# Patient Record
Sex: FEMALE | Race: WHITE | NOT HISPANIC OR LATINO | Employment: FULL TIME | URBAN - METROPOLITAN AREA
[De-identification: names, ages, dates, MRNs, and addresses within clinical notes are randomized per-mention and may not be internally consistent; named-entity substitution may affect disease eponyms.]

---

## 2017-02-09 ENCOUNTER — OFFICE VISIT (OUTPATIENT)
Dept: ORTHOPEDICS | Facility: CLINIC | Age: 52
End: 2017-02-09
Payer: COMMERCIAL

## 2017-02-09 VITALS — HEART RATE: 69 BPM | DIASTOLIC BLOOD PRESSURE: 97 MMHG | SYSTOLIC BLOOD PRESSURE: 149 MMHG | OXYGEN SATURATION: 98 %

## 2017-02-09 DIAGNOSIS — M71.9 DISORDER OF BURSAE AND TENDONS IN SHOULDER REGION: Primary | ICD-10-CM

## 2017-02-09 DIAGNOSIS — M67.919 DISORDER OF BURSAE AND TENDONS IN SHOULDER REGION: Primary | ICD-10-CM

## 2017-02-09 PROCEDURE — 99213 OFFICE O/P EST LOW 20 MIN: CPT | Mod: 25 | Performed by: ORTHOPAEDIC SURGERY

## 2017-02-09 PROCEDURE — 20610 DRAIN/INJ JOINT/BURSA W/O US: CPT | Mod: LT | Performed by: ORTHOPAEDIC SURGERY

## 2017-02-09 ASSESSMENT — PAIN SCALES - GENERAL: PAINLEVEL: MODERATE PAIN (4)

## 2017-02-09 NOTE — MR AVS SNAPSHOT
After Visit Summary   2017    Mary Sims    MRN: [id number]           Patient Information     Date Of Birth          1965        Visit Information        Provider Department      2017 1:00 PM Dorina Rodriguez MD [facility]        Today's Diagnoses     Disorder of bursae and tendons in shoulder region    -  1      Care Instructions    Thanks for coming today.  Or[facility] Clinic  [address] [place] [address]    To schedule future appointments in Ortho Clinic, you may call [phone].    To schedule ordered imaging by your provider:   Call Central Imaging [phone]    To schedule an injection ordered by your provider:  Call Central Imaging Injection scheduling line: [phone]  [email or web address] available online at:  [email or web address]    Please call if any further questions or concerns ([phone]).  Clinic hours 8 am to 5 pm.    Return to clinic (call) if symptoms worsen or fail to improve.          Follow-ups after your visit        Your next 10 appointments already scheduled     2017  9:30 AM CST   Return Visit with Dorina Rodriguez MD   [facility] ([facility])    [address] [place] [id number] [phone]              Who to contact     If you have questions or need follow up information about today's clinic visit or your schedule please contact [facility] directly at [phone].  Normal or non-critical lab and imaging results will be communicated to you by MyChart, letter or phone within 4 business days after the clinic has received the results. If you do not hear from us within 7 days, please contact the clinic through [employer] or phone. If you have a critical or abnormal lab result, we will notify you by phone as soon as possible.  Submit refill requests through [employer] or call your pharmacy and they will forward the refill  request to us. Please allow 3 business days for your refill to be completed.          Additional Information About Your Visit        [employer] Information     [employer] gives you secure access to your electronic health record. If you see a primary care provider, you can also send messages to your care team and make appointments. If you have questions, please call your primary care clinic.  If you do not have a primary care provider, please call [phone] and they will assist you.      [employer] is an electronic gateway that provides easy, online access to your medical records. With [employer], you can request a clinic appointment, read your test results, renew a prescription or communicate with your care team.     To access your existing account, please contact your [facility] or call [phone] for assistance.        Care EveryWhere ID     This is your Care EveryWhere ID. This could be used by other organizations to access your [facility] medical records  [id number]        Your Vitals Were     Pulse Last Period Pulse Oximetry             69 08/04/2015 (Approximate) 98%          Blood Pressure from Last 3 Encounters:   02/09/17 (!) 149/97   12/30/16 140/80   10/20/16 143/85    Weight from Last 3 Encounters:   12/30/16 88.9 kg (196 lb)   10/20/16 88 kg (194 lb)   10/18/16 85.7 kg (189 lb)              We Performed the Following     DRAIN/INJECT LARGE JOINT/BURSA     METHYLPREDNISOLONE 40 MG INJ        Primary Care Provider Office Phone # Fax #    Jazmin Baylee Ramírez PA-C [phone]       [facility] PHYSIC[facility] [address] [facility] [address]  [facility] [address]        Thank you!     Thank you for choosing [facility]  for your care. Our goal is always to provide you with excellent care. Hearing back from our patients is one way we can continue to improve our services. Please take a few minutes to complete the written survey that you may receive in the mail  after your visit with us. Thank you!             Your Updated Medication List - Protect others around you: Learn how to safely use, store and throw away your medicines at www.disposemymeds.org.          This list is accurate as of: 2/9/17 11:59 PM.  Always use your most recent med list.                   Brand Name Dispense Instructions for use    ferrous sulfate 325 (65 FE) MG tablet    IRON    180 tablet    Take 1 tablet (325 mg) by mouth 2 times daily       levothyroxine 88 MCG tablet    SYNTHROID/LEVOTHROID    90 tablet    TAKE 1 TABLET (88 MCG) BY MOUTH DAILY       metoprolol 25 MG 24 hr tablet    TOPROL-XL    90 tablet    Take 1 tablet (25 mg) by mouth daily

## 2017-02-09 NOTE — NURSING NOTE
"Mary Sims's goals for this visit include: Follow up with left shoulder pain.   She requests these members of her care team be copied on today's visit information: yes    PCP: Jazmin Ramírez    Referring Provider:  ESTABLISHED PATIENT  No address on file    Chief Complaint   Patient presents with     RECHECK     Shoulder left     Patient recieved a Subacromial Bursa Cortisone injection on 10/20/2016. Had a relief from the pain for three months. Pain is now becoming worse.        Initial /97 mmHg  Pulse 69  SpO2 98%  LMP 08/04/2015 (Approximate) Estimated body mass index is 33.38 kg/(m^2) as calculated from the following:    Height as of 10/20/16: 1.632 m (5' 4.25\").    Weight as of 12/30/16: 88.905 kg (196 lb).  Medication Reconciliation: complete    "

## 2017-02-09 NOTE — PATIENT INSTRUCTIONS
Thanks for coming today.  Ortho[facility] Medicine Clinic  [address] [place] [address]    To schedule future appointments in Ortho Clinic, you may call [phone].    To schedule ordered imaging by your provider:   Call Central Imaging [phone]    To schedule an injection ordered by your provider:  Call Central Imaging Injection scheduling line: [phone]  [email or web address] available online at:  [email or web address]    Please call if any further questions or concerns ([phone]).  Clinic hours 8 am to 5 pm.    Return to clinic (call) if symptoms worsen or fail to improve.

## 2017-02-24 NOTE — PROGRESS NOTES
CHIEF CONCERN:  Left shoulder pain.      HISTORY OF PRESENT ILLNESS:  Ms. Sims is a 51-year-old female who returns today for her left shoulder pain.  She notes that the injection she received on 10/20/2016 provided her significant relief for 3 months.  She states that when it was working for 3 months she had zero pain.  Now her discomfort is returning.  She has trouble with reaching away from her and notes pain over the anterior aspect of the shoulder.      PHYSICAL EXAMINATION:  On examination today, the patient is a pleasant adult female in no acute distress.  She articulates and communicates appropriately with normal affect.   Respirations are even and unlabored.   FOCUSED UPPER EXTREMITY EXAM:  Inspection of the shoulder reveals no warmth, atrophy or erythema.  She has intact sensation and motor exam without deficits.  Her left shoulder active range of motion is forward elevation to 165, external rotation at the side to 90 and internal rotation to L2 with pain.  She has some tenderness to palpation over the biceps groove.  She has a grossly positive Speed's today.  She has no tenderness to palpation over the AC joint.      IMAGING:  None  new.      ASSESSMENT:   1.  Left partial thickness supraspinatus tear.   2.  Left long head biceps disease.      RECOMMENDATIONS:  I reviewed with Ms. Sims that we could repeat her injection today.  I think a pure subacromial injection would be reasonable given her exam today.  We discussed the risks and benefits of that injection.  She would like to proceed with that.  She will then follow up on an as-needed basis.      PROCEDURE:  The left shoulder was sterilely prepped after confirming correct patient and site with date of birth and imaging.  I then injected 4 cc of 1% plain lidocaine and 40 mg of Depo-Medrol into the subacromial space.  She tolerated the injection well.

## 2017-02-27 ENCOUNTER — OFFICE VISIT (OUTPATIENT)
Dept: ORTHOPEDICS | Facility: CLINIC | Age: 52
End: 2017-02-27
Payer: COMMERCIAL

## 2017-02-27 ENCOUNTER — TELEPHONE (OUTPATIENT)
Dept: ORTHOPEDICS | Facility: CLINIC | Age: 52
End: 2017-02-27

## 2017-02-27 VITALS — HEART RATE: 74 BPM | OXYGEN SATURATION: 100 % | SYSTOLIC BLOOD PRESSURE: 129 MMHG | DIASTOLIC BLOOD PRESSURE: 89 MMHG

## 2017-02-27 DIAGNOSIS — M75.112 INCOMPLETE TEAR OF LEFT ROTATOR CUFF: Primary | ICD-10-CM

## 2017-02-27 PROCEDURE — 99213 OFFICE O/P EST LOW 20 MIN: CPT | Performed by: ORTHOPAEDIC SURGERY

## 2017-02-27 ASSESSMENT — PAIN SCALES - GENERAL: PAINLEVEL: EXTREME PAIN (8)

## 2017-02-27 NOTE — NURSING NOTE
"Mary Sims's goals for this visit include: Discuss different surgery options   She requests these members of her care team be copied on today's visit information: yes    PCP: Jazmin Ramírez    Referring Provider:  ESTABLISHED PATIENT  No address on file    Chief Complaint   Patient presents with     RECHECK     Follow up with left shoulder pain. Patient states that the Cortisone injection didnt help at all. Mary would like to discuss different surgery routes.        Initial /89 (BP Location: Left arm, Patient Position: Chair, Cuff Size: Adult Large)  Pulse 74  LMP 08/04/2015 (Approximate)  SpO2 100% Estimated body mass index is 33.38 kg/(m^2) as calculated from the following:    Height as of 10/20/16: 1.632 m (5' 4.25\").    Weight as of 12/30/16: 88.9 kg (196 lb).  Medication Reconciliation: complete    "

## 2017-02-27 NOTE — TELEPHONE ENCOUNTER
Procedure: Left  arthroscopic rotator cuff repair, subacromial decompression, open biceps tenodesis  Facility: [facility]  Length: 90 minute(s)  Surgeon: Michael ORTIZ  Anesthesia: Choice  BMI: There is no height or weight on file to calculate BMI. (If over 43 for general or 45 for MAC cannot be scheduled at  [facility])   Pre-op Appointments needed: H&P within 30 days of surgery  Post-op appointments needed:2 weeks   provider only 6 weeks   provider only   needed:  No  Surgery packet/instructions given to patient?  Yes   Special Considerations:     Raul Sanchez RN

## 2017-02-27 NOTE — PATIENT INSTRUCTIONS
[facility]  [address] [place] [id number]  Phone [phone]  Fax [phone]    SURGICAL INFORMATION & INSTRUCTIONS  Dr. Dorina Rodriguez  Name of Surgery: Left arthroscopic rotator cuff repair, subacromial decompression, open biceps tenodesis  Date of Surgery:   A surgery scheduler will contact you within 1 week of your office visit with the surgeon.  If you don't receive a phone call, please call [phone].  Arrival Time:   Time of Surgery:    Please arrive early so that we can prepare you for surgery, if you arrive later than your scheduled arrival time your surgery may be cancelled.  Please note that scheduled times may change.    Location of Surgery:   ?  [facility]  5th Floor   [address] [place] [address]  Phone [phone]  Fax [phone]  [email or web address]  Prior to surgery  ?   Call your insurance company   Ask if you need pre-approval for your surgery.  If pre-approval is needed, please call our surgery scheduler for assistance with the pre-approval process.   If you do not have insurance, please let us know.     ?   Schedule an appointment with a Primary Care Provider for a Pre-Op Physical.  This should be done within 30 days of surgery  If you do not have a primary care provider, you may call [facility] at [phone], for an appointment.  Please have your office note and any labs or tests faxed to the facility where you are having surgery. Please be sure to request a copy of your pro-op physical and bring it with you on the day of surgery.      Tell your provider if you have any of the following:   - IF you have a pacemaker or an ICD (implanted cardiac defibrillator). If you do, please bring the ID card with you on the day of surgery  - IF you're a smoker. People who smoke have a higher risk of infection after surgery. Ask your provider how you can quit smoking.  - If you have  diabetes, work with your provider to control your blood sugar. If its not well-controlled, we may need to delay surgery (or you may have problems healing afterward).  - If your surgeon asks you to see your dentist: You'll need to complete any dental work before surgery. Your dentist must send a letter to your surgery center saying it's ok to do the surgery.  ?   Pre-Op Phone Call  You will receive a pre-op phone call 1-3 days before surgery to review your eating and drinking restrictions, review medications, and confirm surgery times.      ?   7-10 days BEFORE surgery  ? Stop taking aspirin, Plavix or aspirin products 10 days before surgery or as directed by your doctor.    ? Stop taking non-steroidal anti-inflammatory medications (Motrin, Naprosyn, Ibuprofen, Aleve, Advil) 1 week before surgery or as directed by your surgeon.  This will reduce the risk of bleeding during surgery.    ? Stop taking fish oil (Omega-3-fatty acid) 1 week before surgery.    ? It is OK to take acetaminophen (Tylenol) up until 2 hours prior to surgery.    ? Take prescription medications as directed by your doctor.  Discuss which medications to take or hold prior to your surgery, with your primary care doctor.      ? If you have diabetes, ask your primary care doctor or endocrinologist how you should take your medication(s).  ?   24 hours BEFORE surgery    ? Stop drinking alcohol (beer, wine, liquor) at least 24-hours before and after your surgery.     ?   Evening BEFORE surgery      You may eat a normal meal the night before surgery, but eat nothing after midnight.       Take a shower - to help wash away bacteria (germs) from your skin.  It s normal to have bacteria on your skin and skin protects us from these germs.  When you have surgery, we cut the skin.  Sometimes germs get into the cuts and cause infection (illness caused by germs).  By following the showering instructions and using the special soap, you will lower the number of germs  on your skin.  This decreases your chance of an infection.    o Buy or get 8 ounces of antiseptic surgical soap called 4% CHG.  Common brands of this soap are Hibiclens and Exidine.      o You can find it this soap at your local pharmacy, clinic or retail store.  If you have trouble finding it, ask your pharmacist to help you find the right substitute.      o Do not shave within 12 inches of your incision (surgical cut) area for at least 3 days before surgery.  Shaving can make small cuts in the skin.  This puts you at a higher risk of infection.  Items you will need for each shower:     Newly washed towel    4 ounces of one of the recommended soaps    Follow these instructions, the evening before surgery       Wash your hair and body with your regular shampoo and soap. Make sure you rinse the shampoo and soap from your hair and body.      Using clean hands, apply about 2 ounces of soap gently on your skin from the neck to your toes. Use on your groin area last. Do not use this soap on your face or head. If you get any soap in your eyes, ears or mouth, rinse right away.       Repeat step 2. It is very important to let the soap stay on your skin for at least 1 minute.       Rinse well and dry off using a clean towel.  If you feel any tingling, itching or other irritation, rinse right away. It is normal to feel some coolness on the skin after using the antiseptic soap. Your skin may feel a bit dry after the shower, but do not use any lotions, creams or moisturizers. Do not use hair spray or other products in your hair.      Dress in freshly washed clothes or pajamas. Use fresh pillowcases and sheets on your bed.    ?   Day of Surgery    You may drink clear liquids up to 2 hours before surgery or as directed by your surgeon.  Clear liquids include: Water, Pedialyte, Gatorade, apple juice and liquids you can read through. Please avoid liquids that are red or orange in color.     Do NOT drink: milk, coffee, liquids that  have pulp, orange juice, and lemonade or tomato juice.     Do NOT chew gum, chew tobacco or suck on hard candy.      Take another shower            Follow these instructions:        Wash your hair and body with your regular shampoo and soap. Make sure you rinse the shampoo and soap from your hair and body.      Using clean hands, apply about 2 ounces of soap gently on your skin from the neck to your toes. Use on your groin area last. Do not use this soap on your face or head. If you get any soap in your eyes, ears or mouth, rinse right away.       Repeat step 2. It is very important to let the soap stay on your skin for at least 1 minute.       Rinse well and dry off using a clean towel.  If you feel any tingling, itching or other irritation, rinse right away. It is normal to feel some coolness on the skin after using the antiseptic soap. Your skin may feel a bit dry after the shower, but do not use any lotions, creams or moisturizers. Do not use hair spray or other products in your hair.      Dress in freshly washed clothes.    Do not wear deodorant, cologne, lotion, makeup, nail polish or jewelry to surgery.    ?   If there is any change in your health PRIOR to your surgery, please contact your surgeon's office.  Such as a fever, cold, cough, rash, etc     ? Arrange for someone to drive you home after surgery.    will need to be a responsible adult (18 years or older) that will provide transportation to and from surgery and stay in the waiting room during your surgery. You may not drive yourself or take public transportation to and from surgery.    ?   Arrange for someone to stay with you for 24 hours after you go home.   This person must be a responsible adult (18 years or older).    ?   Bring these items to the hospital/surgery center:   ? Insurance card(s)  ? Money for parking and co-pays, if needed  ? A list of all the medications you take, including vitamins, minerals, herbs and over the counter  medications.    ? A copy of your Advance Health Care Directive, if you have one.  This tells us what treatment(s) you would or would not want, and who would make health care decisions, if you could no longer speak for yourself.    ? A case for glasses, contact lenses, hearing aids or dentures.   ? Your inhaler or CPAP machine, if you use these at home    ?   Leave extra cash, jewelry and other valuables at home.       ?   Other information:     Sleep Apnea: Let your nurse know if you have a history of sleep apnea, only if you are having surgery at the [place].    When you arrive for surgery  When you get to the surgery center/hospital, you will:    Check in. If you are under age 18, you must be with a parent or legal guardian.      Sign consent forms, if you haven t already. These forms state that you know the risks and benefits of surgery. When you sign the forms, you give us permission to do the surgery. Do not sign them unless you understand what will happen during and after your surgery. If you have any questions about your surgery, ask to speak with your doctor before you sign the forms. If you don t understand the answers, ask again.      Receive a copy of the Patient s Bill of Rights. If you do not receive a copy, please ask for one.      Change into hospital clothes. Your belongings will be placed in a bag. We will return them to you after surgery.      Meet with the anesthesia provider. He or she will tell you what kind of anesthesia (medicine) will be used to keep you comfortable during surgery.      Remember: it s okay to remind doctors and nurses to wash their hands before touching you.      In most cases, your surgeon will use a marker to write his or her initials on the surgery site. This ensures that the exact site is operated on.      For safety reasons, we will ask you the same questions many times. For example, we may ask your name and birth date over and over again.      Friends  and family can stay with you until it s time for surgery. While you re in surgery, they will be in the waiting area. Please note that cell phones are not allowed in some patient care areas.      If you have questions about what will happen in the operating room, talk to your care team.      You will meet with an anesthesiologist, before your surgery.  He or she may reference types of anesthesia commonly used for surgeries:     General:  This involves the use of an IV for injection of medication and anesthesia. You are put into a sleep and have a breathing tube to assist you with breathing.    Sedation:  You are asleep, but not so deply that you need a breathing tube.     Local or Regional: a nerve is injected to numb the surgical area.    Spinal: you are numbed from the waist down with medicine injected into your back.    Femoral Nerve Block:  Anesthesia injected into the groin of leg which you are having surgery on.      After surgery  We will move you to a recovery room, where we will watch you closely. If you have any pain or discomfort, tell your nurse. He or she will try to make you comfortable.      If you are staying overnight, we will move you to your hospital room after you are awake.      If you are going home, we will move you to another room. Friends and family may be able to join you. The length of time you spend in recovery depend on the type of medicine you received, your medical condition, the type of surgery you had, or your response to the anesthesia given during your procedure.      When you are discharged from the recovery room, the nurses will review instructions with you and your caregiver.      Please wash your hands every time you touch the wound or change bandages or dressings.      Do not submerge the wound in water.  You may not use a bathtub or hot tub until the wound is closed. The wait time frame is generally 2-3 weeks, but any open area can be a source of incoming bacteria, so it is  better to be on the safe side and avoid water submersion until your wound is fully healed.  Keep all dressings clean and dry.       If you had surgery on your arm or leg, elevate it as much as possible to help reduce swelling.      You may put ice on the surgical area for comfort, keeping ice on area for up to 20 minutes then off for 40 minutes.  You may do this the first few days after surgery to help reduce pain and swelling.        Drink at least 8-10 glasses of liquid each day to help your body heal.      Keep your lungs clear by coughing and taking deep breaths every couple hours.  This is especially important the first 48 hours after surgery.      Notify your doctor if you have any of the following:   o Fever of 101 F or higher  o Numbness and/or tingling  o Increased pain, redness or swelling  o Drainage from wound  o Prolonged or uncontrolled bleeding  o Difficulty with movement  ?  Physical Therapy  Think about where you would like to have physical therapy (PT) after your surgery. You will need to continue PT soon after being discharged from the hospital unless specifically told by your care team to wait until follow up in clinic. If you have questions regarding this, please contact the orthopedic clinic.     Follow-up Appointments, in Clinic  If you don't already have an appointment scheduled, please call to set up an appointment at [phone].    ?   Post-Op appointment with provider. (2 and 6 weeks)    Dealing with pain  A nurse will check your comfort level often during your stay. He or she will work with you to manage your pain.  It s expected that you will have pain after surgery.  Our goal is to reduce or minimize pain by:     Remember pain is real. There are many ways to control pain. We can help you decide what works best for you.    Ask for pain medicine when you need it.  Don t try to  tough it out --this can make you feel worse. Always take your medicine as ordered.    Medicine doesn t work  the same for everyone. If your medicine isn t working, tell your nurse. There may be other medicines or treatments we can try.    Medication Refills.  If you need refills on your pain medication, please call the clinic as soon as possible.  It may take 72-business hours to obtain a refill.  Refills must be picked up at check-in 2, [facility] or mailed to a pharmacy of your choice.      It is expected that you will wean off the pain medications in a timely manner.     Constipation is a common side effect of pain medication, decreased activity and anesthesia from surgery.  Take a stool softener as prescribed by your doctor at the time of discharge.  You may also use over the counter medications as needed.  Be sure to increase your fiber (fruits and vegetables) and your water intake.      Please call the clinic at [phone], if you experience any problems or have questions.  If you are having an emergency, always call [phone] or seek immediate evaluation at the Emergency Room.    Thank you for selecting the [facility] for your care!  ---------------------------------------------

## 2017-02-27 NOTE — MR AVS SNAPSHOT
After Visit Summary   2/27/2017    Mary Sims    MRN: [id number]           Patient Information     Date Of Birth          1965        Visit Information        Provider Department      2/27/2017 9:30 AM Dorina Rodriguez MD [facility]        Today's Diagnoses     Incomplete tear of left rotator cuff    -  1      Care Instructions      [facility]  [address] [place] [address]  Phone [phone]  Fax [phone]    SURGICAL INFORMATION & INSTRUCTIONS  Dr. Dorina Rodriguez  Name of Surgery: Left arthroscopic rotator cuff repair, subacromial decompression, open biceps tenodesis  Date of Surgery:   A surgery scheduler will contact you within 1 week of your office visit with the surgeon.  If you don't receive a phone call, please call [phone].  Arrival Time:   Time of Surgery:    Please arrive early so that we can prepare you for surgery, if you arrive later than your scheduled arrival time your surgery may be cancelled.  Please note that scheduled times may change.    Location of Surgery:   ?  [facility]  5th Floor   [address] [place] [address]  Phone [phone]  Fax [phone]  [email or web address]  Prior to surgery  ?   Call your insurance company   Ask if you need pre-approval for your surgery.  If pre-approval is needed, please call our surgery scheduler for assistance with the pre-approval process.   If you do not have insurance, please let us know.     ?   Schedule an appointment with a Primary Care Provider for a Pre-Op Physical.  This should be done within 30 days of surgery  If you do not have a primary care provider, you may call [facility] at [phone], for an appointment.  Please have your office note and any labs or tests faxed to the facility where you are having surgery. Please be sure to request a copy of your pro-op physical and bring it  with you on the day of surgery.      Tell your provider if you have any of the following:   - IF you have a pacemaker or an ICD (implanted cardiac defibrillator). If you do, please bring the ID card with you on the day of surgery  - IF you're a smoker. People who smoke have a higher risk of infection after surgery. Ask your provider how you can quit smoking.  - If you have diabetes, work with your provider to control your blood sugar. If its not well-controlled, we may need to delay surgery (or you may have problems healing afterward).  - If your surgeon asks you to see your dentist: You'll need to complete any dental work before surgery. Your dentist must send a letter to your surgery center saying it's ok to do the surgery.  ?   Pre-Op Phone Call  You will receive a pre-op phone call 1-3 days before surgery to review your eating and drinking restrictions, review medications, and confirm surgery times.      ?   7-10 days BEFORE surgery  ? Stop taking aspirin, Plavix or aspirin products 10 days before surgery or as directed by your doctor.    ? Stop taking non-steroidal anti-inflammatory medications (Motrin, Naprosyn, Ibuprofen, Aleve, Advil) 1 week before surgery or as directed by your surgeon.  This will reduce the risk of bleeding during surgery.    ? Stop taking fish oil (Omega-3-fatty acid) 1 week before surgery.    ? It is OK to take acetaminophen (Tylenol) up until 2 hours prior to surgery.    ? Take prescription medications as directed by your doctor.  Discuss which medications to take or hold prior to your surgery, with your primary care doctor.      ? If you have diabetes, ask your primary care doctor or endocrinologist how you should take your medication(s).  ?   24 hours BEFORE surgery    ? Stop drinking alcohol (beer, wine, liquor) at least 24-hours before and after your surgery.     ?   Evening BEFORE surgery      You may eat a normal meal the night before surgery, but eat nothing after midnight.        Take a shower - to help wash away bacteria (germs) from your skin.  It s normal to have bacteria on your skin and skin protects us from these germs.  When you have surgery, we cut the skin.  Sometimes germs get into the cuts and cause infection (illness caused by germs).  By following the showering instructions and using the special soap, you will lower the number of germs on your skin.  This decreases your chance of an infection.    o Buy or get 8 ounces of antiseptic surgical soap called 4% CHG.  Common brands of this soap are Hibiclens and Exidine.      o You can find it this soap at your local pharmacy, clinic or retail store.  If you have trouble finding it, ask your pharmacist to help you find the right substitute.      o Do not shave within 12 inches of your incision (surgical cut) area for at least 3 days before surgery.  Shaving can make small cuts in the skin.  This puts you at a higher risk of infection.  Items you will need for each shower:     Newly washed towel    4 ounces of one of the recommended soaps    Follow these instructions, the evening before surgery       Wash your hair and body with your regular shampoo and soap. Make sure you rinse the shampoo and soap from your hair and body.      Using clean hands, apply about 2 ounces of soap gently on your skin from the neck to your toes. Use on your groin area last. Do not use this soap on your face or head. If you get any soap in your eyes, ears or mouth, rinse right away.       Repeat step 2. It is very important to let the soap stay on your skin for at least 1 minute.       Rinse well and dry off using a clean towel.  If you feel any tingling, itching or other irritation, rinse right away. It is normal to feel some coolness on the skin after using the antiseptic soap. Your skin may feel a bit dry after the shower, but do not use any lotions, creams or moisturizers. Do not use hair spray or other products in your hair.      Dress in freshly  washed clothes or pajamas. Use fresh pillowcases and sheets on your bed.    ?   Day of Surgery    You may drink clear liquids up to 2 hours before surgery or as directed by your surgeon.  Clear liquids include: Water, Pedialyte, Gatorade, apple juice and liquids you can read through. Please avoid liquids that are red or orange in color.     Do NOT drink: milk, coffee, liquids that have pulp, orange juice, and lemonade or tomato juice.     Do NOT chew gum, chew tobacco or suck on hard candy.      Take another shower            Follow these instructions:        Wash your hair and body with your regular shampoo and soap. Make sure you rinse the shampoo and soap from your hair and body.      Using clean hands, apply about 2 ounces of soap gently on your skin from the neck to your toes. Use on your groin area last. Do not use this soap on your face or head. If you get any soap in your eyes, ears or mouth, rinse right away.       Repeat step 2. It is very important to let the soap stay on your skin for at least 1 minute.       Rinse well and dry off using a clean towel.  If you feel any tingling, itching or other irritation, rinse right away. It is normal to feel some coolness on the skin after using the antiseptic soap. Your skin may feel a bit dry after the shower, but do not use any lotions, creams or moisturizers. Do not use hair spray or other products in your hair.      Dress in freshly washed clothes.    Do not wear deodorant, cologne, lotion, makeup, nail polish or jewelry to surgery.    ?   If there is any change in your health PRIOR to your surgery, please contact your surgeon's office.  Such as a fever, cold, cough, rash, etc     ? Arrange for someone to drive you home after surgery.    will need to be a responsible adult (18 years or older) that will provide transportation to and from surgery and stay in the waiting room during your surgery. You may not drive yourself or take public transportation to and  from surgery.    ?   Arrange for someone to stay with you for 24 hours after you go home.   This person must be a responsible adult (18 years or older).    ?   Bring these items to the hospital/surgery center:   ? Insurance card(s)  ? Money for parking and co-pays, if needed  ? A list of all the medications you take, including vitamins, minerals, herbs and over the counter medications.    ? A copy of your Advance Health Care Directive, if you have one.  This tells us what treatment(s) you would or would not want, and who would make health care decisions, if you could no longer speak for yourself.    ? A case for glasses, contact lenses, hearing aids or dentures.   ? Your inhaler or CPAP machine, if you use these at home    ?   Leave extra cash, jewelry and other valuables at home.       ?   Other information:     Sleep Apnea: Let your nurse know if you have a history of sleep apnea, only if you are having surgery at the [facility].    When you arrive for surgery  When you get to the surgery center/hospital, you will:    Check in. If you are under age 18, you must be with a parent or legal guardian.      Sign consent forms, if you haven t already. These forms state that you know the risks and benefits of surgery. When you sign the forms, you give us permission to do the surgery. Do not sign them unless you understand what will happen during and after your surgery. If you have any questions about your surgery, ask to speak with your doctor before you sign the forms. If you don t understand the answers, ask again.      Receive a copy of the Patient s Bill of Rights. If you do not receive a copy, please ask for one.      Change into hospital clothes. Your belongings will be placed in a bag. We will return them to you after surgery.      Meet with the anesthesia provider. He or she will tell you what kind of anesthesia (medicine) will be used to keep you comfortable during surgery.      Remember: it s  okay to remind doctors and nurses to wash their hands before touching you.      In most cases, your surgeon will use a marker to write his or her initials on the surgery site. This ensures that the exact site is operated on.      For safety reasons, we will ask you the same questions many times. For example, we may ask your name and birth date over and over again.      Friends and family can stay with you until it s time for surgery. While you re in surgery, they will be in the waiting area. Please note that cell phones are not allowed in some patient care areas.      If you have questions about what will happen in the operating room, talk to your care team.      You will meet with an anesthesiologist, before your surgery.  He or she may reference types of anesthesia commonly used for surgeries:     General:  This involves the use of an IV for injection of medication and anesthesia. You are put into a sleep and have a breathing tube to assist you with breathing.    Sedation:  You are asleep, but not so deply that you need a breathing tube.     Local or Regional: a nerve is injected to numb the surgical area.    Spinal: you are numbed from the waist down with medicine injected into your back.    Femoral Nerve Block:  Anesthesia injected into the groin of leg which you are having surgery on.      After surgery  We will move you to a recovery room, where we will watch you closely. If you have any pain or discomfort, tell your nurse. He or she will try to make you comfortable.      If you are staying overnight, we will move you to your hospital room after you are awake.      If you are going home, we will move you to another room. Friends and family may be able to join you. The length of time you spend in recovery depend on the type of medicine you received, your medical condition, the type of surgery you had, or your response to the anesthesia given during your procedure.      When you are discharged from the recovery  room, the nurses will review instructions with you and your caregiver.      Please wash your hands every time you touch the wound or change bandages or dressings.      Do not submerge the wound in water.  You may not use a bathtub or hot tub until the wound is closed. The wait time frame is generally 2-3 weeks, but any open area can be a source of incoming bacteria, so it is better to be on the safe side and avoid water submersion until your wound is fully healed.  Keep all dressings clean and dry.       If you had surgery on your arm or leg, elevate it as much as possible to help reduce swelling.      You may put ice on the surgical area for comfort, keeping ice on area for up to 20 minutes then off for 40 minutes.  You may do this the first few days after surgery to help reduce pain and swelling.        Drink at least 8-10 glasses of liquid each day to help your body heal.      Keep your lungs clear by coughing and taking deep breaths every couple hours.  This is especially important the first 48 hours after surgery.      Notify your doctor if you have any of the following:   o Fever of 101 F or higher  o Numbness and/or tingling  o Increased pain, redness or swelling  o Drainage from wound  o Prolonged or uncontrolled bleeding  o Difficulty with movement  ?  Physical Therapy  Think about where you would like to have physical therapy (PT) after your surgery. You will need to continue PT soon after being discharged from the hospital unless specifically told by your care team to wait until follow up in clinic. If you have questions regarding this, please contact the orthopedic clinic.     Follow-up Appointments, in Clinic  If you don't already have an appointment scheduled, please call to set up an appointment at [phone].    ?   Post-Op appointment with provider. (2 and 6 weeks)    Dealing with pain  A nurse will check your comfort level often during your stay. He or she will work with you to manage your  pain.  It s expected that you will have pain after surgery.  Our goal is to reduce or minimize pain by:     Remember pain is real. There are many ways to control pain. We can help you decide what works best for you.    Ask for pain medicine when you need it.  Don t try to  tough it out --this can make you feel worse. Always take your medicine as ordered.    Medicine doesn t work the same for everyone. If your medicine isn t working, tell your nurse. There may be other medicines or treatments we can try.    Medication Refills.  If you need refills on your pain medication, please call the clinic as soon as possible.  It may take 72-business hours to obtain a refill.  Refills must be picked up at check-in 2, [facility] or mailed to a pharmacy of your choice.      It is expected that you will wean off the pain medications in a timely manner.     Constipation is a common side effect of pain medication, decreased activity and anesthesia from surgery.  Take a stool softener as prescribed by your doctor at the time of discharge.  You may also use over the counter medications as needed.  Be sure to increase your fiber (fruits and vegetables) and your water intake.      Please call the clinic at [phone], if you experience any problems or have questions.  If you are having an emergency, always call [phone] or seek immediate evaluation at the Emergency Room.    Thank you for selecting the [facility] for your care!  ---------------------------------------------        Follow-ups after your visit        Who to contact     If you have questions or need follow up information about today's clinic visit or your schedule please contact [facility] directly at [phone].  Normal or non-critical lab and imaging results will be communicated to you by MyChart, letter or phone within 4 business days after the clinic has received the results. If you do not hear from us within 7  days, please contact the clinic through [employer] or phone. If you have a critical or abnormal lab result, we will notify you by phone as soon as possible.  Submit refill requests through [employer] or call your pharmacy and they will forward the refill request to us. Please allow 3 business days for your refill to be completed.          Additional Information About Your Visit        [employer] Information     [employer] gives you secure access to your electronic health record. If you see a primary care provider, you can also send messages to your care team and make appointments. If you have questions, please call your primary care clinic.  If you do not have a primary care provider, please call [phone] and they will assist you.      [employer] is an electronic gateway that provides easy, online access to your medical records. With [employer], you can request a clinic appointment, read your test results, renew a prescription or communicate with your care team.     To access your existing account, please contact your [facility] or call [phone] for assistance.        Care EveryWhere ID     This is your Care EveryWhere ID. This could be used by other organizations to access your [facility] medical records  [id number]        Your Vitals Were     Pulse Last Period Pulse Oximetry             74 08/04/2015 (Approximate) 100%          Blood Pressure from Last 3 Encounters:   02/27/17 129/89   02/09/17 (!) 149/97   12/30/16 140/80    Weight from Last 3 Encounters:   12/30/16 88.9 kg (196 lb)   10/20/16 88 kg (194 lb)   10/18/16 85.7 kg (189 lb)              We Performed the Following     Julianne-Operative Worksheet        Primary Care Provider Office Phone # Fax #    Jazmin Ramírez PA-C [phone]       [facility] [address]  [facility] [address]        Thank you!     Thank you for choosing [facility]  for your care. Our goal is always  to provide you with excellent care. Hearing back from our patients is one way we can continue to improve our services. Please take a few minutes to complete the written survey that you may receive in the mail after your visit with us. Thank you!             Your Updated Medication List - Protect others around you: Learn how to safely use, store and throw away your medicines at www.disposemymeds.org.          This list is accurate as of: 2/27/17 10:08 AM.  Always use your most recent med list.                   Brand Name Dispense Instructions for use    ferrous sulfate 325 (65 FE) MG tablet    IRON    180 tablet    Take 1 tablet (325 mg) by mouth 2 times daily       levothyroxine 88 MCG tablet    SYNTHROID/LEVOTHROID    90 tablet    TAKE 1 TABLET (88 MCG) BY MOUTH DAILY       metoprolol 25 MG 24 hr tablet    TOPROL-XL    90 tablet    Take 1 tablet (25 mg) by mouth daily

## 2017-02-27 NOTE — PROGRESS NOTES
CHIEF CONCERN:    HISTORY OF PRESENT ILLNESS:    PHYSICAL EXAM:    IMAGING:    ASSESSMENT:    PLAN:CHIEF CONCERN:  Left shoulder pain.      HISTORY OF PRESENT ILLNESS:  Mary returns to clinic to follow up on her left shoulder pain.  Mary received a cortisone shot on 2/9/2017.  She says that did not help nearly as much as her U/S guided biceps groove injection.  She relates that her shoulder is hurting all the time.  It ranges between 4 and 8/10 pain.  She states that it aches all the time.  It is worse by moving it.  Reaching in particular makes it worse.  She takes 2 Aleve and Tylenol at a time.  States this is helping her.  She will awake frequently with pain.  She has tried physical therapy, which did not help.  She got an injection in the fall in her biceps under ultrasound guidance, which helped her.  She relates that she is having trouble exercising because of her shoulder pain.  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  She has pain predominantly on the lateral aspect of her proximal humerus and secondarily over her biceps groove.       PHYSICAL EXAMINATION:  The patient is pleasant, in no acute distress.   Her breathing is nonlabored on room air.   Focused exam of the left upper extremity reveals sensation intact to median, ulnar, radial and axillary nerve distributions.  She can fire her FPL, EPL and intrinsics.  Her radial pulse is +2.  She has forward flexion to 160 degrees.  She has abduction to 90 degrees and got up 160 with my assistance.  She had external rotation to 90 degrees and internal rotation to T7.  She has 4/5 strength in forward flexion and 5/5 strength in external rotation and 5/5 strength for bear hug.  She is tender over her biceps tendon and had a positive Speed's test.      IMAGING:  We reviewed her Left shoulder MRI from 02/17/2016 which showed an intrasubstance/thickness tear of her supraspinatus, tendinosis of her articular biceps  tendon and degenerative changes of her labrum.      ASSESSMENT:   1.  Left shoulder partial thickness tear of the supraspinatus tendon.   2.  Left shoulder long head biceps tendinosis.      PLAN:  We discussed the options with Ms. Sims for her shoulder pain.  These include continued conservative management, injection of PRP and surgery.  We discussed an arthroscopic rotator cuff repair. We also discussed management of the long head of the biceps with either tenotomy or tenodesis.  Due to her job, we recommended tenodesis as she does a lot of work at her job that involves supinating and pronating her forearm.  We discussed the risks and benefits of surgery as well as the likely recovery period which involves wearing a sling for 6 weeks and starting physical therapy between 2 and 4 weeks. She would like to schedule surgery.  She will require a preop physical and preoperative labs.     Dictated by Kat Thapa MD  Orthopaedic Surgery Resident    I have personally examined this patient and have reviewed the clinical presentation and progress note with the resident.  I agree with the treatment plan as outlined.  The plan was formulated with the resident on the day of the resident's note.

## 2017-02-27 NOTE — TELEPHONE ENCOUNTER
Date Scheduled: 4-11-17  Facility: [facility]  Surgeon: Dr. Rodriguez   Post-op appointment scheduled:   Next 5 appointments (look out 90 days)     Apr 27, 2017  8:00 AM CDT   Return Visit with Dorina Rodriguez MD   [facility])    [address] [place] [address] [id number] [phone]            May 22, 2017  8:00 AM CDT   Return Visit with Dorina Rodriguez MD   [facility])    [address] [place] [id number] [phone]                   scheduled?: No  Surgery packet/instructions confirmed received?  Yes  Special Considerations:   Jessica Lujan, Surgery Scheduling Coordinator

## 2017-03-03 DIAGNOSIS — E03.9 HYPOTHYROIDISM: ICD-10-CM

## 2017-03-03 NOTE — TELEPHONE ENCOUNTER
levothyroxine      Last Written Prescription Date: 12/30/16  Last Quantity: 90, # refills: 1  Last Office Visit with [facility], [facility] or [facility] prescribing provider: 9/6/16   Next 5 appointments (look out 90 days)     Mar 13, 2017 10:00 AM CDT   Pre-Op physical with P[facility] ([facility])    [address] [facility] [id number]   [phone]            Apr 27, 2017  8:00 AM CDT   [facility] Visit with Dorina Rodriguez MD   [facility]    [address] [place] [id number] [phone]            May 22, 2017  8:00 AM CDT   Return Visit with Dorina Rodriguez MD   [facility]    [address] [place] [id number] [phone]                   TSH   Date Value Ref Range Status   04/18/2016 0.53 0.40 - 4.00 mU/L Final

## 2017-03-07 RX ORDER — LEVOTHYROXINE SODIUM 88 UG/1
TABLET ORAL
Qty: 90 TABLET | Refills: 0 | Status: SHIPPED | OUTPATIENT
Start: 2017-03-07 | End: 2017-05-15

## 2017-03-07 NOTE — TELEPHONE ENCOUNTER
levothyroxine (SYNTHROID, LEVOTHROID) 88 MCG tablet 90 tablet 1 9/6/2016  No   Sig: TAKE 1 TABLET (88 MCG) BY MOUTH DAILY     Last office visit: 12/30/16    Prescription approved per [facility] Refill Protocol.  Natacha Cheng RN

## 2017-03-13 ENCOUNTER — OFFICE VISIT (OUTPATIENT)
Dept: FAMILY MEDICINE | Facility: CLINIC | Age: 52
End: 2017-03-13
Payer: COMMERCIAL

## 2017-03-13 VITALS
DIASTOLIC BLOOD PRESSURE: 76 MMHG | BODY MASS INDEX: 33.73 KG/M2 | SYSTOLIC BLOOD PRESSURE: 130 MMHG | WEIGHT: 197.6 LBS | HEIGHT: 64 IN | HEART RATE: 71 BPM | TEMPERATURE: 97.4 F | OXYGEN SATURATION: 99 %

## 2017-03-13 DIAGNOSIS — M75.102 NONTRAUMATIC TEAR OF SUPRASPINATUS TENDON, LEFT: ICD-10-CM

## 2017-03-13 DIAGNOSIS — Z01.818 PREOP GENERAL PHYSICAL EXAM: Primary | ICD-10-CM

## 2017-03-13 DIAGNOSIS — E03.9 HYPOTHYROIDISM, UNSPECIFIED TYPE: ICD-10-CM

## 2017-03-13 DIAGNOSIS — D50.9 IRON DEFICIENCY ANEMIA, UNSPECIFIED IRON DEFICIENCY ANEMIA TYPE: ICD-10-CM

## 2017-03-13 DIAGNOSIS — I10 HYPERTENSION GOAL BP (BLOOD PRESSURE) < 140/90: ICD-10-CM

## 2017-03-13 LAB
ANION GAP SERPL CALCULATED.3IONS-SCNC: 7 MMOL/L (ref 3–14)
BUN SERPL-MCNC: 14 MG/DL (ref 7–30)
CALCIUM SERPL-MCNC: 9 MG/DL (ref 8.5–10.1)
CHLORIDE SERPL-SCNC: 105 MMOL/L (ref 94–109)
CO2 SERPL-SCNC: 29 MMOL/L (ref 20–32)
CREAT SERPL-MCNC: 0.73 MG/DL (ref 0.52–1.04)
GFR SERPL CREATININE-BSD FRML MDRD: 83 ML/MIN/1.7M2
GLUCOSE SERPL-MCNC: 80 MG/DL (ref 70–99)
HGB BLD-MCNC: 13.5 G/DL (ref 11.7–15.7)
POTASSIUM SERPL-SCNC: 4.3 MMOL/L (ref 3.4–5.3)
SODIUM SERPL-SCNC: 141 MMOL/L (ref 133–144)
TSH SERPL DL<=0.005 MIU/L-ACNC: 0.46 MU/L (ref 0.4–4)

## 2017-03-13 PROCEDURE — 93000 ELECTROCARDIOGRAM COMPLETE: CPT | Performed by: NURSE PRACTITIONER

## 2017-03-13 PROCEDURE — 36415 COLL VENOUS BLD VENIPUNCTURE: CPT | Performed by: NURSE PRACTITIONER

## 2017-03-13 PROCEDURE — 99214 OFFICE O/P EST MOD 30 MIN: CPT | Performed by: NURSE PRACTITIONER

## 2017-03-13 PROCEDURE — 80048 BASIC METABOLIC PNL TOTAL CA: CPT | Performed by: NURSE PRACTITIONER

## 2017-03-13 PROCEDURE — 85018 HEMOGLOBIN: CPT | Performed by: NURSE PRACTITIONER

## 2017-03-13 PROCEDURE — 84443 ASSAY THYROID STIM HORMONE: CPT | Performed by: NURSE PRACTITIONER

## 2017-03-13 NOTE — PATIENT INSTRUCTIONS
Based on your medical history and these are the current health maintenance or preventive care services that you are due for (some may have been done at this visit)  Health Maintenance Due   Topic Date Due     HEPATITIS C SCREENING  06/04/1981     PAP Q3 YR  03/11/2016         At [facility], we strive to deliver an exceptional experience to you, every time we see you.    If you receive a survey in the mail, please send us back your thoughts. We really do value your feedback.    Your care team's suggested websites for health information:  Www.[employer].org : Up to date and easily searchable information on multiple topics.  Www.medlineplus.gov : medication info, interactive tutorials, watch real surgeries online  Www.familydoctor.org : good info from the Academy of Family Physicians  Www.cdc.gov : public health info, travel advisories, epidemics (H1N1)  Www.aap.org : children's health info, normal development, vaccinations  Www.health.[facility].[place].us : [place] dept of health, public health issues in [place], N1N1    How to contact your care team:   Team [place] [phone]         Pharmacy [phone]    Dr. Rich, Teri Bingham PA-C, Dr. Aems, Gretel SHAH CNP, Tierney Mosqueda PA-C, Dr. Álvarez, and PABLO Deng CNP    Team RNs: Rosy & Danitza      Clinic hours  M-Th 7 am-7 pm   Fri 7 am-5 pm.   Urgent care M-F 11 am-9 pm,   Sat/Sun 9 am-5 pm.  Pharmacy M-Th 8 am-8 pm Fri 8 am-6 pm  Sat/Sun 9 am-5 pm.     All password changes, disabled accounts, or ID changes in [employer]/MyHealth will be done by our Access Services Department.    If you need help with your account or password, call: [phone]. Clinic staff no longer has the ability to change passwords.     Before Your Surgery      Call your surgeon if there is any change in your health. This includes signs of a cold or flu (such as a sore throat, runny nose, cough, rash or fever).    Do not smoke, drink alcohol or take  over the counter medicine (unless your surgeon or primary care doctor tells you to) for the 24 hours before and after surgery.    If you take prescribed drugs: Follow your doctor s orders about which medicines to take and which to stop until after surgery.    Eating and drinking prior to surgery: follow the instructions from your surgeon    Take a shower or bath the night before surgery. Use the soap your surgeon gave you to gently clean your skin. If you do not have soap from your surgeon, use your regular soap. Do not shave or scrub the surgery site.  Wear clean pajamas and have clean sheets on your bed.

## 2017-03-13 NOTE — MR AVS SNAPSHOT
After Visit Summary   3/13/2017    Mary Sims    MRN: [id number]           Patient Information     Date Of Birth          1965        Visit Information        Provider Department      3/13/2017 10:00 AM Janie Riley APRN CNP [facility]        Today's Diagnoses     Preop general physical exam    -  1    Nontraumatic tear of supraspinatus tendon, left        Hypertension goal BP (blood pressure) < 140/90        Hypothyroidism, unspecified type        Iron deficiency anemia, unspecified iron deficiency anemia type          Care Instructions    Based on your medical history and these are the current health maintenance or preventive care services that you are due for (some may have been done at this visit)  Health Maintenance Due   Topic Date Due     HEPATITIS C SCREENING  06/04/1981     PAP Q3 YR  03/11/2016         At [facility], we strive to deliver an exceptional experience to you, every time we see you.    If you receive a survey in the mail, please send us back your thoughts. We really do value your feedback.    Your care team's suggested websites for health information:  Www.[employer].org : Up to date and easily searchable information on multiple topics.  Www.medlineplus.gov : medication info, interactive tutorials, watch real surgeries online  Www.familydoctor.org : good info from the Academy of Family Physicians  Www.cdc.gov : public health info, travel advisories, epidemics (H1N1)  Www.aap.org : children's health info, normal development, vaccinations  Www.health.[facility].[place].us : [place] dept of health, public health issues in [place], N1N1    How to contact your care team:   Team [facility] [phone]         Pharmacy [phone]    Dr. Rich, Teri Bingham PA-C, Dr. Ames, Gretel SHAH CNP, Tierney Mosqueda PA-C, Dr. Álvarez, and PABLO Deng CNP    Team RNs: Rosy Bosch      Clinic hours  M-Th 7 am-7 pm   Fri 7 am-5  pm.   Urgent care M-F 11 am-9 pm,   Sat/Sun 9 am-5 pm.  Pharmacy M-Th 8 am-8 pm Fri 8 am-6 pm  Sat/Sun 9 am-5 pm.     All password changes, disabled accounts, or ID changes in MyChart/MyHealth will be done by our Access Services Department.    If you need help with your account or password, call: [phone]. Clinic staff no longer has the ability to change passwords.     Before Your Surgery      Call your surgeon if there is any change in your health. This includes signs of a cold or flu (such as a sore throat, runny nose, cough, rash or fever).    Do not smoke, drink alcohol or take over the counter medicine (unless your surgeon or primary care doctor tells you to) for the 24 hours before and after surgery.    If you take prescribed drugs: Follow your doctor s orders about which medicines to take and which to stop until after surgery.    Eating and drinking prior to surgery: follow the instructions from your surgeon    Take a shower or bath the night before surgery. Use the soap your surgeon gave you to gently clean your skin. If you do not have soap from your surgeon, use your regular soap. Do not shave or scrub the surgery site.  Wear clean pajamas and have clean sheets on your bed.         Follow-ups after your visit        Your next 10 appointments already scheduled     Apr 11, 2017   Procedure with Dorina Rodriguez MD   [facility] ([facility])    [address] [place] [address]  [address] [place] [id number] [phone]           Located in the Clinics and Surgery Center at [address] [place] [address].   parking is very convenient and highly recommended.  is a $6 flat rate fee.  Both  and self parkers should enter the main arrival plaza from [facility]; parking attendants will direct you based on your parking preference.            Apr 27, 2017  8:00 AM CDT   Return Visit with Dorina Rodriguez MD   [facility]  "[facility] ([facility])    [address] [place] [address] [phone]            May 22, 2017  8:00 AM CDT   Return Visit with Dorina Rodriguez MD   [facility] ([facility])    [address] [address] [address] [phone]              Who to contact     If you have questions or need follow up information about today's clinic visit or your schedule please contact [facility] directly at [phone].  Normal or non-critical lab and imaging results will be communicated to you by [employer], letter or phone within 4 business days after the clinic has received the results. If you do not hear from us within 7 days, please contact the clinic through [employer] or phone. If you have a critical or abnormal lab result, we will notify you by phone as soon as possible.  Submit refill requests through [employer] or call your pharmacy and they will forward the refill request to us. Please allow 3 business days for your refill to be completed.          Additional Information About Your Visit        [employer] Information     [employer] gives you secure access to your electronic health record. If you see a primary care provider, you can also send messages to your care team and make appointments. If you have questions, please call your primary care clinic.  If you do not have a primary care provider, please call [phone] and they will assist you.        Care EveryWhere ID     This is your Care EveryWhere ID. This could be used by other organizations to access your [place] medical records  [id number]        Your Vitals Were     Pulse Temperature Height Last Period Pulse Oximetry BMI (Body Mass Index)    71 97.4  F (36.3  C) (Oral) 5' 3.86\" (1.622 m) 08/04/2015 (Approximate) 99% 34.07 kg/m2       Blood Pressure from Last 3 Encounters:   03/13/17 130/76   02/27/17 129/89   02/09/17 (!) 149/97    Weight from Last 3 Encounters: "   03/13/17 197 lb 9.6 oz (89.6 kg)   12/30/16 196 lb (88.9 kg)   10/20/16 194 lb (88 kg)              We Performed the Following     Basic metabolic panel  (Ca, Cl, CO2, Creat, Gluc, K, Na, BUN)     EKG 12-lead complete w/read - Clinics     Hemoglobin     TSH with free T4 reflex        Primary Care Provider Office Phone # Fax #    Jazmin Baylee Ramírez PA-C [phone]       [facility] [address] [facility] [address]  [facility] [address]        Thank you!     Thank you for choosing [facility]  for your care. Our goal is always to provide you with excellent care. Hearing back from our patients is one way we can continue to improve our services. Please take a few minutes to complete the written survey that you may receive in the mail after your visit with us. Thank you!             Your Updated Medication List - Protect others around you: Learn how to safely use, store and throw away your medicines at www.disposemymeds.org.          This list is accurate as of: 3/13/17 11:08 AM.  Always use your most recent med list.                   Brand Name Dispense Instructions for use    ferrous sulfate 325 (65 FE) MG tablet    IRON    180 tablet    Take 1 tablet (325 mg) by mouth 2 times daily       levothyroxine 88 MCG tablet    SYNTHROID/LEVOTHROID    90 tablet    TAKE 1 TABLET BY MOUTH DAILY       metoprolol 25 MG 24 hr tablet    TOPROL-XL    90 tablet    Take 1 tablet (25 mg) by mouth daily

## 2017-03-13 NOTE — PROGRESS NOTES
[address] [address]  [phone]  Dept: [phone]    PRE-OP EVALUATION:  Today's date: 3/13/2017    Mary Sims (: 1965) presents for pre-operative evaluation assessment as requested by Dorina May.  She requires evaluation and anesthesia risk assessment prior to undergoing surgery/procedure for treatment of Left Arthroscopic Rotator Cuff Repair, Subacromial Decompression, Open Bioceps Tenodesis  .  Proposed procedure: ARTHROSCOPY SHOULDER ROTATOR CUFF REPAIR, SUBACROMIAL DECOMPRESSION, DISTAL CLAVICLE RESECTION, OPEN BICEP TENODESIS REPAIR    Date of Surgery/ Procedure: 2017  Time of Surgery/ Procedure: 10AM  Hospital/Surgical Facility:  OR  Fax number for surgical facility:   Primary Physician: Jazmin Ramírez  Type of Anesthesia Anticipated: General    Patient has a Health Care Directive or Living Will:  NO    1. NO - Do you have a history of heart attack, stroke, stent, bypass or surgery on an artery in the head, neck, heart or legs?  2. NO - Do you ever have any pain or discomfort in your chest?  3. NO - Do you have a history of  Heart Failure?  4. NO - Are you troubled by shortness of breath when: walking on the level, up a slight hill or at night?  5. NO - Do you currently have a cold, bronchitis or other respiratory infection?  6. NO - Do you have a cough, shortness of breath or wheezing?  7. NO - Do you sometimes get pains in the calves of your legs when you walk?  8. NO - Do you or anyone in your family have previous history of blood clots?  9. NO - Do you or does anyone in your family have a serious bleeding problem such as prolonged bleeding following surgeries or cuts?  10. YES - Have you ever had problems with anemia or been told to take iron pills?  After pregnancies  11. NO - Have you had any abnormal blood loss such as black, tarry or bloody stools, or abnormal vaginal bleeding?  12. NO -  Have you ever had a blood transfusion?  13. NO - Have you or any of your relatives ever had problems with anesthesia?  14. NO - Do you have sleep apnea, excessive snoring or daytime drowsiness?  15. NO - Do you have any prosthetic heart valves?  16. NO - Do you have prosthetic joints?  17. NO - Is there any chance that you may be pregnant?      HPI:                                                      Brief HPI related to upcoming procedure: Patient has long history of  Left shoulder pain, has done physical therapy, had shoulder injection 10/2016 that worked well for 3 months but her pain returned, had another injection 2/9/17 but received no pain relief. MRI 2/2016 showed left shoulder partial thickness tear of the supraspinatus tendon, left long head biceps tendinosis. She is scheduled for ARTHROSCOPY SHOULDER ROTATOR CUFF REPAIR, SUBACROMIAL DECOMPRESSION, DISTAL CLAVICLE RESECTION, OPEN BICEP TENODESIS REPAIR on 4/11/17 but she is on a waiting list to see if she can have the procedure done sooner.       See problem list for active medical problems.  Problems all longstanding and stable, except as noted/documented.  See ROS for pertinent symptoms related to these conditions.                                                                                                  .  HYPERTENSION - Patient has longstanding history of mod-severe HTN , currently denies any symptoms referable to elevated blood pressure. Specifically denies chest pain, palpitations, dyspnea, orthopnea, PND or peripheral edema. Blood pressure readings have been in normal range. Current medication regimen is as listed below. Patient denies any side effects of medication.                                                                                                                                                                                          .  ANEMIA - Patient has a recent history of moderate severity anemia, which has not been  symptomatic. Work up to date has revealed iron deficiency anemia. Treatment has been dietary.                                                                                                                   .  HYPOTHYROIDISM - Patient has a longstanding history of chronic Hypothyroidism. Patient has been doing well, noting no tremor, insomnia, hair loss or changes in skin texture. Last TSH value of 0.53 16. Continues to take medications as directed, without adverse reactions or side effects.                                                                                                                                                                                                                        .    MEDICAL HISTORY:                                                      Patient Active Problem List    Diagnosis Date Noted     Nontraumatic tear of supraspinatus tendon, left 2016     Priority: Medium     Obesity 2016     Priority: Medium     WONG (iron deficiency anemia) 2014     Priority: Medium     Hypothyroidism 2011     Priority: Medium     Hypertension goal BP (blood pressure) < 140/90 2011     Priority: Medium     CARDIOVASCULAR SCREENING; LDL GOAL LESS THAN 160 10/31/2010     Priority: Medium      Past Medical History   Diagnosis Date     Graves disease      Hypertension      Hypothyroidism      MEDICAL HISTORY OF -      S/P RADIOACTIVE IODINE     Palpitation      Past Surgical History   Procedure Laterality Date     C  delivery only       Surgical history of -        RT ANKLE Fx AND REPAIR (PIN,PLATE,SCREWS)     Colonoscopy  2013     Procedure: COLONOSCOPY;  colonoscopy, screening;  Surgeon: Dalton Lala MD;  Location: [facility] OR     Current Outpatient Prescriptions   Medication Sig Dispense Refill     levothyroxine (SYNTHROID/LEVOTHROID) 88 MCG tablet TAKE 1 TABLET BY MOUTH DAILY 90 tablet 0     metoprolol (TOPROL-XL) 25 MG 24 hr tablet Take 1  "tablet (25 mg) by mouth daily 90 tablet 3     ferrous sulfate 325 (65 FE) MG tablet Take 1 tablet (325 mg) by mouth 2 times daily (Patient not taking: Reported on 3/13/2017) 180 tablet 1     OTC products: None, except as noted above    Allergies   Allergen Reactions     No Known Drug Allergy       Latex Allergy: NO    Social History   Substance Use Topics     Smoking status: Never Smoker     Smokeless tobacco: Never Used     Alcohol use No     History   Drug Use No       REVIEW OF SYSTEMS:                                                    Constitutional, HEENT, cardiovascular, pulmonary, gi and gu systems are negative, except as otherwise noted.    EXAM:                                                    /76 (BP Location: Left arm, Patient Position: Chair, Cuff Size: Adult Regular)  Pulse 71  Temp 97.4  F (36.3  C) (Oral)  Ht 5' 3.86\" (1.622 m)  Wt 197 lb 9.6 oz (89.6 kg)  LMP 08/04/2015 (Approximate)  SpO2 99%  BMI 34.07 kg/m2    GENERAL APPEARANCE: healthy, alert and no distress     EYES: EOMI, PERRL     HENT: ear canals and TM's normal and nose and mouth without ulcers or lesions     NECK: no adenopathy, no asymmetry, masses, or scars and thyroid normal to palpation     RESP: lungs clear to auscultation - no rales, rhonchi or wheezes     CV: regular rates and rhythm, normal S1 S2, no S3 or S4 and no murmur, click or rub     ABDOMEN:  soft, nontender, no HSM or masses and bowel sounds normal     SKIN: no suspicious lesions or rashes     NEURO: Normal strength and tone, sensory exam grossly normal, mentation intact and speech normal     PSYCH: mentation appears normal. and affect normal/bright     LYMPHATICS: No axillary, cervical, or supraclavicular nodes    DIAGNOSTICS:                                                    EKG: appears normal, NSR, normal axis, normal intervals, no acute ST/T changes c/w ischemia, no LVH by voltage criteria, there are no prior tracings available    Recent Labs   Lab Test  " 04/18/16   1245  01/16/15   1358  05/15/14   1356   HGB   --   13.2  9.9*   PLT   --   236  376   NA  141   --   139   POTASSIUM  3.8   --   4.3   CR  0.67   --   0.92      Component      Latest Ref Rng & Units 3/13/2017   Sodium      133 - 144 mmol/L 141   Potassium      3.4 - 5.3 mmol/L 4.3   Chloride      94 - 109 mmol/L 105   Carbon Dioxide      20 - 32 mmol/L 29   Anion Gap      3 - 14 mmol/L 7   Glucose      70 - 99 mg/dL 80   Urea Nitrogen      7 - 30 mg/dL 14   Creatinine      0.52 - 1.04 mg/dL 0.73   GFR Estimate      >60 mL/min/1.7m2 83   GFR Estimate If Black      >60 mL/min/1.7m2 >90 . . .   Calcium      8.5 - 10.1 mg/dL 9.0   Hemoglobin      11.7 - 15.7 g/dL 13.5   TSH      0.40 - 4.00 mU/L 0.46       IMPRESSION:                                                    Reason for surgery/procedure: left shoulder partial thickness tear of the supraspinatus tendon, left long head biceps tendinosis/ ARTHROSCOPY SHOULDER ROTATOR CUFF REPAIR, SUBACROMIAL DECOMPRESSION, DISTAL CLAVICLE RESECTION, OPEN BICEP TENODESIS REPAIR  Diagnosis/reason for consult: Preoperative physical    The proposed surgical procedure is considered INTERMEDIATE risk.    REVISED CARDIAC RISK INDEX  The patient has the following serious cardiovascular risks for perioperative complications such as (MI, PE, VFib and 3  AV Block):  No serious cardiac risks  INTERPRETATION: 0 risks: Class I (very low risk - 0.4% complication rate)    The patient has the following additional risks for perioperative complications:  No identified additional risks  The 10-year ASCVD risk score (Edel JHONATHAN Jr., et al., 2013) is: 1.7%    Values used to calculate the score:      Age: 51 years      Sex: Female      Is Non- : No      Diabetic: No      Tobacco smoker: No      Systolic Blood Pressure: 130 mmHg      Is Prescribed Antihypertensives: Yes      HDL Cholesterol: 68 mg/dL      Total Cholesterol: 188 mg/dL      ICD-10-CM    1. Preop general  physical exam Z01.818 EKG 12-lead complete w/read - Clinics   2. Nontraumatic tear of supraspinatus tendon, left M75.102    3. Hypertension goal BP (blood pressure) < 140/90 I10 TSH with free T4 reflex     Basic metabolic panel  (Ca, Cl, CO2, Creat, Gluc, K, Na, BUN)   4. Hypothyroidism, unspecified type E03.9 TSH with free T4 reflex   5. Iron deficiency anemia, unspecified iron deficiency anemia type D50.9 Hemoglobin       RECOMMENDATIONS:                                                        --Patient is to take all scheduled medications on the day of surgery EXCEPT for modifications listed below.    APPROVAL GIVEN to proceed with proposed procedure, without further diagnostic evaluation       Signed Electronically by: PABLO Glez CNP    Copy of this evaluation report is provided to requesting physician.    [facility] Preop Guidelines

## 2017-03-13 NOTE — NURSING NOTE
"Chief Complaint   Patient presents with     Pre-Op Exam       Initial /76 (BP Location: Left arm, Patient Position: Chair, Cuff Size: Adult Regular)  Pulse 71  Temp 97.4  F (36.3  C) (Oral)  Ht 5' 3.86\" (1.622 m)  Wt 197 lb 9.6 oz (89.6 kg)  LMP 08/04/2015 (Approximate)  SpO2 99%  BMI 34.07 kg/m2 Estimated body mass index is 34.07 kg/(m^2) as calculated from the following:    Height as of this encounter: 5' 3.86\" (1.622 m).    Weight as of this encounter: 197 lb 9.6 oz (89.6 kg).  Medication Reconciliation: complete   Britney Cruz MA      "

## 2017-03-15 NOTE — PROGRESS NOTES
Faxed Pre-op notes, lab and EKG to [facility],  [phone] and [phone], right fax at both confirmed  At 8:53 am today.  Trang Chirinos MA/  For Teams [facility] and [facility]

## 2017-04-07 ENCOUNTER — DOCUMENTATION ONLY (OUTPATIENT)
Dept: ORTHOPEDICS | Facility: CLINIC | Age: 52
End: 2017-04-07

## 2017-04-07 NOTE — PROGRESS NOTES
Received forms from [facility] for Short Term Disability. Forms filled out and will be faxed (to [phone]) after provider signs them.  Pt will  forms on Monday after provider signs them. Just needs to be called when they are put up front.    Raul Sanchez RN

## 2017-04-10 ENCOUNTER — ANESTHESIA EVENT (OUTPATIENT)
Dept: SURGERY | Facility: AMBULATORY SURGERY CENTER | Age: 52
End: 2017-04-10

## 2017-04-11 ENCOUNTER — SURGERY (OUTPATIENT)
Age: 52
End: 2017-04-11

## 2017-04-11 ENCOUNTER — ANESTHESIA (OUTPATIENT)
Dept: SURGERY | Facility: AMBULATORY SURGERY CENTER | Age: 52
End: 2017-04-11

## 2017-04-11 ENCOUNTER — HOSPITAL ENCOUNTER (OUTPATIENT)
Facility: AMBULATORY SURGERY CENTER | Age: 52
End: 2017-04-11
Attending: ORTHOPAEDIC SURGERY

## 2017-04-11 VITALS
WEIGHT: 190 LBS | TEMPERATURE: 97 F | HEIGHT: 64 IN | HEART RATE: 68 BPM | SYSTOLIC BLOOD PRESSURE: 146 MMHG | OXYGEN SATURATION: 95 % | DIASTOLIC BLOOD PRESSURE: 96 MMHG | RESPIRATION RATE: 16 BRPM | BODY MASS INDEX: 32.44 KG/M2

## 2017-04-11 DIAGNOSIS — Z98.890 S/P ROTATOR CUFF REPAIR: Primary | ICD-10-CM

## 2017-04-11 DEVICE — IMP ANCHOR ARTHREX BIO-SWIVELOCK 5.5MM AR-2323BCC: Type: IMPLANTABLE DEVICE | Site: SHOULDER | Status: FUNCTIONAL

## 2017-04-11 DEVICE — IMP ANCHOR ARTHREX BC SWVLK TENODESIS 6.25X19.1MM AR-1662BC: Type: IMPLANTABLE DEVICE | Site: SHOULDER | Status: FUNCTIONAL

## 2017-04-11 DEVICE — IMP ANCHOR ARTHREX BIO-SWIVELOCK 4.75X22MM AR-2324BCC-2: Type: IMPLANTABLE DEVICE | Site: SHOULDER | Status: FUNCTIONAL

## 2017-04-11 RX ORDER — ONDANSETRON 4 MG/1
4 TABLET, ORALLY DISINTEGRATING ORAL EVERY 30 MIN PRN
Status: DISCONTINUED | OUTPATIENT
Start: 2017-04-11 | End: 2017-04-12 | Stop reason: HOSPADM

## 2017-04-11 RX ORDER — SODIUM CHLORIDE, SODIUM LACTATE, POTASSIUM CHLORIDE, CALCIUM CHLORIDE 600; 310; 30; 20 MG/100ML; MG/100ML; MG/100ML; MG/100ML
INJECTION, SOLUTION INTRAVENOUS CONTINUOUS
Status: DISCONTINUED | OUTPATIENT
Start: 2017-04-11 | End: 2017-04-12 | Stop reason: HOSPADM

## 2017-04-11 RX ORDER — BUPIVACAINE HYDROCHLORIDE 2.5 MG/ML
INJECTION, SOLUTION EPIDURAL; INFILTRATION; INTRACAUDAL PRN
Status: DISCONTINUED | OUTPATIENT
Start: 2017-04-11 | End: 2017-04-11 | Stop reason: HOSPADM

## 2017-04-11 RX ORDER — OXYCODONE HYDROCHLORIDE 5 MG/1
5-10 TABLET ORAL
Qty: 80 TABLET | Refills: 0 | Status: SHIPPED | OUTPATIENT
Start: 2017-04-11 | End: 2017-04-27

## 2017-04-11 RX ORDER — BUPIVACAINE HYDROCHLORIDE 5 MG/ML
INJECTION, SOLUTION EPIDURAL; INTRACAUDAL PRN
Status: DISCONTINUED | OUTPATIENT
Start: 2017-04-11 | End: 2017-04-11

## 2017-04-11 RX ORDER — FLUMAZENIL 0.1 MG/ML
0.2 INJECTION, SOLUTION INTRAVENOUS
Status: DISCONTINUED | OUTPATIENT
Start: 2017-04-11 | End: 2017-04-11 | Stop reason: HOSPADM

## 2017-04-11 RX ORDER — LIDOCAINE 40 MG/G
CREAM TOPICAL
Status: DISCONTINUED | OUTPATIENT
Start: 2017-04-11 | End: 2017-04-11 | Stop reason: HOSPADM

## 2017-04-11 RX ORDER — ACETAMINOPHEN 325 MG/1
975 TABLET ORAL ONCE
Status: COMPLETED | OUTPATIENT
Start: 2017-04-11 | End: 2017-04-11

## 2017-04-11 RX ORDER — MEPERIDINE HYDROCHLORIDE 25 MG/ML
12.5 INJECTION INTRAMUSCULAR; INTRAVENOUS; SUBCUTANEOUS
Status: DISCONTINUED | OUTPATIENT
Start: 2017-04-11 | End: 2017-04-12 | Stop reason: HOSPADM

## 2017-04-11 RX ORDER — SODIUM CHLORIDE, SODIUM LACTATE, POTASSIUM CHLORIDE, CALCIUM CHLORIDE 600; 310; 30; 20 MG/100ML; MG/100ML; MG/100ML; MG/100ML
INJECTION, SOLUTION INTRAVENOUS CONTINUOUS PRN
Status: DISCONTINUED | OUTPATIENT
Start: 2017-04-11 | End: 2017-04-11

## 2017-04-11 RX ORDER — AMOXICILLIN 250 MG
1-2 CAPSULE ORAL 2 TIMES DAILY
Qty: 30 TABLET | Refills: 0 | Status: SHIPPED | OUTPATIENT
Start: 2017-04-11 | End: 2017-04-27

## 2017-04-11 RX ORDER — GABAPENTIN 300 MG/1
300 CAPSULE ORAL ONCE
Status: COMPLETED | OUTPATIENT
Start: 2017-04-11 | End: 2017-04-11

## 2017-04-11 RX ORDER — NALOXONE HYDROCHLORIDE 0.4 MG/ML
.1-.4 INJECTION, SOLUTION INTRAMUSCULAR; INTRAVENOUS; SUBCUTANEOUS
Status: DISCONTINUED | OUTPATIENT
Start: 2017-04-11 | End: 2017-04-11 | Stop reason: HOSPADM

## 2017-04-11 RX ORDER — ONDANSETRON 2 MG/ML
INJECTION INTRAMUSCULAR; INTRAVENOUS PRN
Status: DISCONTINUED | OUTPATIENT
Start: 2017-04-11 | End: 2017-04-11

## 2017-04-11 RX ORDER — NALOXONE HYDROCHLORIDE 0.4 MG/ML
.1-.4 INJECTION, SOLUTION INTRAMUSCULAR; INTRAVENOUS; SUBCUTANEOUS
Status: DISCONTINUED | OUTPATIENT
Start: 2017-04-11 | End: 2017-04-12 | Stop reason: HOSPADM

## 2017-04-11 RX ORDER — DEXAMETHASONE SODIUM PHOSPHATE 4 MG/ML
INJECTION, SOLUTION INTRA-ARTICULAR; INTRALESIONAL; INTRAMUSCULAR; INTRAVENOUS; SOFT TISSUE PRN
Status: DISCONTINUED | OUTPATIENT
Start: 2017-04-11 | End: 2017-04-11

## 2017-04-11 RX ORDER — SODIUM CHLORIDE, SODIUM LACTATE, POTASSIUM CHLORIDE, CALCIUM CHLORIDE 600; 310; 30; 20 MG/100ML; MG/100ML; MG/100ML; MG/100ML
INJECTION, SOLUTION INTRAVENOUS CONTINUOUS
Status: DISCONTINUED | OUTPATIENT
Start: 2017-04-11 | End: 2017-04-11 | Stop reason: HOSPADM

## 2017-04-11 RX ORDER — PROPOFOL 10 MG/ML
INJECTION, EMULSION INTRAVENOUS CONTINUOUS PRN
Status: DISCONTINUED | OUTPATIENT
Start: 2017-04-11 | End: 2017-04-11

## 2017-04-11 RX ORDER — ONDANSETRON 2 MG/ML
4 INJECTION INTRAMUSCULAR; INTRAVENOUS EVERY 30 MIN PRN
Status: DISCONTINUED | OUTPATIENT
Start: 2017-04-11 | End: 2017-04-12 | Stop reason: HOSPADM

## 2017-04-11 RX ORDER — KETOROLAC TROMETHAMINE 30 MG/ML
30 INJECTION, SOLUTION INTRAMUSCULAR; INTRAVENOUS EVERY 6 HOURS PRN
Status: DISCONTINUED | OUTPATIENT
Start: 2017-04-11 | End: 2017-04-12 | Stop reason: HOSPADM

## 2017-04-11 RX ORDER — HYDROXYZINE HYDROCHLORIDE 25 MG/1
25 TABLET, FILM COATED ORAL EVERY 6 HOURS PRN
Qty: 50 TABLET | Refills: 0 | Status: SHIPPED | OUTPATIENT
Start: 2017-04-11 | End: 2018-04-25

## 2017-04-11 RX ORDER — FENTANYL CITRATE 50 UG/ML
25-50 INJECTION, SOLUTION INTRAMUSCULAR; INTRAVENOUS
Status: DISCONTINUED | OUTPATIENT
Start: 2017-04-11 | End: 2017-04-11 | Stop reason: HOSPADM

## 2017-04-11 RX ORDER — PROPOFOL 10 MG/ML
INJECTION, EMULSION INTRAVENOUS PRN
Status: DISCONTINUED | OUTPATIENT
Start: 2017-04-11 | End: 2017-04-11

## 2017-04-11 RX ORDER — LIDOCAINE HYDROCHLORIDE 20 MG/ML
INJECTION, SOLUTION INFILTRATION; PERINEURAL PRN
Status: DISCONTINUED | OUTPATIENT
Start: 2017-04-11 | End: 2017-04-11

## 2017-04-11 RX ORDER — CEFAZOLIN SODIUM 1 G/3ML
1 INJECTION, POWDER, FOR SOLUTION INTRAMUSCULAR; INTRAVENOUS SEE ADMIN INSTRUCTIONS
Status: DISCONTINUED | OUTPATIENT
Start: 2017-04-11 | End: 2017-04-11 | Stop reason: HOSPADM

## 2017-04-11 RX ADMIN — SODIUM CHLORIDE, SODIUM LACTATE, POTASSIUM CHLORIDE, CALCIUM CHLORIDE: 600; 310; 30; 20 INJECTION, SOLUTION INTRAVENOUS at 09:59

## 2017-04-11 RX ADMIN — PROPOFOL: 10 INJECTION, EMULSION INTRAVENOUS at 10:43

## 2017-04-11 RX ADMIN — ONDANSETRON 4 MG: 2 INJECTION INTRAMUSCULAR; INTRAVENOUS at 10:04

## 2017-04-11 RX ADMIN — BUPIVACAINE HYDROCHLORIDE 5 ML: 2.5 INJECTION, SOLUTION EPIDURAL; INFILTRATION; INTRACAUDAL at 11:40

## 2017-04-11 RX ADMIN — GABAPENTIN 300 MG: 300 CAPSULE ORAL at 09:02

## 2017-04-11 RX ADMIN — ACETAMINOPHEN 975 MG: 325 TABLET ORAL at 09:02

## 2017-04-11 RX ADMIN — PROPOFOL: 10 INJECTION, EMULSION INTRAVENOUS at 11:24

## 2017-04-11 RX ADMIN — DEXAMETHASONE SODIUM PHOSPHATE 10 MG: 4 INJECTION, SOLUTION INTRA-ARTICULAR; INTRALESIONAL; INTRAMUSCULAR; INTRAVENOUS; SOFT TISSUE at 10:04

## 2017-04-11 RX ADMIN — SODIUM CHLORIDE, SODIUM LACTATE, POTASSIUM CHLORIDE, CALCIUM CHLORIDE: 600; 310; 30; 20 INJECTION, SOLUTION INTRAVENOUS at 09:09

## 2017-04-11 RX ADMIN — BUPIVACAINE HYDROCHLORIDE 10 ML: 5 INJECTION, SOLUTION EPIDURAL; INTRACAUDAL at 09:17

## 2017-04-11 RX ADMIN — FENTANYL CITRATE 25 MCG: 50 INJECTION, SOLUTION INTRAMUSCULAR; INTRAVENOUS at 09:21

## 2017-04-11 RX ADMIN — LIDOCAINE HYDROCHLORIDE 80 MG: 20 INJECTION, SOLUTION INFILTRATION; PERINEURAL at 10:02

## 2017-04-11 RX ADMIN — PROPOFOL 200 MCG/KG/MIN: 10 INJECTION, EMULSION INTRAVENOUS at 10:04

## 2017-04-11 RX ADMIN — PROPOFOL 240 MG: 10 INJECTION, EMULSION INTRAVENOUS at 10:02

## 2017-04-11 NOTE — DISCHARGE INSTRUCTIONS
[facility]  Home Care Following Anesthesia  For 24 hours after surgery:  1. Get plenty of rest.  A responsible adult must stay with you for at least 24 hours after you leave the surgery center.  2. Do not drive or use heavy equipment.  If you have weakness or tingling, don't drive or use heavy equipment until this feeling goes away.   3. Do not drink alcohol.   4. Avoid strenuous or risky activities.  Ask for help when climbing stairs.  5. You may feel lightheaded.  IF so, sit for a few minutes before standing.  Have someone help you get up.   6. If you have nausea (feel sick to your stomach): Drink only clear liquids such as apple juice, ginger ale, broth or 7-Up.  Rest may also help.  Be sure to drink enough fluids.  Move to a regular diet as you feel able.   7. You may have a slight fever.  Call the doctor if your fever is over 100 F (37.7 C) (taken under the tongue) or lasts longer than 24 hours.  8. You may have a dry mouth, a sore throat, muscle aches or trouble sleeping. These should go away after 24 hours.  9. Do not make important or legal decisions.         Tips for taking pain medications  To get the best pain relief possible, remember these points:    Take pain medications as directed, before pain becomes severe.    Pain medication can upset your stomach: taking it with food may help.    Constipation is a common side effect of pain medication. Drink plenty of  fluids.    Eat foods high in fiber. Take a stool softener if recommended by your doctor or pharmacist.    Do not drink alcohol, drive or operate machinery while taking pain medications.    Ask about other ways to control pain, such as with heat, ice or relaxation.    Call a doctor for any of the followin. Signs of infection (fever, growing tenderness at the surgery site, a large amount of drainage or bleeding, severe pain, foul-smelling drainage, redness, swelling).  2. It has been over 8 to 10 hours since  surgery and you are still not able to urinate (pass water).  3. Headache for over 24 hours.  4. Numbness, tingling or weakness the day after surgery (if you had spinal anesthesia).  Your doctor is:  Dr. Dorina Rodriguez, Orthopaedics: [phone]                    Or dial [phone] and ask for the resident on call for:  Orthopaedics  For emergency care, call the:  [facility] Emergency Department: [phone] (TTY for hearing impaired: [phone])  Tips for taking pain medications    To get the best pain relief possible, remember these points:    Take pain medications as directed, before pain becomes severe.    Pain medication can upset your stomach: taking it with food may help.    Constipation is a common side effect of pain medication. Drink plenty of  fluids.    Eat foods high in fiber. Take a stool softener if recommended by your doctor or pharmacist.    Do not drink alcohol, drive or operate machinery while taking pain medications.    Ask about other ways to control pain, such as with heat, ice or relaxation.    Post Operative Instructions: Regional Anesthetic for Upper Extremity    General Information:   Regional anesthesia is when local anesthetic or  numbing  medication is injected around the nerves to anesthetize or  numb  the area supplied by that set of nerves.      Types of Regional Blocks:  Interscalene: A block injected into the neck on the operative shoulder/arm of a patient having shoulder surgery  Supraclavicular: A block injected near the clavicle on the operative shoulder of a patient having elbow, forearm, or hand surgery    Procedure:  The type of anesthesia your doctor used to numb your shoulder or arm will usually not wear off for 6-18 hours, but may last as long as 24 hours. You should be careful during that period, since it is possible to injure your arm without being aware of the injury. While your arm is numb, you should:    Avoid striking or bumping your arm    Avoid extreme hot or  cold    Diet:  There are no restrictions on your diet. You should drink plenty of fluids.     Discomfort:  You will have a tingling and prickly sensation in your arm as the feeling begins to return. You can also expect some discomfort. The amount of discomfort is unpredictable, but if you have more pain than can be controlled with pain medication you should notify your physician.     Pain Medicine:   Begin taking your oral pain pills (if you have not already done so) before bedtime and during the night to avoid a sudden onset of pain as the block wears off.  Do not engage in drinking, driving, or hazardous occupations while taking pain medication.     Stitches:   You may have stitches or special skin closures. You doctor will inform you when to return to the office to have them removed.     Activity:  On the day of surgery you should try to stay in bed with your hand elevated on pillows. You may resume your normal activity after that, wearing a sling for comfort. Contact your physician if you have any of the problems:     Continued numbness or tingling in the arm or hand after 24 hours    Swelling of the fingers or fingers that are cold to the touch    Excessive bleeding or drainage    Severe pain

## 2017-04-11 NOTE — IP AVS SNAPSHOT
[facility]    [address] [place] [id number]    Phone:  [phone]    Fax:  [phone]                                       After Visit Summary   4/11/2017    Mary Sims    MRN: [id number]           After Visit Summary Signature Page     I have received my discharge instructions, and my questions have been answered. I have discussed any challenges I see with this plan with the nurse or doctor.    ..........................................................................................................................................  Patient/Patient Representative Signature      ..........................................................................................................................................  Patient Representative Print Name and Relationship to Patient    ..................................................               ................................................  Date                                            Time    ..........................................................................................................................................  Reviewed by Signature/Title    ...................................................              ..............................................  Date                                                            Time

## 2017-04-11 NOTE — ANESTHESIA PROCEDURE NOTES
Peripheral Nerve Block Procedure Note    Staff:     Anesthesiologist:  BARRY DE LA CRUZ    Referred By:  HAO YOUNG  Location: Pre-op  Procedure Start/Stop TImes:      4/11/2017 9:11 AM     4/11/2017 9:21 AM    patient identified, IV checked, site marked, risks and benefits discussed, informed consent, monitors and equipment checked, pre-op evaluation, at physician/surgeon's request and post-op pain management      Correct Patient: Yes      Correct Position: Yes      Correct Site: Yes      Correct Procedure: Yes      Correct Laterality:  Yes    Site Marked:  Yes  Procedure details:     Procedure:  Interscalene    ASA:  2    Laterality:  Left    Position:  Supine    Sterile Prep: chloraprep, mask and sterile gloves      Local skin infiltration:  2% lidocaine    Needle:  Insulated and short bevel    Needle gauge:  21    Needle length (inches):  4    Ultrasound: Yes      Ultrasound used to identify targeted nerve, plexus, or vascular structure and placed a needle adjacent to it      Permanent Image entered into patiient's record      Abnormal pain on injection: No      Blood Aspirated: No      Paresthesias:  No    Bleeding at site: No      Bolus via:  Needle    Infusion Method:  Single Shot    Complications:  None  Assessment/Narrative:      Manometer used.  PSI remained below 15 PSI

## 2017-04-11 NOTE — BRIEF OP NOTE
[facility]    Brief Operative Note    Pre-operative diagnosis: Cuff Tear Incomplete and Biceps Disease  Post-operative diagnosis Left shoulder rotator cuff tear, biceps disease  Procedure: Procedure(s):  Left Arthroscopic Rotator Cuff Repair, Subacromial Decompression, Open Bioceps Tenodesis - Wound Class: I-Clean  Surgeon: Surgeon(s) and Role:     * Dorina Rodriguez MD - Primary  Anesthesia: General   Estimated blood loss: Less than 10 ml  Drains: None  Specimens: * No specimens in log *  Findings:   See op note  Complications: None.  Implants: See op note    PLAN:  - Discharge home in stable condition  - Oxycodone, Vistaril for pain  - Shower/dressing change POD#3  - NWB with LUE in sling at all times  - PT: follow RCR protocol  - Follow up within 2 weeks

## 2017-04-11 NOTE — PROGRESS NOTES
S. Patient was seen today, at [facility] preop for measurements/delivery of an Ultrasling IV for left shoulder as ordered by Dr. Rodriguez.    O/goal. To reduce motion and help with post-op support    A. At this time, I have provided patient with a size medium Don Tiffany Ultrasling IV. Straps were trimmed and adjusted to achieve a custom fit for the patient.    P. Patient/staffing have been instructed to contact our facility with any future questions and/or concerns.    Edgar Ultrasling IV Instructions

## 2017-04-11 NOTE — ANESTHESIA CARE TRANSFER NOTE
Patient: Mary Sims    Procedure(s):  Left Arthroscopic Rotator Cuff Repair, Subacromial Decompression, Open Bioceps Tenodesis - Wound Class: I-Clean    Diagnosis: Cuff Tear Incomplete and Biceps Disease  Diagnosis Additional Information: No value filed.    Anesthesia Type:   General, LMA, Periph. Nerve Block for postop pain     Note:  Airway :Nasal Cannula  Patient transferred to:PACU  Comments: Patient to PACU on 4L O2 via NC and opens eyes to voice. Report to RN and transfer of care. BP: 137/82 HR: 83 SpO2: 98% on 4L O2 via NC. Temp: 36.0 RR: 12      Vitals: (Last set prior to Anesthesia Care Transfer)    CRNA VITALS  4/11/2017 1127 - 4/11/2017 1201      4/11/2017             Resp Rate (observed): (!)  6                Electronically Signed By: PABLO Cruz CRNA  April 11, 2017  12:01 PM

## 2017-04-11 NOTE — OP NOTE
DATE OF PROCEDURE: 4/11/2017     PREOPERATIVE DIAGNOSES:   1. Left rotator cuff tear  2. Left long head biceps disease.   3. Left subacromial bursitis with anterior acromial spur.     POSTOPERATIVE DIAGNOSES:   1. Left rotator cuff tear  2. Left long head biceps disease.  3. Left subacromial bursitis with large anterior acromial spur     PROCEDURES:   1. Left arthroscopic rotator cuff repair.   2. Left arthroscopic glenohumeral debridement  3. Left subacromial bursectomy with bony acromioplasty.   4. Left open biceps tenodesis    STAFF SURGEON: Dorina Rodriguez MD   ASSISTANT: Miquel Peña PA-C   The assistance of Miquel Peña was needed for assistance with patient position, instrument management, and retraction for open tenodesis exposure in the absence of a suitably qualified available orthopaedic resident to assist (not PGY1)    ANESTHESIA: General endotracheal anesthesia with supplementary interscalene block.   ESTIMATED BLOOD LOSS: 5 mL.     IMPLANTS: Arthrex 4.75 Bio-SwiveLock x 1, 5.5 x 2, 6.25 x 1.   COMPLICATIONS: None.     BRIEF PATIENT HISTORY: Mary Sims is a 51 year old female I have seen in clinic. Please refer to that documentation. She has an MRI which demonstrated a near full thickness tear involving the supraspinatus and she has had marked clinical symptoms of long head biceps disease. The patient has had nonoperative management including physical therapy and cortisone injection. She has not had adequate lasting relief of pain and is at this time having lifestyle limiting symptoms. She wishes at this time to proceed with surgical intervention. We had discussed the risks and benefits of both non-operative and surgical management. We discussed the expected postoperative restrictions. We discussed the risks of surgery including failure of the cuff to heal or risk of retear, risk of being no better or even worse if she  became stiff, infected, had an injury to the nerves or arteries which power  the arm or hand or had a reaction to anesthesia. We discussed the postoperative rehabilitation course. The patient wished to proceed with surgery and informed consent was completed.    DESCRIPTION OF PROCEDURE: The patient was identified in the preoperative area and the correct left shoulder was marked for surgery. The patient was provided an interscalene block by our Anesthesia colleagues and was taken to the operating room where she was surrendered to general endotracheal anesthesia. The patient was moved to the operating table in the beachchair position with all bony prominences well padded. The head was placed in a head guadalupe with the neck in neutral position. The left upper extremity was prepped and draped in the usual sterile fashion. A timeout was held in accordance with hospital policy, confirming correct patient, side, site, procedure and administration of IV antibiotics prior to incision.   I initiated shoulder arthroscopy through a posterior viewing portal. I created an anterior working portal under direct visualization. I performed a diagnostic arthroscopy. There was an intact subscapularis, infraspinatus, and teres. The supraspinatus had an intact articular surface but appeared thin and atrophic. The long head of the biceps was slightly thickened and injected in the biceps groove. The chondral surfaces were well preserved.   I performed a tenotomy of the long head of the biceps and allowed it to retract out of the shoulder for later tenodesis. I debrided the stump back to a stable edge. I then moved to the subacromial space and performed a thorough subacromial bursectomy. There was marked thickened bursitis and severe fraying of the supraspinatus over a course of at least 1 cm from the supraspinatus footprint. Only perhaps 10% of the tendon was attached (at the articular surface). I debrided this to complete the tear. The supraspinatus quality was poor given the severe splitting and fraying of the  tendon extending medial to the original insertion. I exposed the undersurface of the acromion and noted a very large anterior hook or spur (Neer type 3). I used a raya to remove the spur and smooth the acromion to a type 1. Next I debrided the greater tuberosity at the tear site. I placed a medial 4.75 Bio-SwiveLock anchors which was double loaded and with FiberTape in the eyelet. I passed the sutures in mattress fashion. The FiberWire sutures were tied down. All sutures were then brought laterally to 2 5.5 Bio-SwiveLock anchors. This reapproximated the tendon to the greater tuberosity.     Next, I made a longitudinal incision near the axilla centered over the inferior edge of the pectoralis. I dissected down to the pec and came underneath the muscle where I identified the long head of the biceps. This tendon was delivered into the wound and starting at the musculotendinous junction I placed a stitch with FiberLoop extending 2 cm down the tendon. I removed the remaining proximal tendon. I then exposed the site for tenodesis beneath the pectoralis below the biceps groove. I placed a asad retractor and long thin rich retractor to safely expose the site. I drilled a 6.5 tunnel and used the 6.25 SwivelLock to deliver the tendon into the tunnel. I anchored the SwivelLock with good purchase and appropriate tension on the tendon. The sutures were cut flush.  All instruments were removed from the shoulder and then incision were closed in layered fashion with Monocryl. Steri-Strips and a soft sterile dressing were applied. The arm was placed into an abduction sling and the patient was extubated and transported to the recovery room in stable condition. There were no apparent intraoperative complications.     POSTOPERATIVE PLAN:   1. The patient will be discharged to home when she meets Same Day discharge criteria.   2. The patient will have no range of motion of the shoulder for 4 weeks but can do active hand, wrist and  elbow range of motion.   3. At 4 weeks, the patient will start passive and active assisted range of motion and progress to active range of motion over the following 6 weeks with strengthening at 3 months.     HAO YOUNG MD

## 2017-04-11 NOTE — IP AVS SNAPSHOT
MRN:[id number]                      After Visit Summary   4/11/2017    Mary Sims    MRN: [id number]           Thank you!     Thank you for choosing [facility] for your care. Our goal is always to provide you with excellent care. Hearing back from our patients is one way we can continue to improve our services. Please take a few minutes to complete the written survey that you may receive in the mail after you visit with us. Thank you!        Patient Information     Date Of Birth          1965        About your hospital stay     You were admitted on:  April 11, 2017 [facility]    You were discharged on:  April 11, 2017       Who to Call     For medical emergencies, please call 911.  For non-urgent questions about your medical care, please call your primary care provider or clinic, [phone]  For questions related to your surgery, please call your surgery clinic        Attending Provider     Provider Specialty    Dorina Rodriguez MD Orthopedics       Primary Care Provider Office Phone # Fax #    Jazmin Baylee Ramírez PA-C [phone]       [facility] PHYSICIAN [facility] [address] [id number]        After Care Instructions      Diet as Tolerated       Return to diet before surgery, unless instructed otherwise.            Discharge Instructions       Review outpatient procedure discharge instructions with patient as directed by Provider            Discharge Instructions - Lifting Limit (specify)       Lifting limit zero pounds until seen at Post-op follow up appointment.            Dressing Change       Change dressing on third day after surgery.            Ice to affected area       Ice pack to surgical site every 15 minutes per hour for 24 hours            No driving or operating machinery        No driving or alcohol while taking narcotics            No weight bearing       Operative extremity  in sling at all times except hygiene and PT            Return to clinic       Return to clinic in 2 weeks            Shower        POD#3, Cover dressing if dressing is not going to be changed today            Wound care       Do not immerse wound in water until sutures removed                  Your next 10 appointments already scheduled     Apr 27, 2017  8:00 AM CDT   [facility]   [facility]    [address] [facility] [address]   [phone]            May 22, 2017  8:00 AM CDT   Return Visit with Dorina Rodriguez MD   [facility]    [address], [place] [address] [phone]              Further instructions from your care team       [facility]  Home Care Following Anesthesia  For 24 hours after surgery:  1. Get plenty of rest.  A responsible adult must stay with you for at least 24 hours after you leave the surgery center.  2. Do not drive or use heavy equipment.  If you have weakness or tingling, don't drive or use heavy equipment until this feeling goes away.   3. Do not drink alcohol.   4. Avoid strenuous or risky activities.  Ask for help when climbing stairs.  5. You may feel lightheaded.  IF so, sit for a few minutes before standing.  Have someone help you get up.   6. If you have nausea (feel sick to your stomach): Drink only clear liquids such as apple juice, ginger ale, broth or 7-Up.  Rest may also help.  Be sure to drink enough fluids.  Move to a regular diet as you feel able.   7. You may have a slight fever.  Call the doctor if your fever is over 100 F (37.7 C) (taken under the tongue) or lasts longer than 24 hours.  8. You may have a dry mouth, a sore throat, muscle aches or trouble sleeping. These should go away after 24 hours.  9. Do not make important or legal decisions.         Tips for taking  pain medications  To get the best pain relief possible, remember these points:    Take pain medications as directed, before pain becomes severe.    Pain medication can upset your stomach: taking it with food may help.    Constipation is a common side effect of pain medication. Drink plenty of  fluids.    Eat foods high in fiber. Take a stool softener if recommended by your doctor or pharmacist.    Do not drink alcohol, drive or operate machinery while taking pain medications.    Ask about other ways to control pain, such as with heat, ice or relaxation.    Call a doctor for any of the followin. Signs of infection (fever, growing tenderness at the surgery site, a large amount of drainage or bleeding, severe pain, foul-smelling drainage, redness, swelling).  2. It has been over 8 to 10 hours since surgery and you are still not able to urinate (pass water).  3. Headache for over 24 hours.  4. Numbness, tingling or weakness the day after surgery (if you had spinal anesthesia).  Your doctor is:  Dr. Dorina Rodriguez, Orthopaedics: [phone]                    Or dial [phone] and ask for the resident on call for:  Orthopaedics  For emergency care, call the:  [facility]: [phone] (TTY for hearing impaired: [phone])  Tips for taking pain medications    To get the best pain relief possible, remember these points:    Take pain medications as directed, before pain becomes severe.    Pain medication can upset your stomach: taking it with food may help.    Constipation is a common side effect of pain medication. Drink plenty of  fluids.    Eat foods high in fiber. Take a stool softener if recommended by your doctor or pharmacist.    Do not drink alcohol, drive or operate machinery while taking pain medications.    Ask about other ways to control pain, such as with heat, ice or relaxation.    Post Operative Instructions: Regional Anesthetic for Upper Extremity    General Information:    Regional anesthesia is when local anesthetic or  numbing  medication is injected around the nerves to anesthetize or  numb  the area supplied by that set of nerves.      Types of Regional Blocks:  Interscalene: A block injected into the neck on the operative shoulder/arm of a patient having shoulder surgery  Supraclavicular: A block injected near the clavicle on the operative shoulder of a patient having elbow, forearm, or hand surgery    Procedure:  The type of anesthesia your doctor used to numb your shoulder or arm will usually not wear off for 6-18 hours, but may last as long as 24 hours. You should be careful during that period, since it is possible to injure your arm without being aware of the injury. While your arm is numb, you should:    Avoid striking or bumping your arm    Avoid extreme hot or cold    Diet:  There are no restrictions on your diet. You should drink plenty of fluids.     Discomfort:  You will have a tingling and prickly sensation in your arm as the feeling begins to return. You can also expect some discomfort. The amount of discomfort is unpredictable, but if you have more pain than can be controlled with pain medication you should notify your physician.     Pain Medicine:   Begin taking your oral pain pills (if you have not already done so) before bedtime and during the night to avoid a sudden onset of pain as the block wears off.  Do not engage in drinking, driving, or hazardous occupations while taking pain medication.     Stitches:   You may have stitches or special skin closures. You doctor will inform you when to return to the office to have them removed.     Activity:  On the day of surgery you should try to stay in bed with your hand elevated on pillows. You may resume your normal activity after that, wearing a sling for comfort. Contact your physician if you have any of the problems:     Continued numbness or tingling in the arm or hand after 24 hours    Swelling of the fingers or  "fingers that are cold to the touch    Excessive bleeding or drainage    Severe pain                                Pending Results     No orders found from 4/9/2017 to 4/12/2017.            Admission Information     Date & Time Provider Department Dept. Phone    4/11/2017 Dorina Rodriguez MD [facility] [phone]      Your Vitals Were     Blood Pressure Pulse Temperature Respirations Height Weight    127/84 68 98.4  F (36.9  C) (Oral) 14 1.622 m (5' 3.86\") 86.2 kg (190 lb)    Last Period Pulse Oximetry BMI (Body Mass Index)             08/04/2015 (Approximate) 99% 32.76 kg/m2         Opposing Views Information     [employer] gives you secure access to your electronic health record. If you see a primary care provider, you can also send messages to your care team and make appointments. If you have questions, please call your primary care clinic.  If you do not have a primary care provider, please call [phone] and they will assist you.      [employer] is an electronic gateway that provides easy, online access to your medical records. With [employer], you can request a clinic appointment, read your test results, renew a prescription or communicate with your care team.     To access your existing account, please contact your [facility] or call [phone] for assistance.        Care EveryWhere ID     This is your Care EveryWhere ID. This could be used by other organizations to access your [facility] medical records  [id number]           Review of your medicines      START taking        Dose / Directions    hydrOXYzine 25 MG tablet   Commonly known as:  ATARAX   Used for:  S/P rotator cuff repair        Dose:  25 mg   Take 1 tablet (25 mg) by mouth every 6 hours as needed for itching (and nausea)   Quantity:  50 tablet   Refills:  0       oxyCODONE 5 MG IR tablet   Commonly known as:  ROXICODONE   Used for:  S/P rotator cuff repair        Dose:  5-10 mg   Take 1-2 " tablets (5-10 mg) by mouth every 3 hours as needed for pain or other (Moderate to Severe)   Quantity:  80 tablet   Refills:  0       senna-docusate 8.6-50 MG per tablet   Commonly known as:  SENOKOT-S;PERICOLACE   Used for:  S/P rotator cuff repair        Dose:  1-2 tablet   Take 1-2 tablets by mouth 2 times daily Take while on oral narcotics to prevent or treat constipation.   Quantity:  30 tablet   Refills:  0         CONTINUE these medicines which have NOT CHANGED        Dose / Directions    levothyroxine 88 MCG tablet   Commonly known as:  SYNTHROID/LEVOTHROID   Used for:  Hypothyroidism        TAKE 1 TABLET BY MOUTH DAILY   Quantity:  90 tablet   Refills:  0       metoprolol 25 MG 24 hr tablet   Commonly known as:  TOPROL-XL   Used for:  Essential hypertension with goal blood pressure less than 140/90        Dose:  25 mg   Take 1 tablet (25 mg) by mouth daily   Quantity:  90 tablet   Refills:  3            Where to get your medicines      Some of these will need a paper prescription and others can be bought over the counter. Ask your nurse if you have questions.     Bring a paper prescription for each of these medications     hydrOXYzine 25 MG tablet    oxyCODONE 5 MG IR tablet    senna-docusate 8.6-50 MG per tablet                Protect others around you: Learn how to safely use, store and throw away your medicines at www.disposemymeds.org.             Medication List: This is a list of all your medications and when to take them. Check marks below indicate your daily home schedule. Keep this list as a reference.      Medications           Morning Afternoon Evening Bedtime As Needed    hydrOXYzine 25 MG tablet   Commonly known as:  ATARAX   Take 1 tablet (25 mg) by mouth every 6 hours as needed for itching (and nausea)                                levothyroxine 88 MCG tablet   Commonly known as:  SYNTHROID/LEVOTHROID   TAKE 1 TABLET BY MOUTH DAILY                                metoprolol 25 MG 24 hr tablet    Commonly known as:  TOPROL-XL   Take 1 tablet (25 mg) by mouth daily                                oxyCODONE 5 MG IR tablet   Commonly known as:  ROXICODONE   Take 1-2 tablets (5-10 mg) by mouth every 3 hours as needed for pain or other (Moderate to Severe)                                senna-docusate 8.6-50 MG per tablet   Commonly known as:  SENOKOT-S;PERICOLACE   Take 1-2 tablets by mouth 2 times daily Take while on oral narcotics to prevent or treat constipation.

## 2017-04-11 NOTE — ANESTHESIA POSTPROCEDURE EVALUATION
Patient: Mary Sims    Procedure(s):  Left Arthroscopic Rotator Cuff Repair, Subacromial Decompression, Open Bioceps Tenodesis - Wound Class: I-Clean    Diagnosis:Cuff Tear Incomplete and Biceps Disease  Diagnosis Additional Information: No value filed.    Anesthesia Type:  General, LMA, Periph. Nerve Block for postop pain    Note:  Anesthesia Post Evaluation    Patient location during evaluation: bedside  Patient participation: Able to participate in evaluation but full recovery from regional anesthesia has not yet occurred and is not anticipated to occur within 48 hours  Level of consciousness: awake  Pain management: adequate  Airway patency: patent  Cardiovascular status: acceptable  Respiratory status: acceptable  Hydration status: acceptable  PONV: controlled     Anesthetic complications: None          Last vitals:  Vitals:    04/11/17 1229 04/11/17 1230 04/11/17 1235   BP: (!) 121/91  132/80   Pulse:      Resp: 12 16 16   Temp: 36  C (96.8  F)  37.1  C (98.8  F)   SpO2: 98%  99%         Electronically Signed By: Vasquez Guillen MD, MD  April 11, 2017  12:38 PM

## 2017-04-11 NOTE — ANESTHESIA PREPROCEDURE EVALUATION
Anesthesia Evaluation     . Pt has had prior anesthetic. Type: General    No history of anesthetic complications          ROS/MED HX    ENT/Pulmonary:  - neg pulmonary ROS     Neurologic:  - neg neurologic ROS     Cardiovascular:     (+) hypertension----. : . . . :. .       METS/Exercise Tolerance:  >4 METS   Hematologic:  - neg hematologic  ROS       Musculoskeletal:  - neg musculoskeletal ROS       GI/Hepatic:  - neg GI/hepatic ROS       Renal/Genitourinary:  - ROS Renal section negative       Endo:     (+) thyroid problem hypothyroidism, .      Psychiatric:  - neg psychiatric ROS       Infectious Disease:  - neg infectious disease ROS       Malignancy:      - no malignancy   Other:                     Physical Exam  Normal systems: dental    Airway   Mallampati: I  TM distance: >3 FB  Neck ROM: full    Dental     Cardiovascular   Rhythm and rate: regular and normal      Pulmonary    breath sounds clear to auscultation                    Anesthesia Plan      History & Physical Review  History and physical reviewed and following examination; no interval change.    ASA Status:  2 .    NPO Status:  > 6 hours    Plan for General, LMA and Periph. Nerve Block for postop pain with Intravenous induction. Maintenance will be TIVA.    PONV prophylaxis:  Ondansetron (or other 5HT-3) and Dexamethasone or Solumedrol  Discussed with Patient Off-Label use of Liposomal Bupivacaine (Exparel) for Nerve Block.    Relevant risks & benefits were discussed with patient.    All questions were answered and there was agreement to proceed.    Patient signed Off-Label Use of Exparel Consent Form.          Postoperative Care  Postoperative pain management:  Oral pain medications, Peripheral nerve block (Single Shot) and Multi-modal analgesia.      Consents  Anesthetic plan, risks, benefits and alternatives discussed with:  Patient..                          .

## 2017-04-19 ENCOUNTER — ALLIED HEALTH/NURSE VISIT (OUTPATIENT)
Dept: NURSING | Facility: CLINIC | Age: 52
End: 2017-04-19
Payer: COMMERCIAL

## 2017-04-19 DIAGNOSIS — Z98.890 HISTORY OF ARTHROSCOPY OF LEFT SHOULDER: Primary | ICD-10-CM

## 2017-04-19 DIAGNOSIS — Z48.89 ENCOUNTER FOR POST SURGICAL WOUND CHECK: ICD-10-CM

## 2017-04-19 PROCEDURE — 99024 POSTOP FOLLOW-UP VISIT: CPT

## 2017-04-19 NOTE — PATIENT INSTRUCTIONS
Thanks for coming today.  Ortho/Sports Medicine Clinic  [address] [place] [address]    To schedule future appointments in Ortho Clinic, you may call [phone].    To schedule ordered imaging by your provider:   Call Central Imaging [phone]    To schedule an injection ordered by your provider:  Call Central Imaging Injection scheduling line: [phone]  [employer] Powerhart available online at:  [email or web address]    Please call if any further questions or concerns ([phone]).  Clinic hours 8 am to 5 pm.    Return to clinic (call) if symptoms worsen or fail to improve.

## 2017-04-19 NOTE — PROGRESS NOTES
Mary Sims comes into clinic today for a skin integrity check as well as to assess fitting of US IV sling.    The patient is status post left Arthroscopic Rotator Cuff Repair, Subacromial Decompression, Open Bioceps Tenodesis on 4/11/17.   There has been no history of infection or drainage.    There appears to be a old blister seen on the inner aspect of the left upper arm, about where the sling starts. It is just smaller than a dime. 2x2 gauze removed. No redness or drainage noted. Patient notes only discomfort when sling rubs against.  I placed a band-aid over the old blister and advised that she just keep it padded as needed.   I also removed her original post op dressing from her left shoulder and advised that she can shower and get it wet, cover as needed, pat dry, let steri-strips fall off on their own.   She is scheduled to come back in in about 1 week for her scheduled 2 week post op visit with Dr. Rodriguez.  Pt is comfortable with plan and let us know if she has any questions or concerns.    This service provided today was under the direct supervision of Dr. Massey, who was available if needed.    Raul Sanchez RN

## 2017-04-19 NOTE — MR AVS SNAPSHOT
After Visit Summary   2017    Mary Sims    MRN: [id number]           Patient Information     Date Of Birth          1965        Visit Information        Provider Department      2017 11:00 AM MG NURSE ONLY ORTHO [facility]        Today's Diagnoses     History of arthroscopy of left shoulder    -  1    Encounter for post surgical wound check          Care Instructions    Thanks for coming today.  Ortho/Sports Medicine Clinic  [address] [place] [address]    To schedule future appointments in Ortho Clinic, you may call [phone].    To schedule ordered imaging by your provider:   Call Central Imaging [phone]    To schedule an injection ordered by your provider:  Call Central Imaging Injection scheduling line: [phone]  MyChart available online at:  [email or web address]    Please call if any further questions or concerns ([phone]).  Clinic hours 8 am to 5 pm.    Return to clinic (call) if symptoms worsen or fail to improve.          Follow-ups after your visit        Your next 10 appointments already scheduled     2017  8:00 AM CDT   Return Visit with Dorina Rodriguez MD   [facility]    [address] [place] [address] [phone]            May 22, 2017  8:00 AM CDT   Return Visit with Dorina Rodriguez MD   [facility]    [address] [place] [address] [phone]              Who to contact     If you have questions or need follow up information about today's clinic visit or your schedule please contact [facility] directly at [phone].  Normal or non-critical lab and imaging results will be communicated to you by MyChart, letter or phone within 4 business days after the clinic has received the results. If you do not hear from us within 7 days,  please contact the clinic through [employer] or phone. If you have a critical or abnormal lab result, we will notify you by phone as soon as possible.  Submit refill requests through Vuze or call your pharmacy and they will forward the refill request to us. Please allow 3 business days for your refill to be completed.          Additional Information About Your Visit        [employer] Information     [employer]ze gives you secure access to your electronic health record. If you see a primary care provider, you can also send messages to your care team and make appointments. If you have questions, please call your primary care clinic.  If you do not have a primary care provider, please call [phone] and they will assist you.      [employer]ze is an electronic gateway that provides easy, online access to your medical records. With [employer], you can request a clinic appointment, read your test results, renew a prescription or communicate with your care team.     To access your existing account, please contact your [facility] or call [phone] for assistance.        Care EveryWhere ID     This is your Care EveryWhere ID. This could be used by other organizations to access your [facility] medical records  [id number]        Your Vitals Were     Last Period                   08/04/2015 (Approximate)            Blood Pressure from Last 3 Encounters:   04/11/17 (!) 146/96   03/13/17 130/76   02/27/17 129/89    Weight from Last 3 Encounters:   04/11/17 86.2 kg (190 lb)   03/13/17 89.6 kg (197 lb 9.6 oz)   12/30/16 88.9 kg (196 lb)              Today, you had the following     No orders found for display       Primary Care Provider Office Phone # Fax #    Jazmin Ramírez PA-C [phone]       [facility] PHYSICIAN [facility] [address]  [facility] [address]        Thank you!     Thank you for choosing [facility]  for your care. Our goal is always to provide you  with excellent care. Hearing back from our patients is one way we can continue to improve our services. Please take a few minutes to complete the written survey that you may receive in the mail after your visit with us. Thank you!             Your Updated Medication List - Protect others around you: Learn how to safely use, store and throw away your medicines at www.disposemymeds.org.          This list is accurate as of: 4/19/17 11:35 AM.  Always use your most recent med list.                   Brand Name Dispense Instructions for use    hydrOXYzine 25 MG tablet    ATARAX    50 tablet    Take 1 tablet (25 mg) by mouth every 6 hours as needed for itching (and nausea)       levothyroxine 88 MCG tablet    SYNTHROID/LEVOTHROID    90 tablet    TAKE 1 TABLET BY MOUTH DAILY       metoprolol 25 MG 24 hr tablet    TOPROL-XL    90 tablet    Take 1 tablet (25 mg) by mouth daily       oxyCODONE 5 MG IR tablet    ROXICODONE    80 tablet    Take 1-2 tablets (5-10 mg) by mouth every 3 hours as needed for pain or other (Moderate to Severe)       senna-docusate 8.6-50 MG per tablet    SENOKOT-S;PERICOLACE    30 tablet    Take 1-2 tablets by mouth 2 times daily Take while on oral narcotics to prevent or treat constipation.

## 2017-04-27 ENCOUNTER — OFFICE VISIT (OUTPATIENT)
Dept: ORTHOPEDICS | Facility: CLINIC | Age: 52
End: 2017-04-27
Payer: COMMERCIAL

## 2017-04-27 VITALS — SYSTOLIC BLOOD PRESSURE: 135 MMHG | DIASTOLIC BLOOD PRESSURE: 94 MMHG | OXYGEN SATURATION: 95 % | HEART RATE: 80 BPM

## 2017-04-27 DIAGNOSIS — M75.102 TEAR OF LEFT ROTATOR CUFF, UNSPECIFIED TEAR EXTENT: Primary | ICD-10-CM

## 2017-04-27 PROCEDURE — 99024 POSTOP FOLLOW-UP VISIT: CPT | Performed by: ORTHOPAEDIC SURGERY

## 2017-04-27 ASSESSMENT — PAIN SCALES - GENERAL: PAINLEVEL: NO PAIN (0)

## 2017-04-27 NOTE — PATIENT INSTRUCTIONS
Thanks for coming today.  Ortho/Sports Medicine Clinic  [address] [place] [address]    To schedule future appointments in Ortho Clinic, you may call [phone].    To schedule ordered imaging by your provider:   Call Central Imaging [phone]    To schedule an injection ordered by your provider:  Call Central Imaging Injection scheduling line: [phone]  Safe Bulkershart available online at:  [email or web address]    Please call if any further questions or concerns ([phone]).  Clinic hours 8 am to 5 pm.    Return to clinic (call) if symptoms worsen or fail to improve.

## 2017-04-27 NOTE — PROGRESS NOTES
CHIEF CONCERN: Status post left arthroscopic rotator cuff repair, subacromial decompression, open biceps tenodesis  DATE OF SURGERY: 4/11/17    HISTORY OF PRESENT ILLNESS: Ms. Sims is a 51 year old female who is 2 weeks status post the above procedure. She reports she is very pleased with how she has done - her pain is already much better than prior to surgery. She took only 6 pills (oxycodone) total.    PHYSICAL EXAM:  Adult female in no distress  Respirations even and unlabored  Left shoulder: incisions well healing. No erythema. No drainage. CMS intact. Normal EPL, FPL, and Intrinsics.    IMAGING: None new - reviewed intraop pics    ASSESSMENT:  1. Two weeks status post the above procedure    PLAN:  -Continue sling  -Begin PT at 4 weeks postop (in two weeks)  -RTC in 4 weeks

## 2017-04-27 NOTE — NURSING NOTE
"Mary Sims's goals for this visit include: Status post Left Arthroscopic Rotator Cuff Repair, Subacromial Decompression, Open Bioceps Tenodesis DOS 4/11/17  She requests these members of her care team be copied on today's visit information: yes    PCP: Jazmin Ramírez    Referring Provider:  No referring provider defined for this encounter.    Chief Complaint   Patient presents with     RECHECK     Status post Left Arthroscopic Rotator Cuff Repair, Subacromial Decompression, Open Bioceps Tenodesis DOS 4/11/17       Initial BP (!) 135/94  Pulse 80  LMP 08/04/2015 (Approximate)  SpO2 95% Estimated body mass index is 32.76 kg/(m^2) as calculated from the following:    Height as of 4/11/17: 1.622 m (5' 3.86\").    Weight as of 4/11/17: 86.2 kg (190 lb).  Medication Reconciliation: complete  "

## 2017-04-27 NOTE — MR AVS SNAPSHOT
After Visit Summary   2017    Mary Sims    MRN: [id number]           Patient Information     Date Of Birth          1965        Visit Information        Provider Department      2017 8:00 AM Dorina Rodriguez MD [facility]        Today's Diagnoses     Tear of left rotator cuff, unspecified tear extent    -  1      Care Instructions    Thanks for coming today.  [facility] Clinic  [address] [place] [address]    To schedule future appointments in Ortho Clinic, you may call [phone].    To schedule ordered imaging by your provider:   Call Central Imaging [phone]    To schedule an injection ordered by your provider:  Call Central Imaging Injection scheduling line: [phone]  Uzabase available online at:  [email or web address]    Please call if any further questions or concerns ([phone]).  Clinic hours 8 am to 5 pm.    Return to clinic (call) if symptoms worsen or fail to improve.        Follow-ups after your visit        Additional Services     PHYSICAL THERAPY REFERRAL (Internal)       Physical Therapy Referral                  Your next 10 appointments already scheduled     May 25, 2017  8:30 AM CDT   MELIDA Extremity with Shadi Doss PT   [facility] ([facility])    [address] [place] [address]   [phone]            May 30, 2017  8:30 AM CDT   MELIDA Extremity with Shadi Doss PT   [facility] ([facility])    [address] [place] [address]   [phone]            2017  8:30 AM CDT   MELIDA Extremity with Shadi Doss PT   [facility] ([facility])    [address] [place] [address]   [phone]            2017   4:45 PM CDT   Return Visit with Dorina Rodriguez MD   [facility]    [address] [place] [address] [phone]              Who to contact     If you have questions or need follow up information about today's clinic visit or your schedule please contact [facility] directly at [phone].  Normal or non-critical lab and imaging results will be communicated to you by Lijit Networkshart, letter or phone within 4 business days after the clinic has received the results. If you do not hear from us within 7 days, please contact the clinic through [email or web address] or phone. If you have a critical or abnormal lab result, we will notify you by phone as soon as possible.  Submit refill requests through Sykio or call your pharmacy and they will forward the refill request to us. Please allow 3 business days for your refill to be completed.          Additional Information About Your Visit        Sykio Information     Sykio gives you secure access to your electronic health record. If you see a primary care provider, you can also send messages to your care team and make appointments. If you have questions, please call your primary care clinic.  If you do not have a primary care provider, please call [phone] and they will assist you.      Sykio is an electronic gateway that provides easy, online access to your medical records. With Sykio, you can request a clinic appointment, read your test results, renew a prescription or communicate with your care team.     To access your existing account, please contact your [facility] or call [phone] for assistance.        Care EveryWhere ID     This is your Care EveryWhere ID. This could be used by other organizations to access your [place] medical records  [id number]        Your Vitals Were     Pulse Last Period Pulse Oximetry             80 08/04/2015 (Approximate)  95%          Blood Pressure from Last 3 Encounters:   05/22/17 (!) 148/97   05/15/17 136/86   04/27/17 (!) 135/94    Weight from Last 3 Encounters:   05/15/17 92.8 kg (204 lb 9.6 oz)   04/11/17 86.2 kg (190 lb)   03/13/17 89.6 kg (197 lb 9.6 oz)              We Performed the Following     PHYSICAL THERAPY REFERRAL (Internal)        Primary Care Provider Office Phone # Fax #    Jazmin Ramírez PA-C [phone]       [facility] PHYSICIAN [facility] [address]  [facility] [address]        Thank you!     Thank you for choosing [facility]  for your care. Our goal is always to provide you with excellent care. Hearing back from our patients is one way we can continue to improve our services. Please take a few minutes to complete the written survey that you may receive in the mail after your visit with us. Thank you!             Your Updated Medication List - Protect others around you: Learn how to safely use, store and throw away your medicines at www.disposemymeds.org.          This list is accurate as of: 4/27/17 11:59 PM.  Always use your most recent med list.                   Brand Name Dispense Instructions for use    hydrOXYzine 25 MG tablet    ATARAX    50 tablet    Take 1 tablet (25 mg) by mouth every 6 hours as needed for itching (and nausea)

## 2017-05-10 ENCOUNTER — THERAPY VISIT (OUTPATIENT)
Dept: PHYSICAL THERAPY | Facility: CLINIC | Age: 52
End: 2017-05-10
Payer: COMMERCIAL

## 2017-05-10 DIAGNOSIS — M75.122 COMPLETE TEAR OF LEFT ROTATOR CUFF: Primary | ICD-10-CM

## 2017-05-10 DIAGNOSIS — Z47.89 AFTERCARE FOLLOWING SURGERY OF THE MUSCULOSKELETAL SYSTEM: ICD-10-CM

## 2017-05-10 PROCEDURE — 97110 THERAPEUTIC EXERCISES: CPT | Mod: GP | Performed by: PHYSICAL THERAPIST

## 2017-05-10 PROCEDURE — 97161 PT EVAL LOW COMPLEX 20 MIN: CPT | Mod: GP | Performed by: PHYSICAL THERAPIST

## 2017-05-10 NOTE — LETTER
[facility] PHYSICAL THERAPY  [address]  [facility] [address]  [phone]    May 11, 2017    Re: Mary Sims   :   1965  MRN:  [id number]   REFERRING PHYSICIAN:   Dorina Rodriguez    [facility] PHYSICAL [facility]    Date of Initial Evaluation:  05/10/2017  Visits:  Rxs Used: 1  Reason for Referral:     Complete tear of left rotator cuff  Aftercare following surgery of the musculoskeletal system    EVALUATION SUMMARY    [facility] Initial Evaluation    Subjective:  Patient is a 51 year old female presenting with rehab left shoulder hpi.   Mary Sims is a 51 year old female with a left shoulder condition.  Condition occurred with:  Repetition/overuse.  Condition occurred: other.  This is a new condition  Patient is s/p L rotator cuff repair, SAD, with open biceps tenodesis on 17.   She had an MRI showing degenerative tearing of the supraspinatus tendon as well as degenerative changes in the subacromial space.  She had pain since about  after shoveling that persisted over time with repetitive reaching/lifting and overuse.  .    Site of Pain: lateral over deltoid and in biceps area. Pain is described as aching and is intermittent and reported as 1/10.  Associated symptoms:  Loss of motion/stiffness and loss of strength. Pain is worse in the P.M..  Exacerbated by: in post operative sling for 2 more weeks, so is avoiding use of the left arm currently. and relieved by ice (tylenol).  Since onset symptoms are gradually improving.  Special testing: MRI pre operative.  Previous treatment includes surgery.  Improvement with previous treatment: good initial response.  General health as reported by patient is good.  Pertinent medical history includes:  Rheumatoid arthritis, thyroid problems, high blood pressure, anemia and migraines.  Medical allergies: no.  Other surgeries  include:  Orthopedic surgery (ankle and shoulder).  Current medications:  High blood pressure medication and thyroid medication.  Current occupation  at [employer].  Patient is currently not working due to present treatment problem.  Primary job tasks include:  Repetitive tasks and lifting (push/pull, assembly, repetitive tasks).  Barriers include:  None as reported by the patient.  Red flags:  None as reported by the patient.                  Objective:  SHOULDER:    Re: Mary Sims   :   1965    Shoulder:   PROM L PROM R AROM L AROM R MMT L MMT R   Flex 80  NT  NT    Abd 60        Full Can         Empty Can         IR To stomach        ER 0        Ext/IR           Palpation: incisions are well healing, biceps open incision with small scab yet, scope incisions all closed and doing well.     Assessment/Plan:    Patient is a 51 year old female with left side shoulder complaints.    Patient has the following significant findings with corresponding treatment plan.                Diagnosis 1:  S/p L rotator cuff repair, subacromial decompression, open biceps tenodesis on 17    Pain -  hot/cold therapy  Decreased ROM/flexibility - manual therapy and therapeutic exercise  Decreased strength - therapeutic exercise and therapeutic activities  Decreased proprioception - neuro re-education and therapeutic activities  Decreased function - therapeutic activities  Impaired posture - neuro re-education    Therapy Evaluation Codes:   1) History comprised of:   Personal factors that impact the plan of care:      None.    Comorbidity factors that impact the plan of care are:      Rheumatoid arthritis, high blood pressure, thyroid problems, anemia, migraines.     Medications impacting care: high blood pressure and thyroid problems.  2) Examination of Body Systems comprised of:   Body structures and functions that impact the plan of care:      Shoulder.   Activity limitations that impact the plan of care are:       Bathing, Cooking, Driving, Dressing, Lifting, Reading/Computer work, Working, Sleeping, Laying down and Reaching.  3) Clinical presentation characteristics are:   Stable/Uncomplicated.  4) Decision-Making    Low complexity using standardized patient assessment instrument and/or measureable assessment of functional outcome.  Cumulative Therapy Evaluation is: Low complexity.    Re: Mary Sims   :   1965    Previous and current functional limitations:  (See Goal Flow Sheet for this information)    Short term and Long term goals: (See Goal Flow Sheet for this information)     Communication ability:  Patient appears to be able to clearly communicate and understand verbal and written communication and follow directions correctly.  Treatment Explanation - The following has been discussed with the patient:   RX ordered/plan of care  Anticipated outcomes  Possible risks and side effects  This patient would benefit from PT intervention to resume normal activities.   Rehab potential is good.    Frequency:  2 X week, once daily  Duration:  for 2 weeks, tapering to 1x/week for 12 weeks  Discharge Plan:  Achieve all LTG.  Independent in home treatment program.  Reach maximal therapeutic benefit.    Thank you for your referral.    INQUIRIES  Therapist: Mike Doss DPT  [facility]  [address] [address]  Phone: [phone]  Fax: [phone]

## 2017-05-10 NOTE — MR AVS SNAPSHOT
After Visit Summary   5/10/2017    Mary Sims    MRN: [id number]           Patient Information     Date Of Birth          1965        Visit Information        Provider Department      5/10/2017 9:00 AM Shadi Doss PT [facility]        Today's Diagnoses     Complete tear of left rotator cuff    -  1    Aftercare following surgery of the musculoskeletal system           Follow-ups after your visit        Your next 10 appointments already scheduled     May 11, 2017 11:00 AM CDT   MyChart Physical Adult with PABLO Dean CNP   [facility]    [address] [facility] [id number] [phone]            May 22, 2017  8:00 AM CDT   Return Visit with Dorina Rodriguez MD   [facility] ([facility])    [address] [place] [address] [phone]              Who to contact     If you have questions or need follow up information about today's clinic visit or your schedule please contact [facility] directly at [phone].  Normal or non-critical lab and imaging results will be communicated to you by MyChart, letter or phone within 4 business days after the clinic has received the results. If you do not hear from us within 7 days, please contact the clinic through Biotzhart or phone. If you have a critical or abnormal lab result, we will notify you by phone as soon as possible.  Submit refill requests through PayScale or call your pharmacy and they will forward the refill request to us. Please allow 3 business days for your refill to be completed.          Additional Information About Your Visit        MyChart Information     PayScale gives you secure access to your electronic health record. If you see a primary care provider, you can also send messages to your care  team and make appointments. If you have questions, please call your primary care clinic.  If you do not have a primary care provider, please call [phone] and they will assist you.        Care EveryWhere ID     This is your Care EveryWhere ID. This could be used by other organizations to access your [place] medical records  [id number]        Your Vitals Were     Last Period                   08/04/2015 (Approximate)            Blood Pressure from Last 3 Encounters:   04/27/17 (!) 135/94   04/11/17 (!) 146/96   03/13/17 130/76    Weight from Last 3 Encounters:   04/11/17 86.2 kg (190 lb)   03/13/17 89.6 kg (197 lb 9.6 oz)   12/30/16 88.9 kg (196 lb)              We Performed the Following     HC PT EVAL, LOW COMPLEXITY     MELIDA INITIAL EVAL REPORT     THERAPEUTIC EXERCISES        Primary Care Provider Office Phone # Fax #    Jazmin Baylee Ramírez PA-C [phone]       [facility] PHYSICIAN [facility] [address] [facility] [address]  [facility] [address]        Thank you!     Thank you for choosing [facility]ERAP[facility]  for your care. Our goal is always to provide you with excellent care. Hearing back from our patients is one way we can continue to improve our services. Please take a few minutes to complete the written survey that you may receive in the mail after your visit with us. Thank you!             Your Updated Medication List - Protect others around you: Learn how to safely use, store and throw away your medicines at www.disposemymeds.org.          This list is accurate as of: 5/10/17 12:45 PM.  Always use your most recent med list.                   Brand Name Dispense Instructions for use    hydrOXYzine 25 MG tablet    ATARAX    50 tablet    Take 1 tablet (25 mg) by mouth every 6 hours as needed for itching (and nausea)       levothyroxine 88 MCG tablet    SYNTHROID/LEVOTHROID    90 tablet    TAKE 1 TABLET BY MOUTH DAILY       metoprolol 25 MG 24 hr tablet     TOPROL-XL    90 tablet    Take 1 tablet (25 mg) by mouth daily

## 2017-05-10 NOTE — PROGRESS NOTES
[facility] Initial Evaluation    Subjective:    Patient is a 51 year old female presenting with rehab left shoulder hpi.   Mary Sims is a 51 year old female with a left shoulder condition.  Condition occurred with:  Repetition/overuse.  Condition occurred: other.  This is a new condition  Patient is s/p L rotator cuff repair, SAD, with open biceps tenodesis on 4/11/17.   She had an MRI showing degenerative tearing of the supraspinatus tendon as well as degenerative changes in the subacromial space.  She had pain since about 2015 after shoveling that persisted over time with repetitive reaching/lifting and overuse.  .    Site of Pain: lateral over deltoid and in biceps area.    Pain is described as aching and is intermittent and reported as 1/10.  Associated symptoms:  Loss of motion/stiffness and loss of strength. Pain is worse in the P.M..  Exacerbated by: in post operative sling for 2 more weeks, so is avoiding use of the left arm currently. and relieved by ice (tylenol).  Since onset symptoms are gradually improving.  Special testing: MRI pre operative.  Previous treatment includes surgery.  Improvement with previous treatment: good initial response.  General health as reported by patient is good.  Pertinent medical history includes:  Rheumatoid arthritis, thyroid problems, high blood pressure, anemia and migraines.  Medical allergies: no.  Other surgeries include:  Orthopedic surgery (ankle and shoulder).  Current medications:  High blood pressure medication and thyroid medication.  Current occupation  at [employer].  Patient is currently not working due to present treatment problem.  Primary job tasks include:  Repetitive tasks and lifting (push/pull, assembly, repetitive tasks).    Barriers include:  None as reported by the patient.    Red flags:  None as reported by the patient.                        Objective:  SHOULDER:    Shoulder:   PROM L PROM R AROM L AROM R MMT L MMT  R   Flex 80  NT  NT    Abd 60        Full Can         Empty Can         IR To stomach        ER 0        Ext/IR           Palpation: incisions are well healing, biceps open incision with small scab yet, scope incisions all closed and doing well.         System    Physical Exam    General     ROS    Assessment/Plan:      Patient is a 51 year old female with left side shoulder complaints.    Patient has the following significant findings with corresponding treatment plan.                Diagnosis 1:  S/p L rotator cuff repair, subacromial decompression, open biceps tenodesis on 4/11/17    Pain -  hot/cold therapy  Decreased ROM/flexibility - manual therapy and therapeutic exercise  Decreased strength - therapeutic exercise and therapeutic activities  Decreased proprioception - neuro re-education and therapeutic activities  Decreased function - therapeutic activities  Impaired posture - neuro re-education    Therapy Evaluation Codes:   1) History comprised of:   Personal factors that impact the plan of care:      None.    Comorbidity factors that impact the plan of care are:      Rheumatoid arthritis, high blood pressure, thyroid problems, anemia, migraines.     Medications impacting care: high blood pressure and thyroid problems.  2) Examination of Body Systems comprised of:   Body structures and functions that impact the plan of care:      Shoulder.   Activity limitations that impact the plan of care are:      Bathing, Cooking, Driving, Dressing, Lifting, Reading/Computer work, Working, Sleeping, Laying down and Reaching.  3) Clinical presentation characteristics are:   Stable/Uncomplicated.  4) Decision-Making    Low complexity using standardized patient assessment instrument and/or measureable assessment of functional outcome.  Cumulative Therapy Evaluation is: Low complexity.    Previous and current functional limitations:  (See Goal Flow Sheet for this information)    Short term and Long term goals: (See Goal  Flow Sheet for this information)     Communication ability:  Patient appears to be able to clearly communicate and understand verbal and written communication and follow directions correctly.  Treatment Explanation - The following has been discussed with the patient:   RX ordered/plan of care  Anticipated outcomes  Possible risks and side effects  This patient would benefit from PT intervention to resume normal activities.   Rehab potential is good.    Frequency:  2 X week, once daily  Duration:  for 2 weeks, tapering to 1x/week for 12 weeks  Discharge Plan:  Achieve all LTG.  Independent in home treatment program.  Reach maximal therapeutic benefit.    Please refer to the daily flowsheet for treatment today, total treatment time and time spent performing 1:1 timed codes.

## 2017-05-15 ENCOUNTER — OFFICE VISIT (OUTPATIENT)
Dept: FAMILY MEDICINE | Facility: CLINIC | Age: 52
End: 2017-05-15
Payer: COMMERCIAL

## 2017-05-15 VITALS
WEIGHT: 204.6 LBS | TEMPERATURE: 97.5 F | DIASTOLIC BLOOD PRESSURE: 86 MMHG | SYSTOLIC BLOOD PRESSURE: 136 MMHG | HEART RATE: 60 BPM | HEIGHT: 64 IN | BODY MASS INDEX: 34.93 KG/M2 | OXYGEN SATURATION: 100 %

## 2017-05-15 DIAGNOSIS — E66.09 NON MORBID OBESITY DUE TO EXCESS CALORIES: ICD-10-CM

## 2017-05-15 DIAGNOSIS — Z11.59 NEED FOR HEPATITIS C SCREENING TEST: ICD-10-CM

## 2017-05-15 DIAGNOSIS — B96.89 BV (BACTERIAL VAGINOSIS): ICD-10-CM

## 2017-05-15 DIAGNOSIS — E03.9 HYPOTHYROIDISM, UNSPECIFIED TYPE: ICD-10-CM

## 2017-05-15 DIAGNOSIS — N76.0 BV (BACTERIAL VAGINOSIS): ICD-10-CM

## 2017-05-15 DIAGNOSIS — Z11.3 SCREEN FOR STD (SEXUALLY TRANSMITTED DISEASE): ICD-10-CM

## 2017-05-15 DIAGNOSIS — A59.01 TRICHOMONAS VAGINALIS (TV) INFECTION: ICD-10-CM

## 2017-05-15 DIAGNOSIS — I10 ESSENTIAL HYPERTENSION WITH GOAL BLOOD PRESSURE LESS THAN 140/90: ICD-10-CM

## 2017-05-15 DIAGNOSIS — B37.31 CANDIDIASIS OF VAGINA: ICD-10-CM

## 2017-05-15 DIAGNOSIS — Z00.00 NORMAL PHYSICAL EXAM: Primary | ICD-10-CM

## 2017-05-15 DIAGNOSIS — Z12.4 SCREENING FOR MALIGNANT NEOPLASM OF CERVIX: ICD-10-CM

## 2017-05-15 LAB
CREAT UR-MCNC: 84 MG/DL
MICRO REPORT STATUS: ABNORMAL
MICROALBUMIN UR-MCNC: 7 MG/L
MICROALBUMIN/CREAT UR: 7.87 MG/G CR (ref 0–25)
SPECIMEN SOURCE: ABNORMAL
WET PREP SPEC: ABNORMAL

## 2017-05-15 PROCEDURE — 87624 HPV HI-RISK TYP POOLED RSLT: CPT | Performed by: NURSE PRACTITIONER

## 2017-05-15 PROCEDURE — 99396 PREV VISIT EST AGE 40-64: CPT | Performed by: NURSE PRACTITIONER

## 2017-05-15 PROCEDURE — 87210 SMEAR WET MOUNT SALINE/INK: CPT | Performed by: NURSE PRACTITIONER

## 2017-05-15 PROCEDURE — 82043 UR ALBUMIN QUANTITATIVE: CPT | Performed by: NURSE PRACTITIONER

## 2017-05-15 PROCEDURE — G0145 SCR C/V CYTO,THINLAYER,RESCR: HCPCS | Performed by: NURSE PRACTITIONER

## 2017-05-15 RX ORDER — FLUCONAZOLE 150 MG/1
150 TABLET ORAL ONCE
Qty: 1 TABLET | Refills: 0 | Status: SHIPPED | OUTPATIENT
Start: 2017-05-15 | End: 2017-05-15

## 2017-05-15 RX ORDER — METOPROLOL SUCCINATE 25 MG/1
25 TABLET, EXTENDED RELEASE ORAL DAILY
Qty: 90 TABLET | Refills: 3 | Status: SHIPPED | OUTPATIENT
Start: 2017-05-15 | End: 2017-12-14

## 2017-05-15 RX ORDER — METRONIDAZOLE 500 MG/1
500 TABLET ORAL 2 TIMES DAILY
Qty: 14 TABLET | Refills: 0 | Status: SHIPPED | OUTPATIENT
Start: 2017-05-15 | End: 2018-03-08

## 2017-05-15 RX ORDER — LEVOTHYROXINE SODIUM 88 UG/1
88 TABLET ORAL DAILY
Qty: 90 TABLET | Refills: 3 | Status: SHIPPED | OUTPATIENT
Start: 2017-05-15 | End: 2018-04-25

## 2017-05-15 NOTE — NURSING NOTE
"Chief Complaint   Patient presents with     Physical     Fasting       Initial /90 (BP Location: Right arm, Patient Position: Chair, Cuff Size: Adult Large)  Pulse 60  Temp 97.5  F (36.4  C) (Oral)  Ht 5' 3.86\" (1.622 m)  Wt 204 lb 9.6 oz (92.8 kg)  LMP 08/04/2015 (Approximate)  SpO2 100%  BMI 35.28 kg/m2 Estimated body mass index is 35.28 kg/(m^2) as calculated from the following:    Height as of this encounter: 5' 3.86\" (1.622 m).    Weight as of this encounter: 204 lb 9.6 oz (92.8 kg).  Medication Reconciliation: complete   Britney Cruz MA      "

## 2017-05-15 NOTE — MR AVS SNAPSHOT
After Visit Summary   5/15/2017    Mary Sims    MRN: [id number]           Patient Information     Date Of Birth          1965        Visit Information        Provider Department      5/15/2017 9:00 AM Janie Riley APRN CNP [facility]        Today's Diagnoses     Normal physical exam    -  1    Essential hypertension with goal blood pressure less than 140/90        Screening for malignant neoplasm of cervix        Hypothyroidism, unspecified type        Need for hepatitis C screening test        Non morbid obesity due to excess calories          Care Instructions    Based on your medical history and these are the current health maintenance or preventive care services that you are due for (some may have been done at this visit)  Health Maintenance Due   Topic Date Due     HEPATITIS C SCREENING  06/04/1981     PAP Q3 YR  03/11/2016     MICROALBUMIN Q1 YEAR( NO INBASKET)  04/18/2017     EYE EXAM Q1 YEAR( NO INBASKET)  04/27/2017         At [facility], we strive to deliver an exceptional experience to you, every time we see you.    If you receive a survey in the mail, please send us back your thoughts. We really do value your feedback.    Your care team's suggested websites for health information:  Www.[employer].org : Up to date and easily searchable information on multiple topics.  Www.medlineplus.gov : medication info, interactive tutorials, watch real surgeries online  Www.familydoctor.org : good info from the Academy of Family Physicians  Www.cdc.gov : public health info, travel advisories, epidemics (H1N1)  Www.aap.org : children's health info, normal development, vaccinations  Www.health.[facility].us : [place] dept of health, public health issues in [place], N1N1    How to contact your care team:   Team [facility] [phone]         Pharmacy [phone]    Dr. Rich, Teri Bingham PA-C, Gretel Dash CNP, Tierney Mosqueda,  ANTELMO, Dr. Álvarez, and PABLO Deng CNP    Team RNs: Rosy & Danitza      Clinic hours  M-Th 7 am-7 pm   Fri 7 am-5 pm.   Urgent care M-F 11 am-9 pm,   Sat/Sun 9 am-5 pm.  Pharmacy M-Th 8 am-8 pm Fri 8 am-6 pm  Sat/Sun 9 am-5 pm.     All password changes, disabled accounts, or ID changes in [employer]/MyHealth will be done by our Access Services Department.    If you need help with your account or password, call: [phone]. Clinic staff no longer has the ability to change passwords.     Preventive Health Recommendations  Female Ages 50 - 64    Yearly exam: See your health care provider every year in order to  o Review health changes.   o Discuss preventive care.    o Review your medicines if your doctor has prescribed any.      Get a Pap test every three years (unless you have an abnormal result and your provider advises testing more often).    If you get Pap tests with HPV test, you only need to test every 5 years, unless you have an abnormal result.     You do not need a Pap test if your uterus was removed (hysterectomy) and you have not had cancer.    You should be tested each year for STDs (sexually transmitted diseases) if you're at risk.     Have a mammogram every 1 to 2 years.    Have a colonoscopy at age 50, or have a yearly FIT test (stool test). These exams screen for colon cancer.      Have a cholesterol test every 5 years, or more often if advised.    Have a diabetes test (fasting glucose) every three years. If you are at risk for diabetes, you should have this test more often.     If you are at risk for osteoporosis (brittle bone disease), think about having a bone density scan (DEXA).    Shots: Get a flu shot each year. Get a tetanus shot every 10 years.    Nutrition:     Eat at least 5 servings of fruits and vegetables each day.    Eat whole-grain bread, whole-wheat pasta and brown rice instead of white grains and rice.    Talk to your provider about Calcium and Vitamin D.      Lifestyle    Exercise at least 150 minutes a week (30 minutes a day, 5 days a week). This will help you control your weight and prevent disease.    Limit alcohol to one drink per day.    No smoking.     Wear sunscreen to prevent skin cancer.     See your dentist every six months for an exam and cleaning.    See your eye doctor every 1 to 2 years.    Controlling High Blood Pressure  High blood pressure (hypertension) is called the silent killer. This is because many people who have it don t know it. High blood pressure is 140/90 or higher. Know your blood pressure and remember to check it regularly. Doing so can save your life. Here are some things you can do to help control your blood pressure.    Choose heart-healthy foods    Select low-salt, low-fat foods.    Limit canned, dried, cured, packaged, and fast foods. These can contain a lot of salt.    Eat 8 to 10 servings of  fruits and vegetables every day.    Choose lean meats, fish, or chicken.    Eat whole-grain pasta, brown rice, and beans.    Eat 2 to 3 servings of low-fat or fat-free dairy products    Ask your doctor about the DASH eating plan. This plan helps reduce blood pressure.  Maintain a healthy weight    Ask your health care provider how many calories to eat a day. Then stick to that number.    Ask your health care provider what weight range is healthiest for you. If you are overweight, a weight loss of only 3% to 5% of your body weight can help lower blood pressure.    Limit snacks and sweets.    Get regular exercise.  Get up and get active    Choose activities you enjoy. Find ones you can do with friends or family.    Park farther away from building entrances.    Use stairs instead of the elevator.    When you can, walk or bike instead of driving.    [place] leaves, garden, or do household repairs.    Be active at a moderate to vigorous level of physical activity for at least 40 minutes for a minimum of 3 to 4 days a week.   Manage stress    Make  time to relax and enjoy life. Find time to laugh.    Visit with family and friends, and keep up with hobbies.  Limit alcohol and quit smoking    Men should have no more than 2 drinks per day.    Women should have no more than 1 drink per day.    Talk with your health care provider about quitting smoking. Smoking increases your risk for heart disease and stroke. Ask about local or community programs that can help.  Medications  If lifestyle changes aren t enough, your health care provider may prescribe high blood pressure medicine. Take all medications as prescribed.     0364-4634 [employer]. [address] [place] [address]. All rights reserved. This information is not intended as a substitute for professional medical care. Always follow your healthcare professional's instructions.              Follow-ups after your visit        Your next 10 appointments already scheduled     May 17, 2017  2:10 PM CDT   MELIDA Extremity with Nicol Gibbs PTA   [facility] ([facility])    [address] [place] [id number]   [phone]            May 19, 2017  7:45 AM CDT   MELIDA Extremity with Nicol Gibbs PTA   [facility] ([facility])    [address] [place] [phone]   [phone]            May 22, 2017  8:00 AM CDT   [facility]    [address] [place] [phone]   [phone]            May 23, 2017  9:10 AM CDT   MELIDA Extremity with Shadi Doss PT   [facility] ([facility])    [address] [place] [phone]   [phone]            May 25, 2017  8:30 AM CDT   MELIDA Extremity with Shadi Doss PT   [facility] [facility]    [address]  [facility] [id number] [phone]              Future tests that were ordered for you today     Open Future Orders        Priority Expected Expires Ordered    Hepatitis C Screen Reflex to HCV RNA Quant and Genotype Routine  5/15/2018 5/15/2017            Who to contact     If you have questions or need follow up information about today's clinic visit or your schedule please contact [facility] directly at [phone].  Normal or non-critical lab and imaging results will be communicated to you by [employer], letter or phone within 4 business days after the clinic has received the results. If you do not hear from us within 7 days, please contact the clinic through [employer] or phone. If you have a critical or abnormal lab result, we will notify you by phone as soon as possible.  Submit refill requests through [employer] or call your pharmacy and they will forward the refill request to us. Please allow 3 business days for your refill to be completed.          Additional Information About Your Visit        [employer] Information     [employer] gives you secure access to your electronic health record. If you see a primary care provider, you can also send messages to your care team and make appointments. If you have questions, please call your primary care clinic.  If you do not have a primary care provider, please call [phone] and they will assist you.        Care EveryWhere ID     This is your Care EveryWhere ID. This could be used by other organizations to access your [place] medical records  [id number]        Your Vitals Were     Pulse Temperature Height Last Period Pulse Oximetry BMI (Body Mass Index)    60 97.5  F (36.4  C) (Oral) 5' 3.86\" (1.622 m) 08/04/2015 (Approximate) 100% 35.28 kg/m2       Blood Pressure from Last 3 Encounters:   05/15/17 136/86   04/27/17 (!) 135/94   04/11/17 " (!) 146/96    Weight from Last 3 Encounters:   05/15/17 204 lb 9.6 oz (92.8 kg)   04/11/17 190 lb (86.2 kg)   03/13/17 197 lb 9.6 oz (89.6 kg)              We Performed the Following     Albumin Random Urine Quantitative     Pap imaged thin layer screen with HPV - recommended age 30 - 65 years (select HPV order below)     Wet prep          Today's Medication Changes          These changes are accurate as of: 5/15/17  9:37 AM.  If you have any questions, ask your nurse or doctor.               These medicines have changed or have updated prescriptions.        Dose/Directions    levothyroxine 88 MCG tablet   Commonly known as:  SYNTHROID/LEVOTHROID   This may have changed:  See the new instructions.   Used for:  Hypothyroidism, unspecified type   Changed by:  Janie Riley APRN CNP        Dose:  88 mcg   Take 1 tablet (88 mcg) by mouth daily   Quantity:  90 tablet   Refills:  3            Where to get your medicines      These medications were sent to [facility] [address]  [address] [facility] [address]     Phone:  [phone]     levothyroxine 88 MCG tablet    metoprolol 25 MG 24 hr tablet                Primary Care Provider Office Phone # Fax #    Jazmin Ramírez PA-C [phone]       [facility] [address]  [facility] [address]        Thank you!     Thank you for choosing [facility]  for your care. Our goal is always to provide you with excellent care. Hearing back from our patients is one way we can continue to improve our services. Please take a few minutes to complete the written survey that you may receive in the mail after your visit with us. Thank you!             Your Updated Medication List - Protect others around you: Learn how to safely use, store and throw away your medicines at www.disposemymeds.org.          This list is accurate as of: 5/15/17  9:37 AM.  Always use your  most recent med list.                   Brand Name Dispense Instructions for use    hydrOXYzine 25 MG tablet    ATARAX    50 tablet    Take 1 tablet (25 mg) by mouth every 6 hours as needed for itching (and nausea)       levothyroxine 88 MCG tablet    SYNTHROID/LEVOTHROID    90 tablet    Take 1 tablet (88 mcg) by mouth daily       metoprolol 25 MG 24 hr tablet    TOPROL-XL    90 tablet    Take 1 tablet (25 mg) by mouth daily

## 2017-05-15 NOTE — PATIENT INSTRUCTIONS
Based on your medical history and these are the current health maintenance or preventive care services that you are due for (some may have been done at this visit)  Health Maintenance Due   Topic Date Due     HEPATITIS C SCREENING  06/04/1981     PAP Q3 YR  03/11/2016     MICROALBUMIN Q1 YEAR( NO INBASKET)  04/18/2017     EYE EXAM Q1 YEAR( NO INBASKET)  04/27/2017         At [facility], we strive to deliver an exceptional experience to you, every time we see you.    If you receive a survey in the mail, please send us back your thoughts. We really do value your feedback.    Your care team's suggested websites for health information:  Www.[employer].org : Up to date and easily searchable information on multiple topics.  Www.medlineplus.gov : medication info, interactive tutorials, watch real surgeries online  Www.familydoctor.org : good info from the Academy of Family Physicians  Www.cdc.gov : public health info, travel advisories, epidemics (H1N1)  Www.aap.org : children's health info, normal development, vaccinations  Www.health.[facility].[place].us : [place] dept of health, public health issues in [place], N1N1    How to contact your care team:   Team [facility] [phone]         Pharmacy [phone]    Dr. Rich, Teri Bingham PA-C, Dr. Ames, Gretel SHAH CNP, Tierney Mosqueda PA-C, Dr. Álvarez, and PABLO Deng CNP    Team RNs: Rosy Bosch      Clinic hours  M-Th 7 am-7 pm   Fri 7 am-5 pm.   Urgent care M-F 11 am-9 pm,   Sat/Sun 9 am-5 pm.  Pharmacy M-Th 8 am-8 pm Fri 8 am-6 pm  Sat/Sun 9 am-5 pm.     All password changes, disabled accounts, or ID changes in [employer]/MyHealth will be done by our Access Services Department.    If you need help with your account or password, call: [phone]. Clinic staff no longer has the ability to change passwords.     Preventive Health Recommendations  Female Ages 50 - 64    Yearly exam: See your health care provider every year in order  to  o Review health changes.   o Discuss preventive care.    o Review your medicines if your doctor has prescribed any.      Get a Pap test every three years (unless you have an abnormal result and your provider advises testing more often).    If you get Pap tests with HPV test, you only need to test every 5 years, unless you have an abnormal result.     You do not need a Pap test if your uterus was removed (hysterectomy) and you have not had cancer.    You should be tested each year for STDs (sexually transmitted diseases) if you're at risk.     Have a mammogram every 1 to 2 years.    Have a colonoscopy at age 50, or have a yearly FIT test (stool test). These exams screen for colon cancer.      Have a cholesterol test every 5 years, or more often if advised.    Have a diabetes test (fasting glucose) every three years. If you are at risk for diabetes, you should have this test more often.     If you are at risk for osteoporosis (brittle bone disease), think about having a bone density scan (DEXA).    Shots: Get a flu shot each year. Get a tetanus shot every 10 years.    Nutrition:     Eat at least 5 servings of fruits and vegetables each day.    Eat whole-grain bread, whole-wheat pasta and brown rice instead of white grains and rice.    Talk to your provider about Calcium and Vitamin D.     Lifestyle    Exercise at least 150 minutes a week (30 minutes a day, 5 days a week). This will help you control your weight and prevent disease.    Limit alcohol to one drink per day.    No smoking.     Wear sunscreen to prevent skin cancer.     See your dentist every six months for an exam and cleaning.    See your eye doctor every 1 to 2 years.    Controlling High Blood Pressure  High blood pressure (hypertension) is called the silent killer. This is because many people who have it don t know it. High blood pressure is 140/90 or higher. Know your blood pressure and remember to check it regularly. Doing so can save your life.  Here are some things you can do to help control your blood pressure.    Choose heart-healthy foods    Select low-salt, low-fat foods.    Limit canned, dried, cured, packaged, and fast foods. These can contain a lot of salt.    Eat 8 to 10 servings of  fruits and vegetables every day.    Choose lean meats, fish, or chicken.    Eat whole-grain pasta, brown rice, and beans.    Eat 2 to 3 servings of low-fat or fat-free dairy products    Ask your doctor about the DASH eating plan. This plan helps reduce blood pressure.  Maintain a healthy weight    Ask your health care provider how many calories to eat a day. Then stick to that number.    Ask your health care provider what weight range is healthiest for you. If you are overweight, a weight loss of only 3% to 5% of your body weight can help lower blood pressure.    Limit snacks and sweets.    Get regular exercise.  Get up and get active    Choose activities you enjoy. Find ones you can do with friends or family.    Park farther away from building entrances.    Use stairs instead of the elevator.    When you can, walk or bike instead of driving.    [place] leaves, garden, or do household repairs.    Be active at a moderate to vigorous level of physical activity for at least 40 minutes for a minimum of 3 to 4 days a week.   Manage stress    Make time to relax and enjoy life. Find time to laugh.    Visit with family and friends, and keep up with hobbies.  Limit alcohol and quit smoking    Men should have no more than 2 drinks per day.    Women should have no more than 1 drink per day.    Talk with your health care provider about quitting smoking. Smoking increases your risk for heart disease and stroke. Ask about local or community programs that can help.  Medications  If lifestyle changes aren t enough, your health care provider may prescribe high blood pressure medicine. Take all medications as prescribed.     9253-7545 [employer]. [address] [facility],  [place] [address]. All rights reserved. This information is not intended as a substitute for professional medical care. Always follow your healthcare professional's instructions.

## 2017-05-15 NOTE — PROGRESS NOTES
SUBJECTIVE:     CC: Mary Sims is an 51 year old woman who presents for preventive health visit.     [facility] for HPI/ROS submitted by the patient on 5/12/2017   Annual Exam:  Getting at least 3 servings of Calcium per day:: Yes  Bi-annual eye exam:: Yes  Dental care twice a year:: NO  Sleep apnea or symptoms of sleep apnea:: None  Diet:: Regular (no restrictions)  Taking medications regularly:: Yes  Medication side effects:: None  Additional concerns today:: check left hand (possible injury)  Q1: Little interest or pleasure in doing things: 0=Not at all  Q2: Feeling down, depressed or hopeless: 0=Not at all  PHQ-2 Score: 0        Today's PHQ-2 Score:   PHQ-2 ( 1999 Pf[employer]) 5/12/2017 5/9/2017   Q1: Little interest or pleasure in doing things - -   Q2: Feeling down, depressed or hopeless - -   PHQ-2 Score - -   Little interest or pleasure in doing things Not at all Not at all   Feeling down, depressed or hopeless Not at all Not at all   PHQ-2 Score 0 0       Abuse: Current or Past(Physical, Sexual or Emotional)- No  Do you feel safe in your environment - Yes    Social History   Substance Use Topics     Smoking status: Never Smoker     Smokeless tobacco: Never Used     Alcohol use Yes      Comment: SELDOM      The patient does not drink >3 drinks per day nor >7 drinks per week.    Recent Labs   Lab Test  08/18/15   1019  05/15/14   1356   CHOL  188  226*   HDL  68  65   LDL  109  145*   TRIG  57  80   CHOLHDLRATIO  2.8  3.5       Reviewed orders with patient.  Reviewed health maintenance and updated orders accordingly - Yes    Mammo Decision Support:  Patient over age 50, mutual decision to screen reflected in health maintenance.    Pertinent mammograms are reviewed under the imaging tab.  History of abnormal Pap smear: YES - updated in Problem List and Health Maintenance accordingly    Reviewed and updated as needed this visit by clinical staff  Tobacco  Allergies  Meds  Problems   Med Hx  Surg Hx  Fam Hx  Soc Hx          Reviewed and updated as needed this visit by Provider  Allergies  Meds  Problems        Past Medical History:   Diagnosis Date     Graves disease      Hypertension      Hypothyroidism      MEDICAL HISTORY OF -     S/P RADIOACTIVE IODINE     Palpitation       Past Surgical History:   Procedure Laterality Date     ARTHROSCOPY SHLDR ROTATOR CUFF REPAIR, SUBACROMIAL DECOMP, DIST CLAVICLE RESECTION, BICEP TENODESIS Left 2017    Procedure: ARTHROSCOPY SHOULDER ROTATOR CUFF REPAIR, SUBACROMIAL DECOMPRESSION, DISTAL CLAVICLE RESECTION, OPEN BICEP TENODESIS REPAIR;  Surgeon: Dorina Rodriguez MD;  Location: [facility] OR     C  DELIVERY ONLY       COLONOSCOPY  2013    Procedure: COLONOSCOPY;  colonoscopy, screening;  Surgeon: Dalton Lala MD;  Location: [facility] OR     SURGICAL HISTORY OF -       RT ANKLE Fx AND REPAIR (PIN,PLATE,SCREWS)     THYROIDECTOMY       Patient needs refill of synthroid for Hypothyroidism.  No hair loss, weight gain, fatigue, constipation, dry skin.  She is taking synthroid first thing in the am along with her Toprol for BP (also needs refill of Toprol).  She does not check BP's at home, is  Exercising more regularly now that she is feeling better after her left shoulder  Arthroscopy in April.    ROS:  C: NEGATIVE for fever, chills, change in weight  I: NEGATIVE for worrisome rashes, moles or lesions  E: NEGATIVE for vision changes or irritation  ENT: NEGATIVE for ear, mouth and throat problems  R: NEGATIVE for significant cough or SOB  B: NEGATIVE for masses, tenderness or discharge  CV: NEGATIVE for chest pain, palpitations or peripheral edema  GI: NEGATIVE for nausea, abdominal pain, heartburn, or change in bowel habits  : NEGATIVE for unusual urinary or vaginal symptoms. No vaginal bleeding.  M: NEGATIVE for significant arthralgias or myalgia  N: NEGATIVE for weakness, dizziness or paresthesias  P: NEGATIVE for changes in  "mood or affect     Problem list, Medication list, Allergies, and Medical/Social/Surgical histories reviewed in [facility] and updated as appropriate.  Labs reviewed in EPIC  BP Readings from Last 3 Encounters:   05/15/17 136/86   04/27/17 (!) 135/94   04/11/17 (!) 146/96    Wt Readings from Last 3 Encounters:   05/15/17 204 lb 9.6 oz (92.8 kg)   04/11/17 190 lb (86.2 kg)   03/13/17 197 lb 9.6 oz (89.6 kg)                  OBJECTIVE:     /86  Pulse 60  Temp 97.5  F (36.4  C) (Oral)  Ht 5' 3.86\" (1.622 m)  Wt 204 lb 9.6 oz (92.8 kg)  LMP 08/04/2015 (Approximate)  SpO2 100%  BMI 35.28 kg/m2  EXAM:  GENERAL APPEARANCE: healthy, alert and no distress  EYES: Eyes grossly normal to inspection, PERRL and conjunctivae and sclerae normal  HENT: ear canals and TM's normal, nose and mouth without ulcers or lesions, oropharynx clear and oral mucous membranes moist  NECK: no adenopathy, no asymmetry, masses, or scars and thyroid normal to palpation  RESP: lungs clear to auscultation - no rales, rhonchi or wheezes  BREAST: normal without masses, tenderness or nipple discharge and no palpable axillary masses or adenopathy  CV: regular rate and rhythm, normal S1 S2, no S3 or S4, no murmur, click or rub, no peripheral edema and peripheral pulses strong  ABDOMEN: soft, nontender, no hepatosplenomegaly, no masses and bowel sounds normal   (female): normal female external genitalia, normal urethral meatus, vaginal mucosal atrophy noted, normal cervix, adnexae, and uterus without masses, creamy white discharge present, pap/wet prep obtained  MS: no musculoskeletal defects are noted and gait is age appropriate without ataxia  SKIN: no suspicious lesions or rashes  NEURO: Normal strength and tone, sensory exam grossly normal, mentation intact and speech normal  PSYCH: mentation appears normal and affect normal/bright    ASSESSMENT/PLAN:     1. Normal physical exam      2. Essential hypertension with goal blood pressure less than " "140/90  BP at goal, continue to work on weight loss, regular exercise, low salt/DASH/Mediterranean diet  - Albumin Random Urine Quantitative  - metoprolol (TOPROL-XL) 25 MG 24 hr tablet; Take 1 tablet (25 mg) by mouth daily  Dispense: 90 tablet; Refill: 3    3. Screening for malignant neoplasm of cervix    - Pap imaged thin layer screen with HPV - recommended age 30 - 65 years (select HPV order below)  - Wet prep    4. Hypothyroidism, unspecified type  TSH normal 3/2017, refilled for a year.  - levothyroxine (SYNTHROID/LEVOTHROID) 88 MCG tablet; Take 1 tablet (88 mcg) by mouth daily  Dispense: 90 tablet; Refill: 3    5. Need for hepatitis C screening test    - Hepatitis C Screen Reflex to HCV RNA Quant and Genotype; Future  6.  Non morbid obesity due to excess calories  Benefits of weight loss reviewed in detail, encouraged him to cut back on the carbohydrates in the diet, consume more fruits and vegetables, drink plenty of water, avoid fruit juices, sodas, get 150 min moderate exercise/week.  Recheck weight in 6 months.  7.  Screen for STD(sexually transmitted disease)  -Wet prep  -GC    8.  Trichomonas vaginalis (TV) infection  Treating with Flagyl BID for 7 days    9.  BV (Bacterial vaginosis)  Flagyl ordered    10.  Candidiasis of vagina   Treating with Diflucan    COUNSELING:   Reviewed preventive health counseling, as reflected in patient instructions       Regular exercise       Healthy diet/nutrition       Vision screening       Osteoporosis Prevention/Bone Health       Safe sex practices/STD prevention       (Julianne)menopause management         reports that she has never smoked. She has never used smokeless tobacco.    Estimated body mass index is 35.28 kg/(m^2) as calculated from the following:    Height as of this encounter: 5' 3.86\" (1.622 m).    Weight as of this encounter: 204 lb 9.6 oz (92.8 kg).   Weight management plan: Discussed healthy diet and exercise guidelines and patient will follow up in 12 " months in clinic to re-evaluate.    Counseling Resources:  ATP IV Guidelines  Pooled Cohorts Equation Calculator  Breast Cancer Risk Calculator  FRAX Risk Assessment  ICSI Preventive Guidelines  Dietary Guidelines for Americans, 2010  USDA's MyPlate  ASA Prophylaxis  Lung CA Screening    Janie Riley, APRN CNP

## 2017-05-17 ENCOUNTER — THERAPY VISIT (OUTPATIENT)
Dept: PHYSICAL THERAPY | Facility: CLINIC | Age: 52
End: 2017-05-17
Payer: COMMERCIAL

## 2017-05-17 DIAGNOSIS — Z47.89 AFTERCARE FOLLOWING SURGERY OF THE MUSCULOSKELETAL SYSTEM: ICD-10-CM

## 2017-05-17 DIAGNOSIS — M75.122 COMPLETE TEAR OF LEFT ROTATOR CUFF: ICD-10-CM

## 2017-05-17 LAB
COPATH REPORT: NORMAL
PAP: NORMAL

## 2017-05-17 PROCEDURE — 97110 THERAPEUTIC EXERCISES: CPT | Mod: GP | Performed by: PHYSICAL THERAPY ASSISTANT

## 2017-05-19 ENCOUNTER — THERAPY VISIT (OUTPATIENT)
Dept: PHYSICAL THERAPY | Facility: CLINIC | Age: 52
End: 2017-05-19
Payer: COMMERCIAL

## 2017-05-19 DIAGNOSIS — M75.102 NONTRAUMATIC TEAR OF SUPRASPINATUS TENDON, LEFT: ICD-10-CM

## 2017-05-19 DIAGNOSIS — M75.122 COMPLETE TEAR OF LEFT ROTATOR CUFF: Primary | ICD-10-CM

## 2017-05-19 DIAGNOSIS — Z47.89 AFTERCARE FOLLOWING SURGERY OF THE MUSCULOSKELETAL SYSTEM: ICD-10-CM

## 2017-05-19 LAB
FINAL DIAGNOSIS: NORMAL
HPV HR 12 DNA CVX QL NAA+PROBE: NEGATIVE
HPV16 DNA SPEC QL NAA+PROBE: NEGATIVE
HPV18 DNA SPEC QL NAA+PROBE: NEGATIVE
SPECIMEN DESCRIPTION: NORMAL

## 2017-05-19 PROCEDURE — 97110 THERAPEUTIC EXERCISES: CPT | Mod: GP | Performed by: PHYSICAL THERAPY ASSISTANT

## 2017-05-22 ENCOUNTER — OFFICE VISIT (OUTPATIENT)
Dept: ORTHOPEDICS | Facility: CLINIC | Age: 52
End: 2017-05-22
Payer: COMMERCIAL

## 2017-05-22 VITALS — HEART RATE: 85 BPM | SYSTOLIC BLOOD PRESSURE: 148 MMHG | DIASTOLIC BLOOD PRESSURE: 97 MMHG

## 2017-05-22 DIAGNOSIS — M75.102 TEAR OF LEFT ROTATOR CUFF, UNSPECIFIED TEAR EXTENT: Primary | ICD-10-CM

## 2017-05-22 PROCEDURE — 99024 POSTOP FOLLOW-UP VISIT: CPT | Performed by: ORTHOPAEDIC SURGERY

## 2017-05-22 ASSESSMENT — PAIN SCALES - GENERAL: PAINLEVEL: NO PAIN (0)

## 2017-05-22 NOTE — PATIENT INSTRUCTIONS
Thanks for coming today.  Ortho/Sports Medicine Clinic  [address] [place] [address]    To schedule future appointments in Ortho Clinic, you may call [phone].    To schedule ordered imaging by your provider:   Call Central Imaging [phone]    To schedule an injection ordered by your provider:  Call Central Imaging Injection scheduling line: [phone]  JenaValve Technologyhart available online at:  [email or web address]    Please call if any further questions or concerns ([phone]).  Clinic hours 8 am to 5 pm.    Return to clinic (call) if symptoms worsen or fail to improve.

## 2017-05-22 NOTE — NURSING NOTE
"Mary Sims's goals for this visit include: Status post Left Arthroscopic Rotator Cuff Repair, Subacromial Decompression, Open Bioceps Tenodesis DOS 4/11/17  She requests these members of her care team be copied on today's visit information: yes    PCP: Jazmin Ramírez    Referring Provider:  No referring provider defined for this encounter.    Chief Complaint   Patient presents with     RECHECK     Status post Left Arthroscopic Rotator Cuff Repair, Subacromial Decompression, Open Bioceps Tenodesis DOS 4/11/17       Initial BP (!) 148/97  Pulse 85  LMP 08/04/2015 (Approximate) Estimated body mass index is 35.28 kg/(m^2) as calculated from the following:    Height as of 5/15/17: 1.622 m (5' 3.86\").    Weight as of 5/15/17: 92.8 kg (204 lb 9.6 oz).  Medication Reconciliation: complete  "

## 2017-05-22 NOTE — PROGRESS NOTES
CHIEF CONCERN: Status post left arthroscopic rotator cuff repair, subacromial decompression, open biceps tenodesis  DATE OF SURGERY: 4/11/17    HISTORY OF PRESENT ILLNESS: Ms. Sims is a 51 year old female who returns 6 weeks status post the above procedure. She has been working with PT - still with restriction in PROM but she has markedly less pain than preop and she is quite pleased with this.    PHYSICAL EXAM:  Adult female in no distress  Respirations even and unlabored  Left shoulder: CMS intact. EPL, FPL, and Intrinsics intact. Passive ROM is FE to 90, ER at side to 30.    IMAGING: None new     ASSESSMENT:  1. Six weeks status post the above procedure    PLAN:  -DC sling  -Progress PT - to AROM in next 4 weeks. Gentle mobs OK  -Work paperwork completed today: no away from body, shoulder height or overhead work. Return with no more than 6 hrs/day for 1st 2 weeks, then progress to 9 hrs/day.  -RTC in 6 weeks

## 2017-05-22 NOTE — MR AVS SNAPSHOT
After Visit Summary   2017    Mary Sims    MRN: [id number]           Patient Information     Date Of Birth          1965        Visit Information        Provider Department      2017 8:00 AM Dorina Rodriguez MD [facility]        Today's Diagnoses     Tear of left rotator cuff, unspecified tear extent    -  1      Care Instructions    Thanks for coming today.  Ortho/Sports Medicine Clinic  [address] [place] [address]    To schedule future appointments in Ortho Clinic, you may call [phone].    To schedule ordered imaging by your provider:   Call Central Imaging [phone]    To schedule an injection ordered by your provider:  Call Central Imaging Injection scheduling line: [phone]  SendGrid available online at:  [email or web address]    Please call if any further questions or concerns ([phone]).  Clinic hours 8 am to 5 pm.    Return to clinic (call) if symptoms worsen or fail to improve.        Follow-ups after your visit        Your next 10 appointments already scheduled     May 23, 2017  9:10 AM CDT   MELIDA Extremity with Shadi Doss PT   [place] [facility] ([facility])    [address] [place] [id number]   [phone]            May 25, 2017  8:30 AM CDT   MELIDA Extremity with Shadi Doss PT   [facility] Physical Therapy ([facility])    [address] [place] [address]   [phone]            2017  4:45 PM CDT   Return Visit with Dorina Rodriguez MD   [facility] ([facility])    [address] [place] [id number] [phone]              Who to contact     If you have questions or need follow up information about today's clinic visit or your schedule please contact [facility]  directly at [phone].  Normal or non-critical lab and imaging results will be communicated to you by NineSigmahart, letter or phone within 4 business days after the clinic has received the results. If you do not hear from us within 7 days, please contact the clinic through NineSigmahart or phone. If you have a critical or abnormal lab result, we will notify you by phone as soon as possible.  Submit refill requests through Toonimo or call your pharmacy and they will forward the refill request to us. Please allow 3 business days for your refill to be completed.          Additional Information About Your Visit        NineSigmaharOptiMedica Information     Toonimo gives you secure access to your electronic health record. If you see a primary care provider, you can also send messages to your care team and make appointments. If you have questions, please call your primary care clinic.  If you do not have a primary care provider, please call [phone] and they will assist you.      Toonimo is an electronic gateway that provides easy, online access to your medical records. With Toonimo, you can request a clinic appointment, read your test results, renew a prescription or communicate with your care team.     To access your existing account, please contact your [facility] or call [phone] for assistance.        Care EveryWhere ID     This is your Care EveryWhere ID. This could be used by other organizations to access your [place] medical records  [id number]        Your Vitals Were     Pulse Last Period                85 08/04/2015 (Approximate)           Blood Pressure from Last 3 Encounters:   05/22/17 (!) 148/97   05/15/17 136/86   04/27/17 (!) 135/94    Weight from Last 3 Encounters:   05/15/17 92.8 kg (204 lb 9.6 oz)   04/11/17 86.2 kg (190 lb)   03/13/17 89.6 kg (197 lb 9.6 oz)              Today, you had the following     No orders found for display       Primary Care Provider Office Phone # Fax #     Jazmin Ramírez PA-C [phone]       [facility] PHYSICIAN [facility] [address]  [facility] [address]        Thank you!     Thank you for choosing [facility]  for your care. Our goal is always to provide you with excellent care. Hearing back from our patients is one way we can continue to improve our services. Please take a few minutes to complete the written survey that you may receive in the mail after your visit with us. Thank you!             Your Updated Medication List - Protect others around you: Learn how to safely use, store and throw away your medicines at www.disposemymeds.org.          This list is accurate as of: 5/22/17  8:40 AM.  Always use your most recent med list.                   Brand Name Dispense Instructions for use    hydrOXYzine 25 MG tablet    ATARAX    50 tablet    Take 1 tablet (25 mg) by mouth every 6 hours as needed for itching (and nausea)       levothyroxine 88 MCG tablet    SYNTHROID/LEVOTHROID    90 tablet    Take 1 tablet (88 mcg) by mouth daily       metoprolol 25 MG 24 hr tablet    TOPROL-XL    90 tablet    Take 1 tablet (25 mg) by mouth daily       metroNIDAZOLE 500 MG tablet    FLAGYL    14 tablet    Take 1 tablet (500 mg) by mouth 2 times daily

## 2017-05-23 ENCOUNTER — THERAPY VISIT (OUTPATIENT)
Dept: PHYSICAL THERAPY | Facility: CLINIC | Age: 52
End: 2017-05-23
Payer: COMMERCIAL

## 2017-05-23 DIAGNOSIS — Z47.89 AFTERCARE FOLLOWING SURGERY OF THE MUSCULOSKELETAL SYSTEM: ICD-10-CM

## 2017-05-23 DIAGNOSIS — M75.122 COMPLETE TEAR OF LEFT ROTATOR CUFF: ICD-10-CM

## 2017-05-23 PROCEDURE — 97110 THERAPEUTIC EXERCISES: CPT | Mod: GP | Performed by: PHYSICAL THERAPIST

## 2017-05-25 ENCOUNTER — THERAPY VISIT (OUTPATIENT)
Dept: PHYSICAL THERAPY | Facility: CLINIC | Age: 52
End: 2017-05-25
Payer: COMMERCIAL

## 2017-05-25 DIAGNOSIS — M75.122 COMPLETE TEAR OF LEFT ROTATOR CUFF: ICD-10-CM

## 2017-05-25 DIAGNOSIS — Z47.89 AFTERCARE FOLLOWING SURGERY OF THE MUSCULOSKELETAL SYSTEM: ICD-10-CM

## 2017-05-25 PROCEDURE — 97110 THERAPEUTIC EXERCISES: CPT | Mod: GP | Performed by: PHYSICAL THERAPIST

## 2017-05-25 PROCEDURE — 97140 MANUAL THERAPY 1/> REGIONS: CPT | Mod: GP | Performed by: PHYSICAL THERAPIST

## 2017-05-26 ENCOUNTER — TELEPHONE (OUTPATIENT)
Dept: ORTHOPEDICS | Facility: CLINIC | Age: 52
End: 2017-05-26

## 2017-05-26 NOTE — TELEPHONE ENCOUNTER
[facility] Call Center    Phone Message    Name of Caller: Elvie    Phone Number: Other phone number:  [phone]    Best time to return call: Any    May a detailed message be left on voicemail: yes    Relation to patient: Other Name: Elvie  Relationship: Disability nurse  Is there legal documentation in chart to discuss information with this person: NA    Reason for Call: Elvie called to get an update on Mary's last visit.  She will wait for a call back.  Thank you    Action Taken: Message routed to:  Adult Clinics: Orthopedics p [phone]

## 2017-05-30 ENCOUNTER — THERAPY VISIT (OUTPATIENT)
Dept: PHYSICAL THERAPY | Facility: CLINIC | Age: 52
End: 2017-05-30
Payer: COMMERCIAL

## 2017-05-30 DIAGNOSIS — M75.122 COMPLETE TEAR OF LEFT ROTATOR CUFF: ICD-10-CM

## 2017-05-30 DIAGNOSIS — Z47.89 AFTERCARE FOLLOWING SURGERY OF THE MUSCULOSKELETAL SYSTEM: ICD-10-CM

## 2017-05-30 PROCEDURE — 97110 THERAPEUTIC EXERCISES: CPT | Mod: GP | Performed by: PHYSICAL THERAPIST

## 2017-05-30 PROCEDURE — 97140 MANUAL THERAPY 1/> REGIONS: CPT | Mod: GP | Performed by: PHYSICAL THERAPIST

## 2017-05-30 NOTE — TELEPHONE ENCOUNTER
Needed OV notes to go with paperwork that they already have. 5/22/17 OV note faxed.  Fax: [phone].    Raul Sanchez RN

## 2017-06-06 ENCOUNTER — THERAPY VISIT (OUTPATIENT)
Dept: PHYSICAL THERAPY | Facility: CLINIC | Age: 52
End: 2017-06-06
Payer: COMMERCIAL

## 2017-06-06 DIAGNOSIS — Z47.89 AFTERCARE FOLLOWING SURGERY OF THE MUSCULOSKELETAL SYSTEM: ICD-10-CM

## 2017-06-06 DIAGNOSIS — M75.122 COMPLETE TEAR OF LEFT ROTATOR CUFF: ICD-10-CM

## 2017-06-06 PROCEDURE — 97140 MANUAL THERAPY 1/> REGIONS: CPT | Mod: GP | Performed by: PHYSICAL THERAPIST

## 2017-06-06 PROCEDURE — 97110 THERAPEUTIC EXERCISES: CPT | Mod: GP | Performed by: PHYSICAL THERAPIST

## 2017-06-08 ENCOUNTER — THERAPY VISIT (OUTPATIENT)
Dept: PHYSICAL THERAPY | Facility: CLINIC | Age: 52
End: 2017-06-08
Payer: COMMERCIAL

## 2017-06-08 DIAGNOSIS — M75.122 COMPLETE TEAR OF LEFT ROTATOR CUFF: ICD-10-CM

## 2017-06-08 DIAGNOSIS — Z47.89 AFTERCARE FOLLOWING SURGERY OF THE MUSCULOSKELETAL SYSTEM: ICD-10-CM

## 2017-06-08 PROCEDURE — 97110 THERAPEUTIC EXERCISES: CPT | Mod: GP | Performed by: PHYSICAL THERAPIST

## 2017-06-08 PROCEDURE — 97140 MANUAL THERAPY 1/> REGIONS: CPT | Mod: GP | Performed by: PHYSICAL THERAPIST

## 2017-06-13 ENCOUNTER — THERAPY VISIT (OUTPATIENT)
Dept: PHYSICAL THERAPY | Facility: CLINIC | Age: 52
End: 2017-06-13
Payer: COMMERCIAL

## 2017-06-13 DIAGNOSIS — Z47.89 AFTERCARE FOLLOWING SURGERY OF THE MUSCULOSKELETAL SYSTEM: ICD-10-CM

## 2017-06-13 DIAGNOSIS — M75.122 COMPLETE TEAR OF LEFT ROTATOR CUFF: ICD-10-CM

## 2017-06-13 PROCEDURE — 97140 MANUAL THERAPY 1/> REGIONS: CPT | Mod: GP | Performed by: PHYSICAL THERAPIST

## 2017-06-13 PROCEDURE — 97110 THERAPEUTIC EXERCISES: CPT | Mod: GP | Performed by: PHYSICAL THERAPIST

## 2017-06-15 ENCOUNTER — THERAPY VISIT (OUTPATIENT)
Dept: PHYSICAL THERAPY | Facility: CLINIC | Age: 52
End: 2017-06-15
Payer: COMMERCIAL

## 2017-06-15 DIAGNOSIS — M75.122 COMPLETE TEAR OF LEFT ROTATOR CUFF: ICD-10-CM

## 2017-06-15 DIAGNOSIS — Z47.89 AFTERCARE FOLLOWING SURGERY OF THE MUSCULOSKELETAL SYSTEM: ICD-10-CM

## 2017-06-15 PROCEDURE — 97140 MANUAL THERAPY 1/> REGIONS: CPT | Mod: GP | Performed by: PHYSICAL THERAPIST

## 2017-06-15 PROCEDURE — 97110 THERAPEUTIC EXERCISES: CPT | Mod: GP | Performed by: PHYSICAL THERAPIST

## 2017-06-20 ENCOUNTER — THERAPY VISIT (OUTPATIENT)
Dept: PHYSICAL THERAPY | Facility: CLINIC | Age: 52
End: 2017-06-20
Payer: COMMERCIAL

## 2017-06-20 DIAGNOSIS — Z47.89 AFTERCARE FOLLOWING SURGERY OF THE MUSCULOSKELETAL SYSTEM: ICD-10-CM

## 2017-06-20 DIAGNOSIS — M75.122 COMPLETE TEAR OF LEFT ROTATOR CUFF: ICD-10-CM

## 2017-06-20 PROCEDURE — 97110 THERAPEUTIC EXERCISES: CPT | Mod: GP | Performed by: PHYSICAL THERAPIST

## 2017-06-20 PROCEDURE — 97140 MANUAL THERAPY 1/> REGIONS: CPT | Mod: GP | Performed by: PHYSICAL THERAPIST

## 2017-06-22 ENCOUNTER — THERAPY VISIT (OUTPATIENT)
Dept: PHYSICAL THERAPY | Facility: CLINIC | Age: 52
End: 2017-06-22
Payer: COMMERCIAL

## 2017-06-22 DIAGNOSIS — M75.122 COMPLETE TEAR OF LEFT ROTATOR CUFF: ICD-10-CM

## 2017-06-22 DIAGNOSIS — Z47.89 AFTERCARE FOLLOWING SURGERY OF THE MUSCULOSKELETAL SYSTEM: ICD-10-CM

## 2017-06-22 PROCEDURE — 97140 MANUAL THERAPY 1/> REGIONS: CPT | Mod: GP | Performed by: PHYSICAL THERAPIST

## 2017-06-22 PROCEDURE — 97110 THERAPEUTIC EXERCISES: CPT | Mod: GP | Performed by: PHYSICAL THERAPIST

## 2017-06-27 ENCOUNTER — THERAPY VISIT (OUTPATIENT)
Dept: PHYSICAL THERAPY | Facility: CLINIC | Age: 52
End: 2017-06-27
Payer: COMMERCIAL

## 2017-06-27 DIAGNOSIS — M75.122 COMPLETE TEAR OF LEFT ROTATOR CUFF: ICD-10-CM

## 2017-06-27 DIAGNOSIS — Z47.89 AFTERCARE FOLLOWING SURGERY OF THE MUSCULOSKELETAL SYSTEM: ICD-10-CM

## 2017-06-27 PROCEDURE — 97110 THERAPEUTIC EXERCISES: CPT | Mod: GP | Performed by: PHYSICAL THERAPIST

## 2017-06-27 PROCEDURE — 97140 MANUAL THERAPY 1/> REGIONS: CPT | Mod: GP | Performed by: PHYSICAL THERAPIST

## 2017-06-29 ENCOUNTER — THERAPY VISIT (OUTPATIENT)
Dept: PHYSICAL THERAPY | Facility: CLINIC | Age: 52
End: 2017-06-29
Payer: COMMERCIAL

## 2017-06-29 DIAGNOSIS — M75.122 COMPLETE TEAR OF LEFT ROTATOR CUFF: ICD-10-CM

## 2017-06-29 DIAGNOSIS — Z47.89 AFTERCARE FOLLOWING SURGERY OF THE MUSCULOSKELETAL SYSTEM: ICD-10-CM

## 2017-06-29 PROCEDURE — 97110 THERAPEUTIC EXERCISES: CPT | Mod: GP | Performed by: PHYSICAL THERAPIST

## 2017-06-29 PROCEDURE — 97140 MANUAL THERAPY 1/> REGIONS: CPT | Mod: GP | Performed by: PHYSICAL THERAPIST

## 2017-07-06 ENCOUNTER — THERAPY VISIT (OUTPATIENT)
Dept: PHYSICAL THERAPY | Facility: CLINIC | Age: 52
End: 2017-07-06
Payer: COMMERCIAL

## 2017-07-06 ENCOUNTER — TRANSFERRED RECORDS (OUTPATIENT)
Dept: HEALTH INFORMATION MANAGEMENT | Facility: CLINIC | Age: 52
End: 2017-07-06

## 2017-07-06 ENCOUNTER — OFFICE VISIT (OUTPATIENT)
Dept: ORTHOPEDICS | Facility: CLINIC | Age: 52
End: 2017-07-06
Payer: COMMERCIAL

## 2017-07-06 VITALS — HEART RATE: 65 BPM | SYSTOLIC BLOOD PRESSURE: 130 MMHG | DIASTOLIC BLOOD PRESSURE: 80 MMHG

## 2017-07-06 DIAGNOSIS — M75.112 INCOMPLETE TEAR OF LEFT ROTATOR CUFF: Primary | ICD-10-CM

## 2017-07-06 DIAGNOSIS — Z47.89 AFTERCARE FOLLOWING SURGERY OF THE MUSCULOSKELETAL SYSTEM: ICD-10-CM

## 2017-07-06 DIAGNOSIS — M75.122 COMPLETE TEAR OF LEFT ROTATOR CUFF: ICD-10-CM

## 2017-07-06 LAB
CREAT SERPL-MCNC: 0.8 MG/DL (ref 0.57–1.11)
GFR SERPL CREATININE-BSD FRML MDRD: >60 ML/MIN/1.73M2
GLUCOSE SERPL-MCNC: 96 MG/DL (ref 65–100)
POTASSIUM SERPL-SCNC: 4 MMOL/L (ref 3.5–5)

## 2017-07-06 PROCEDURE — 97110 THERAPEUTIC EXERCISES: CPT | Mod: GP | Performed by: PHYSICAL THERAPIST

## 2017-07-06 PROCEDURE — 97112 NEUROMUSCULAR REEDUCATION: CPT | Mod: GP | Performed by: PHYSICAL THERAPIST

## 2017-07-06 PROCEDURE — 99024 POSTOP FOLLOW-UP VISIT: CPT | Performed by: ORTHOPAEDIC SURGERY

## 2017-07-06 ASSESSMENT — PAIN SCALES - GENERAL: PAINLEVEL: NO PAIN (0)

## 2017-07-06 NOTE — MR AVS SNAPSHOT
After Visit Summary   2017    Mary Sims    MRN: [id number]           Patient Information     Date Of Birth          1965        Visit Information        Provider Department      2017 4:45 PM Dorina Rodriguez MD [facility]        Today's Diagnoses     Incomplete tear of left rotator cuff    -  1      Care Instructions    Thanks for coming today.  Ortho/Sports Medicine Clinic  [address] [place] [address]    To schedule future appointments in Ortho Clinic, you may call [phone].    To schedule ordered imaging by your provider:   Call Central Imaging [phone]    To schedule an injection ordered by your provider:  Call Central Imaging Injection scheduling line: [phone]  Cognitive Networkshart available online at:  [email or web address]    Please call if any further questions or concerns ([phone]).  Clinic hours 8 am to 5 pm.    Return to clinic (call) if symptoms worsen or fail to improve.          Follow-ups after your visit        Your next 10 appointments already scheduled     2017  4:45 PM CDT   Return Visit with Dorina Rodriguez MD   [facility] ([facility]    [address] [place] [id number] [phone]            2017  8:30 AM CDT   MELIDA Extremity with Shadi Doss, PT   [place] [facility] ([facility])    [address] [place] [address]   [phone]            2017  7:45 AM CDT   MELIDA Extremity with Nicol Gibbs PTA   [place] [facility] ([facility])    [address] [place] [address]   [phone]            2017  8:30 AM CDT   MELIDA Extremity with Shadi Doss PT   [place] [facility] ([facility])    [address] [place] [address]  [address] [id number] [phone]              Who to contact     If you have questions or need follow up information about today's clinic visit or your schedule please contact [facility] directly at [phone].  Normal or non-critical lab and imaging results will be communicated to you by MyChart, letter or phone within 4 business days after the clinic has received the results. If you do not hear from us within 7 days, please contact the clinic through EnOceanhart or phone. If you have a critical or abnormal lab result, we will notify you by phone as soon as possible.  Submit refill requests through Modality or call your pharmacy and they will forward the refill request to us. Please allow 3 business days for your refill to be completed.          Additional Information About Your Visit        EnOceanhart Information     Modality gives you secure access to your electronic health record. If you see a primary care provider, you can also send messages to your care team and make appointments. If you have questions, please call your primary care clinic.  If you do not have a primary care provider, please call [phone] and they will assist you.      Modality is an electronic gateway that provides easy, online access to your medical records. With Modality, you can request a clinic appointment, read your test results, renew a prescription or communicate with your care team.     To access your existing account, please contact your [facility] or call [phone] for assistance.        Care EveryWhere ID     This is your Care EveryWhere ID. This could be used by other organizations to access your [place] medical records  [id number]        Your Vitals Were     Pulse Last Period                65 08/04/2015 (Approximate)           Blood Pressure from Last 3 Encounters:   07/06/17 130/80   05/22/17 (!) 148/97   05/15/17 136/86    Weight from Last 3  Encounters:   05/15/17 92.8 kg (204 lb 9.6 oz)   04/11/17 86.2 kg (190 lb)   03/13/17 89.6 kg (197 lb 9.6 oz)              Today, you had the following     No orders found for display       Primary Care Provider Office Phone # Fax #    Jazmin Baylee Ramírez PA-C [phone]       [facility] PHYSICIAN [facility] [address]  [place] [id number]        Equal Access to Services     JEFFY DOE : Hadii aad ku hadasho Soomaali, waaxda luqadaha, qaybta kaalmada adeegyada, waxay idiin hayaan aderenu mccoy . So [facility] [phone].    ATENCIÓN: Si habla español, tiene a diamond disposición servicios gratuitos de asistencia lingüística. [facility] [phone].    We comply with applicable federal civil rights laws and [place] laws. We do not discriminate on the basis of race, color, national origin, age, disability sex, sexual orientation or gender identity.            Thank you!     Thank you for choosing [facility]  for your care. Our goal is always to provide you with excellent care. Hearing back from our patients is one way we can continue to improve our services. Please take a few minutes to complete the written survey that you may receive in the mail after your visit with us. Thank you!             Your Updated Medication List - Protect others around you: Learn how to safely use, store and throw away your medicines at www.disposemymeds.org.          This list is accurate as of: 7/6/17  4:32 PM.  Always use your most recent med list.                   Brand Name Dispense Instructions for use Diagnosis    hydrOXYzine 25 MG tablet    ATARAX    50 tablet    Take 1 tablet (25 mg) by mouth every 6 hours as needed for itching (and nausea)    S/P rotator cuff repair       levothyroxine 88 MCG tablet    SYNTHROID/LEVOTHROID    90 tablet    Take 1 tablet (88 mcg) by mouth daily    Hypothyroidism, unspecified type       metoprolol 25 MG 24 hr tablet    TOPROL-XL    90 tablet    Take 1  tablet (25 mg) by mouth daily    Essential hypertension with goal blood pressure less than 140/90       metroNIDAZOLE 500 MG tablet    FLAGYL    14 tablet    Take 1 tablet (500 mg) by mouth 2 times daily    Trichomonas vaginalis (TV) infection, BV (bacterial vaginosis)

## 2017-07-06 NOTE — NURSING NOTE
"Mary Sims's goals for this visit include: Status post Left Arthroscopic Rotator Cuff Repair, Subacromial Decompression, Open Bioceps Tenodesis DOS 4/11/17  She requests these members of her care team be copied on today's visit information: yes    PCP: Jazmin Ramírez    Referring Provider:  No referring provider defined for this encounter.    Chief Complaint   Patient presents with     RECHECK     Status post Left Arthroscopic Rotator Cuff Repair, Subacromial Decompression, Open Bioceps Tenodesis DOS 4/11/17       Initial /80  Pulse 65  LMP 08/04/2015 (Approximate) Estimated body mass index is 35.28 kg/(m^2) as calculated from the following:    Height as of 5/15/17: 1.622 m (5' 3.86\").    Weight as of 5/15/17: 92.8 kg (204 lb 9.6 oz).  Medication Reconciliation: complete  "

## 2017-07-06 NOTE — PROGRESS NOTES
CHIEF CONCERN: Status post left arthroscopic rotator cuff repair, subacromial decompression, open biceps tenodesis  DATE OF SURGERY: 4/11/17    HISTORY OF PRESENT ILLNESS: Ms. Sims is a 51 year old female who is now 3 months status post the above procedure. She had made excellent progress with Shadi in PT and she is very pleased with that. She is most frustrated by some discomfort with IR stretching up the back. Or ER stretching in abduction. She has been able to do FE reaching at work.    PHYSICAL EXAM:  Adult female in no distress  Respirations even and unlabored  Left shoulder: CMS intact. EPL, FPL, and Intrinsics intact. Passive ROM is FE to 150 (active to 125), ER at side to 70.    IMAGING: None new     ASSESSMENT:  1. Three months status post the above procedure    PLAN:  -Progress PT - to strengthening  -Discussed progression of activities outside of work (exercise bike and exercise videos)  -RTC in 3 months or prn

## 2017-07-06 NOTE — PATIENT INSTRUCTIONS
Thanks for coming today.  [facility] Clinic  [address] [place] [address]    To schedule future appointments in Ortho Clinic, you may call [phone].    To schedule ordered imaging by your provider:   Call Central Imaging [phone]    To schedule an injection ordered by your provider:  Call Central Imaging Injection scheduling line: [phone]  Halalatihart available online at:  [email or web address]    Please call if any further questions or concerns ([phone]).  Clinic hours 8 am to 5 pm.    Return to clinic (call) if symptoms worsen or fail to improve.

## 2017-07-06 NOTE — PROGRESS NOTES
Subjective:    HPI                    Objective:    System    Physical Exam    General     ROS    Assessment/Plan:      PROGRESS  REPORT    Progress reporting period is from 5/10/17 to 7/6/17 (15 visits).       SUBJECTIVE  Subjective changes noted by patient:  Patient notes that she is donig well regarding pain and reports it is much better than prior to surgery.  Rates pain at 0-1/10 most of the time.  Occassionally, if she is very active pain can go up to 4/10, but then does come back down to baseline 0-1/10 pain.  She notes she is sleeping fine, with difficulty if lays on the L, but otherwise fine.  Showering and dressing are going fine.  She has returned to normal work hours.  Main complaint is stiffness in the L shoulder, especially reaching behind her back; however, she is consistent with her HEP and is gradually making progress.     Current pain level is:  (0-1/10 typical, up to 4/10 on occasion).     Initial Pain level: 1/10.   Changes in function:  Yes (See Goal flowsheet attached for changes in current functional level)  Adverse reaction to treatment or activity: None    OBJECTIVE  Changes noted in objective findings:  Yes,   SHOULDER:      Shoulder:   PROM L PROM R AROM L AROM R MMT L MMT R   Flex 150 162 125, just stiff 155 NT NT   Abd   115 just stiff 125     Full Can         Empty Can         IR 30 (abducted 90) 46 (Abducted 90) To stomach To stomach     ER 70 (Abducted 90) 95 (abducted 90) 55 degrees at side 67 degrees, at side     Ext/IR   To belt line, stiff T9       Scapulothoraic Rhythm: mild scapular hike with elevation, able to correct with cuing.     Working on wand and 4 corners stretching/ sleeper stretching.   Was working on supine rotator cuff strength up to 1lb; today transitioned to standing AROM AG isotonics, and will progress with reps and then add light weight for rotator cuff and scapular strength now that is 12 weeks post op.       ASSESSMENT/PLAN  Updated problem list and treatment  plan: Diagnosis 1: s/p L rotator cuff repair, SAD, open biceps tenodesis on 4/11/17   (now just over 12 weeks post op)  Pain -  hot/cold therapy  Decreased ROM/flexibility - manual therapy and therapeutic exercise  Decreased strength - therapeutic exercise and therapeutic activities  Decreased proprioception - neuro re-education and therapeutic activities  Decreased function - therapeutic activities  Impaired posture - neuro re-education  STG/LTGs have been met or progress has been made towards goals:  Yes (See Goal flow sheet completed today.)  Assessment of Progress: The patient's condition is improving.  Self Management Plans:  Patient has been instructed in a home treatment program.  I have re-evaluated this patient and find that the nature, scope, duration and intensity of the therapy is appropriate for the medical condition of the patient.  Mary continues to require the following intervention to meet STG and LTG's:  PT    Recommendations:  This patient would benefit from continued therapy.     Frequency:  1 X week, once daily  Duration:  for 8 weeks        Please refer to the daily flowsheet for treatment today, total treatment time and time spent performing 1:1 timed codes.

## 2017-07-06 NOTE — MR AVS SNAPSHOT
After Visit Summary   7/6/2017    Mary Sims    MRN: [id number]           Patient Information     Date Of Birth          1965        Visit Information        Provider Department      7/6/2017 2:20 PM Shadi Doss, PT [facility] Physical Therapy        Today's Diagnoses     Complete tear of left rotator cuff        Aftercare following surgery of the musculoskeletal system           Follow-ups after your visit        Your next 10 appointments already scheduled     Jul 06, 2017  4:45 PM CDT   [facility] with Dorina Rodriguez MD   [facility]    [address] [place] [address]   [phone]            Jul 11, 2017  8:30 AM CDT   MELIDA Extremity with Shadi Doss PT   [facility] Physical Therapy ([facility])    [address] [place] [address]   [phone]            Jul 21, 2017  7:45 AM CDT   MELIDA Extremity with Nicol Gibbs PTA   [facility] Physical Therapy ([facility])    [address] [place] [address] [phone]            Jul 27, 2017  8:30 AM CDT   MELIDA Extremity with Shadi Doss PT   [facility] Physical Therapy ([facility])    [address] [place] [id number] [phone]              Who to contact     If you have questions or need follow up information about today's clinic visit or your schedule please contact [facility] PHYSICAL THERAPY directly at [phone].  Normal or non-critical lab and imaging results will be communicated to you by MyChart, letter or phone within 4 business days after the clinic has received the results. If you do not hear from us within 7 days, please contact the clinic through My [employer] Workst  or phone. If you have a critical or abnormal lab result, we will notify you by phone as soon as possible.  Submit refill requests through Crestock or call your pharmacy and they will forward the refill request to us. Please allow 3 business days for your refill to be completed.          Additional Information About Your Visit        Secondbrainhart Information     Crestock gives you secure access to your electronic health record. If you see a primary care provider, you can also send messages to your care team and make appointments. If you have questions, please call your primary care clinic.  If you do not have a primary care provider, please call [phone] and they will assist you.        Care EveryWhere ID     This is your Care EveryWhere ID. This could be used by other organizations to access your [facility] medical records  [id number]        Your Vitals Were     Last Period                   08/04/2015 (Approximate)            Blood Pressure from Last 3 Encounters:   05/22/17 (!) 148/97   05/15/17 136/86   04/27/17 (!) 135/94    Weight from Last 3 Encounters:   05/15/17 92.8 kg (204 lb 9.6 oz)   04/11/17 86.2 kg (190 lb)   03/13/17 89.6 kg (197 lb 9.6 oz)              We Performed the Following     MELIDA PROGRESS NOTES REPORT     NEUROMUSCULAR RE-EDUCATION     THERAPEUTIC EXERCISES        Primary Care Provider Office Phone # Fax #    Jazmin Baylee Ramírez PA-C [phone]       [facility] PHYSICIAN [facility] [address]  [facility] [address]        Equal Access to Services     JEFFY DOE : Hadii aad ku hadasho Soomaali, waaxda luqadaha, qaybta kaalmada adeegyada, jaymie jean haynoellen clara mccoy . So [facility] [phone].    ATENCIÓN: Si habla español, tiene a diamond disposición servicios gratuitos de asistencia lingüística. Llame al [phone].    We comply with applicable federal civil rights laws and [place] laws. We do not discriminate on the basis of race, color, national origin, age,  disability sex, sexual orientation or gender identity.            Thank you!     Thank you for choosing [facility] PHYSICAL THERAPY  for your care. Our goal is always to provide you with excellent care. Hearing back from our patients is one way we can continue to improve our services. Please take a few minutes to complete the written survey that you may receive in the mail after your visit with us. Thank you!             Your Updated Medication List - Protect others around you: Learn how to safely use, store and throw away your medicines at www.disposemymeds.org.          This list is accurate as of: 7/6/17  3:25 PM.  Always use your most recent med list.                   Brand Name Dispense Instructions for use Diagnosis    hydrOXYzine 25 MG tablet    ATARAX    50 tablet    Take 1 tablet (25 mg) by mouth every 6 hours as needed for itching (and nausea)    S/P rotator cuff repair       levothyroxine 88 MCG tablet    SYNTHROID/LEVOTHROID    90 tablet    Take 1 tablet (88 mcg) by mouth daily    Hypothyroidism, unspecified type       metoprolol 25 MG 24 hr tablet    TOPROL-XL    90 tablet    Take 1 tablet (25 mg) by mouth daily    Essential hypertension with goal blood pressure less than 140/90       metroNIDAZOLE 500 MG tablet    FLAGYL    14 tablet    Take 1 tablet (500 mg) by mouth 2 times daily    Trichomonas vaginalis (TV) infection, BV (bacterial vaginosis)

## 2017-07-06 NOTE — LETTER
[facility] PHYSICAL THERAPY  [address]  [facility] [address]  [phone]    2017    Re: Mary Sims   :   1965  MRN:  [id number]   REFERRING PHYSICIAN:   Dorina Rodriguez    [facility] PHYSICAL [facility]    Date of Initial Evaluation:  5/10/17  Visits:  Rxs Used: 15  Reason for Referral:     Complete tear of left rotator cuff  Aftercare following surgery of the musculoskeletal system    PROGRESS  REPORT    Progress reporting period is from 5/10/17 to 17 (15 visits).       SUBJECTIVE  Subjective changes noted by patient:  Patient notes that she is donig well regarding pain and reports it is much better than prior to surgery.  Rates pain at 0-1/10 most of the time.  Occassionally, if she is very active pain can go up to 4/10, but then does come back down to baseline 0-1/10 pain.  She notes she is sleeping fine, with difficulty if lays on the L, but otherwise fine.  Showering and dressing are going fine.  She has returned to normal work hours.  Main complaint is stiffness in the L shoulder, especially reaching behind her back; however, she is consistent with her HEP and is gradually making progress.     Current pain level is:  (0-1/10 typical, up to 4/10 on occasion).     Initial Pain level: 1/10.   Changes in function:  Yes (See Goal flowsheet attached for changes in current functional level)  Adverse reaction to treatment or activity: None    OBJECTIVE  Changes noted in objective findings:  Yes,   SHOULDER:      Shoulder:   PROM L PROM R AROM L AROM R MMT L MMT R   Flex 150 162 125, just stiff 155 NT NT   Abd   115 just stiff 125     Full Can         Empty Can         IR 30 (abducted 90) 46 (Abducted 90) To stomach To stomach     ER 70 (Abducted 90) 95 (abducted 90) 55 degrees at side 67 degrees, at side     Ext/IR   To belt line, stiff T9       Scapulothoraic Rhythm: mild scapular hike  with elevation, able to correct with cuing.     Working on wand and 4 corners stretching/ sleeper stretching.   Was working on supine rotator cuff strength up to 1lb; today transitioned to standing AROM AG isotonics, and will progress with reps and then add light weight for rotator cuff and scapular strength now that is 12 weeks post op.       ASSESSMENT/PLAN  Updated problem list and treatment plan: Diagnosis 1: s/p L rotator cuff repair, SAD, open biceps tenodesis on 4/11/17   (now just over 12 weeks post op)  Pain -  hot/cold therapy  Decreased ROM/flexibility - manual therapy and therapeutic exercise  Decreased strength - therapeutic exercise and therapeutic activities  Decreased proprioception - neuro re-education and therapeutic activities  Decreased function - therapeutic activities  Impaired posture - neuro re-education  STG/LTGs have been met or progress has been made towards goals:  Yes (See Goal flow sheet completed today.)  Assessment of Progress: The patient's condition is improving.  Self Management Plans:  Patient has been instructed in a home treatment program.  I have re-evaluated this patient and find that the nature, scope, duration and intensity of the therapy is appropriate for the medical condition of the patient.  Mary continues to require the following intervention to meet STG and LTG's:  PT    Recommendations:  This patient would benefit from continued therapy.     Frequency:  1 X week, once daily  Duration:  for 8 weeks    Thank you for your referral.    INQUIRIES  Therapist: Shadi Doss   [facility]  [address] [address]  Phone: [phone]  Fax: [phone]

## 2017-07-11 ENCOUNTER — THERAPY VISIT (OUTPATIENT)
Dept: PHYSICAL THERAPY | Facility: CLINIC | Age: 52
End: 2017-07-11
Payer: COMMERCIAL

## 2017-07-11 DIAGNOSIS — Z47.89 AFTERCARE FOLLOWING SURGERY OF THE MUSCULOSKELETAL SYSTEM: ICD-10-CM

## 2017-07-11 DIAGNOSIS — M75.122 COMPLETE TEAR OF LEFT ROTATOR CUFF: ICD-10-CM

## 2017-07-11 PROCEDURE — 97110 THERAPEUTIC EXERCISES: CPT | Mod: GP | Performed by: PHYSICAL THERAPIST

## 2017-07-11 PROCEDURE — 97112 NEUROMUSCULAR REEDUCATION: CPT | Mod: GP | Performed by: PHYSICAL THERAPIST

## 2017-07-25 ENCOUNTER — THERAPY VISIT (OUTPATIENT)
Dept: PHYSICAL THERAPY | Facility: CLINIC | Age: 52
End: 2017-07-25
Payer: COMMERCIAL

## 2017-07-25 DIAGNOSIS — Z47.89 AFTERCARE FOLLOWING SURGERY OF THE MUSCULOSKELETAL SYSTEM: ICD-10-CM

## 2017-07-25 DIAGNOSIS — M75.122 COMPLETE TEAR OF LEFT ROTATOR CUFF: ICD-10-CM

## 2017-07-25 PROCEDURE — 97110 THERAPEUTIC EXERCISES: CPT | Mod: GP | Performed by: PHYSICAL THERAPIST

## 2017-07-25 PROCEDURE — 97112 NEUROMUSCULAR REEDUCATION: CPT | Mod: GP | Performed by: PHYSICAL THERAPIST

## 2017-08-01 ENCOUNTER — THERAPY VISIT (OUTPATIENT)
Dept: PHYSICAL THERAPY | Facility: CLINIC | Age: 52
End: 2017-08-01
Payer: COMMERCIAL

## 2017-08-01 DIAGNOSIS — Z47.89 AFTERCARE FOLLOWING SURGERY OF THE MUSCULOSKELETAL SYSTEM: ICD-10-CM

## 2017-08-01 DIAGNOSIS — M75.122 COMPLETE TEAR OF LEFT ROTATOR CUFF: ICD-10-CM

## 2017-08-01 PROCEDURE — 97110 THERAPEUTIC EXERCISES: CPT | Mod: GP | Performed by: PHYSICAL THERAPIST

## 2017-08-01 PROCEDURE — 97112 NEUROMUSCULAR REEDUCATION: CPT | Mod: GP | Performed by: PHYSICAL THERAPIST

## 2017-08-01 NOTE — MR AVS SNAPSHOT
After Visit Summary   8/1/2017    Mary Sims    MRN: [id number]           Patient Information     Date Of Birth          1965        Visit Information        Provider Department      8/1/2017 6:20 PM Shadi Doss PT [facility] Physical Therapy        Today's Diagnoses     Complete tear of left rotator cuff        Aftercare following surgery of the musculoskeletal system           Follow-ups after your visit        Who to contact     If you have questions or need follow up information about today's clinic visit or your schedule please contact [facility] PH[facility]ICAL [facility] directly at [phone].  Normal or non-critical lab and imaging results will be communicated to you by [employer] Nethart, letter or phone within 4 business days after the clinic has received the results. If you do not hear from us within 7 days, please contact the clinic through [employer] Nethart or phone. If you have a critical or abnormal lab result, we will notify you by phone as soon as possible.  Submit refill requests through Tragara or call your pharmacy and they will forward the refill request to us. Please allow 3 business days for your refill to be completed.          Additional Information About Your Visit        MyChart Information     Tragara gives you secure access to your electronic health record. If you see a primary care provider, you can also send messages to your care team and make appointments. If you have questions, please call your primary care clinic.  If you do not have a primary care provider, please call [phone] and they will assist you.        Care EveryWhere ID     This is your Care EveryWhere ID. This could be used by other organizations to access your [facility] medical records  [id number]        Your Vitals Were     Last Period                   08/04/2015 (Approximate)            Blood Pressure from Last 3 Encounters:   07/06/17 130/80    05/22/17 (!) 148/97   05/15/17 136/86    Weight from Last 3 Encounters:   05/15/17 92.8 kg (204 lb 9.6 oz)   04/11/17 86.2 kg (190 lb)   03/13/17 89.6 kg (197 lb 9.6 oz)              We Performed the Following     NEUROMUSCULAR RE-EDUCATION     THERAPEUTIC EXERCISES        Primary Care Provider Office Phone # Fax #    Jazmin Baylee Ramírez PA-C [phone]       [facility] PHYSICIAN [facility] [address]  [facility] [address]        Equal Access to Services     [facility]: Hadii aad ku hadasho Soomaali, waaxda luqadaha, qaybta kaalmada aderenuyaalejandro, jaymie mccoy . So [facility] [phone].    ATENCIÓN: Si habla español, tiene a diamond disposición servicios gratuitos de asistencia lingüística. [facility] [phone].    We comply with applicable federal civil rights laws and [place] laws. We do not discriminate on the basis of race, color, national origin, age, disability sex, sexual orientation or gender identity.            Thank you!     Thank you for choosing [facility] PHYSICAL THERAPY  for your care. Our goal is always to provide you with excellent care. Hearing back from our patients is one way we can continue to improve our services. Please take a few minutes to complete the written survey that you may receive in the mail after your visit with us. Thank you!             Your Updated Medication List - Protect others around you: Learn how to safely use, store and throw away your medicines at www.disposemymeds.org.          This list is accurate as of: 8/1/17  7:12 PM.  Always use your most recent med list.                   Brand Name Dispense Instructions for use Diagnosis    hydrOXYzine 25 MG tablet    ATARAX    50 tablet    Take 1 tablet (25 mg) by mouth every 6 hours as needed for itching (and nausea)    S/P rotator cuff repair       levothyroxine 88 MCG tablet    SYNTHROID/LEVOTHROID    90 tablet    Take 1 tablet (88 mcg) by  mouth daily    Hypothyroidism, unspecified type       metoprolol 25 MG 24 hr tablet    TOPROL-XL    90 tablet    Take 1 tablet (25 mg) by mouth daily    Essential hypertension with goal blood pressure less than 140/90       metroNIDAZOLE 500 MG tablet    FLAGYL    14 tablet    Take 1 tablet (500 mg) by mouth 2 times daily    Trichomonas vaginalis (TV) infection, BV (bacterial vaginosis)

## 2017-09-21 PROBLEM — M75.122 COMPLETE TEAR OF LEFT ROTATOR CUFF: Status: RESOLVED | Noted: 2017-05-10 | Resolved: 2017-09-21

## 2017-09-21 PROBLEM — Z47.89 AFTERCARE FOLLOWING SURGERY OF THE MUSCULOSKELETAL SYSTEM: Status: RESOLVED | Noted: 2017-05-10 | Resolved: 2017-09-21

## 2017-09-21 NOTE — PROGRESS NOTES
Subjective:    HPI                    Objective:    System    Physical Exam    General     ROS    Assessment/Plan:      DISCHARGE REPORT    Progress reporting period is from 5/10/17 to 8/1/17 (18 visits).       SUBJECTIVE  Subjective changes noted by patient:  Patient notes she's not doing overhead lifting, but that she's doing most all of her other job duties at work, lifting boxes, etc.  She's a little sore anterior shoulder, biceps and pec as a result, but not too bad.  Still difficulty reaching behind back, but otherwise progressing.     Current pain level is:  (0-1/10).     Initial Pain level: 1/10.   Changes in function:  Yes (See Goal flowsheet attached for changes in current functional level)  Adverse reaction to treatment or activity: None    OBJECTIVE  Changes noted in objective findings:  Yes,   Objective: abd/ER to 70 on L yet (90 on R), abd/IR to 34 degrees (46 on R).  Able to lift 1lb overhead flexion >5 reps, mild scap hike.     Previous more full testing 7/6/17 as in progress note from that date. Further changes as above.       ASSESSMENT/PLAN  Updated problem list and treatment plan: Diagnosis 1:  s/p L rotator cuff repair, SAD, open biceps tenodesis on 4/11/17    Pain -  home program  Decreased ROM/flexibility - home program  Decreased strength - home program  Decreased proprioception - home program  Decreased function - home program  STG/LTGs have been met or progress has been made towards goals:  Yes (See Goal flow sheet completed today.)  Assessment of Progress: The patient's condition is improving.  Self Management Plans:  Patient has been instructed in a home treatment program.  I have re-evaluated this patient and find that the nature, scope, duration and intensity of the therapy is appropriate for the medical condition of the patient.  Mary continues to require the following intervention to meet STG and LTG's:  PT    Recommendations:  Patient consistent with HEP.  Did call to follow up and  left message, but did not reconnect.  Had been doing very well and progressing.  Improving ROM, Strength nearing full.  Function improving and working with ability to do most job duties.  Will discharge from formal PT at this time.     Please refer to the daily flowsheet for treatment today, total treatment time and time spent performing 1:1 timed codes.

## 2017-10-02 DIAGNOSIS — I10 ESSENTIAL HYPERTENSION WITH GOAL BLOOD PRESSURE LESS THAN 140/90: ICD-10-CM

## 2017-10-02 NOTE — TELEPHONE ENCOUNTER
metoprolol (TOPROL-XL) 25 MG 24 hr tablet sent to [facility] on 05/15/17 #90 with 3 refills    [facility] is now requesting this.

## 2017-10-04 RX ORDER — METOPROLOL SUCCINATE 25 MG/1
TABLET, EXTENDED RELEASE ORAL
Qty: 90 TABLET | Refills: 2 | OUTPATIENT
Start: 2017-10-04

## 2017-10-04 NOTE — TELEPHONE ENCOUNTER
Called requesting pharmacy, [facility], and let them know about RX being at [facility].

## 2017-12-14 ENCOUNTER — OFFICE VISIT (OUTPATIENT)
Dept: FAMILY MEDICINE | Facility: CLINIC | Age: 52
End: 2017-12-14
Payer: COMMERCIAL

## 2017-12-14 ENCOUNTER — RADIANT APPOINTMENT (OUTPATIENT)
Dept: GENERAL RADIOLOGY | Facility: CLINIC | Age: 52
End: 2017-12-14
Attending: NURSE PRACTITIONER
Payer: COMMERCIAL

## 2017-12-14 VITALS
SYSTOLIC BLOOD PRESSURE: 120 MMHG | HEART RATE: 53 BPM | WEIGHT: 187.8 LBS | OXYGEN SATURATION: 100 % | HEIGHT: 64 IN | TEMPERATURE: 97.8 F | DIASTOLIC BLOOD PRESSURE: 90 MMHG | BODY MASS INDEX: 32.06 KG/M2

## 2017-12-14 DIAGNOSIS — M79.671 RIGHT FOOT PAIN: Primary | ICD-10-CM

## 2017-12-14 DIAGNOSIS — I10 HYPERTENSION GOAL BP (BLOOD PRESSURE) < 140/90: ICD-10-CM

## 2017-12-14 DIAGNOSIS — M79.671 RIGHT FOOT PAIN: ICD-10-CM

## 2017-12-14 DIAGNOSIS — Z28.21 INFLUENZA VACCINATION DECLINED BY PATIENT: ICD-10-CM

## 2017-12-14 PROCEDURE — 73630 X-RAY EXAM OF FOOT: CPT | Mod: RT

## 2017-12-14 PROCEDURE — 99213 OFFICE O/P EST LOW 20 MIN: CPT | Performed by: NURSE PRACTITIONER

## 2017-12-14 RX ORDER — NAPROXEN 500 MG/1
500 TABLET ORAL 2 TIMES DAILY PRN
Qty: 90 TABLET | Refills: 1 | Status: SHIPPED | OUTPATIENT
Start: 2017-12-14 | End: 2020-01-30

## 2017-12-14 ASSESSMENT — PAIN SCALES - GENERAL: PAINLEVEL: EXTREME PAIN (8)

## 2017-12-14 NOTE — MR AVS SNAPSHOT
After Visit Summary   12/14/2017    Mary Sims    MRN: [id number]           Patient Information     Date Of Birth          1965        Visit Information        Provider Department      12/14/2017 10:00 AM Janie Riley APRN CNP [facility]        Today's Diagnoses     Right foot pain    -  1    Hypertension goal BP (blood pressure) < 140/90        Influenza vaccination declined by patient          Care Instructions    At [facility], we strive to deliver an exceptional experience to you, every time we see you.  If you receive a survey in the mail, please send us back your thoughts. We really do value your feedback.    Based on your medical history, these are the current health maintenance/preventive care services that you are due for (some may have been done at this visit.)  Health Maintenance Due   Topic Date Due     HEPATITIS C SCREENING  06/04/1981     EYE EXAM Q1 YEAR  04/27/2017     INFLUENZA VACCINE (SYSTEM ASSIGNED)  09/01/2017         Suggested websites for health information:  Www.Telller : Up to date and easily searchable information on multiple topics.  Www.medlineplus.gov : medication info, interactive tutorials, watch real surgeries online  Www.familydoctor.org : good info from the Academy of Family Physicians  Www.cdc.gov : public health info, travel advisories, epidemics (H1N1)  Www.aap.org : children's health info, normal development, vaccinations  Www.health.state[place] : [place] dept of health, public health issues in [place], N1N1    Your care team:                            Family Medicine Internal Medicine   MD Stan Santoro MD Shantel Branch-Fleming, MD Katya Georgiev PA-C Nam Ho, MD Pediatrics   ANTELMO Guerin CNP Amelia Massimini APRN CNP Shaista Malik, MD Bethany Templen, MD Deborah Mielke, MD Kim Thein, APRN CNP      Clinic hours: Monday - Thursday 7 am-7 pm; Fridays 7 am-5 pm.  "  Urgent care: Monday - Friday 11 am-9 pm; Saturday and Sunday 9 am-5 pm.  Pharmacy : Monday -Thursday 8 am-8 pm; Friday 8 am-6 pm; Saturday and Sunday 9 am-5 pm.     Clinic: [phone]   Pharmacy: [phone]              Follow-ups after your visit        Follow-up notes from your care team     Return if symptoms worsen or fail to improve.      Who to contact     If you have questions or need follow up information about today's clinic visit or your schedule please contact [facility] directly at [phone].  Normal or non-critical lab and imaging results will be communicated to you by MyChart, letter or phone within 4 business days after the clinic has received the results. If you do not hear from us within 7 days, please contact the clinic through Electronic Payment and Services (EPS)hart or phone. If you have a critical or abnormal lab result, we will notify you by phone as soon as possible.  Submit refill requests through PolyRemedy or call your pharmacy and they will forward the refill request to us. Please allow 3 business days for your refill to be completed.          Additional Information About Your Visit        MyChart Information     PolyRemedy gives you secure access to your electronic health record. If you see a primary care provider, you can also send messages to your care team and make appointments. If you have questions, please call your primary care clinic.  If you do not have a primary care provider, please call [phone] and they will assist you.        Care EveryWhere ID     This is your Care EveryWhere ID. This could be used by other organizations to access your [place] medical records  [id number]        Your Vitals Were     Pulse Temperature Height Last Period Pulse Oximetry BMI (Body Mass Index)    53 97.8  F (36.6  C) (Oral) 5' 3.86\" (1.622 m) 08/04/2015 (Approximate) 100% 32.38 kg/m2       Blood Pressure from Last 3 Encounters:   12/14/17 120/90   07/06/17 130/80   05/22/17 (!) 148/97    Weight " from Last 3 Encounters:   12/14/17 187 lb 12.8 oz (85.2 kg)   05/15/17 204 lb 9.6 oz (92.8 kg)   04/11/17 190 lb (86.2 kg)                 Today's Medication Changes          These changes are accurate as of: 12/14/17  7:32 PM.  If you have any questions, ask your nurse or doctor.               Start taking these medicines.        Dose/Directions    naproxen 500 MG tablet   Commonly known as:  NAPROSYN   Used for:  Right foot pain   Started by:  Janie Riley APRN CNP        Dose:  500 mg   Take 1 tablet (500 mg) by mouth 2 times daily as needed for moderate pain   Quantity:  90 tablet   Refills:  1            Where to get your medicines      These medications were sent to [facility] [address]  [address] [facility] [address]     Phone:  [phone]     naproxen 500 MG tablet                Primary Care Provider Office Phone # Fax #    PABLO Dean [phone]       [facility] [address]  [facility] [address]        Equal Access to Services     JEFFY DOE AH: Hadii aad ku hadasho Soomaali, waaxda luqadaha, qaybta kaalmada adeegyada, waxay ted hayasim paniagua. So [facility] [phone].    ATENCIÓN: Si habla español, tiene a diamond disposición servicios gratuitos de asistencia lingüística. [facility] [phone].    We comply with applicable federal civil rights laws and [place] laws. We do not discriminate on the basis of race, color, national origin, age, disability, sex, sexual orientation, or gender identity.            Thank you!     Thank you for choosing [facility]  for your care. Our goal is always to provide you with excellent care. Hearing back from our patients is one way we can continue to improve our services. Please take a few minutes to complete the written survey that you may receive in the mail after your visit with us. Thank you!             Your Updated Medication List -  Protect others around you: Learn how to safely use, store and throw away your medicines at www.disposemymeds.org.          This list is accurate as of: 12/14/17  7:32 PM.  Always use your most recent med list.                   Brand Name Dispense Instructions for use Diagnosis    hydrOXYzine 25 MG tablet    ATARAX    50 tablet    Take 1 tablet (25 mg) by mouth every 6 hours as needed for itching (and nausea)    S/P rotator cuff repair       levothyroxine 88 MCG tablet    SYNTHROID/LEVOTHROID    90 tablet    Take 1 tablet (88 mcg) by mouth daily    Hypothyroidism, unspecified type       metoprolol 25 MG 24 hr tablet    TOPROL-XL    90 tablet    TAKE ONE TABLET BY MOUTH ONE TIME DAILY    Essential hypertension with goal blood pressure less than 140/90       metroNIDAZOLE 500 MG tablet    FLAGYL    14 tablet    Take 1 tablet (500 mg) by mouth 2 times daily    Trichomonas vaginalis (TV) infection, BV (bacterial vaginosis)       naproxen 500 MG tablet    NAPROSYN    90 tablet    Take 1 tablet (500 mg) by mouth 2 times daily as needed for moderate pain    Right foot pain

## 2017-12-14 NOTE — PROGRESS NOTES
SUBJECTIVE:   Mary Sims is a 52 year old female who presents to clinic today for the following health issues:      Joint Pain    Onset: Two years worsened two months ago     Description:   Location: Right foot   Character: Sharp and spasm     Intensity: moderate, severe    Progression of Symptoms: worse    Accompanying Signs & Symptoms:  Other symptoms: swelling    History:   Previous similar pain: YES      Precipitating factors:   Trauma or overuse: YES- on feet 9 hours a day     Alleviating factors:  Improved by: rest/inactivity    Therapies Tried and outcome: Tylenol 500-600mg   Patient has tried changing her shoes (tennis vs hard soled shoe) without improvement.       Problem list and histories reviewed & adjusted, as indicated.  Additional history: as documented    Patient Active Problem List   Diagnosis     CARDIOVASCULAR SCREENING; LDL GOAL LESS THAN 160     Hypothyroidism     Hypertension goal BP (blood pressure) < 140/90     WONG (iron deficiency anemia)     Obesity     Nontraumatic tear of supraspinatus tendon, left     Past Surgical History:   Procedure Laterality Date     ARTHROSCOPY SHLDR ROTATOR CUFF REPAIR, SUBACROMIAL DECOMP, DIST CLAVICLE RESECTION, BICEP TENODESIS Left 2017    Procedure: ARTHROSCOPY SHOULDER ROTATOR CUFF REPAIR, SUBACROMIAL DECOMPRESSION, DISTAL CLAVICLE RESECTION, OPEN BICEP TENODESIS REPAIR;  Surgeon: Dorina Rodriguez MD;  Location: [facility] OR     C  DELIVERY ONLY       COLONOSCOPY  2013    Procedure: COLONOSCOPY;  colonoscopy, screening;  Surgeon: Dalton Lala MD;  Location:  OR     SURGICAL HISTORY OF -       RT ANKLE Fx AND REPAIR (PIN,PLATE,SCREWS)     THYROIDECTOMY         Social History   Substance Use Topics     Smoking status: Never Smoker     Smokeless tobacco: Never Used     Alcohol use Yes      Comment: SELDOM      Family History   Problem Relation Age of Onset     Hypertension Mother      DIABETES Mother      Hypertension Father  "     Alcohol/Drug Father      alcohol     Crohn Disease Son      Hypertension Other      CANCER No family hx of      CEREBROVASCULAR DISEASE No family hx of      Thyroid Disease No family hx of      Glaucoma No family hx of      Macular Degeneration No family hx of          BP Readings from Last 3 Encounters:   12/14/17 120/90   07/06/17 130/80   05/22/17 (!) 148/97    Wt Readings from Last 3 Encounters:   12/14/17 187 lb 12.8 oz (85.2 kg)   05/15/17 204 lb 9.6 oz (92.8 kg)   04/11/17 190 lb (86.2 kg)                  Labs reviewed in EPIC        Reviewed and updated as needed this visit by clinical staff       Reviewed and updated as needed this visit by Provider         ROS:  Constitutional, HEENT, cardiovascular, pulmonary, gi and gu systems are negative, except as otherwise noted.      OBJECTIVE:   /90 (BP Location: Left arm, Patient Position: Sitting, Cuff Size: Adult Regular)  Pulse 53  Temp 97.8  F (36.6  C) (Oral)  Ht 5' 3.86\" (1.622 m)  Wt 187 lb 12.8 oz (85.2 kg)  LMP 08/04/2015 (Approximate)  SpO2 100%  BMI 32.38 kg/m2  Body mass index is 32.38 kg/(m^2).  GENERAL: healthy, alert and no distress  EYES: Eyes grossly normal to inspection, PERRL and conjunctivae and sclerae normal  HENT: ear canals and TM's normal, nose and mouth without ulcers or lesions  NECK: no adenopathy, no asymmetry, masses, or scars and thyroid normal to palpation  RESP: lungs clear to auscultation - no rales, rhonchi or wheezes  CV: regular rate and rhythm, normal S1 S2, no S3 or S4, no murmur, click or rub, no peripheral edema and peripheral pulses strong  ABDOMEN: soft, nontender, no hepatosplenomegaly, no masses and bowel sounds normal  MS: Right foot- TTP dorsum of foot which is mildly swollen, pain with foot flexion, extension only, FAROM with pain at extremes of ROM, normal sensation, nomal DT/PT pulses, NVI.  no gross musculoskeletal defects noted, no edema  SKIN: no suspicious lesions or rashes  NEURO: Normal " "strength and tone, mentation intact and speech normal  PSYCH: mentation appears normal, affect normal/bright    Diagnostic Test Results:  Xray -   Order   XR Foot Right G/E 3 Views [[id number]] (Order [id number])   Exam Information   Exam Date Exam Time Accession # Performing Department Results    12/14/17 10:55 AM [id number] [facility]    [employer] Interactive Report and InfoRx   View the interactive report   PACS Images   Show images for XR Foot Right G/E 3 Views   Study Result   FOOT RIGHT THREE OR MORE VIEWS December 14, 2017 10:55 AM      HISTORY: Pain dorsum of foot with prolonged standing, Right foot pain.     COMPARISON: None.         IMPRESSION: There are postoperative changes in the ankle, partially  imaged. There are moderate degenerative changes in the midfoot. There  is a large plantar calcaneal spur. No evidence of acute fracture.     CHUY MCCORMACK MD       ASSESSMENT/PLAN:         BMI:   Estimated body mass index is 32.38 kg/(m^2) as calculated from the following:    Height as of this encounter: 5' 3.86\" (1.622 m).    Weight as of this encounter: 187 lb 12.8 oz (85.2 kg).   Weight management plan: Discussed healthy diet and exercise guidelines and patient will follow up in 12 months in clinic to re-evaluate.    Declined immunizations: Influenza due to Conscientious objector      1. Right foot pain  Degenerative changes to midfoot, referring to Podiatry if pain not improved with naprosyn.  Recommended shoes that do not put pressure on tender area, weight loss.  - XR Foot Right G/E 3 Views; Future  - naproxen (NAPROSYN) 500 MG tablet; Take 1 tablet (500 mg) by mouth 2 times daily as needed for moderate pain  Dispense: 90 tablet; Refill: 1    2. Hypertension goal BP (blood pressure) < 140/90  dBP elevated today.  Patient will check her BP's at home (states they've been OK), recommended low salt diet, regular exercise (elliptical, pool walking, recumbent bike).    3. Influenza " vaccination declined by patient        See Patient Instructions    PABLO Glez CNP  [facility]

## 2017-12-14 NOTE — PATIENT INSTRUCTIONS
At [facility], we strive to deliver an exceptional experience to you, every time we see you.  If you receive a survey in the mail, please send us back your thoughts. We really do value your feedback.    Based on your medical history, these are the current health maintenance/preventive care services that you are due for (some may have been done at this visit.)  Health Maintenance Due   Topic Date Due     HEPATITIS C SCREENING  06/04/1981     EYE EXAM Q1 YEAR  04/27/2017     INFLUENZA VACCINE (SYSTEM ASSIGNED)  09/01/2017         Suggested websites for health information:  Www.Webchutney.MakuCell : Up to date and easily searchable information on multiple topics.  Www.Fullscreen.gov : medication info, interactive tutorials, watch real surgeries online  Www.familydoctor.org : good info from the Academy of Family Physicians  Www.cdc.gov : public health info, travel advisories, epidemics (H1N1)  Www.aap.org : children's health info, normal development, vaccinations  Www.health.[facility].[place].us : [place] dept of health, public health issues in [place], N1N1    Your care team:                            Family Medicine Internal Medicine   MD Stan Santoro MD Shantel Branch-Fleming, MD Katya Georgiev PA-C Nam Ho, MD Pediatrics   ANTELMO Guerin, MD Bethany Shelton CNP, MD Deborah Mielke, MD Kim Thein, APRN CNP      Clinic hours: Monday - Thursday 7 am-7 pm; Fridays 7 am-5 pm.   Urgent care: Monday - Friday 11 am-9 pm; Saturday and Sunday 9 am-5 pm.  Pharmacy : Monday -Thursday 8 am-8 pm; Friday 8 am-6 pm; Saturday and Sunday 9 am-5 pm.     Clinic: [phone]   Pharmacy: [phone]

## 2017-12-14 NOTE — NURSING NOTE
"Chief Complaint   Patient presents with     Foot Pain       Initial /90 (BP Location: Left arm, Patient Position: Sitting, Cuff Size: Adult Regular)  Pulse 53  Temp 97.8  F (36.6  C) (Oral)  Ht 5' 3.86\" (1.622 m)  Wt 187 lb 12.8 oz (85.2 kg)  LMP 08/04/2015 (Approximate)  SpO2 100%  BMI 32.38 kg/m2 Estimated body mass index is 32.38 kg/(m^2) as calculated from the following:    Height as of this encounter: 5' 3.86\" (1.622 m).    Weight as of this encounter: 187 lb 12.8 oz (85.2 kg).  Medication Reconciliation: complete   Britney Cruz MA      "

## 2018-03-08 ENCOUNTER — OFFICE VISIT (OUTPATIENT)
Dept: PODIATRY | Facility: CLINIC | Age: 53
End: 2018-03-08
Payer: COMMERCIAL

## 2018-03-08 ENCOUNTER — TELEPHONE (OUTPATIENT)
Dept: FAMILY MEDICINE | Facility: CLINIC | Age: 53
End: 2018-03-08

## 2018-03-08 VITALS — OXYGEN SATURATION: 100 % | WEIGHT: 195 LBS | HEIGHT: 64 IN | BODY MASS INDEX: 33.29 KG/M2

## 2018-03-08 DIAGNOSIS — M19.071 PRIMARY LOCALIZED OSTEOARTHROSIS OF RIGHT ANKLE AND FOOT: Primary | ICD-10-CM

## 2018-03-08 PROCEDURE — 99203 OFFICE O/P NEW LOW 30 MIN: CPT | Performed by: PODIATRIST

## 2018-03-08 NOTE — PATIENT INSTRUCTIONS
We wish you continued good healing. If you have any questions or concerns, please do not hesitate to contact us at [phone]    Please remember to call and schedule a follow up appointment if one was recommended at your earliest convenience.   PODIATRY CLINIC HOURS  TELEPHONE NUMBER    Dr. Guero Ibarra D.P.M [facility]    Clinics:  [facility]    Miladys Villarreal [facility]   Tuesday 1PM-6PM  [place]  Wednesday 7AM-2PM  [facility]  Thursday 10AM-6PM  [place]  Friday 7AM-3PM  [place]  Specialty schedulers:   [phone] to make an appointment with any Specialty Provider.        Urgent Care locations:    [facility] Monday-Friday 5 pm - 9 pm. Saturday-Sunday 9 am -5pm    Monday-Friday 11 am - 9 pm Saturday 9 am - 5 pm     Monday-Sunday 12 noon-8PM [phone]     If you need a medication refill, please contact us you may need lab work and/or a follow up visit prior to your refill (i.e. Antifungal medications).    Insightlyt (secure e-mail communication and access to your chart) to send a message or to make an appointment.    If MRI needed please call [facility] at [phone]        Weight management plan: Patient was referred to their PCP to discuss a diet and exercise plan.

## 2018-03-08 NOTE — MR AVS SNAPSHOT
After Visit Summary   3/8/2018    Mary Sims    MRN: [id number]           Patient Information     Date Of Birth          1965        Visit Information        Provider Department      3/8/2018 3:30 PM Gu[facility]barra[facility]        Today's Diagnoses     Primary localized osteoarthrosis of right ankle and foot    -  1      Care Instructions    We wish you continued good healing. If you have any questions or concerns, please do not hesitate to contact us at [phone]    Please remember to call and schedule a follow up appointment if one was recommended at your earliest convenience.   PODIATRY CLINIC HOURS  TELEPHONE NUMBER    Dr. Guero MARIAPJOHN FAC FAS    Clinics:  [facility]    Miladys Villarreal [facility]   Tuesday 1PM-6PM  [place]  Wednesday 7AM-2PM  [place]  Thursday 10AM-6PM  [place]  Friday 7AM-3PM  [place]  Specialty schedulers:   [phone] to make an appointment with any Specialty Provider.        Urgent Care locations:    [facility] Monday-Friday 5 pm - 9 pm. Saturday-Sunday 9 am -5pm    Monday-Friday 11 am - 9 pm Saturday 9 am - 5 pm     Monday-Sunday 12 noon-8PM [phone]     If you need a medication refill, please contact us you may need lab work and/or a follow up visit prior to your refill (i.e. Antifungal medications).    codetagt (secure e-mail communication and access to your chart) to send a message or to make an appointment.    If MRI needed please call [facility] at [phone]        Weight management plan: Patient was referred to their PCP to discuss a diet and exercise plan.            Follow-ups after your visit        Who to contact     If you have questions or need follow up information about today's clinic visit or your schedule please contact [facility] directly at  "[phone].  Normal or non-critical lab and imaging results will be communicated to you by MyChart, letter or phone within 4 business days after the clinic has received the results. If you do not hear from us within 7 days, please contact the clinic through Urban Interactionshart or phone. If you have a critical or abnormal lab result, we will notify you by phone as soon as possible.  Submit refill requests through Aegis Mobility or call your pharmacy and they will forward the refill request to us. Please allow 3 business days for your refill to be completed.          Additional Information About Your Visit        Urban Interactionshart Information     Aegis Mobility gives you secure access to your electronic health record. If you see a primary care provider, you can also send messages to your care team and make appointments. If you have questions, please call your primary care clinic.  If you do not have a primary care provider, please call [phone] and they will assist you.        Care EveryWhere ID     This is your Care EveryWhere ID. This could be used by other organizations to access your [place] medical records  [id number]        Your Vitals Were     Height Last Period Pulse Oximetry BMI (Body Mass Index)          5' 4\" (1.626 m) 08/04/2015 (Approximate) 100% 33.47 kg/m2         Blood Pressure from Last 3 Encounters:   12/14/17 120/90   07/06/17 130/80   05/22/17 (!) 148/97    Weight from Last 3 Encounters:   03/08/18 195 lb (88.5 kg)   12/14/17 187 lb 12.8 oz (85.2 kg)   05/15/17 204 lb 9.6 oz (92.8 kg)              Today, you had the following     No orders found for display       Primary Care Provider Office Phone # Fax #    PABLO Dean [phone]       [facility] [address]  [facility] [address]        Equal Access to Services     JEFFY DOE : Lola joneso Sokevin, waaxda luqadaha, qaybta kaalmada adeegyada, jaymie paniagua. So [place] [phone].    ATENCIÓN: Si " humaira monzon, tiene a diamond disposición servicios gratuitos de asistencia lingüística. Tirso lacey [phone].    We comply with applicable federal civil rights laws and [place] laws. We do not discriminate on the basis of race, color, national origin, age, disability, sex, sexual orientation, or gender identity.            Thank you!     Thank you for choosing [facility]  for your care. Our goal is always to provide you with excellent care. Hearing back from our patients is one way we can continue to improve our services. Please take a few minutes to complete the written survey that you may receive in the mail after your visit with us. Thank you!             Your Updated Medication List - Protect others around you: Learn how to safely use, store and throw away your medicines at www.disposemymeds.org.          This list is accurate as of 3/8/18 11:59 PM.  Always use your most recent med list.                   Brand Name Dispense Instructions for use Diagnosis    hydrOXYzine 25 MG tablet    ATARAX    50 tablet    Take 1 tablet (25 mg) by mouth every 6 hours as needed for itching (and nausea)    S/P rotator cuff repair       levothyroxine 88 MCG tablet    SYNTHROID/LEVOTHROID    90 tablet    Take 1 tablet (88 mcg) by mouth daily    Hypothyroidism, unspecified type       metoprolol succinate 25 MG 24 hr tablet    TOPROL-XL    90 tablet    TAKE ONE TABLET BY MOUTH ONE TIME DAILY    Essential hypertension with goal blood pressure less than 140/90       naproxen 500 MG tablet    NAPROSYN    90 tablet    Take 1 tablet (500 mg) by mouth 2 times daily as needed for moderate pain    Right foot pain

## 2018-03-08 NOTE — LETTER
3/8/2018         RE: Mary Sims  [address]  [facility] [place] [address]        Dear Colleague,    Thank you for referring your patient, Mary Sims, to the [facility]. Please see a copy of my visit note below.    S:  Patient seen in consult form Laura Riley and complains of foot pain.  Points to dorsum of right second/third tarsometatarsal joint.  Has had this for 2 years.  Describes it as a burning pain.  Aggrevated by activity and relieved by rest.  Positive history of post static dyskinesia.  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  History of ankle fracture with ORIF on this foot.  At times feels better with no shoes pressing on these joints.        ROS:  A 10-point review of systems was performed and is positive for that noted in the HPI and as seen below.  All other areas are negative.        Allergies   Allergen Reactions     No Known Drug Allergy        Current Outpatient Prescriptions   Medication Sig Dispense Refill     naproxen (NAPROSYN) 500 MG tablet Take 1 tablet (500 mg) by mouth 2 times daily as needed for moderate pain 90 tablet 1     metoprolol (TOPROL-XL) 25 MG 24 hr tablet TAKE ONE TABLET BY MOUTH ONE TIME DAILY  90 tablet 1     levothyroxine (SYNTHROID/LEVOTHROID) 88 MCG tablet Take 1 tablet (88 mcg) by mouth daily 90 tablet 3     hydrOXYzine (ATARAX) 25 MG tablet Take 1 tablet (25 mg) by mouth every 6 hours as needed for itching (and nausea) 50 tablet 0       Patient Active Problem List   Diagnosis     CARDIOVASCULAR SCREENING; LDL GOAL LESS THAN 160     Hypothyroidism     Hypertension goal BP (blood pressure) < 140/90     WONG (iron deficiency anemia)     Obesity     Nontraumatic tear of supraspinatus tendon, left       Past Medical History:   Diagnosis Date     Graves disease      Hypertension      Hypothyroidism      MEDICAL HISTORY OF -     S/P RADIOACTIVE IODINE     Palpitation        Past Surgical History:   Procedure Laterality Date     ARTHROSCOPY SHLDR  "ROTATOR CUFF REPAIR, SUBACROMIAL DECOMP, DIST CLAVICLE RESECTION, BICEP TENODESIS Left 2017    Procedure: ARTHROSCOPY SHOULDER ROTATOR CUFF REPAIR, SUBACROMIAL DECOMPRESSION, DISTAL CLAVICLE RESECTION, OPEN BICEP TENODESIS REPAIR;  Surgeon: Dorina Rodriguez MD;  Location: [facility]     C  DELIVERY ONLY       COLONOSCOPY  2013    Procedure: COLONOSCOPY;  colonoscopy, screening;  Surgeon: Dalton Lala MD;  Location: [facility] OR     SURGICAL HISTORY OF -       RT ANKLE Fx AND REPAIR (PIN,PLATE,SCREWS)     THYROIDECTOMY         Family History   Problem Relation Age of Onset     Hypertension Mother      DIABETES Mother      Hypertension Father      Alcohol/Drug Father      alcohol     Crohn Disease Son      Hypertension Other      CANCER No family hx of      CEREBROVASCULAR DISEASE No family hx of      Thyroid Disease No family hx of      Glaucoma No family hx of      Macular Degeneration No family hx of        Social History   Substance Use Topics     Smoking status: Never Smoker     Smokeless tobacco: Never Used     Alcohol use Yes      Comment: SELDOM          O: Ht 5' 4\" (1.626 m)  Wt 195 lb (88.5 kg)  LMP 2015 (Approximate)  SpO2 100%  BMI 33.47 kg/m2.      Constitutional/ general:  Pt is in no apparent distress, appears well-nourished.  Cooperative with history and physical exam.     Psych:  The patient answered questions appropriately.  Normal affect.  Seems to have reasonable expectations, in terms of treatment.     Eyes:  Visual scanning/ tracking without deficit.     Ears:  Response to auditory stimuli is normal.  negative hearing aid devices.  Auricles in proper alignment.     Lymphatic:  Popliteal lymph nodes not enlarged.     Lungs:  Non labored breathing, non labored speech. No cough.  No audible wheezing. Even, quiet breathing.       Vascular:  positive pedal pulses bilaterally for both the DP and PT arteries.  CFT < 3 sec.  negative ankle edema.  positive pedal hair " growth.    Neuro:  Alert and oriented x 3. Coordinated gait.  Light touch sensation is intact to the L4, L5, S1 distributions. No obvious deficits.  No evidence of neurological-based weakness, spasticity, or contracture in the lower extremities.     Derm: Normal texture and turgor.  No erythema, ecchymosis, or cyanosis.      Musculoskeletal:  Cavus arch with weightbearing.  No forefoot or rear foot deformities noted.  MS 5/5 all compartments.  Normal ROM all fore foot and rearfoot joints.  No equinus.  Pain with palpation and ROM of the right second/third tarsometatarsal joint.  There is bossing here.  No pain with stressing any muscle compartments.  No erythema edema or ecchymosis or masses noted.  Xrays show decreased joint and subchondral sclerosis of the second/third tarsometatarsal joint and medial cuneiform navicular joint.    A:  Right midfoot arthritis.    P:  X-rays personally reviewed.  Explained to patient she has arthritis in these joints.   Discussed importance of wearing a good stiff shoe at all times to decrease symptoms and I made suggestion.  Will relace shoes so no pressure.  Rx for orthotics.  Avoid activities that will bother this and discussed this.  Discussed surgery, fusion vs bone removal.  RETURN TO CLINIC PRN.  Thank you for allowing me participate in the care of this patient.        Guero Ibarra DPM, FACFAS         Again, thank you for allowing me to participate in the care of your patient.        Sincerely,        Guero Ibarra DPM

## 2018-03-08 NOTE — TELEPHONE ENCOUNTER
Reason for Call:  Other X-Ray     Detailed comments: Pt calling for she would like to request hard disks of her X-Rays to be made available for  at the  today for she will be needing those for an upcoming appointment with a speciality provider. She would like a call back to confirm disks are ready for .    Phone Number Patient can be reached at: Cell number on file:    Telephone Information:   Mobile [phone]       Best Time: anytime    Can we leave a detailed message on this number? YES    Call taken on 3/8/2018 at 7:13 AM by Sherwin Romero

## 2018-03-09 NOTE — PROGRESS NOTES
S:  Patient seen in consult form Laura Riley and complains of foot pain.  Points to dorsum of right second/third tarsometatarsal joint.  Has had this for 2 years.  Describes it as a burning pain.  Aggrevated by activity and relieved by rest.  Positive history of post static dyskinesia.  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  History of ankle fracture with ORIF on this foot.  At times feels better with no shoes pressing on these joints.        ROS:  A 10-point review of systems was performed and is positive for that noted in the HPI and as seen below.  All other areas are negative.        Allergies   Allergen Reactions     No Known Drug Allergy        Current Outpatient Prescriptions   Medication Sig Dispense Refill     naproxen (NAPROSYN) 500 MG tablet Take 1 tablet (500 mg) by mouth 2 times daily as needed for moderate pain 90 tablet 1     metoprolol (TOPROL-XL) 25 MG 24 hr tablet TAKE ONE TABLET BY MOUTH ONE TIME DAILY  90 tablet 1     levothyroxine (SYNTHROID/LEVOTHROID) 88 MCG tablet Take 1 tablet (88 mcg) by mouth daily 90 tablet 3     hydrOXYzine (ATARAX) 25 MG tablet Take 1 tablet (25 mg) by mouth every 6 hours as needed for itching (and nausea) 50 tablet 0       Patient Active Problem List   Diagnosis     CARDIOVASCULAR SCREENING; LDL GOAL LESS THAN 160     Hypothyroidism     Hypertension goal BP (blood pressure) < 140/90     WONG (iron deficiency anemia)     Obesity     Nontraumatic tear of supraspinatus tendon, left       Past Medical History:   Diagnosis Date     Graves disease      Hypertension      Hypothyroidism      MEDICAL HISTORY OF -     S/P RADIOACTIVE IODINE     Palpitation        Past Surgical History:   Procedure Laterality Date     ARTHROSCOPY SHLDR ROTATOR CUFF REPAIR, SUBACROMIAL DECOMP, DIST CLAVICLE RESECTION, BICEP TENODESIS Left 4/11/2017    Procedure: ARTHROSCOPY SHOULDER ROTATOR CUFF REPAIR, SUBACROMIAL DECOMPRESSION, DISTAL CLAVICLE RESECTION, OPEN BICEP TENODESIS REPAIR;   "Surgeon: Dorina Rodriguez MD;  Location: [facility] OR     C  DELIVERY ONLY       COLONOSCOPY  2013    Procedure: COLONOSCOPY;  colonoscopy, screening;  Surgeon: Dalton Lala MD;  Location: [facility] OR     SURGICAL HISTORY OF -       RT ANKLE Fx AND REPAIR (PIN,PLATE,SCREWS)     THYROIDECTOMY         Family History   Problem Relation Age of Onset     Hypertension Mother      DIABETES Mother      Hypertension Father      Alcohol/Drug Father      alcohol     Crohn Disease Son      Hypertension Other      CANCER No family hx of      CEREBROVASCULAR DISEASE No family hx of      Thyroid Disease No family hx of      Glaucoma No family hx of      Macular Degeneration No family hx of        Social History   Substance Use Topics     Smoking status: Never Smoker     Smokeless tobacco: Never Used     Alcohol use Yes      Comment: SELDOM          O: Ht 5' 4\" (1.626 m)  Wt 195 lb (88.5 kg)  LMP 2015 (Approximate)  SpO2 100%  BMI 33.47 kg/m2.      Constitutional/ general:  Pt is in no apparent distress, appears well-nourished.  Cooperative with history and physical exam.     Psych:  The patient answered questions appropriately.  Normal affect.  Seems to have reasonable expectations, in terms of treatment.     Eyes:  Visual scanning/ tracking without deficit.     Ears:  Response to auditory stimuli is normal.  negative hearing aid devices.  Auricles in proper alignment.     Lymphatic:  Popliteal lymph nodes not enlarged.     Lungs:  Non labored breathing, non labored speech. No cough.  No audible wheezing. Even, quiet breathing.       Vascular:  positive pedal pulses bilaterally for both the DP and PT arteries.  CFT < 3 sec.  negative ankle edema.  positive pedal hair growth.    Neuro:  Alert and oriented x 3. Coordinated gait.  Light touch sensation is intact to the L4, L5, S1 distributions. No obvious deficits.  No evidence of neurological-based weakness, spasticity, or contracture in the lower " extremities.     Derm: Normal texture and turgor.  No erythema, ecchymosis, or cyanosis.      Musculoskeletal:  Cavus arch with weightbearing.  No forefoot or rear foot deformities noted.  MS 5/5 all compartments.  Normal ROM all fore foot and rearfoot joints.  No equinus.  Pain with palpation and ROM of the right second/third tarsometatarsal joint.  There is bossing here.  No pain with stressing any muscle compartments.  No erythema edema or ecchymosis or masses noted.  Xrays show decreased joint and subchondral sclerosis of the second/third tarsometatarsal joint and medial cuneiform navicular joint.    A:  Right midfoot arthritis.    P:  X-rays personally reviewed.  Explained to patient she has arthritis in these joints.   Discussed importance of wearing a good stiff shoe at all times to decrease symptoms and I made suggestion.  Will relace shoes so no pressure.  Rx for orthotics.  Avoid activities that will bother this and discussed this.  Discussed surgery, fusion vs bone removal.  RETURN TO CLINIC PRN.  Thank you for allowing me participate in the care of this patient.        Guero Ibarra DPM, FACFAS

## 2018-04-18 ENCOUNTER — TRANSFERRED RECORDS (OUTPATIENT)
Dept: HEALTH INFORMATION MANAGEMENT | Facility: CLINIC | Age: 53
End: 2018-04-18

## 2018-04-25 ENCOUNTER — OFFICE VISIT (OUTPATIENT)
Dept: FAMILY MEDICINE | Facility: CLINIC | Age: 53
End: 2018-04-25
Payer: COMMERCIAL

## 2018-04-25 ENCOUNTER — RADIANT APPOINTMENT (OUTPATIENT)
Dept: GENERAL RADIOLOGY | Facility: CLINIC | Age: 53
End: 2018-04-25
Attending: INTERNAL MEDICINE
Payer: COMMERCIAL

## 2018-04-25 VITALS
BODY MASS INDEX: 32.27 KG/M2 | HEART RATE: 70 BPM | WEIGHT: 189 LBS | HEIGHT: 64 IN | OXYGEN SATURATION: 99 % | TEMPERATURE: 98.1 F | SYSTOLIC BLOOD PRESSURE: 135 MMHG | DIASTOLIC BLOOD PRESSURE: 88 MMHG

## 2018-04-25 DIAGNOSIS — E03.9 HYPOTHYROIDISM, UNSPECIFIED TYPE: ICD-10-CM

## 2018-04-25 DIAGNOSIS — Z13.6 CARDIOVASCULAR SCREENING; LDL GOAL LESS THAN 130: ICD-10-CM

## 2018-04-25 DIAGNOSIS — M51.369 DEGENERATION OF LUMBAR INTERVERTEBRAL DISC: ICD-10-CM

## 2018-04-25 DIAGNOSIS — G47.00 INSOMNIA, UNSPECIFIED TYPE: ICD-10-CM

## 2018-04-25 DIAGNOSIS — Z11.59 NEED FOR HEPATITIS C SCREENING TEST: ICD-10-CM

## 2018-04-25 DIAGNOSIS — I10 HYPERTENSION GOAL BP (BLOOD PRESSURE) < 140/90: ICD-10-CM

## 2018-04-25 DIAGNOSIS — Z00.00 ROUTINE GENERAL MEDICAL EXAMINATION AT A HEALTH CARE FACILITY: Primary | ICD-10-CM

## 2018-04-25 PROBLEM — M54.50 RIGHT-SIDED LOW BACK PAIN WITHOUT SCIATICA, UNSPECIFIED CHRONICITY: Status: ACTIVE | Noted: 2018-04-25

## 2018-04-25 PROBLEM — M65.4 DE QUERVAIN'S DISEASE (TENOSYNOVITIS): Status: ACTIVE | Noted: 2018-04-25

## 2018-04-25 LAB
ALBUMIN SERPL-MCNC: 4 G/DL (ref 3.4–5)
ALP SERPL-CCNC: 75 U/L (ref 40–150)
ALT SERPL W P-5'-P-CCNC: 21 U/L (ref 0–50)
ANION GAP SERPL CALCULATED.3IONS-SCNC: 7 MMOL/L (ref 3–14)
AST SERPL W P-5'-P-CCNC: 15 U/L (ref 0–45)
BASOPHILS # BLD AUTO: 0 10E9/L (ref 0–0.2)
BASOPHILS NFR BLD AUTO: 0.4 %
BILIRUB SERPL-MCNC: 1 MG/DL (ref 0.2–1.3)
BUN SERPL-MCNC: 18 MG/DL (ref 7–30)
CALCIUM SERPL-MCNC: 9.1 MG/DL (ref 8.5–10.1)
CHLORIDE SERPL-SCNC: 108 MMOL/L (ref 94–109)
CHOLEST SERPL-MCNC: 190 MG/DL
CO2 SERPL-SCNC: 26 MMOL/L (ref 20–32)
CREAT SERPL-MCNC: 0.62 MG/DL (ref 0.52–1.04)
CREAT UR-MCNC: 154 MG/DL
DIFFERENTIAL METHOD BLD: NORMAL
EOSINOPHIL # BLD AUTO: 0.1 10E9/L (ref 0–0.7)
EOSINOPHIL NFR BLD AUTO: 2.3 %
ERYTHROCYTE [DISTWIDTH] IN BLOOD BY AUTOMATED COUNT: 13.2 % (ref 10–15)
GFR SERPL CREATININE-BSD FRML MDRD: >90 ML/MIN/1.7M2
GLUCOSE SERPL-MCNC: 90 MG/DL (ref 70–99)
HCT VFR BLD AUTO: 42.2 % (ref 35–47)
HCV AB SERPL QL IA: NONREACTIVE
HDLC SERPL-MCNC: 72 MG/DL
HGB BLD-MCNC: 13.9 G/DL (ref 11.7–15.7)
LDLC SERPL CALC-MCNC: 104 MG/DL
LYMPHOCYTES # BLD AUTO: 1.9 10E9/L (ref 0.8–5.3)
LYMPHOCYTES NFR BLD AUTO: 40.3 %
MCH RBC QN AUTO: 29 PG (ref 26.5–33)
MCHC RBC AUTO-ENTMCNC: 32.9 G/DL (ref 31.5–36.5)
MCV RBC AUTO: 88 FL (ref 78–100)
MICROALBUMIN UR-MCNC: 14 MG/L
MICROALBUMIN/CREAT UR: 9.03 MG/G CR (ref 0–25)
MONOCYTES # BLD AUTO: 0.3 10E9/L (ref 0–1.3)
MONOCYTES NFR BLD AUTO: 6.8 %
NEUTROPHILS # BLD AUTO: 2.4 10E9/L (ref 1.6–8.3)
NEUTROPHILS NFR BLD AUTO: 50.2 %
NONHDLC SERPL-MCNC: 118 MG/DL
PLATELET # BLD AUTO: 198 10E9/L (ref 150–450)
POTASSIUM SERPL-SCNC: 4.1 MMOL/L (ref 3.4–5.3)
PROT SERPL-MCNC: 7.9 G/DL (ref 6.8–8.8)
RBC # BLD AUTO: 4.79 10E12/L (ref 3.8–5.2)
SODIUM SERPL-SCNC: 141 MMOL/L (ref 133–144)
T4 FREE SERPL-MCNC: 1.68 NG/DL (ref 0.76–1.46)
TRIGL SERPL-MCNC: 69 MG/DL
TSH SERPL DL<=0.005 MIU/L-ACNC: 0.12 MU/L (ref 0.4–4)
WBC # BLD AUTO: 4.7 10E9/L (ref 4–11)

## 2018-04-25 PROCEDURE — 84443 ASSAY THYROID STIM HORMONE: CPT | Performed by: INTERNAL MEDICINE

## 2018-04-25 PROCEDURE — 85025 COMPLETE CBC W/AUTO DIFF WBC: CPT | Performed by: INTERNAL MEDICINE

## 2018-04-25 PROCEDURE — 80053 COMPREHEN METABOLIC PANEL: CPT | Performed by: INTERNAL MEDICINE

## 2018-04-25 PROCEDURE — 86803 HEPATITIS C AB TEST: CPT | Performed by: INTERNAL MEDICINE

## 2018-04-25 PROCEDURE — 80061 LIPID PANEL: CPT | Performed by: INTERNAL MEDICINE

## 2018-04-25 PROCEDURE — 99396 PREV VISIT EST AGE 40-64: CPT | Performed by: INTERNAL MEDICINE

## 2018-04-25 PROCEDURE — 36415 COLL VENOUS BLD VENIPUNCTURE: CPT | Performed by: INTERNAL MEDICINE

## 2018-04-25 PROCEDURE — 82043 UR ALBUMIN QUANTITATIVE: CPT | Performed by: INTERNAL MEDICINE

## 2018-04-25 PROCEDURE — 72100 X-RAY EXAM L-S SPINE 2/3 VWS: CPT | Mod: FY

## 2018-04-25 PROCEDURE — 84439 ASSAY OF FREE THYROXINE: CPT | Performed by: INTERNAL MEDICINE

## 2018-04-25 RX ORDER — ZOLPIDEM TARTRATE 5 MG/1
5 TABLET ORAL
Qty: 30 TABLET | Refills: 5 | Status: SHIPPED | OUTPATIENT
Start: 2018-04-25 | End: 2018-11-20

## 2018-04-25 RX ORDER — LEVOTHYROXINE SODIUM 88 UG/1
88 TABLET ORAL DAILY
Qty: 90 TABLET | Refills: 3 | Status: SHIPPED | OUTPATIENT
Start: 2018-04-25 | End: 2019-01-10 | Stop reason: DRUGHIGH

## 2018-04-25 RX ORDER — METOPROLOL SUCCINATE 25 MG/1
25 TABLET, EXTENDED RELEASE ORAL DAILY
Qty: 90 TABLET | Refills: 3 | Status: SHIPPED | OUTPATIENT
Start: 2018-04-25 | End: 2019-05-06

## 2018-04-25 ASSESSMENT — PAIN SCALES - GENERAL: PAINLEVEL: NO PAIN (0)

## 2018-04-25 NOTE — PROGRESS NOTES
SUBJECTIVE:   CC: Mary Sims is an 52 year old woman who presents for preventive health visit.     Healthy Habits:    Do you get at least three servings of calcium containing foods daily (dairy, green leafy vegetables, etc.)? no, taking calcium and/or vitamin D supplement: no    Amount of exercise or daily activities, outside of work: 2 day(s) per week    Problems taking medications regularly No    Medication side effects: No    Have you had an eye exam in the past two years? yes    Do you see a dentist twice per year? no    Do you have sleep apnea, excessive snoring or daytime drowsiness?no      -------------------------------------    Today's PHQ-2 Score:   PHQ-2 ( 1999 [employer]) 5/12/2017 5/9/2017   Q1: Little interest or pleasure in doing things - -   Q2: Feeling down, depressed or hopeless - -   PHQ-2 Score - -   Q1: Little interest or pleasure in doing things Not at all Not at all   Q2: Feeling down, depressed or hopeless Not at all Not at all   PHQ-2 Score 0 0       Abuse: Current or Past(Physical, Sexual or Emotional)- No  Do you feel safe in your environment - Yes    Social History   Substance Use Topics     Smoking status: Never Smoker     Smokeless tobacco: Never Used     Alcohol use Yes      Comment: SELDOM      If you drink alcohol do you typically have >3 drinks per day or >7 drinks per week? No                     Reviewed orders with patient.  Reviewed health maintenance and updated orders accordingly - Yes  Labs reviewed in EPIC  BP Readings from Last 3 Encounters:   04/25/18 135/88   12/14/17 120/90   07/06/17 130/80    Wt Readings from Last 3 Encounters:   04/25/18 189 lb (85.7 kg)   03/08/18 195 lb (88.5 kg)   12/14/17 187 lb 12.8 oz (85.2 kg)                  Patient Active Problem List   Diagnosis     CARDIOVASCULAR SCREENING; LDL GOAL LESS THAN 160     Hypothyroidism     Hypertension goal BP (blood pressure) < 140/90     WONG (iron deficiency anemia)     Obesity     Nontraumatic tear of  supraspinatus tendon, left     Insomnia, unspecified type     De Quervain's disease (tenosynovitis)     Right-sided low back pain without sciatica, unspecified chronicity     Past Surgical History:   Procedure Laterality Date     ARTHROSCOPY SHLDR ROTATOR CUFF REPAIR, SUBACROMIAL DECOMP, DIST CLAVICLE RESECTION, BICEP TENODESIS Left 2017    Procedure: ARTHROSCOPY SHOULDER ROTATOR CUFF REPAIR, SUBACROMIAL DECOMPRESSION, DISTAL CLAVICLE RESECTION, OPEN BICEP TENODESIS REPAIR;  Surgeon: Dorina Rodriguez MD;  Location: [facility] OR     C  DELIVERY ONLY       COLONOSCOPY  2013    Procedure: COLONOSCOPY;  colonoscopy, screening;  Surgeon: Dalton Lala MD;  Location: [facility] OR     SURGICAL HISTORY OF -       RT ANKLE Fx AND REPAIR (PIN,PLATE,SCREWS)     THYROIDECTOMY         Social History   Substance Use Topics     Smoking status: Never Smoker     Smokeless tobacco: Never Used     Alcohol use Yes      Comment: SELDOM      Family History   Problem Relation Age of Onset     Hypertension Mother      DIABETES Mother      Hypertension Father      Alcohol/Drug Father      alcohol     Crohn Disease Son      Hypertension Other      CANCER No family hx of      CEREBROVASCULAR DISEASE No family hx of      Thyroid Disease No family hx of      Glaucoma No family hx of      Macular Degeneration No family hx of          Allergies   Allergen Reactions     No Known Drug Allergy      Recent Labs   Lab Test  18   0847 17   1110   08/18/15   1019   05/15/14   1356   11   1518   LDL  104*   --    --    --   109   --   145*   --    --    HDL  72   --    --    --   68   --   65   --    --    TRIG  69   --    --    --   57   --   80   --    --    ALT  21   --    --    --    --    --    --    --   22   CR  0.62  0.80  0.73   < >   --    --   0.92   < >  0.85   GFRESTIMATED  >90  >60  83   < >   --    --   65   < >  71   GFRESTBLACK  >90  >60  >90  African American GFR Calc     < >   --    --    78   < >  86   POTASSIUM  4.1  4.0  4.3   < >   --    --   4.3   < >  5.0   TSH  0.12*   --   0.46   < >  1.04   < >  131.00*   < >  0.47    < > = values in this interval not displayed.        Patient over age 50, mutual decision to screen reflected in health maintenance.    Pertinent mammograms are reviewed under the imaging tab.  History of abnormal Pap smear: NO - age 30-65 PAP every 5 years with negative HPV co-testing recommended      ROS:  CONSTITUTIONAL: NEGATIVE for fever, chills, change in weight  INTEGUMENTARU/SKIN: NEGATIVE for worrisome rashes, moles or lesions  EYES: NEGATIVE for vision changes or irritation  ENT: NEGATIVE for ear, mouth and throat problems  RESP: NEGATIVE for significant cough or SOB  BREAST: NEGATIVE for masses, tenderness or discharge  CV: NEGATIVE for chest pain, palpitations or peripheral edema  GI: NEGATIVE for nausea, abdominal pain, heartburn, or change in bowel habits  : NEGATIVE for unusual urinary or vaginal symptoms. Periods are regular.  MUSCULOSKELETAL: NEGATIVE for significant arthralgias or myalgia  NEURO: NEGATIVE for weakness, dizziness or paresthesias  PSYCHIATRIC: NEGATIVE for changes in mood or affect    OBJECTIVE:   LMP 08/04/2015 (Approximate)  EXAM:  GENERAL: healthy, alert and no distress  EYES: Eyes grossly normal to inspection, PERRL and conjunctivae and sclerae normal  HENT: ear canals and TM's normal, nose and mouth without ulcers or lesions  NECK: no adenopathy, no asymmetry, masses, or scars and thyroid normal to palpation  RESP: lungs clear to auscultation - no rales, rhonchi or wheezes  CV: regular rate and rhythm, normal S1 S2, no S3 or S4, no murmur, click or rub, no peripheral edema and peripheral pulses strong  ABDOMEN: soft, nontender, no hepatosplenomegaly, no masses and bowel sounds normal  MS: no gross musculoskeletal defects noted, no edema  SKIN: no suspicious lesions or rashes  NEURO: Normal strength and tone, mentation intact and speech  normal  PSYCH: mentation appears normal, affect normal/bright    ASSESSMENT/PLAN:   (Z00.00) Routine general medical examination at a health care facility  (primary encounter diagnosis)  Comment:   Plan: CBC with platelets differential, Comprehensive         metabolic panel (BMP + Alb, Alk Phos, ALT, AST,        Total. Bili, TP)            (Z13.6) CARDIOVASCULAR SCREENING; LDL GOAL LESS THAN 130  Comment:   Plan: Lipid Profile (Chol, Trig, HDL, LDL calc)            (Z11.59) Need for hepatitis C screening test  Comment:   Plan: Hepatitis C Screen Reflex to HCV RNA Quant and         Genotype            (G47.00) Insomnia, unspecified type  Comment:   Plan: zolpidem (AMBIEN) 5 MG tablet            (M51.36) Degeneration of lumbar intervertebral disc  Comment:   Plan: XR Lumbar Spine 2/3 Views, diclofenac         (VOLTAREN) 1 % GEL topical gel            (I10) Hypertension goal BP (blood pressure) < 140/90  Comment:   Plan: Albumin Random Urine Quantitative with Creat         Ratio, Lipid Profile (Chol, Trig, HDL, LDL         calc), metoprolol succinate (TOPROL-XL) 25 MG         24 hr tablet            (E03.9) Hypothyroidism, unspecified type  Comment:   Plan: levothyroxine (SYNTHROID/LEVOTHROID) 88 MCG         tablet, TSH, T4, free              COUNSELING:   Special attention given to:        Regular exercise       Healthy diet/nutrition       Consider Hep C screening for patients born between 1945 and 1965       The 10-year ASCVD risk score (Edel RING Jr, et al., 2013) is: 1.9%    Values used to calculate the score:      Age: 52 years      Sex: Female      Is Non- : No      Diabetic: No      Tobacco smoker: No      Systolic Blood Pressure: 135 mmHg      Is BP treated: Yes      HDL Cholesterol: 72 mg/dL      Total Cholesterol: 190 mg/dL    BP Screening:   Last 3 BP Readings:    BP Readings from Last 3 Encounters:   04/25/18 135/88   12/14/17 120/90   07/06/17 130/80       The following was  "recommended to the patient:  Re-screen BP within a year and recommended lifestyle modifications     reports that she has never smoked. She has never used smokeless tobacco.    Estimated body mass index is 33.47 kg/(m^2) as calculated from the following:    Height as of 3/8/18: 5' 4\" (1.626 m).    Weight as of 3/8/18: 195 lb (88.5 kg).   Weight management plan: diet and exercise.    Counseling Resources:  ATP IV Guidelines  Pooled Cohorts Equation Calculator  Breast Cancer Risk Calculator  FRAX Risk Assessment  ICSI Preventive Guidelines  Dietary Guidelines for Americans, 2010  USDA's MyPlate  ASA Prophylaxis  Lung CA Screening    Stan Weber MD  [facility]  "

## 2018-04-25 NOTE — MR AVS SNAPSHOT
After Visit Summary   4/25/2018    Mary Sims    MRN: [id number]           Patient Information     Date Of Birth          1965        Visit Information        Provider Department      4/25/2018 7:40 AM Stan Weber MD [facility]        Today's Diagnoses     Routine general medical examination at a health care facility    -  1    CARDIOVASCULAR SCREENING; LDL GOAL LESS THAN 130        Need for hepatitis C screening test        Insomnia, unspecified type        Right-sided low back pain without sciatica, unspecified chronicity        Hypertension goal BP (blood pressure) < 140/90        Hypothyroidism, unspecified type          Care Instructions      Preventive Health Recommendations  Female Ages 50 - 64    Yearly exam: See your health care provider every year in order to  o Review health changes.   o Discuss preventive care.    o Review your medicines if your doctor has prescribed any.      Get a Pap test every three years (unless you have an abnormal result and your provider advises testing more often).    If you get Pap tests with HPV test, you only need to test every 5 years, unless you have an abnormal result.     You do not need a Pap test if your uterus was removed (hysterectomy) and you have not had cancer.    You should be tested each year for STDs (sexually transmitted diseases) if you're at risk.     Have a mammogram every 1 to 2 years.    Have a colonoscopy at age 50, or have a yearly FIT test (stool test). These exams screen for colon cancer.      Have a cholesterol test every 5 years, or more often if advised.    Have a diabetes test (fasting glucose) every three years. If you are at risk for diabetes, you should have this test more often.     If you are at risk for osteoporosis (brittle bone disease), think about having a bone density scan (DEXA).    Shots: Get a flu shot each year. Get a tetanus shot every 10 years.    Nutrition:     Eat at least 5  servings of fruits and vegetables each day.    Eat whole-grain bread, whole-wheat pasta and brown rice instead of white grains and rice.    Talk to your provider about Calcium and Vitamin D.     Lifestyle    Exercise at least 150 minutes a week (30 minutes a day, 5 days a week). This will help you control your weight and prevent disease.    Limit alcohol to one drink per day.    No smoking.     Wear sunscreen to prevent skin cancer.     See your dentist every six months for an exam and cleaning.    See your eye doctor every 1 to 2 years.  At [facility], we strive to deliver an exceptional experience to you, every time we see you.  If you receive a survey in the mail, please send us back your thoughts. We really do value your feedback.    Based on your medical history, these are the current health maintenance/preventive care services that you are due for (some may have been done at this visit.)  Health Maintenance Due   Topic Date Due     HIV SCREEN (SYSTEM ASSIGNED)  06/04/1981     HEPATITIS C SCREENING  06/04/1981     EYE EXAM Q1 YEAR  04/27/2017     INFLUENZA VACCINE (1) 09/01/2017     BMP Q1 YR  03/13/2018     MICROALBUMIN Q1 YEAR  05/15/2018         Suggested websites for health information:  Www.[facility] / [facility].[employer]qua : Up to date and easily searchable information on multiple topics.  Www.medlineplus.gov : medication info, interactive tutorials, watch real surgeries online  Www.familydoctor.org : good info from the Academy of Family Physicians  Www.cdc.gov : public health info, travel advisories, epidemics (H1N1)  Www.aap.org : children's health info, normal development, vaccinations  Www.health.[facility].[place].us : [place] dept of health, public health issues in [place], N1N1    Your care team:                            Family Medicine Internal Medicine   MD Stan Santoro MD Shantel Branch-Fleming, MD Katya Georgiev PA-C Nam Ho, MD Pediatrics   ANTELMO Guerin CNP Amelia  MD Bethany Esquivel CNP, MD Deborah Mielke, MD Kim Thein, APRN CNP      Clinic hours: Monday - Thursday 7 am-7 pm; Fridays 7 am-5 pm.   Urgent care: Monday - Friday 11 am-9 pm; Saturday and Sunday 9 am-5 pm.  Pharmacy : Monday -Thursday 8 am-8 pm; Friday 8 am-6 pm; Saturday and Sunday 9 am-5 pm.     Clinic: [phone]   Pharmacy: [phone]    Facet Joint Injection  Back or neck pain may be caused by a problem with your facet joints. If so, a facet joint injection may help. With this treatment, medicine is injected into certain facet joints. The injection can help your doctor find problem joints. It may also relieve your pain.    What is a facet joint?  Bones called vertebrae make up your spine. Each vertebra has facets (flat surfaces) that touch where the vertebrae fit together. These form a structure called a facet joint on each side of the vertebrae.  What is a facet joint injection?  One or more facet joints in your back or neck can become inflamed (swollen and irritated). This may cause pain. During a facet joint injection, medicine is injected into the inflamed joints. This treatment may help reduce inflammation and relieve pain. Pain relief may last for weeks to months or longer. If the pain returns, you may need a repeat injection.    Getting ready  To get ready for your treatment, do the following:    At least a week before treatment, tell your healthcare provider what medicines you take. This includes aspirin. Ask whether you should stop taking any of them before treatment.    Tell your provider if you are pregnant or allergic to any medicines.    Follow any directions you are given for not eating or drinking before the treatment.    If asked, bring X-rays, MRIs, or other tests with you on the day of your treatment.  Date Last Reviewed: 9/21/2015 2000-2017 [employer]. [address] [place] [address], [place] [address]. All rights reserved. This  "information is not intended as a substitute for professional medical care. Always follow your healthcare professional's instructions.                Follow-ups after your visit        Who to contact     If you have questions or need follow up information about today's clinic visit or your schedule please contact [facility] directly at [phone].  Normal or non-critical lab and imaging results will be communicated to you by MyChart, letter or phone within 4 business days after the clinic has received the results. If you do not hear from us within 7 days, please contact the clinic through Prom[employer]ior[employer] or phone. If you have a critical or abnormal lab result, we will notify you by phone as soon as possible.  Submit refill requests through [employer] or call your pharmacy and they will forward the refill request to us. Please allow 3 business days for your refill to be completed.          Additional Information About Your Visit        MyChart Information     [employer] gives you secure access to your electronic health record. If you see a primary care provider, you can also send messages to your care team and make appointments. If you have questions, please call your primary care clinic.  If you do not have a primary care provider, please call [phone] and they will assist you.        Care EveryWhere ID     This is your Care EveryWhere ID. This could be used by other organizations to access your [facility] medical records  [id number]        Your Vitals Were     Pulse Temperature Height Last Period Pulse Oximetry BMI (Body Mass Index)    70 98.1  F (36.7  C) (Oral) 5' 4\" (1.626 m) 08/04/2015 (Approximate) 99% 32.44 kg/m2       Blood Pressure from Last 3 Encounters:   04/25/18 135/88   12/14/17 120/90   07/06/17 130/80    Weight from Last 3 Encounters:   04/25/18 189 lb (85.7 kg)   03/08/18 195 lb (88.5 kg)   12/14/17 187 lb 12.8 oz (85.2 kg)              We Performed the Following     Albumin Random Urine " Quantitative with Creat Ratio     CBC with platelets differential     Comprehensive metabolic panel (BMP + Alb, Alk Phos, ALT, AST, Total. Bili, TP)     Hepatitis C Screen Reflex to HCV RNA Quant and Genotype     Lipid Profile (Chol, Trig, HDL, LDL calc)     XR Lumbar Spine 2/3 Views          Today's Medication Changes          These changes are accurate as of 4/25/18  8:40 AM.  If you have any questions, ask your nurse or doctor.               Start taking these medicines.        Dose/Directions    zolpidem 5 MG tablet   Commonly known as:  AMBIEN   Used for:  Insomnia, unspecified type   Started by:  Stan Weber MD        Dose:  5 mg   Take 1 tablet (5 mg) by mouth nightly as needed for sleep   Quantity:  30 tablet   Refills:  5         These medicines have changed or have updated prescriptions.        Dose/Directions    metoprolol succinate 25 MG 24 hr tablet   Commonly known as:  TOPROL-XL   This may have changed:  See the new instructions.   Used for:  Hypertension goal BP (blood pressure) < 140/90   Changed by:  Stan Weber MD        Dose:  25 mg   Take 1 tablet (25 mg) by mouth daily   Quantity:  90 tablet   Refills:  3            Where to get your medicines      [facility] [place] [facility] [address]  [address] [facility] [address]     Phone:  [phone]     levothyroxine 88 MCG tablet    metoprolol succinate 25 MG 24 hr tablet         Some of these will need a paper prescription and others can be bought over the counter.  Ask your nurse if you have questions.     Bring a paper prescription for each of these medications     zolpidem 5 MG tablet                Primary Care Provider Office Phone # Fax #    PABLO Dean [phone]       [facility] [address]  [place] [address]        Equal Access to Services     JEFFY DOE AH: dominguez Sexton,  joon belkisbaileyalejandro fairchirs jaymie kein hayaan adeeg kharash la'aan ah. So [facility] [phone].    ATENCIÓN: Si humaira monzon, tiene a diamond disposición servicios gratuitos de asistencia lingüística. Tirso al [phone].    We comply with applicable federal civil rights laws and [place] laws. We do not discriminate on the basis of race, color, national origin, age, disability, sex, sexual orientation, or gender identity.            Thank you!     Thank you for choosing [facility]  for your care. Our goal is always to provide you with excellent care. Hearing back from our patients is one way we can continue to improve our services. Please take a few minutes to complete the written survey that you may receive in the mail after your visit with us. Thank you!             Your Updated Medication List - Protect others around you: Learn how to safely use, store and throw away your medicines at www.disposemymeds.org.          This list is accurate as of 4/25/18  8:40 AM.  Always use your most recent med list.                   Brand Name Dispense Instructions for use Diagnosis    levothyroxine 88 MCG tablet    SYNTHROID/LEVOTHROID    90 tablet    Take 1 tablet (88 mcg) by mouth daily    Hypothyroidism, unspecified type       MELATONIN PO      Take 3 mg by mouth nightly as needed        metoprolol succinate 25 MG 24 hr tablet    TOPROL-XL    90 tablet    Take 1 tablet (25 mg) by mouth daily    Hypertension goal BP (blood pressure) < 140/90       naproxen 500 MG tablet    NAPROSYN    90 tablet    Take 1 tablet (500 mg) by mouth 2 times daily as needed for moderate pain    Right foot pain       zolpidem 5 MG tablet    AMBIEN    30 tablet    Take 1 tablet (5 mg) by mouth nightly as needed for sleep    Insomnia, unspecified type

## 2018-04-25 NOTE — PATIENT INSTRUCTIONS
Preventive Health Recommendations  Female Ages 50 - 64    Yearly exam: See your health care provider every year in order to  o Review health changes.   o Discuss preventive care.    o Review your medicines if your doctor has prescribed any.      Get a Pap test every three years (unless you have an abnormal result and your provider advises testing more often).    If you get Pap tests with HPV test, you only need to test every 5 years, unless you have an abnormal result.     You do not need a Pap test if your uterus was removed (hysterectomy) and you have not had cancer.    You should be tested each year for STDs (sexually transmitted diseases) if you're at risk.     Have a mammogram every 1 to 2 years.    Have a colonoscopy at age 50, or have a yearly FIT test (stool test). These exams screen for colon cancer.      Have a cholesterol test every 5 years, or more often if advised.    Have a diabetes test (fasting glucose) every three years. If you are at risk for diabetes, you should have this test more often.     If you are at risk for osteoporosis (brittle bone disease), think about having a bone density scan (DEXA).    Shots: Get a flu shot each year. Get a tetanus shot every 10 years.    Nutrition:     Eat at least 5 servings of fruits and vegetables each day.    Eat whole-grain bread, whole-wheat pasta and brown rice instead of white grains and rice.    Talk to your provider about Calcium and Vitamin D.     Lifestyle    Exercise at least 150 minutes a week (30 minutes a day, 5 days a week). This will help you control your weight and prevent disease.    Limit alcohol to one drink per day.    No smoking.     Wear sunscreen to prevent skin cancer.     See your dentist every six months for an exam and cleaning.    See your eye doctor every 1 to 2 years.  At [facility], we strive to deliver an exceptional experience to you, every time we see you.  If you receive a survey in the mail, please send  us back your thoughts. We really do value your feedback.    Based on your medical history, these are the current health maintenance/preventive care services that you are due for (some may have been done at this visit.)  Health Maintenance Due   Topic Date Due     HIV SCREEN (SYSTEM ASSIGNED)  06/04/1981     HEPATITIS C SCREENING  06/04/1981     EYE EXAM Q1 YEAR  04/27/2017     INFLUENZA VACCINE (1) 09/01/2017     BMP Q1 YR  03/13/2018     MICROALBUMIN Q1 YEAR  05/15/2018         Suggested websites for health information:  Www.[employer].org : Up to date and easily searchable information on multiple topics.  Www.PreciouStatus.gov : medication info, interactive tutorials, watch real surgeries online  Www.familydoctor.org : good info from the Academy of Family Physicians  Www.cdc.gov : public health info, travel advisories, epidemics (H1N1)  Www.aap.org : children's health info, normal development, vaccinations  Www.health.[facility].[place].us : [place] dept of health, public health issues in [place], N1N1    Your care team:                            Family Medicine Internal Medicine   MD Stan Santoro MD Shantel Branch-Fleming, MD Katya Georgiev PA-C Nam Ho, MD Pediatrics   Adan Martin PAJEREMIAH Merrill, CNP Gretel SHAH CNP   MD Bethany Hayes MD Deborah Mielke, MD Kim Thein, APRN [facility]      Clinic hours: Monday - Thursday 7 am-7 pm; Fridays 7 am-5 pm.   Urgent care: Monday - Friday 11 am-9 pm; Saturday and Sunday 9 am-5 pm.  Pharmacy : Monday -Thursday 8 am-8 pm; Friday 8 am-6 pm; Saturday and Sunday 9 am-5 pm.     Clinic: [phone]   Pharmacy: [phone]    Facet Joint Injection  Back or neck pain may be caused by a problem with your facet joints. If so, a facet joint injection may help. With this treatment, medicine is injected into certain facet joints. The injection can help your doctor find problem joints. It may also relieve your pain.    What is a facet  joint?  Bones called vertebrae make up your spine. Each vertebra has facets (flat surfaces) that touch where the vertebrae fit together. These form a structure called a facet joint on each side of the vertebrae.  What is a facet joint injection?  One or more facet joints in your back or neck can become inflamed (swollen and irritated). This may cause pain. During a facet joint injection, medicine is injected into the inflamed joints. This treatment may help reduce inflammation and relieve pain. Pain relief may last for weeks to months or longer. If the pain returns, you may need a repeat injection.    Getting ready  To get ready for your treatment, do the following:    At least a week before treatment, tell your healthcare provider what medicines you take. This includes aspirin. Ask whether you should stop taking any of them before treatment.    Tell your provider if you are pregnant or allergic to any medicines.    Follow any directions you are given for not eating or drinking before the treatment.    If asked, bring X-rays, MRIs, or other tests with you on the day of your treatment.  Date Last Reviewed: 9/21/2015 2000-2017 [employer] [address] [place] [address], [place] [address]. All rights reserved. This information is not intended as a substitute for professional medical care. Always follow your healthcare professional's instructions.

## 2018-04-26 ENCOUNTER — TELEPHONE (OUTPATIENT)
Dept: FAMILY MEDICINE | Facility: CLINIC | Age: 53
End: 2018-04-26

## 2018-04-26 NOTE — TELEPHONE ENCOUNTER
Plan does not cover diclofenac (VOLTAREN) 1 % GEL topical gel. Please call plan at [phone] to initiate prior authorization or call/fax pharmacy to change med.      Pt ID# [id number]    Pamela Nath  [facility]

## 2018-04-30 NOTE — TELEPHONE ENCOUNTER
Central Prior Authorization Team   Phone: [phone]    PA Initiation    Medication: [place]- diclofenac (VOLTAREN) 1 % GEL topical gel  Insurance Company: [employer] - Phone [phone] Fax [phone]  [facility] [place] [facility] [place] [place] [facility] [address]  Filling Pharmacy Phone: [phone]  Filling Pharmacy Fax:    Start Date: 4/30/2018     [id number]

## 2018-05-02 NOTE — TELEPHONE ENCOUNTER
Please let patient know that medication Diclofenac gel is not covered by her insurance. She can try over the counter Capsaicin cream or Lidoderm patches 4%.    Thanks,  Samina Ames MD MPH

## 2018-05-02 NOTE — TELEPHONE ENCOUNTER
PRIOR AUTHORIZATION DENIED    Medication: PA- diclofenac (VOLTAREN) 1 % GEL topical gel    Denial Date: 5/2/2018    Denial Rational: diclofenac (VOLTAREN) 1 % GEL topical gel is excluded from coverage. The member can get this medication but will have to pay retail for it        Appeal Information: n/a

## 2018-07-08 ENCOUNTER — TRANSFERRED RECORDS (OUTPATIENT)
Dept: HEALTH INFORMATION MANAGEMENT | Facility: CLINIC | Age: 53
End: 2018-07-08

## 2018-08-14 ENCOUNTER — TRANSFERRED RECORDS (OUTPATIENT)
Dept: HEALTH INFORMATION MANAGEMENT | Facility: CLINIC | Age: 53
End: 2018-08-14

## 2018-10-24 ENCOUNTER — OFFICE VISIT (OUTPATIENT)
Dept: ORTHOPEDICS | Facility: CLINIC | Age: 53
End: 2018-10-24
Payer: COMMERCIAL

## 2018-10-24 VITALS
SYSTOLIC BLOOD PRESSURE: 140 MMHG | HEART RATE: 76 BPM | OXYGEN SATURATION: 98 % | BODY MASS INDEX: 33.1 KG/M2 | HEIGHT: 64 IN | WEIGHT: 193.9 LBS | DIASTOLIC BLOOD PRESSURE: 86 MMHG

## 2018-10-24 DIAGNOSIS — M19.071 PRIMARY OSTEOARTHRITIS OF RIGHT FOOT: Primary | ICD-10-CM

## 2018-10-24 PROCEDURE — 99213 OFFICE O/P EST LOW 20 MIN: CPT | Performed by: FAMILY MEDICINE

## 2018-10-24 ASSESSMENT — PAIN SCALES - GENERAL: PAINLEVEL: MILD PAIN (2)

## 2018-10-24 NOTE — LETTER
10/24/2018         RE: Mary Sims  [address]  [place] [address]        Dear Colleague,    Thank you for referring your patient, Mary Sims, to the [facility]. Please see a copy of my visit note below.    CHIEF COMPLAINT:  Consult (right foot pain X 2 years. )       HISTORY OF PRESENT ILLNESS  Ms. Sims is a pleasant 53 year old year old female who presents to clinic today with right foot issues.    Garys foot has been bothering her for a couple of years.  No event or injury that she can recall.  She points to her midfoot on the dorsal aspect, just distal to the junction of the ankle.  Achy, sharp pain, worse the more she bears weight.  When she gets to the end of a long day she has to sit down.  This has greatly been affecting her social life.  She has noticed over the past couple of years that her foot has become increasingly swollen and tender at the swollen site.  The swelling feels hard.  She has had an ankle fracture in the past that required ORIF, she recovered well from this.    Additional history: as documented    MEDICAL HISTORY  Patient Active Problem List   Diagnosis     CARDIOVASCULAR SCREENING; LDL GOAL LESS THAN 160     Hypothyroidism     Hypertension goal BP (blood pressure) < 140/90     WONG (iron deficiency anemia)     Obesity     Nontraumatic tear of supraspinatus tendon, left     Insomnia, unspecified type     De Quervain's disease (tenosynovitis)     Right-sided low back pain without sciatica, unspecified chronicity       Current Outpatient Prescriptions   Medication Sig Dispense Refill     levothyroxine (SYNTHROID/LEVOTHROID) 88 MCG tablet Take 1 tablet (88 mcg) by mouth daily 90 tablet 3     metoprolol succinate (TOPROL-XL) 25 MG 24 hr tablet Take 1 tablet (25 mg) by mouth daily 90 tablet 3     naproxen (NAPROSYN) 500 MG tablet Take 1 tablet (500 mg) by mouth 2 times daily as needed for moderate pain 90 tablet 1     diclofenac (VOLTAREN) 1 % GEL topical  "gel Apply  2 grams to hands four times daily as needed to affected area. (Patient not taking: Reported on 10/24/2018) 100 g 11     MELATONIN PO Take 3 mg by mouth nightly as needed       zolpidem (AMBIEN) 5 MG tablet Take 1 tablet (5 mg) by mouth nightly as needed for sleep (Patient not taking: Reported on 10/24/2018) 30 tablet 5       Allergies   Allergen Reactions     No Known Drug Allergy        Family History   Problem Relation Age of Onset     Hypertension Mother      Diabetes Mother      Hypertension Father      Alcohol/Drug Father      alcohol     Crohn Disease Son      Hypertension Other      Cancer No family hx of      Cerebrovascular Disease No family hx of      Thyroid Disease No family hx of      Glaucoma No family hx of      Macular Degeneration No family hx of        Additional medical/Social/Surgical histories reviewed in [facility] and updated as appropriate.     REVIEW OF SYSTEMS (10/24/2018)  CONSTITUTIONAL: Denies fever and weight loss  EYES: Denies acute vision changes  ENT: Denies hearing changes or difficulty swallowing  CARDIAC: Denies chest pain or edema  RESPIRATORY: Denies dyspnea, cough or wheeze  GASTROINTESTINAL: Denies abdominal pain, nausea, vomiting  MUSCULOSKELETAL: See HPI  SKIN: Denies any recent rash or lesion  NEUROLOGICAL: Denies numbness or focal weakness  PSYCHIATRIC: No history of psychiatric symptoms or problems  ENDOCRINE: Denies current diagnosis of diabetes  HEMATOLOGY: Denies episodes of easy bleeding      PHYSICAL EXAM  Vitals:    10/24/18 0903   BP: 140/86   Pulse: 76   SpO2: 98%   Weight: 88 kg (193 lb 14.4 oz)   Height: 1.626 m (5' 4\")     General  - normal appearance, in no obvious distress  CV  - normal pulses at posterior tib and dorsalis pedis  Pulm  - normal respiratory pattern, non-labored  Musculoskeletal - right foot  - stance: normal gait without limp  - inspection: Midfoot protrusion dorsally just lateral to navicular  - palpation: tender over bony protrusion  - " ROM: normal active and passive ROM of great and lesser toes, no pain with MT translation  - strength: 5/5 in all planes  Neuro  - no sensory or motor deficit, grossly normal coordination, normal muscle tone  Skin  - no ecchymosis, erythema, warmth, or induration, no obvious rash  Psych  - interactive, appropriate, normal mood and affect           ASSESSMENT & PLAN  Ms. iSms is a 53 year old year old female who presents to clinic today with right foot pain.    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    Her midfoot arthritis is most likely the cause of her pain.  We discussed this in detail.  She is previously seen by Dr. Ibarra who recommended flat soled shoes and orthotics.  Unfortunately this shoes have not helped her that much, she did not follow through with orthotics.  I do think orthotics may help her greatly by reducing midfoot collapse.  I am referring her to our orthotist to have these molded.    If her pain continues over the course of the next couple of month I would consider advanced imaging or injection therapy.    It was a pleasure seeing Mary today.    Murray Brewster DO, [facility]  Primary Care Sports Medicine      Again, thank you for allowing me to participate in the care of your patient.        Sincerely,        Murray Brewster DO

## 2018-10-24 NOTE — PROGRESS NOTES
CHIEF COMPLAINT:  Consult (right foot pain X 2 years. )       HISTORY OF PRESENT ILLNESS  Ms. Sims is a pleasant 53 year old year old female who presents to clinic today with right foot issues.    Garys foot has been bothering her for a couple of years.  No event or injury that she can recall.  She points to her midfoot on the dorsal aspect, just distal to the junction of the ankle.  Achy, sharp pain, worse the more she bears weight.  When she gets to the end of a long day she has to sit down.  This has greatly been affecting her social life.  She has noticed over the past couple of years that her foot has become increasingly swollen and tender at the swollen site.  The swelling feels hard.  She has had an ankle fracture in the past that required ORIF, she recovered well from this.    Additional history: as documented    MEDICAL HISTORY  Patient Active Problem List   Diagnosis     CARDIOVASCULAR SCREENING; LDL GOAL LESS THAN 160     Hypothyroidism     Hypertension goal BP (blood pressure) < 140/90     WONG (iron deficiency anemia)     Obesity     Nontraumatic tear of supraspinatus tendon, left     Insomnia, unspecified type     De Quervain's disease (tenosynovitis)     Right-sided low back pain without sciatica, unspecified chronicity       Current Outpatient Prescriptions   Medication Sig Dispense Refill     levothyroxine (SYNTHROID/LEVOTHROID) 88 MCG tablet Take 1 tablet (88 mcg) by mouth daily 90 tablet 3     metoprolol succinate (TOPROL-XL) 25 MG 24 hr tablet Take 1 tablet (25 mg) by mouth daily 90 tablet 3     naproxen (NAPROSYN) 500 MG tablet Take 1 tablet (500 mg) by mouth 2 times daily as needed for moderate pain 90 tablet 1     diclofenac (VOLTAREN) 1 % GEL topical gel Apply  2 grams to hands four times daily as needed to affected area. (Patient not taking: Reported on 10/24/2018) 100 g 11     MELATONIN PO Take 3 mg by mouth nightly as needed       zolpidem (AMBIEN) 5 MG tablet Take 1 tablet (5 mg) by  "mouth nightly as needed for sleep (Patient not taking: Reported on 10/24/2018) 30 tablet 5       Allergies   Allergen Reactions     No Known Drug Allergy        Family History   Problem Relation Age of Onset     Hypertension Mother      Diabetes Mother      Hypertension Father      Alcohol/Drug Father      alcohol     Crohn Disease Son      Hypertension Other      Cancer No family hx of      Cerebrovascular Disease No family hx of      Thyroid Disease No family hx of      Glaucoma No family hx of      Macular Degeneration No family hx of        Additional medical/Social/Surgical histories reviewed in [facility] and updated as appropriate.     REVIEW OF SYSTEMS (10/24/2018)  CONSTITUTIONAL: Denies fever and weight loss  EYES: Denies acute vision changes  ENT: Denies hearing changes or difficulty swallowing  CARDIAC: Denies chest pain or edema  RESPIRATORY: Denies dyspnea, cough or wheeze  GASTROINTESTINAL: Denies abdominal pain, nausea, vomiting  MUSCULOSKELETAL: See HPI  SKIN: Denies any recent rash or lesion  NEUROLOGICAL: Denies numbness or focal weakness  PSYCHIATRIC: No history of psychiatric symptoms or problems  ENDOCRINE: Denies current diagnosis of diabetes  HEMATOLOGY: Denies episodes of easy bleeding      PHYSICAL EXAM  Vitals:    10/24/18 0903   BP: 140/86   Pulse: 76   SpO2: 98%   Weight: 88 kg (193 lb 14.4 oz)   Height: 1.626 m (5' 4\")     General  - normal appearance, in no obvious distress  CV  - normal pulses at posterior tib and dorsalis pedis  Pulm  - normal respiratory pattern, non-labored  Musculoskeletal - right foot  - stance: normal gait without limp  - inspection: Midfoot protrusion dorsally just lateral to navicular  - palpation: tender over bony protrusion  - ROM: normal active and passive ROM of great and lesser toes, no pain with MT translation  - strength: 5/5 in all planes  Neuro  - no sensory or motor deficit, grossly normal coordination, normal muscle tone  Skin  - no ecchymosis, erythema, " warmth, or induration, no obvious rash  Psych  - interactive, appropriate, normal mood and affect           ASSESSMENT & PLAN  Ms. Sims is a 53 year old year old female who presents to clinic today with right foot pain.    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    Her midfoot arthritis is most likely the cause of her pain.  We discussed this in detail.  She is previously seen by Dr. Ibarra who recommended flat soled shoes and orthotics.  Unfortunately this shoes have not helped her that much, she did not follow through with orthotics.  I do think orthotics may help her greatly by reducing midfoot collapse.  I am referring her to our orthotist to have these molded.    If her pain continues over the course of the next couple of month I would consider advanced imaging or injection therapy.    It was a pleasure seeing Mary today.    Murray Brewster DO, CAM  Primary Care Sports Medicine

## 2018-10-24 NOTE — NURSING NOTE
"Mary Sims's chief complaint for this visit includes:  Chief Complaint   Patient presents with     Consult     right foot pain X 2 years.      PCP: Janie Riley    Referring Provider:  No referring provider defined for this encounter.    /86  Pulse 76  Ht 1.626 m (5' 4\")  Wt 88 kg (193 lb 14.4 oz)  LMP 08/04/2015 (Approximate)  SpO2 98%  BMI 33.28 kg/m2  Mild Pain (2)     Do you need any medication refills at today's visit? No        "

## 2018-10-24 NOTE — MR AVS SNAPSHOT
After Visit Summary   10/24/2018    Mary Sims    MRN: [id number]           Patient Information     Date Of Birth          1965        Visit Information        Provider Department      10/24/2018 9:00 AM Murray Brewster DO M [facility]        Today's Diagnoses     Primary osteoarthritis of right foot    -  1      Care Instructions    Thanks for coming today.  Ortho/Sports Medicine Clinic  [address] [place] [address]    To schedule future appointments in Ortho Clinic, you may call [phone].    To schedule ordered imaging by your provider:   Call Central Imaging [phone]    To schedule an injection ordered by your provider:  Call Central Imaging Injection scheduling line: [phone]  [email or web address] available online at:  [email or web address]    Please call if any further questions or concerns ([phone]).  Clinic hours 8 am to 5 pm.    Return to clinic (call) if symptoms worsen or fail to improve.            Follow-ups after your visit        Additional Services     ORTHOTICS REFERRAL       **This referral order prints off in the [facility]  (Orthotics & Prosthetics) Central Scheduling Office**    The [facility] Central Scheduling Staff will contact the patient to schedule appointments.     Central Scheduling Contact Information: [phone] ([place])    Orthotics: Foot Orthotics    Please be aware that coverage of these services is subject to the terms and limitations of your health insurance plan.  Call member services at your health plan with any benefit or coverage questions.      Please bring the following to your appointment:    >>   Any x-rays, CTs or MRIs which have been performed.  Contact the facility where they were done to arrange for  prior to your scheduled appointment.    >>   List of current medications   >>   This referral request   >>   Any documents/labs given to you for this referral          "         Who to contact     If you have questions or need follow up information about today's clinic visit or your schedule please contact [facility] directly at [phone].  Normal or non-critical lab and imaging results will be communicated to you by Woisiohart, letter or phone within 4 business days after the clinic has received the results. If you do not hear from us within 7 days, please contact the clinic through Woisiohart or phone. If you have a critical or abnormal lab result, we will notify you by phone as soon as possible.  Submit refill requests through [employer] or call your pharmacy and they will forward the refill request to us. Please allow 3 business days for your refill to be completed.          Additional Information About Your Visit        [employer] Information     [employer] gives you secure access to your electronic health record. If you see a primary care provider, you can also send messages to your care team and make appointments. If you have questions, please call your primary care clinic.  If you do not have a primary care provider, please call [phone] and they will assist you.      [employer] is an electronic gateway that provides easy, online access to your medical records. With [employer], you can request a clinic appointment, read your test results, renew a prescription or communicate with your care team.     To access your existing account, please contact your [facility] or call [phone] for assistance.        Care EveryWhere ID     This is your Care EveryWhere ID. This could be used by other organizations to access your [facility] medical records  [id number]        Your Vitals Were     Pulse Height Last Period Pulse Oximetry BMI (Body Mass Index)       76 1.626 m (5' 4\") 08/04/2015 (Approximate) 98% 33.28 kg/m2        Blood Pressure from Last 3 Encounters:   10/24/18 140/86   04/25/18 135/88   12/14/17 120/90    Weight from Last 3 Encounters: "   10/24/18 88 kg (193 lb 14.4 oz)   04/25/18 85.7 kg (189 lb)   03/08/18 88.5 kg (195 lb)              We Performed the Following     ORTHOTICS REFERRAL        Primary Care Provider Office Phone # Fax #    PABLO Dean [phone]       [address] [place] [address]        Equal Access to Services     JEFFY DOE : Hadii aad ku hadasho Soomaali, waaxda luqadaha, qaybta kaalmada adeegyada, waxay idiin hayaan adeeg khalyxsh lakyara . So [facility] [phone].    ATENCIÓN: Si humaira monzon, tiene a diamond disposición servicios gratuitos de asistencia lingüística. Joceliname al [phone].    We comply with applicable federal civil rights laws and [place] laws. We do not discriminate on the basis of race, color, national origin, age, disability, sex, sexual orientation, or gender identity.            Thank you!     Thank you for choosing [facility]  for your care. Our goal is always to provide you with excellent care. Hearing back from our patients is one way we can continue to improve our services. Please take a few minutes to complete the written survey that you may receive in the mail after your visit with us. Thank you!             Your Updated Medication List - Protect others around you: Learn how to safely use, store and throw away your medicines at www.disposemymeds.org.          This list is accurate as of 10/24/18  9:25 AM.  Always use your most recent med list.                   Brand Name Dispense Instructions for use Diagnosis    diclofenac 1 % Gel topical gel    VOLTAREN    100 g    Apply  2 grams to hands four times daily as needed to affected area.    Degeneration of lumbar intervertebral disc       levothyroxine 88 MCG tablet    SYNTHROID/LEVOTHROID    90 tablet    Take 1 tablet (88 mcg) by mouth daily    Hypothyroidism, unspecified type       MELATONIN PO      Take 3 mg by mouth nightly as needed        metoprolol succinate 25 MG 24 hr tablet     TOPROL-XL    90 tablet    Take 1 tablet (25 mg) by mouth daily    Hypertension goal BP (blood pressure) < 140/90       naproxen 500 MG tablet    NAPROSYN    90 tablet    Take 1 tablet (500 mg) by mouth 2 times daily as needed for moderate pain    Right foot pain       zolpidem 5 MG tablet    AMBIEN    30 tablet    Take 1 tablet (5 mg) by mouth nightly as needed for sleep    Insomnia, unspecified type

## 2018-10-24 NOTE — PATIENT INSTRUCTIONS
Thanks for coming today.  Ortho/Sports Medicine Clinic  [address] [place] [address]    To schedule future appointments in Ortho Clinic, you may call [phone].    To schedule ordered imaging by your provider:   Call Central Imaging [phone]    To schedule an injection ordered by your provider:  Call Central Imaging Injection scheduling line: [phone]  [email or web address] available online at:  [email or web address]    Please call if any further questions or concerns ([phone]).  Clinic hours 8 am to 5 pm.    Return to clinic (call) if symptoms worsen or fail to improve.

## 2018-11-20 ENCOUNTER — RADIANT APPOINTMENT (OUTPATIENT)
Dept: GENERAL RADIOLOGY | Facility: CLINIC | Age: 53
End: 2018-11-20
Attending: NURSE PRACTITIONER
Payer: COMMERCIAL

## 2018-11-20 ENCOUNTER — OFFICE VISIT (OUTPATIENT)
Dept: FAMILY MEDICINE | Facility: CLINIC | Age: 53
End: 2018-11-20
Payer: COMMERCIAL

## 2018-11-20 VITALS
WEIGHT: 195.9 LBS | TEMPERATURE: 97.9 F | DIASTOLIC BLOOD PRESSURE: 88 MMHG | RESPIRATION RATE: 14 BRPM | HEART RATE: 67 BPM | SYSTOLIC BLOOD PRESSURE: 140 MMHG | OXYGEN SATURATION: 98 % | BODY MASS INDEX: 33.63 KG/M2

## 2018-11-20 DIAGNOSIS — S92.502A CLOSED FRACTURE OF PHALANX OF LEFT FOURTH TOE, INITIAL ENCOUNTER: Primary | ICD-10-CM

## 2018-11-20 DIAGNOSIS — S99.922A FOOT INJURY, LEFT, INITIAL ENCOUNTER: ICD-10-CM

## 2018-11-20 PROCEDURE — 73630 X-RAY EXAM OF FOOT: CPT | Mod: LT

## 2018-11-20 PROCEDURE — 99214 OFFICE O/P EST MOD 30 MIN: CPT | Performed by: NURSE PRACTITIONER

## 2018-11-20 RX ORDER — HYDROCODONE BITARTRATE AND ACETAMINOPHEN 5; 325 MG/1; MG/1
1 TABLET ORAL EVERY 6 HOURS PRN
Qty: 10 TABLET | Refills: 0 | Status: SHIPPED | OUTPATIENT
Start: 2018-11-20 | End: 2019-01-24

## 2018-11-20 ASSESSMENT — PAIN SCALES - GENERAL: PAINLEVEL: MODERATE PAIN (5)

## 2018-11-20 NOTE — LETTER
November 20, 2018      Mary Sims  [address]  [facility] [address]        To Whom It May Concern:    Mary Sims was seen on 11/20/2018 for injury.  Please excuse her until Saturday, 11/24/2018. When she comes back, she should use walking shoe and keep left foot in front of her when she walks. She should not step off with the toes. Please allow her breaks every 2-3 hours to elevate the foot and rest for at least 10 minutes. She has follow up with the specialist next week.        Sincerely,        PABLO Michel CNP

## 2018-11-20 NOTE — MR AVS SNAPSHOT
After Visit Summary   11/20/2018    Mary Sims    MRN: [id number]           Patient Information     Date Of Birth          1965        Visit Information        Provider Department      11/20/2018 10:40 AM Stephanie Whitlock APRN CNP [facility]        Today's Diagnoses     Closed fracture of phalanx of left fourth toe, initial encounter    -  1      Care Instructions    Fracture of left 4th toe  Ice 15 minutes 4-6 times daily  Elevate while resting  Keep left foot in front of you while you walk  Don't step off with your toes  Continue naproxen for pain every 12 hours. Take with food  For pain not relieved by the naproxen, you may take acetaminophen/hydrocodone (Norco). No driving, operating machinery, or drinking alcohol while taking  Follow up with orthopedics next week  See work note for work restrictions          Follow-ups after your visit        Additional Services     ORTHO  REFERRAL       [facility] is referring you to the Orthopedic  Services at [facility].       The  Representative will assist you in the coordination of your Orthopedic and Musculoskeletal Care as prescribed by your physician.    The  Representative will call you within 1 business day to help schedule your appointment, or you may contact the  Representative at:    All areas ~ [phone]     Type of Referral : Surgical / Specialist Dr Benitez please      Timeframe requested: Routine    Coverage of these services is subject to the terms and limitations of your health insurance plan.  Please call member services at your health plan with any benefit or coverage questions.      If X-rays, CT or MRI's have been performed, please contact the facility where they were done to arrange for , prior to your scheduled appointment.  Please bring this referral request to your appointment and present it to your specialist.                   Who to contact     If you have questions or need follow up information about today's clinic visit or your schedule please contact [facility] directly at [phone].  Normal or non-critical lab and imaging results will be communicated to you by MyChart, letter or phone within 4 business days after the clinic has received the results. If you do not hear from us within 7 days, please contact the clinic through Homeschool Snowboardinghart or phone. If you have a critical or abnormal lab result, we will notify you by phone as soon as possible.  Submit refill requests through [employer] or call your pharmacy and they will forward the refill request to us. Please allow 3 business days for your refill to be completed.          Additional Information About Your Visit        [employer] Information     [employer] gives you secure access to your electronic health record. If you see a primary care provider, you can also send messages to your care team and make appointments. If you have questions, please call your primary care clinic.  If you do not have a primary care provider, please call [phone] and they will assist you.        Care EveryWhere ID     This is your Care EveryWhere ID. This could be used by other organizations to access your [facility] medical records  [id number]        Your Vitals Were     Pulse Temperature Respirations Last Period Pulse Oximetry BMI (Body Mass Index)    67 97.9  F (36.6  C) (Oral) 14 08/04/2015 (Approximate) 98% 33.63 kg/m2       Blood Pressure from Last 3 Encounters:   11/20/18 140/88   10/24/18 140/86   04/25/18 135/88    Weight from Last 3 Encounters:   11/20/18 195 lb 14.4 oz (88.9 kg)   10/24/18 193 lb 14.4 oz (88 kg)   04/25/18 189 lb (85.7 kg)              We Performed the Following     ORTHO  REFERRAL          Today's Medication Changes          These changes are accurate as of 11/20/18 11:46 AM.  If you have any questions, ask your nurse or doctor.                Start taking these medicines.        Dose/Directions    HYDROcodone-acetaminophen 5-325 MG per tablet   Commonly known as:  NORCO   Used for:  Closed fracture of phalanx of left fourth toe, initial encounter   Started by:  Stephanie Whitlock APRN CNP        Dose:  1 tablet   Take 1 tablet by mouth every 6 hours as needed for severe pain   Quantity:  10 tablet   Refills:  0       order for DME   Used for:  Closed fracture of phalanx of left fourth toe, initial encounter   Started by:  Stephanie Whitlock APRN CNP        Equipment being ordered: walking boot   Quantity:  1 Units   Refills:  0            Where to get your medicines      Some of these will need a paper prescription and others can be bought over the counter.  Ask your nurse if you have questions.     Bring a paper prescription for each of these medications     HYDROcodone-acetaminophen 5-325 MG per tablet    order for DME               Information about OPIOIDS     PRESCRIPTION OPIOIDS: WHAT YOU NEED TO KNOW   We gave you an opioid (narcotic) pain medicine. It is important to manage your pain, but opioids are not always the best choice. You should first try all the other options your care team gave you. Take this medicine for as short a time (and as few doses) as possible.    Some activities can increase your pain, such as bandage changes or therapy sessions. It may help to take your pain medicine 30 to 60 minutes before these activities. Reduce your stress by getting enough sleep, working on hobbies you enjoy and practicing relaxation or meditation. Talk to your care team about ways to manage your pain beyond prescription opioids.    These medicines have risks:    DO NOT drive when on new or higher doses of pain medicine. These medicines can affect your alertness and reaction times, and you could be arrested for driving under the influence (DUI). If you need to use opioids long-term, talk to your care team about driving.    DO NOT operate heavy  machinery    DO NOT do any other dangerous activities while taking these medicines.    DO NOT drink any alcohol while taking these medicines.     If the opioid prescribed includes acetaminophen, DO NOT take with any other medicines that contain acetaminophen. Read all labels carefully. Look for the word  acetaminophen  or  Tylenol.  Ask your pharmacist if you have questions or are unsure.    You can get addicted to pain medicines, especially if you have a history of addiction (chemical, alcohol or substance dependence). Talk to your care team about ways to reduce this risk.    All opioids tend to cause constipation. Drink plenty of water and eat foods that have a lot of fiber, such as fruits, vegetables, prune juice, apple juice and high-fiber cereal. Take a laxative (Miralax, milk of magnesia, Colace, Senna) if you don t move your bowels at least every other day. Other side effects include upset stomach, sleepiness, dizziness, throwing up, tolerance (needing more of the medicine to have the same effect), physical dependence and slowed breathing.    Store your pills in a secure place, locked if possible. We will not replace any lost or stolen medicine. If you don t finish your medicine, please throw away (dispose) as directed by your pharmacist. The [place] Pollution Control Agency has more information about safe disposal: https://www.pca.[facility].mn.us/living-green/managing-unwanted-medications         Primary Care Provider Office Phone # Fax #    PABLO Dean [phone]       [address] [place] [address]        Equal Access to Services     JEFFY DOE : Hadasael joneso Sokevin, waaxda luqadaha, qaybta kaalmada jaymie ibarra . So [facility] [phone].    ATENCIÓN: Si habla español, tiene a diamond disposición servicios gratuitos de asistencia lingüística. Llame al [phone].    We comply with applicable federal civil rights laws  and [place] laws. We do not discriminate on the basis of race, color, national origin, age, disability, sex, sexual orientation, or gender identity.            Thank you!     Thank you for choosing [facility]  for your care. Our goal is always to provide you with excellent care. Hearing back from our patients is one way we can continue to improve our services. Please take a few minutes to complete the written survey that you may receive in the mail after your visit with us. Thank you!             Your Updated Medication List - Protect others around you: Learn how to safely use, store and throw away your medicines at www.disposemymeds.org.          This list is accurate as of 11/20/18 11:46 AM.  Always use your most recent med list.                   Brand Name Dispense Instructions for use Diagnosis    HYDROcodone-acetaminophen 5-325 MG per tablet    NORCO    10 tablet    Take 1 tablet by mouth every 6 hours as needed for severe pain    Closed fracture of phalanx of left fourth toe, initial encounter       levothyroxine 88 MCG tablet    SYNTHROID/LEVOTHROID    90 tablet    Take 1 tablet (88 mcg) by mouth daily    Hypothyroidism, unspecified type       metoprolol succinate 25 MG 24 hr tablet    TOPROL-XL    90 tablet    Take 1 tablet (25 mg) by mouth daily    Hypertension goal BP (blood pressure) < 140/90       naproxen 500 MG tablet    NAPROSYN    90 tablet    Take 1 tablet (500 mg) by mouth 2 times daily as needed for moderate pain    Right foot pain       order for DME     1 Units    Equipment being ordered: walking boot    Closed fracture of phalanx of left fourth toe, initial encounter

## 2018-11-20 NOTE — PATIENT INSTRUCTIONS
Fracture of left 4th toe  Ice 15 minutes 4-6 times daily  Elevate while resting  Keep left foot in front of you while you walk  Don't step off with your toes  Continue naproxen for pain every 12 hours. Take with food  For pain not relieved by the naproxen, you may take acetaminophen/hydrocodone (Norco). No driving, operating machinery, or drinking alcohol while taking  Follow up with orthopedics next week  See work note for work restrictions

## 2018-11-27 ENCOUNTER — OFFICE VISIT (OUTPATIENT)
Dept: ORTHOPEDICS | Facility: CLINIC | Age: 53
End: 2018-11-27
Payer: COMMERCIAL

## 2018-11-27 ENCOUNTER — RADIANT APPOINTMENT (OUTPATIENT)
Dept: GENERAL RADIOLOGY | Facility: CLINIC | Age: 53
End: 2018-11-27
Attending: PHYSICIAN ASSISTANT
Payer: COMMERCIAL

## 2018-11-27 VITALS
HEIGHT: 64 IN | WEIGHT: 195 LBS | SYSTOLIC BLOOD PRESSURE: 140 MMHG | BODY MASS INDEX: 33.29 KG/M2 | DIASTOLIC BLOOD PRESSURE: 83 MMHG

## 2018-11-27 DIAGNOSIS — S92.502A CLOSED FRACTURE OF PHALANX OF LEFT FOURTH TOE, INITIAL ENCOUNTER: Primary | ICD-10-CM

## 2018-11-27 DIAGNOSIS — S92.502A CLOSED FRACTURE OF PHALANX OF LEFT FOURTH TOE, INITIAL ENCOUNTER: ICD-10-CM

## 2018-11-27 PROCEDURE — 99214 OFFICE O/P EST MOD 30 MIN: CPT | Performed by: ORTHOPAEDIC SURGERY

## 2018-11-27 PROCEDURE — 73630 X-RAY EXAM OF FOOT: CPT | Mod: LT

## 2018-11-27 NOTE — LETTER
2018         RE: Mary Sims  [address]  [facility] [address]        Dear Colleague,    Thank you for referring your patient, Mary Sims, to the [facility]. Please see a copy of my visit note below.    Mary Sims is a 53 year old female who is seen in consultation at the request of Stephanie Whitlock CNP  for left 4th toe fracture.    Past Medical History:   Diagnosis Date     Graves disease      Hypertension      Hypothyroidism      MEDICAL HISTORY OF -     S/P RADIOACTIVE IODINE     Palpitation        Past Surgical History:   Procedure Laterality Date     ARTHROSCOPY SHLDR ROTATOR CUFF REPAIR, SUBACROMIAL DECOMP, DIST CLAVICLE RESECTION, BICEP TENODESIS Left 2017    Procedure: ARTHROSCOPY SHOULDER ROTATOR CUFF REPAIR, SUBACROMIAL DECOMPRESSION, DISTAL CLAVICLE RESECTION, OPEN BICEP TENODESIS REPAIR;  Surgeon: Dorina Rodriguez MD;  [facility]     C  DELIVERY ONLY       COLONOSCOPY  2013    Procedure: COLONOSCOPY;  colonoscopy, screening;  Surgeon: Dalton Lala MD;  Location:  OR     SURGICAL HISTORY OF -       RT ANKLE Fx AND REPAIR (PIN,PLATE,SCREWS)     THYROIDECTOMY         Family History   Problem Relation Age of Onset     Hypertension Mother      Diabetes Mother      Hypertension Father      Alcohol/Drug Father      alcohol     Crohn Disease Son      Hypertension Other      Cancer No family hx of      Cerebrovascular Disease No family hx of      Thyroid Disease No family hx of      Glaucoma No family hx of      Macular Degeneration No family hx of        Social History     Social History     Marital status:      Spouse name: N/A     Number of children: N/A     Years of education: N/A     Occupational History     Not on file.     Social History Main Topics     Smoking status: Never Smoker     Smokeless tobacco: Never Used     Alcohol use Yes      Comment: SELDOM      Drug use: No     Sexual activity: Yes     Partners:  "Male     Birth control/ protection: Surgical      Comment: tubal     Other Topics Concern     Not on file     Social History Narrative       Current Outpatient Prescriptions   Medication Sig Dispense Refill     HYDROcodone-acetaminophen (NORCO) 5-325 MG per tablet Take 1 tablet by mouth every 6 hours as needed for severe pain 10 tablet 0     levothyroxine (SYNTHROID/LEVOTHROID) 88 MCG tablet Take 1 tablet (88 mcg) by mouth daily 90 tablet 3     metoprolol succinate (TOPROL-XL) 25 MG 24 hr tablet Take 1 tablet (25 mg) by mouth daily 90 tablet 3     naproxen (NAPROSYN) 500 MG tablet Take 1 tablet (500 mg) by mouth 2 times daily as needed for moderate pain 90 tablet 1     order for DME Equipment being ordered: walking boot 1 Units 0       Allergies   Allergen Reactions     No Known Drug Allergy        REVIEW OF SYSTEMS:  CONSTITUTIONAL:  NEGATIVE for fever, chills, change in weight, not feeling tired  SKIN:  NEGATIVE for worrisome rashes, no skin lumps, no skin ulcers and no non-healing wounds  EYES:  NEGATIVE for vision changes or irritation.  ENT/MOUTH:  NEGATIVE.  No hearing loss, no hoarseness, no difficulty swallowing.  RESP:  NEGATIVE. No cough or shortness of breath.  CV:  NEGATIVE for chest pain, palpitations or peripheral edema  GI:  NEGATIVE for nausea, abdominal pain, heartburn, or change in bowel habits  :  Negative. No dysuria, no hematuria  MUSCULOSKELETAL:  See HPI above  NEURO:  NEGATIVE . No headaches, no dizziness,  no numbness  ENDOCRINE:  NEGATIVE for temperature intolerance, skin/hair changes  HEME/ALLERGY/IMMUNE:  NEGATIVE for bleeding problems  PSYCHIATRIC:  NEGATIVE. no anxiety, no depression.     Exam:  Vitals: /83 (BP Location: Left arm)  Ht 1.626 m (5' 4\")  Wt 88.5 kg (195 lb)  BMI 33.47 kg/m2  BMI= Body mass index is 33.47 kg/(m^2).  Constitutional:  healthy, alert and no distress  Neuro: Alert and Oriented x 3, Sensation grossly WNL.  Psych: Affect normal   Respiratory: Breathing " not labored.  Cardiovascular: normal peripheral pulses  Lymph: no adenopathy  Skin: No rashes,worrisome lesions or skin problems      Again, thank you for allowing me to participate in the care of your patient.        Sincerely,        Murray Benitez MD

## 2018-11-27 NOTE — MR AVS SNAPSHOT
After Visit Summary   11/27/2018    Mary Sims    MRN: [id number]           Patient Information     Date Of Birth          1965        Visit Information        Provider Department      11/27/2018 8:15 AM Murray Benitez MD [facility]        Today's Diagnoses     Closed fracture of phalanx of left fourth toe, initial encounter    -  1      Care Instructions    Toe fracture in good position.  Use boot for another 2-3 weeks, then stop.  At that time regular shoe and regular activity can be resumed.          Follow-ups after your visit        Who to contact     If you have questions or need follow up information about today's clinic visit or your schedule please contact [facility] directly at [phone].  Normal or non-critical lab and imaging results will be communicated to you by [facility], letter or phone within 4 business days after the clinic has received the results. If you do not hear from us within 7 days, please contact the clinic through [facility] or phone. If you have a critical or abnormal lab result, we will notify you by phone as soon as possible.  Submit refill requests through [employer] or call your pharmacy and they will forward the refill request to us. Please allow 3 business days for your refill to be completed.          Additional Information About Your Visit        MyChart Information     [employer] gives you secure access to your electronic health record. If you see a primary care provider, you can also send messages to your care team and make appointments. If you have questions, please call your primary care clinic.  If you do not have a primary care provider, please call [phone] and they will assist you.        Care EveryWhere ID     This is your Care EveryWhere ID. This could be used by other organizations to access your [facility] medical records  [id number]        Your Vitals Were     Height BMI (Body Mass Index)                 "1.626 m (5' 4\") 33.47 kg/m2           Blood Pressure from Last 3 Encounters:   11/27/18 140/83   11/20/18 140/88   10/24/18 140/86    Weight from Last 3 Encounters:   11/27/18 88.5 kg (195 lb)   11/20/18 88.9 kg (195 lb 14.4 oz)   10/24/18 88 kg (193 lb 14.4 oz)               Primary Care Provider Office Phone # Fax #    Janie MERINO PABLO Riley [phone]       [address] [place] [id number]        Equal Access to Services     ISAMAR DOE : Hadii rula herrmann hadasho Soomaali, waaxda luqadaha, qaybta kaalmada adeegyada, jaymie paniagua. So [facility] [phone].    ATENCIÓN: Si habla español, tiene a diamond disposición servicios gratuitos de asistencia lingüística. Llame al [phone].    We comply with applicable federal civil rights laws and [place] laws. We do not discriminate on the basis of race, color, national origin, age, disability, sex, sexual orientation, or gender identity.            Thank you!     Thank you for choosing [facility]  for your care. Our goal is always to provide you with excellent care. Hearing back from our patients is one way we can continue to improve our services. Please take a few minutes to complete the written survey that you may receive in the mail after your visit with us. Thank you!             Your Updated Medication List - Protect others around you: Learn how to safely use, store and throw away your medicines at www.disposemymeds.org.          This list is accurate as of 11/27/18  8:40 AM.  Always use your most recent med list.                   Brand Name Dispense Instructions for use Diagnosis    HYDROcodone-acetaminophen 5-325 MG tablet    NORCO    10 tablet    Take 1 tablet by mouth every 6 hours as needed for severe pain    Closed fracture of phalanx of left fourth toe, initial encounter       levothyroxine 88 MCG tablet    SYNTHROID/LEVOTHROID    90 tablet    Take 1 tablet (88 mcg) by mouth daily    " Hypothyroidism, unspecified type       metoprolol succinate 25 MG 24 hr tablet    TOPROL-XL    90 tablet    Take 1 tablet (25 mg) by mouth daily    Hypertension goal BP (blood pressure) < 140/90       naproxen 500 MG tablet    NAPROSYN    90 tablet    Take 1 tablet (500 mg) by mouth 2 times daily as needed for moderate pain    Right foot pain       order for DME     1 Units    Equipment being ordered: walking boot    Closed fracture of phalanx of left fourth toe, initial encounter

## 2018-11-27 NOTE — PATIENT INSTRUCTIONS
Toe fracture in good position.  Use boot for another 2-3 weeks, then stop.  At that time regular shoe and regular activity can be resumed.

## 2018-11-29 ENCOUNTER — TELEPHONE (OUTPATIENT)
Dept: FAMILY MEDICINE | Facility: CLINIC | Age: 53
End: 2018-11-29

## 2018-11-29 NOTE — PROGRESS NOTES
Visit Date:   2018      HISTORY OF PRESENT ILLNESS:  Mrs. Sims is a 53-year-old female seen in consultation at the request of PABLO Weiner for evaluation of left fourth toe fracture.  She injured the toe on 2018 when visiting at someone else's home.  She got up at night and stubbed her toe on a bar stool.  She has dull pain rated 1/10.  She has been placed into a cast boot and is comfortable in this.  X-ray of the fracture last week shows a nondisplaced oblique fracture of the fourth toe proximal phalanx.  Repeat x-ray today shows no change in the fracture.      Examination shows the toe aligned correctly.  No angulation.  No abnormal rotation.  There is tenderness of the proximal phalanx.      IMPRESSION:  Left fourth toe fracture in good position.  She will continue the cast boot for 2-3 weeks.  At that point, she can resume a regular shoe and resume activity as tolerated.  She should avoid getting up on tiptoe for the next 2-3 weeks.         NINO BO MD             D: 2018   T: 2018   MT: MUKUL      Name:     ROHITH SIMS   MRN:      [id number]        Account:      [id number]   :      1965           Visit Date:   2018      Document: [id number]

## 2018-11-29 NOTE — LETTER
November 29, 2018      Mary Sims  [address]  [facility] [address]    Dear Mary Sims,     At [facility] we care about your health and are committed to providing quality patient care.    Which includes staying current on preventive cancer screenings.  You can increase your chances of finding and treating cancers through regular screenings.      Our records indicate you may be due for the following preventive screening(s):    Mammogram    Mammogram for breast cancer   Recommended every 1-2 years for women age 50 and older  Mammograms help detect breast cancer, which is the most common cancer among women in the [place].  You may need to start having mammograms earlier and more often if you have had breast cancer, breast problems, or a family history of breast cancer.     To schedule an appointment or discuss this screening further, you may contact us by phone at the [facility] at [phone] or online through the patient portal/[email or web address] @ [email or web address]    If you have had any of the screenings listed above at another facility, please call us so that we may update your chart.      Your partners in health,      Quality Committee at [facility]

## 2018-11-29 NOTE — LETTER
[address] [address]  [phone]  Dept: [phone]      November 29, 2018      Mary Sims  [address]  [facility] [address]    Dear Mary Sims,     At [facility] we care about your health and are committed to providing quality patient care.    Which includes staying current on preventive cancer screenings.  You can increase your chances of finding and treating cancers through regular screenings.      Our records indicate you may be due for the following preventive screening(s):    Mammogram    Mammogram for breast cancer   Recommended every 1-2 years for women age 50 and older  Mammograms help detect breast cancer, which is the most common cancer among women in the [place].  You may need to start having mammograms earlier and more often if you have had breast cancer, breast problems, or a family history of breast cancer.     To schedule an appointment or discuss this screening further, you may contact us by phone at the [facility] at [phone] or online through the patient portal/[employer] @ [email or web address]    If you have had any of the screenings listed above at another facility, please call us so that we may update your chart.      Your partners in health,      Quality Committee at [facility]

## 2018-11-29 NOTE — PROGRESS NOTES
Mary Sims is a 53 year old female who is seen in consultation at the request of Stephanie Whitlock CNP  for left 4th toe fracture.    Past Medical History:   Diagnosis Date     Graves disease      Hypertension      Hypothyroidism      MEDICAL HISTORY OF -     S/P RADIOACTIVE IODINE     Palpitation        Past Surgical History:   Procedure Laterality Date     ARTHROSCOPY SHLDR ROTATOR CUFF REPAIR, SUBACROMIAL DECOMP, DIST CLAVICLE RESECTION, BICEP TENODESIS Left 2017    Procedure: ARTHROSCOPY SHOULDER ROTATOR CUFF REPAIR, SUBACROMIAL DECOMPRESSION, DISTAL CLAVICLE RESECTION, OPEN BICEP TENODESIS REPAIR;  Surgeon: Dorina Rodriguez MD;  [facility]     C  DELIVERY ONLY       COLONOSCOPY  2013    Procedure: COLONOSCOPY;  colonoscopy, screening;  Surgeon: Dalton Lala MD;  Location:  OR     SURGICAL HISTORY OF -       RT ANKLE Fx AND REPAIR (PIN,PLATE,SCREWS)     THYROIDECTOMY         Family History   Problem Relation Age of Onset     Hypertension Mother      Diabetes Mother      Hypertension Father      Alcohol/Drug Father      alcohol     Crohn Disease Son      Hypertension Other      Cancer No family hx of      Cerebrovascular Disease No family hx of      Thyroid Disease No family hx of      Glaucoma No family hx of      Macular Degeneration No family hx of        Social History     Social History     Marital status:      Spouse name: N/A     Number of children: N/A     Years of education: N/A     Occupational History     Not on file.     Social History Main Topics     Smoking status: Never Smoker     Smokeless tobacco: Never Used     Alcohol use Yes      Comment: SELDOM      Drug use: No     Sexual activity: Yes     Partners: Male     Birth control/ protection: Surgical      Comment: tubal     Other Topics Concern     Not on file     Social History Narrative       Current Outpatient Prescriptions   Medication Sig Dispense Refill     HYDROcodone-acetaminophen (NORCO)  "5-325 MG per tablet Take 1 tablet by mouth every 6 hours as needed for severe pain 10 tablet 0     levothyroxine (SYNTHROID/LEVOTHROID) 88 MCG tablet Take 1 tablet (88 mcg) by mouth daily 90 tablet 3     metoprolol succinate (TOPROL-XL) 25 MG 24 hr tablet Take 1 tablet (25 mg) by mouth daily 90 tablet 3     naproxen (NAPROSYN) 500 MG tablet Take 1 tablet (500 mg) by mouth 2 times daily as needed for moderate pain 90 tablet 1     order for DME Equipment being ordered: walking boot 1 Units 0       Allergies   Allergen Reactions     No Known Drug Allergy        REVIEW OF SYSTEMS:  CONSTITUTIONAL:  NEGATIVE for fever, chills, change in weight, not feeling tired  SKIN:  NEGATIVE for worrisome rashes, no skin lumps, no skin ulcers and no non-healing wounds  EYES:  NEGATIVE for vision changes or irritation.  ENT/MOUTH:  NEGATIVE.  No hearing loss, no hoarseness, no difficulty swallowing.  RESP:  NEGATIVE. No cough or shortness of breath.  CV:  NEGATIVE for chest pain, palpitations or peripheral edema  GI:  NEGATIVE for nausea, abdominal pain, heartburn, or change in bowel habits  :  Negative. No dysuria, no hematuria  MUSCULOSKELETAL:  See HPI above  NEURO:  NEGATIVE . No headaches, no dizziness,  no numbness  ENDOCRINE:  NEGATIVE for temperature intolerance, skin/hair changes  HEME/ALLERGY/IMMUNE:  NEGATIVE for bleeding problems  PSYCHIATRIC:  NEGATIVE. no anxiety, no depression.     Exam:  Vitals: /83 (BP Location: Left arm)  Ht 1.626 m (5' 4\")  Wt 88.5 kg (195 lb)  BMI 33.47 kg/m2  BMI= Body mass index is 33.47 kg/(m^2).  Constitutional:  healthy, alert and no distress  Neuro: Alert and Oriented x 3, Sensation grossly WNL.  Psych: Affect normal   Respiratory: Breathing not labored.  Cardiovascular: normal peripheral pulses  Lymph: no adenopathy  Skin: No rashes,worrisome lesions or skin problems    "

## 2018-11-29 NOTE — LETTER
November 29, 2018      Mary Sims  [address]  [facility] [address]    Dear Mary Sims,     At [facility] we care about your health and are committed to providing quality patient care.    Which includes staying current on preventive cancer screenings.  You can increase your chances of finding and treating cancers through regular screenings.      Our records indicate you may be due for the following preventive screening(s):    Mammogram    Mammogram for breast cancer   Recommended every 1-2 years for women age 50 and older  Mammograms help detect breast cancer, which is the most common cancer among women in the [place].  You may need to start having mammograms earlier and more often if you have had breast cancer, breast problems, or a family history of breast cancer.     To schedule an appointment or discuss this screening further, you may contact us by phone at the [facility] at [phone] or online through the patient portal/[employer] @ [email or web address]    If you have had any of the screenings listed above at another facility, please call us so that we may update your chart.      Your partners in health,      Quality Committee at [facility]

## 2018-11-29 NOTE — TELEPHONE ENCOUNTER
Patient is due for a mammogram. Left message for patient to call back  [place] Scheduling Department at [phone].  If patient returns call, please help them schedule a mammogram and letter sent to patient's home address.    Thank you,    [facility]

## 2019-01-07 ENCOUNTER — OFFICE VISIT (OUTPATIENT)
Dept: FAMILY MEDICINE | Facility: CLINIC | Age: 54
End: 2019-01-07
Payer: COMMERCIAL

## 2019-01-07 VITALS
BODY MASS INDEX: 33.22 KG/M2 | OXYGEN SATURATION: 100 % | DIASTOLIC BLOOD PRESSURE: 78 MMHG | WEIGHT: 194.6 LBS | SYSTOLIC BLOOD PRESSURE: 124 MMHG | HEART RATE: 64 BPM | TEMPERATURE: 97.3 F | HEIGHT: 64 IN

## 2019-01-07 DIAGNOSIS — I10 HYPERTENSION GOAL BP (BLOOD PRESSURE) < 140/90: ICD-10-CM

## 2019-01-07 DIAGNOSIS — E66.09 CLASS 1 OBESITY DUE TO EXCESS CALORIES WITH SERIOUS COMORBIDITY AND BODY MASS INDEX (BMI) OF 33.0 TO 33.9 IN ADULT: ICD-10-CM

## 2019-01-07 DIAGNOSIS — Z28.21 INFLUENZA VACCINATION DECLINED BY PATIENT: ICD-10-CM

## 2019-01-07 DIAGNOSIS — E66.811 CLASS 1 OBESITY DUE TO EXCESS CALORIES WITH SERIOUS COMORBIDITY AND BODY MASS INDEX (BMI) OF 33.0 TO 33.9 IN ADULT: ICD-10-CM

## 2019-01-07 DIAGNOSIS — S86.911A KNEE STRAIN, RIGHT, INITIAL ENCOUNTER: Primary | ICD-10-CM

## 2019-01-07 DIAGNOSIS — Z01.83 ENCOUNTER FOR RH BLOOD TYPING: ICD-10-CM

## 2019-01-07 DIAGNOSIS — Z11.4 SCREENING FOR HIV (HUMAN IMMUNODEFICIENCY VIRUS): ICD-10-CM

## 2019-01-07 DIAGNOSIS — E03.9 HYPOTHYROIDISM, UNSPECIFIED TYPE: ICD-10-CM

## 2019-01-07 DIAGNOSIS — Z12.31 VISIT FOR SCREENING MAMMOGRAM: ICD-10-CM

## 2019-01-07 LAB
T4 FREE SERPL-MCNC: 1.45 NG/DL (ref 0.76–1.46)
TSH SERPL DL<=0.005 MIU/L-ACNC: 0.18 MU/L (ref 0.4–4)

## 2019-01-07 PROCEDURE — 99214 OFFICE O/P EST MOD 30 MIN: CPT | Performed by: NURSE PRACTITIONER

## 2019-01-07 PROCEDURE — 86901 BLOOD TYPING SEROLOGIC RH(D): CPT | Performed by: NURSE PRACTITIONER

## 2019-01-07 PROCEDURE — 84439 ASSAY OF FREE THYROXINE: CPT | Performed by: NURSE PRACTITIONER

## 2019-01-07 PROCEDURE — 87389 HIV-1 AG W/HIV-1&-2 AB AG IA: CPT | Performed by: NURSE PRACTITIONER

## 2019-01-07 PROCEDURE — 84443 ASSAY THYROID STIM HORMONE: CPT | Performed by: NURSE PRACTITIONER

## 2019-01-07 PROCEDURE — 86900 BLOOD TYPING SEROLOGIC ABO: CPT | Performed by: NURSE PRACTITIONER

## 2019-01-07 PROCEDURE — 36415 COLL VENOUS BLD VENIPUNCTURE: CPT | Performed by: NURSE PRACTITIONER

## 2019-01-07 RX ORDER — NAPROXEN 500 MG/1
500 TABLET ORAL 2 TIMES DAILY WITH MEALS
Qty: 60 TABLET | Refills: 3 | Status: SHIPPED | OUTPATIENT
Start: 2019-01-07 | End: 2019-05-15

## 2019-01-07 ASSESSMENT — MIFFLIN-ST. JEOR: SCORE: 1462.7

## 2019-01-07 NOTE — PATIENT INSTRUCTIONS
At [facility], we strive to deliver an exceptional experience to you, every time we see you.  If you receive a survey in the mail, please send us back your thoughts. We really do value your feedback.    Your care team:                            Family Medicine Internal Medicine   MD Stan Santoro MD Shantel Branch-Fleming, MD Katya Georgiev PA-C Megan Hill, APRN CNP    Sean Newby MD Pediatrics   Adan Martin, ANTELMO Merrill, MD Gretel Gil APRN CNP   MD Bethany Hayes MD Deborah Mielke, MD Laura Riley, APRN [facility]      Clinic hours: Monday - Thursday 7 am-7 pm; Fridays 7 am-5 pm.   Urgent care: Monday - Friday 11 am-9 pm; Saturday and Sunday 9 am-5 pm.  Pharmacy : Monday -Thursday 8 am-8 pm; Friday 8 am-6 pm; Saturday and Sunday 9 am-5 pm.     Clinic: [phone]   Pharmacy: [phone]        Patient Education     Knee Sprain    A sprain is an injury to the ligaments or capsule that holds a joint together. There are no broken bones. Most sprains take 3 to 6 weeks to heal. If it a severe sprain where the ligament is completely torn, it can take months to recover.  Most knee sprains are treated with a splint, knee immobilizer brace, or elastic wrap for support. Severe sprains may rarely require surgery.  Home care    Stay off the injured leg as much as possible until you can walk on it without pain. If you have a lot of pain with walking, crutches or a walker may be prescribed. (These can be rented or purchased at many pharmacies and surgical or orthopedic supply stores). Follow your healthcare provider's advice about when to begin putting weight on that leg.    Keep your leg elevated to reduce pain and swelling. When sleeping, place a pillow under the injured leg. When sitting, support the injured leg so it is above heart level. This is very important during the first 48 hours.    Apply an ice pack over the injured area for  15 to 20 minutes every 3 to 6 hours. You should do this for the first 24 to 48 hours. You can make an ice pack by filling a plastic bag that seals at the top with ice cubes and then wrapping it with a thin towel. Continue to use ice packs for relief of pain and swelling as needed. As the ice melts, be careful to avoid getting your wrap, splint, or cast wet. After 48 hours, apply heat (warm shower or warm bath) for 15 to 20 minutes several times a day, or alternate ice and heat. You can place the ice pack directly over the splint. If you have to wear a hook-and-loop knee brace, you can open it to apply the ice pack, or heat, directly to the knee. Never put ice directly on the skin. Always wrap the ice in a towel or other type of cloth.    You may use over-the-counter pain medicine to control pain, unless another pain medicine was prescribed. If you have chronic liver or kidney disease or ever had a stomach ulcer or gastrointestinal bleeding, talk with your healthcare provider before using these medicines.    If you were given a splint, keep it completely dry at all times. Bathe with your splint out of the water, protected with 2 large plastic bags, sealed with rubber bands or tape at the top end. If a fiberglass splint gets wet, you can dry it with a hair dryer set to cool. If you have a hook-and-loop knee brace, you can remove this to bathe, unless told otherwise.  Follow-up care  Follow up with your doctor as advised. Any X-rays you had today don t show any broken bones, breaks, or fractures. Sometimes fractures don t show up on the first X-ray. Bruises and sprains can sometimes hurt as much as a fracture. These injuries can take time to heal completely. If your symptoms don t improve or they get worse, talk with your doctor. You may need a repeat X-ray. If X-rays were taken, you will be told of any new findings that may affect your care.  Call 911  Call 911 if you have:     Shortness of breath     Chest pain  When  to seek medical advice  Call your healthcare provider right away if any of these occur:    The splint or knee immobilizer brace becomes wet or soft    The fiberglass cast or splint remains wet for more than 24 hours    Pain or swelling increases    The injured leg or toes become cold, blue, numb, or tingly  Date Last Reviewed: 5/1/2018 2000-2018 [employer]. [address] [place] [address]. All rights reserved. This information is not intended as a substitute for professional medical care. Always follow your healthcare professional's instructions.

## 2019-01-07 NOTE — PROGRESS NOTES
"napr  SUBJECTIVE:   Mary Sims is a 53 year old female who presents to clinic today for the following health issues:      Musculoskeletal problem/pain      Duration: December 26th    Description  Location: right knee    Intensity:  severe    Accompanying signs and symptoms: swollen after fall,     History  Previous similar problem: no   Previous evaluation:  none    Precipitating or alleviating factors:  Trauma or overuse: YES- fall  Aggravating factors include: sitting, standing, walking, climbing stairs, exercise, overuse and cold or damp weather, bending is worse    Therapies tried and outcome: ice, naproxen, aleve and brace  Patient slipped in her yard and \"tweaked her knee.\"  She fell on her buttocks.     Hypertension Follow-up      Outpatient blood pressures are not being checked.    Low Salt Diet: low salt    Hypothyroidism Follow-up      Since last visit, patient describes the following symptoms: Weight stable, no hair loss, no skin changes, no constipation, no loose stools    Patient also requests AbO type/Rh as she does not know what her blood type is.    Problem list and histories reviewed & adjusted, as indicated.  Additional history: as documented    Patient Active Problem List   Diagnosis     CARDIOVASCULAR SCREENING; LDL GOAL LESS THAN 160     Hypothyroidism     Hypertension goal BP (blood pressure) < 140/90     WONG (iron deficiency anemia)     Obesity     Nontraumatic tear of supraspinatus tendon, left     Insomnia, unspecified type     De Quervain's disease (tenosynovitis)     Right-sided low back pain without sciatica, unspecified chronicity     Past Surgical History:   Procedure Laterality Date     ARTHROSCOPY SHLDR ROTATOR CUFF REPAIR, SUBACROMIAL DECOMP, DIST CLAVICLE RESECTION, BICEP TENODESIS Left 4/11/2017    Procedure: ARTHROSCOPY SHOULDER ROTATOR CUFF REPAIR, SUBACROMIAL DECOMPRESSION, DISTAL CLAVICLE RESECTION, OPEN BICEP TENODESIS REPAIR;  Surgeon: Dorina Rodriguez MD;  " Location: [facility] OR     C  DELIVERY ONLY       COLONOSCOPY  2013    Procedure: COLONOSCOPY;  colonoscopy, screening;  Surgeon: Dalton Lala MD;  Location: [facility] OR     SURGICAL HISTORY OF -       RT ANKLE Fx AND REPAIR (PIN,PLATE,SCREWS)     THYROIDECTOMY         Social History     Tobacco Use     Smoking status: Never Smoker     Smokeless tobacco: Never Used   Substance Use Topics     Alcohol use: Yes     Comment: SELDOM      Family History   Problem Relation Age of Onset     Hypertension Mother      Diabetes Mother      Hypertension Father      Alcohol/Drug Father         alcohol     Crohn's Disease Son      Hypertension Other      Cancer No family hx of      Cerebrovascular Disease No family hx of      Thyroid Disease No family hx of      Glaucoma No family hx of      Macular Degeneration No family hx of          Current Outpatient Medications   Medication Sig Dispense Refill     HYDROcodone-acetaminophen (NORCO) 5-325 MG per tablet Take 1 tablet by mouth every 6 hours as needed for severe pain 10 tablet 0     levothyroxine (SYNTHROID/LEVOTHROID) 88 MCG tablet Take 1 tablet (88 mcg) by mouth daily 90 tablet 3     metoprolol succinate (TOPROL-XL) 25 MG 24 hr tablet Take 1 tablet (25 mg) by mouth daily 90 tablet 3     naproxen (NAPROSYN) 500 MG tablet Take 1 tablet (500 mg) by mouth 2 times daily as needed for moderate pain 90 tablet 1     order for DME Equipment being ordered: walking boot 1 Units 0     BP Readings from Last 3 Encounters:   19 124/78   18 140/83   18 140/88    Wt Readings from Last 3 Encounters:   19 88.3 kg (194 lb 9.6 oz)   18 88.5 kg (195 lb)   18 88.9 kg (195 lb 14.4 oz)                    Reviewed and updated as needed this visit by clinical staff  Tobacco  Allergies  Meds  Med Hx  Surg Hx  Fam Hx  Soc Hx      Reviewed and updated as needed this visit by Provider  Tobacco         ROS:  Constitutional, HEENT, cardiovascular,  "pulmonary, gi and gu systems are negative, except as otherwise noted.    OBJECTIVE:     /78   Pulse 64   Temp 97.3  F (36.3  C) (Oral)   Ht 1.626 m (5' 4\")   Wt 88.3 kg (194 lb 9.6 oz)   LMP 08/04/2015 (Approximate)   SpO2 100%   BMI 33.40 kg/m    Body mass index is 33.4 kg/m .  GENERAL: healthy, alert and no distress  EYES: Eyes grossly normal to inspection, PERRL and conjunctivae and sclerae normal  HENT: ear canals and TM's normal, nose and mouth without ulcers or lesions  NECK: no adenopathy, no asymmetry, masses, or scars and thyroid normal to palpation  RESP: lungs clear to auscultation - no rales, rhonchi or wheezes  CV: regular rate and rhythm, normal S1 S2, no S3 or S4, no murmur, click or rub, no peripheral edema and peripheral pulses strong  ABDOMEN: soft, nontender, no hepatosplenomegaly, no masses and bowel sounds normal  MS: right knee- FAROM without ain, mild TTP anteriomedial aspect of knee, ligaments intact, negative Ant/Post drawer, no gait abnotmalityy, normal hip and ankle exam, no gross musculoskeletal defects noted, no edema  SKIN: no suspicious lesions or rashes  NEURO: Normal strength and tone, mentation intact and speech normal  BACK: no CVA tenderness, no paralumbar tenderness  PSYCH: mentation appears normal, affect normal/bright  LYMPH: normal ant/post cervical, supraclavicular nodes    Diagnostic Test Results:  No results found for this or any previous visit (from the past 24 hour(s)).    ASSESSMENT/PLAN:         BMI:   Estimated body mass index is 33.4 kg/m  as calculated from the following:    Height as of this encounter: 1.626 m (5' 4\").    Weight as of this encounter: 88.3 kg (194 lb 9.6 oz).   Weight management plan: Discussed healthy diet and exercise guidelines      1. Knee strain, right, initial encounter  Advised RICE, continue to wear compression sleeve prn comfort, OK to cuse naprosyn BId taken with food prn, refer to physical therapy if not improved, return to " clinic 3-4 weeks.    - naproxen (NAPROSYN) 500 MG tablet; Take 1 tablet (500 mg) by mouth 2 times daily (with meals)  Dispense: 60 tablet; Refill: 3    2. Hypothyroidism, unspecified type  Due for recheck.  She continues on 88 mcg Synthroid despite elevated fT4 last year and instructions to decrease dose to 75 mcg daily. Will adjust dose as indicated toda.  - TSH with free T4 reflex    3. Hypertension goal BP (blood pressure) < 140/90  At goal, continue low salt diet, regular exercise, weight loss efforts.    4. Class 1 obesity due to excess calories with serious comorbidity and body mass index (BMI) of 33.0 to 33.9 in adult  Benefits of weight loss reviewed in detail, encouraged her to cut back on the carbohydrates in the diet, consume more fruits and vegetables, drink plenty of water, avoid fruit juices, sodas, get 150 min moderate exercise/week.  Recheck weight in 6 months.    5. Visit for screening mammogram    - MA SCREENING DIGITAL BILAT - Future  (s+30); Future    6. Screening for HIV (human immunodeficiency virus)    - HIV Screening    7. Encounter for Rh blood typing    - ABO and Rh    8. Influenza vaccination declined by patient  Conscientious objector      See Patient Instructions    PABLO Glez [facility]

## 2019-01-08 LAB — HIV 1+2 AB+HIV1 P24 AG SERPL QL IA: NONREACTIVE

## 2019-01-09 LAB
ABO + RH BLD: NORMAL
ABO + RH BLD: NORMAL
SPECIMEN EXP DATE BLD: NORMAL

## 2019-01-10 RX ORDER — LEVOTHYROXINE SODIUM 75 UG/1
75 TABLET ORAL DAILY
Qty: 90 TABLET | Refills: 3 | Status: SHIPPED | OUTPATIENT
Start: 2019-01-10 | End: 2020-01-02

## 2019-01-24 ENCOUNTER — OFFICE VISIT (OUTPATIENT)
Dept: OPTOMETRY | Facility: CLINIC | Age: 54
End: 2019-01-24
Payer: COMMERCIAL

## 2019-01-24 DIAGNOSIS — H52.13 MYOPIA OF BOTH EYES WITH ASTIGMATISM AND PRESBYOPIA: Primary | ICD-10-CM

## 2019-01-24 DIAGNOSIS — H52.203 MYOPIA OF BOTH EYES WITH ASTIGMATISM AND PRESBYOPIA: Primary | ICD-10-CM

## 2019-01-24 DIAGNOSIS — H52.4 MYOPIA OF BOTH EYES WITH ASTIGMATISM AND PRESBYOPIA: Primary | ICD-10-CM

## 2019-01-24 PROCEDURE — 92015 DETERMINE REFRACTIVE STATE: CPT | Performed by: OPTOMETRIST

## 2019-01-24 PROCEDURE — 92014 COMPRE OPH EXAM EST PT 1/>: CPT | Performed by: OPTOMETRIST

## 2019-01-24 ASSESSMENT — REFRACTION_MANIFEST
OD_CYLINDER: +2.25
OS_SPHERE: -10.75
OD_ADD: +2.50
OS_ADD: +2.50
OD_AXIS: 083
OD_SPHERE: -11.50
OS_AXIS: 093
OS_CYLINDER: +2.25
OD_SPHERE: -11.50
OS_CYLINDER: +2.00
OS_AXIS: 087
METHOD_AUTOREFRACTION: 1
OD_AXIS: 079
OS_SPHERE: -11.00
OD_CYLINDER: +2.50

## 2019-01-24 ASSESSMENT — REFRACTION_WEARINGRX
OD_AXIS: 083
OD_ADD: +2.25
OD_SPHERE: -11.25
OS_AXIS: 081
OD_CYLINDER: +2.25
OS_ADD: +2.25
OS_CYLINDER: +2.00
SPECS_TYPE: PAL
OS_SPHERE: 11.25

## 2019-01-24 ASSESSMENT — SLIT LAMP EXAM - LIDS
COMMENTS: COLLARETTES
COMMENTS: COLLARETTES

## 2019-01-24 ASSESSMENT — TONOMETRY
OD_IOP_MMHG: 13
OS_IOP_MMHG: 13
IOP_METHOD: APPLANATION

## 2019-01-24 ASSESSMENT — REFRACTION_CURRENTRX
OD_CYLINDER: -1.75
OD_SPHERE: -8.00
OD_BRAND: ALCON AIR OPTIX FOR ASTIGMATISM BC 8.7, D 14.5
OS_AXIS: 180
OS_BRAND: ALCON AIR OPTIX FOR ASTIGMATISM BC 8.7, D 14.5
OS_SPHERE: -8.00
OS_CYLINDER: -1.75
OD_AXIS: 170

## 2019-01-24 ASSESSMENT — VISUAL ACUITY
OS_CC+: +2
OS_CC: 20/30
OD_CC+: -2
OD_CC: J3
METHOD: SNELLEN - LINEAR
CORRECTION_TYPE: GLASSES
OD_CC: 20/30
OS_CC: J3

## 2019-01-24 ASSESSMENT — EXTERNAL EXAM - LEFT EYE: OS_EXAM: NORMAL

## 2019-01-24 ASSESSMENT — EXTERNAL EXAM - RIGHT EYE: OD_EXAM: NORMAL

## 2019-01-24 ASSESSMENT — CONF VISUAL FIELD
OS_NORMAL: 1
METHOD: COUNTING FINGERS
OD_NORMAL: 1

## 2019-01-24 ASSESSMENT — CUP TO DISC RATIO
OS_RATIO: 0.2
OD_RATIO: 0.2

## 2019-01-24 NOTE — PATIENT INSTRUCTIONS
New prescription given for glasses.    Use Cliradex wipes on both eyelids and brow once a day for 6 weeks.        Your eyes may be blurry at near and sensitive to light for several hours from the dilating drops.

## 2019-01-24 NOTE — PROGRESS NOTES
Chief Complaint   Patient presents with     Annual Eye Exam        Last Eye Exam: 12/30/2016  Dilated Previously: Yes    What are you currently using to see?  glasses       Distance Vision Acuity: Noticed gradual change in both eyes and Noticed gradual change in both eyes    Near Vision Acuity: Not satisfied     Eye Comfort: dry and itchy  Do you use eye drops? 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       Medical, surgical and family histories reviewed and updated 1/24/2019.       OBJECTIVE: See Ophthalmology exam    ASSESSMENT:    ICD-10-CM    1. Myopia of both eyes with astigmatism and presbyopia H52.13 EYE EXAM (SIMPLE-NONBILLABLE)    H52.203 REFRACTION    H52.4       PLAN:     Patient Instructions   New prescription given for glasses.    Use Cliradex wipes on both eyelids and brow once a day for 6 weeks.        Your eyes may be blurry at near and sensitive to light for several hours from the dilating drops.

## 2019-01-24 NOTE — TELEPHONE ENCOUNTER
Ordered trials for patient please call patient and make an apt when they come in. Patient needs to have fitting and pay fit fee

## 2019-01-24 NOTE — LETTER
1/24/2019         RE: Mary Sims  [address]  [place] [place] [address]        Dear Colleague,    Thank you for referring your patient, Mary Sims, to the [facility]. Please see a copy of my visit note below.    Chief Complaint   Patient presents with     Annual Eye Exam        Last Eye Exam: 12/30/2016  Dilated Previously: Yes    What are you currently using to see?  glasses       Distance Vision Acuity: Noticed gradual change in both eyes and Noticed gradual change in both eyes    Near Vision Acuity: Not satisfied     Eye Comfort: dry and itchy  Do you use eye drops? 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       OBJECTIVE: See Ophthalmology exam    ASSESSMENT:    ICD-10-CM    1. Myopia of both eyes with astigmatism and presbyopia H52.13 EYE EXAM (SIMPLE-NONBILLABLE)    H52.203 REFRACTION    H52.4       PLAN:     Patient Instructions   New prescription given for glasses.    Use Cliradex wipes on both eyelids and brow once a day for 6 weeks.        Your eyes may be blurry at near and sensitive to light for several hours from the dilating drops.           Again, thank you for allowing me to participate in the care of your patient.        Sincerely,        Becky Smith OD

## 2019-02-01 ENCOUNTER — TELEPHONE (OUTPATIENT)
Dept: OPTOMETRY | Facility: CLINIC | Age: 54
End: 2019-02-01

## 2019-02-01 NOTE — TELEPHONE ENCOUNTER
Current Contact Lens Rx (Trial Lens)      Brand Sphere Cylinder Axis   Right Og Air Optix for Astigmatism BC 8.7, D 14.5 -8.00 -1.75 170   Left Og Air Optix for Astigmatism BC 8.7, D 14.5 -8.00 -1.75 180     Trials in, called and told Mary she can  lenses and schedule a recheck appointment in 1 - 2 weeks.

## 2019-02-28 ENCOUNTER — TELEPHONE (OUTPATIENT)
Dept: OPTOMETRY | Facility: CLINIC | Age: 54
End: 2019-02-28

## 2019-02-28 ENCOUNTER — OFFICE VISIT (OUTPATIENT)
Dept: OPTOMETRY | Facility: CLINIC | Age: 54
End: 2019-02-28
Payer: COMMERCIAL

## 2019-02-28 DIAGNOSIS — H52.13 MYOPIA OF BOTH EYES WITH ASTIGMATISM: Primary | ICD-10-CM

## 2019-02-28 DIAGNOSIS — H52.203 MYOPIA OF BOTH EYES WITH ASTIGMATISM: Primary | ICD-10-CM

## 2019-02-28 PROCEDURE — 92310 CONTACT LENS FITTING OU: CPT | Performed by: OPTOMETRIST

## 2019-02-28 ASSESSMENT — REFRACTION_WEARINGRX
OS_ADD: +2.50
OD_CYLINDER: +2.50
OD_ADD: +2.50
OD_AXIS: 083
OS_AXIS: 093
OS_CYLINDER: +2.00
OD_SPHERE: -11.50
OS_SPHERE: -11.00

## 2019-02-28 ASSESSMENT — VISUAL ACUITY
METHOD: SNELLEN - LINEAR
CORRECTION_TYPE: CONTACTS
OD_CC: 20/20
OS_CC: 20/20
OS_CC+: -2
OD_CC+: -2

## 2019-02-28 ASSESSMENT — REFRACTION_CURRENTRX
OS_BRAND: ALCON AIR OPTIX FOR ASTIGMATISM BC 8.7, D 14.5
OD_SPHERE: -8.00
OS_AXIS: 180
OD_CYLINDER: -1.75
OS_SPHERE: -8.00
OD_AXIS: 170
OD_BRAND: ALCON AIR OPTIX FOR ASTIGMATISM BC 8.7, D 14.5
OS_CYLINDER: -1.75

## 2019-02-28 ASSESSMENT — SLIT LAMP EXAM - LIDS
COMMENTS: NORMAL
COMMENTS: NORMAL

## 2019-02-28 NOTE — TELEPHONE ENCOUNTER
Final Contact Lens Rx      Brand Sphere Cylinder Axis   Right Og Air Optix for Astigmatism BC 8.7, D 14.5 -8.00 -1.75 180   Left Og Air Optix for Astigmatism BC 8.7, D 14.5 -8.00 -1.75 180   Expiration Date: 2/28/2021   Replacement: Monthly     If she likes it is ok to order - I ordered 1 box of trials (there are 2 in a box)

## 2019-02-28 NOTE — PROGRESS NOTES
Chief Complaint   Patient presents with     Contact Lens Follow Up     Satisfied with contacts:  Yes    Good comfort:  Yes  Clear vision:     Yes - Except the last couple of days it takes a little longer to focus    Radha RUST  Optometric Tech            Medical, surgical and family histories reviewed and updated 2/28/2019.       OBJECTIVE: See Ophthalmology exam    ASSESSMENT:    ICD-10-CM    1. Myopia of both eyes with astigmatism H52.13     H52.203       PLAN:    There are no Patient Instructions on file for this visit.

## 2019-02-28 NOTE — LETTER
2/28/2019         RE: Mary Sims  [address]  [place] [address]        Dear Colleague,    Thank you for referring your patient, Mary Sims, to the [facility]. Please see a copy of my visit note below.    Chief Complaint   Patient presents with     Contact Lens Follow Up     Satisfied with contacts:  Yes    Good comfort:  Yes  Clear vision:     Yes - Except the last couple of days it takes a little longer to focus    Radha [facility]  [facility]            Medical, surgical and family histories reviewed and updated 2/28/2019.       OBJECTIVE: See Ophthalmology exam    ASSESSMENT:    ICD-10-CM    1. Myopia of both eyes with astigmatism H52.13     H52.203       PLAN:    There are no Patient Instructions on file for this visit.                   Again, thank you for allowing me to participate in the care of your patient.        Sincerely,        Becky Smith OD

## 2019-05-02 DIAGNOSIS — I10 HYPERTENSION GOAL BP (BLOOD PRESSURE) < 140/90: ICD-10-CM

## 2019-05-02 NOTE — TELEPHONE ENCOUNTER
"Requested Prescriptions   Pending Prescriptions Disp Refills     metoprolol succinate ER (TOPROL-XL) 25 MG 24 hr tablet  Last Written Prescription Date:  04/25/18  Last Fill Quantity: 90,  # refills: 3   Last Office Visit with [facility] or [facility] prescribing provider:  01/07/19-Thein   Future Office Visit:    90 tablet 3     Sig: Take 1 tablet (25 mg) by mouth daily       Beta-Blockers Protocol Passed - 5/2/2019  8:28 AM        Passed - Blood pressure under 140/90 in past 12 months     BP Readings from Last 3 Encounters:   01/07/19 124/78   11/27/18 140/83   11/20/18 140/88                 Passed - Patient is age 6 or older        Passed - Recent (12 mo) or future (30 days) visit within the authorizing provider's specialty     Patient had office visit in the last 12 months or has a visit in the next 30 days with authorizing provider or within the authorizing provider's specialty.  See \"Patient Info\" tab in inbasket, or \"Choose Columns\" in Meds & Orders section of the refill encounter.              Passed - Medication is active on med list          "

## 2019-05-06 RX ORDER — METOPROLOL SUCCINATE 25 MG/1
25 TABLET, EXTENDED RELEASE ORAL DAILY
Qty: 30 TABLET | Refills: 0 | Status: SHIPPED | OUTPATIENT
Start: 2019-05-06 | End: 2019-05-15

## 2019-05-06 NOTE — TELEPHONE ENCOUNTER
Medication is being filled for 1 time refill only due to:  Patient needs to be seen because she is due for her annual physical this month.     A [employer] message sent to patient advising on siddharth refill and needing to schedule annual physical.     Kathleen Ozuna RN, BSN

## 2019-05-15 ENCOUNTER — OFFICE VISIT (OUTPATIENT)
Dept: FAMILY MEDICINE | Facility: CLINIC | Age: 54
End: 2019-05-15
Payer: COMMERCIAL

## 2019-05-15 VITALS
DIASTOLIC BLOOD PRESSURE: 86 MMHG | WEIGHT: 200.4 LBS | SYSTOLIC BLOOD PRESSURE: 138 MMHG | OXYGEN SATURATION: 96 % | BODY MASS INDEX: 34.21 KG/M2 | HEART RATE: 68 BPM | HEIGHT: 64 IN | TEMPERATURE: 98.1 F

## 2019-05-15 DIAGNOSIS — M54.50 RIGHT-SIDED LOW BACK PAIN WITHOUT SCIATICA, UNSPECIFIED CHRONICITY: ICD-10-CM

## 2019-05-15 DIAGNOSIS — E03.9 HYPOTHYROIDISM, UNSPECIFIED TYPE: ICD-10-CM

## 2019-05-15 DIAGNOSIS — E66.811 CLASS 1 OBESITY DUE TO EXCESS CALORIES WITH SERIOUS COMORBIDITY AND BODY MASS INDEX (BMI) OF 33.0 TO 33.9 IN ADULT: ICD-10-CM

## 2019-05-15 DIAGNOSIS — I10 HYPERTENSION GOAL BP (BLOOD PRESSURE) < 140/90: ICD-10-CM

## 2019-05-15 DIAGNOSIS — D50.9 IRON DEFICIENCY ANEMIA, UNSPECIFIED IRON DEFICIENCY ANEMIA TYPE: ICD-10-CM

## 2019-05-15 DIAGNOSIS — Z12.31 VISIT FOR SCREENING MAMMOGRAM: ICD-10-CM

## 2019-05-15 DIAGNOSIS — E66.09 CLASS 1 OBESITY DUE TO EXCESS CALORIES WITH SERIOUS COMORBIDITY AND BODY MASS INDEX (BMI) OF 33.0 TO 33.9 IN ADULT: ICD-10-CM

## 2019-05-15 DIAGNOSIS — Z00.00 ROUTINE HISTORY AND PHYSICAL EXAMINATION OF ADULT: Primary | ICD-10-CM

## 2019-05-15 LAB
ANION GAP SERPL CALCULATED.3IONS-SCNC: 8 MMOL/L (ref 3–14)
BUN SERPL-MCNC: 19 MG/DL (ref 7–30)
CALCIUM SERPL-MCNC: 8.6 MG/DL (ref 8.5–10.1)
CHLORIDE SERPL-SCNC: 107 MMOL/L (ref 94–109)
CO2 SERPL-SCNC: 25 MMOL/L (ref 20–32)
CREAT SERPL-MCNC: 0.75 MG/DL (ref 0.52–1.04)
CREAT UR-MCNC: 88 MG/DL
GFR SERPL CREATININE-BSD FRML MDRD: 89 ML/MIN/{1.73_M2}
GLUCOSE SERPL-MCNC: 89 MG/DL (ref 70–99)
HGB BLD-MCNC: 13.8 G/DL (ref 11.7–15.7)
MICROALBUMIN UR-MCNC: 6 MG/L
MICROALBUMIN/CREAT UR: 7.27 MG/G CR (ref 0–25)
POTASSIUM SERPL-SCNC: 4.4 MMOL/L (ref 3.4–5.3)
SODIUM SERPL-SCNC: 140 MMOL/L (ref 133–144)
TSH SERPL DL<=0.005 MIU/L-ACNC: 1.03 MU/L (ref 0.4–4)

## 2019-05-15 PROCEDURE — 80048 BASIC METABOLIC PNL TOTAL CA: CPT | Performed by: NURSE PRACTITIONER

## 2019-05-15 PROCEDURE — 99214 OFFICE O/P EST MOD 30 MIN: CPT | Mod: 25 | Performed by: NURSE PRACTITIONER

## 2019-05-15 PROCEDURE — 85018 HEMOGLOBIN: CPT | Performed by: NURSE PRACTITIONER

## 2019-05-15 PROCEDURE — 82043 UR ALBUMIN QUANTITATIVE: CPT | Performed by: NURSE PRACTITIONER

## 2019-05-15 PROCEDURE — 36415 COLL VENOUS BLD VENIPUNCTURE: CPT | Performed by: NURSE PRACTITIONER

## 2019-05-15 PROCEDURE — 84443 ASSAY THYROID STIM HORMONE: CPT | Performed by: NURSE PRACTITIONER

## 2019-05-15 PROCEDURE — 99396 PREV VISIT EST AGE 40-64: CPT | Performed by: NURSE PRACTITIONER

## 2019-05-15 RX ORDER — METOPROLOL SUCCINATE 25 MG/1
25 TABLET, EXTENDED RELEASE ORAL DAILY
Qty: 90 TABLET | Refills: 3 | Status: SHIPPED | OUTPATIENT
Start: 2019-05-15 | End: 2020-05-27

## 2019-05-15 ASSESSMENT — MIFFLIN-ST. JEOR: SCORE: 1489.01

## 2019-05-15 NOTE — PATIENT INSTRUCTIONS
At [facility], we strive to deliver an exceptional experience to you, every time we see you.  If you receive a survey in the mail, please send us back your thoughts. We really do value your feedback.    Your care team:                            Family Medicine Internal Medicine   MD Stan Santoro MD Shantel Branch-Fleming, MD Katya Georgiev PA-C Megan Hill, APRN CNP    Sean Newby MD Pediatrics   Adan Martin, ANTELMO Merrill, MD Gretel Gil APRN CNP   MD Bethany Hayes MD Deborah Mielke, MD Laura Riley, APRN [facility]      Clinic hours: Monday - Thursday 7 am-7 pm; Fridays 7 am-5 pm.   Urgent care: Monday - Friday 11 am-9 pm; Saturday and Sunday 9 am-5 pm.  Pharmacy : Monday -Thursday 8 am-8 pm; Friday 8 am-6 pm; Saturday and Sunday 9 am-5 pm.     Clinic: [phone]   Pharmacy: [phone]        Preventive Health Recommendations  Female Ages 50 - 64    Yearly exam: See your health care provider every year in order to  o Review health changes.   o Discuss preventive care.    o Review your medicines if your doctor has prescribed any.      Get a Pap test every three years (unless you have an abnormal result and your provider advises testing more often).    If you get Pap tests with HPV test, you only need to test every 5 years, unless you have an abnormal result.     You do not need a Pap test if your uterus was removed (hysterectomy) and you have not had cancer.    You should be tested each year for STDs (sexually transmitted diseases) if you're at risk.     Have a mammogram every 1 to 2 years.    Have a colonoscopy at age 50, or have a yearly FIT test (stool test). These exams screen for colon cancer.      Have a cholesterol test every 5 years, or more often if advised.    Have a diabetes test (fasting glucose) every three years. If you are at risk for diabetes, you should have this test more often.     If you are at risk for  osteoporosis (brittle bone disease), think about having a bone density scan (DEXA).    Shots: Get a flu shot each year. Get a tetanus shot every 10 years.    Nutrition:     Eat at least 5 servings of fruits and vegetables each day.    Eat whole-grain bread, whole-wheat pasta and brown rice instead of white grains and rice.    Get adequate Calcium and Vitamin D.     Lifestyle    Exercise at least 150 minutes a week (30 minutes a day, 5 days a week). This will help you control your weight and prevent disease.    Limit alcohol to one drink per day.    No smoking.     Wear sunscreen to prevent skin cancer.     See your dentist every six months for an exam and cleaning.    See your eye doctor every 1 to 2 years.

## 2019-05-15 NOTE — PROGRESS NOTES
SUBJECTIVE:   CC: Mary Sims is an 53 year old woman who presents for preventive health visit.     Healthy Habits:    Do you get at least three servings of calcium containing foods daily (dairy, green leafy vegetables, etc.)? no    Amount of exercise or daily activities, outside of work: 0 day(s) per week    Problems taking medications regularly No    Medication side effects: No    Have you had an eye exam in the past two years? yes    Do you see a dentist twice per year? no    Do you have sleep apnea, excessive snoring or daytime drowsiness?no      Back Pain       Duration: off and on for 2-3 months        Specific cause: none    Description:   Location of pain: low back right  Character of pain: sharp  Pain radiation:radiates into the right buttocks  New numbness or weakness in legs, not attributed to pain:  YES    Intensity: Currently 1/10    History:   Pain interferes with job: No  History of back problems: no prior back problems  Any previous MRI or X-rays: None  Sees a specialist for back pain:  No  Therapies tried without relief: aleve    Alleviating factors:   Improved by: none      Precipitating factors:  Worsened by: Sitting and Walking          Accompanying Signs & Symptoms:  Risk of Fracture:  None  Risk of Cauda Equina:  None  Risk of Infection:  None  Risk of Cancer:  None  Risk of Ankylosing Spondylitis:  Onset at age <35, male, AND morning back stiffness. no    She's had intermittent low back pain in the past, symptoms are worse with prolonged sitting, improved with movement and Ibuprofen. Patient denies: saddle paresthesia, leg wkness, fever, wt loss, urinary symptoms, cp, shortness of breath, other red flags.      Patient has been menopausal since 2017, no vaginal concerns.      She also has history of WONG and has taken iron intermittently.,  She complains of fatigue, wonders if her hgb is low.    HYPERTENSION- not checking home BP's, not exercising regularly, tries to follow low salt diet  but often gets more salt than she ought.    Today's PHQ-2 Score:   PHQ-2 (  [employer]) 5/15/2019 2018   Q1: Little interest or pleasure in doing things 0 0   Q2: Feeling down, depressed or hopeless 0 0   PHQ-2 Score 0 0   Q1: Little interest or pleasure in doing things - -   Q2: Feeling down, depressed or hopeless - -   PHQ-2 Score - -       Abuse: Current or Past(Physical, Sexual or Emotional)- No  Do you feel safe in your environment? Yes    Social History     Tobacco Use     Smoking status: Never Smoker     Smokeless tobacco: Never Used   Substance Use Topics     Alcohol use: Yes     Comment: SELDOM      If you drink alcohol do you typically have >3 drinks per day or >7 drinks per week? No                     Reviewed orders with patient.  Reviewed health maintenance and updated orders accordingly - Yes  Lab work is in process  Labs reviewed in EPIC  BP Readings from Last 3 Encounters:   05/15/19 138/86   19 124/78   18 140/83    Wt Readings from Last 3 Encounters:   05/15/19 90.9 kg (200 lb 6.4 oz)   19 88.3 kg (194 lb 9.6 oz)   18 88.5 kg (195 lb)                  Patient Active Problem List   Diagnosis     CARDIOVASCULAR SCREENING; LDL GOAL LESS THAN 160     Hypothyroidism     Hypertension goal BP (blood pressure) < 140/90     WONG (iron deficiency anemia)     Obesity     Nontraumatic tear of supraspinatus tendon, left     Insomnia, unspecified type     De Quervain's disease (tenosynovitis)     Right-sided low back pain without sciatica, unspecified chronicity     Past Surgical History:   Procedure Laterality Date     ARTHROSCOPY SHLDR ROTATOR CUFF REPAIR, SUBACROMIAL DECOMP, DIST CLAVICLE RESECTION, BICEP TENODESIS Left 2017    Procedure: ARTHROSCOPY SHOULDER ROTATOR CUFF REPAIR, SUBACROMIAL DECOMPRESSION, DISTAL CLAVICLE RESECTION, OPEN BICEP TENODESIS REPAIR;  Surgeon: Dorina Rodriguez MD;  Location: [facility] OR     C  DELIVERY ONLY       COLONOSCOPY  2013     Procedure: COLONOSCOPY;  colonoscopy, screening;  Surgeon: Dalton Lala MD;  Location:  OR     SURGICAL HISTORY OF -       RT ANKLE Fx AND REPAIR (PIN,PLATE,SCREWS)     THYROIDECTOMY         Social History     Tobacco Use     Smoking status: Never Smoker     Smokeless tobacco: Never Used   Substance Use Topics     Alcohol use: Yes     Comment: SELDOM      Family History   Problem Relation Age of Onset     Hypertension Mother      Diabetes Mother      Hypertension Father      Alcohol/Drug Father         alcohol     Crohn's Disease Son      Hypertension Other      Cancer No family hx of      Cerebrovascular Disease No family hx of      Thyroid Disease No family hx of      Glaucoma No family hx of      Macular Degeneration No family hx of            Mammogram Screening: Patient over age 50, mutual decision to screen reflected in health maintenance.    Pertinent mammograms are reviewed under the imaging tab.  History of abnormal Pap smear: NO - age 30- 65 PAP every 3 years recommended  PAP / HPV Latest Ref Rng & Units 5/15/2017 3/11/2013 2011   PAP - NIL ASC-US(A) ASC-US(A)   HPV 16 DNA NEG Negative - -   HPV 18 DNA NEG Negative - -   OTHER HR HPV NEG Negative - -     Reviewed and updated as needed this visit by clinical staff  Tobacco  Allergies  Meds  Med Hx  Surg Hx  Fam Hx  Soc Hx        Reviewed and updated as needed this visit by Provider        Past Medical History:   Diagnosis Date     Graves disease      Hypertension      Hypothyroidism      MEDICAL HISTORY OF -     S/P RADIOACTIVE IODINE     Palpitation       Past Surgical History:   Procedure Laterality Date     ARTHROSCOPY SHLDR ROTATOR CUFF REPAIR, SUBACROMIAL DECOMP, DIST CLAVICLE RESECTION, BICEP TENODESIS Left 2017    Procedure: ARTHROSCOPY SHOULDER ROTATOR CUFF REPAIR, SUBACROMIAL DECOMPRESSION, DISTAL CLAVICLE RESECTION, OPEN BICEP TENODESIS REPAIR;  Surgeon: Dorina Rodriguez MD;  Location:  OR        "DELIVERY ONLY       COLONOSCOPY  8/27/2013    Procedure: COLONOSCOPY;  colonoscopy, screening;  Surgeon: Dalton Lala MD;  Location: [facility] OR     SURGICAL HISTORY OF -   1996    RT ANKLE Fx AND REPAIR (PIN,PLATE,SCREWS)     THYROIDECTOMY         ROS:  CONSTITUTIONAL: NEGATIVE for fever, chills, change in weight  INTEGUMENTARY/SKIN: NEGATIVE for worrisome rashes, moles or lesions  EYES: NEGATIVE for vision changes or irritation  ENT: NEGATIVE for ear, mouth and throat problems  RESP: NEGATIVE for significant cough or SOB  BREAST: NEGATIVE for masses, tenderness or discharge  CV: NEGATIVE for chest pain, palpitations or peripheral edema  GI: NEGATIVE for nausea, abdominal pain, heartburn, or change in bowel habits  : NEGATIVE for unusual urinary or vaginal symptoms. No vaginal bleeding.  MUSCULOSKELETAL: NEGATIVE for significant arthralgias or myalgia  NEURO: NEGATIVE for weakness, dizziness or paresthesias  ENDOCRINE: NEGATIVE for temperature intolerance, skin/hair changes  PSYCHIATRIC: NEGATIVE for changes in mood or affect     OBJECTIVE:   /86   Pulse 68   Temp 98.1  F (36.7  C) (Oral)   Ht 1.626 m (5' 4\")   Wt 90.9 kg (200 lb 6.4 oz)   LMP 08/04/2015 (Approximate)   SpO2 96%   BMI 34.40 kg/m    EXAM:  GENERAL APPEARANCE: healthy, alert and no distress  EYES: Eyes grossly normal to inspection, PERRL and conjunctivae and sclerae normal  HENT: ear canals and TM's normal, nose and mouth without ulcers or lesions, oropharynx clear and oral mucous membranes moist  NECK: no adenopathy, no asymmetry, masses, or scars and thyroid normal to palpation  RESP: lungs clear to auscultation - no rales, rhonchi or wheezes  BREAST: normal without masses, tenderness or nipple discharge and no palpable axillary masses or adenopathy  CV: regular rate and rhythm, normal S1 S2, no S3 or S4, no murmur, click or rub, no peripheral edema and peripheral pulses strong  ABDOMEN: soft, nontender, no hepatosplenomegaly, no " masses and bowel sounds normal   (female): deferred  MS: Back- mild TTP right paralumbars, SI region, ROm decreased- flexion and extension primarily, SLR negative, no musculoskeletal defects are noted and gait is age appropriate without ataxia  SKIN: no suspicious lesions or rashes  NEURO: Normal strength and tone, sensory exam grossly normal, mentation intact and speech normal  PSYCH: mentation appears normal and affect normal/bright    Diagnostic Test Results:  Results for orders placed or performed in visit on 05/15/19 (from the past 24 hour(s))   BASIC METABOLIC PANEL   Result Value Ref Range    Sodium 140 133 - 144 mmol/L    Potassium 4.4 3.4 - 5.3 mmol/L    Chloride 107 94 - 109 mmol/L    Carbon Dioxide 25 20 - 32 mmol/L    Anion Gap 8 3 - 14 mmol/L    Glucose 89 70 - 99 mg/dL    Urea Nitrogen 19 7 - 30 mg/dL    Creatinine 0.75 0.52 - 1.04 mg/dL    GFR Estimate 89 >60 mL/min/[1.73_m2]    GFR Estimate If Black >90 >60 mL/min/[1.73_m2]    Calcium 8.6 8.5 - 10.1 mg/dL   **TSH with free T4 reflex FUTURE anytime   Result Value Ref Range    TSH 1.03 0.40 - 4.00 mU/L   Hemoglobin   Result Value Ref Range    Hemoglobin 13.8 11.7 - 15.7 g/dL   Albumin Random Urine Quantitative with Creat Ratio   Result Value Ref Range    Creatinine Urine 88 mg/dL    Albumin Urine mg/L 6 mg/L    Albumin Urine mg/g Cr 7.27 0 - 25 mg/g Cr       ASSESSMENT/PLAN:   1. Routine history and physical examination of adult      2. Right-sided low back pain without sciatica, unspecified chronicity  Low Back Pain.    The patient was advised to apply heat intermittently (avoid sleeping on heating pad).  Lifting mechanics discussed  Analgesics such as Tylenol and/or ibuprofen, see epic for orders.  Back exercises, instruction sheet given  Aerobic exercise program, this was strongly encouraged as one of the best ways to manage chronic back pain  Physical therapy/imaging if symptoms not improving    Patient was instructed to f/u if symptoms worsen or  "fail to improve as anticipated.  - [facility] REFERRAL; Future    3. Hypothyroidism, unspecified type  checking TSH as TSH was low and Synthroid dose adjusted at last visit.  - **TSH with free T4 reflex FUTURE anytime    4. Hypertension goal BP (blood pressure) < 140/90  Controlled today, low salt diet reviewed, encouraged more regular exercise/weight loss efforts. Continue with Toprol.  - BASIC METABOLIC PANEL  - Albumin Random Urine Quantitative with Creat Ratio  - metoprolol succinate ER (TOPROL-XL) 25 MG 24 hr tablet; Take 1 tablet (25 mg) by mouth daily  Dispense: 90 tablet; Refill: 3    5. Iron deficiency anemia, unspecified iron deficiency anemia type  Checking HGb, high iron containing foods reviewed.  - Hemoglobin    6. Class 1 obesity due to excess calories with serious comorbidity and body mass index (BMI) of 33.0 to 33.9 in adult  Benefits of weight loss reviewed in detail, encouraged her to cut back on the carbohydrates in the diet, consume more fruits and vegetables, drink plenty of water, avoid fruit juices, sodas, get 150 min moderate exercise/week.  Recheck weight in 6 months.      7. Visit for screening mammogram  Order previously placed- scheduled for next week.      COUNSELING:   Reviewed preventive health counseling, as reflected in patient instructions    Estimated body mass index is 34.4 kg/m  as calculated from the following:    Height as of this encounter: 1.626 m (5' 4\").    Weight as of this encounter: 90.9 kg (200 lb 6.4 oz).    Weight management plan: Discussed healthy diet and exercise guidelines     reports that she has never smoked. She has never used smokeless tobacco.      Counseling Resources:  ATP IV Guidelines  Pooled Cohorts Equation Calculator  Breast Cancer Risk Calculator  FRAX Risk Assessment  ICSI Preventive Guidelines  Dietary Guidelines for Americans, 2010  USDA's MyPlate  ASA Prophylaxis  Lung CA Screening    PABLO Glez [facility] " [facility]

## 2019-05-28 DIAGNOSIS — Z12.31 VISIT FOR SCREENING MAMMOGRAM: ICD-10-CM

## 2019-05-28 PROCEDURE — 77067 SCR MAMMO BI INCL CAD: CPT | Mod: TC

## 2019-07-23 ENCOUNTER — TRANSFERRED RECORDS (OUTPATIENT)
Dept: HEALTH INFORMATION MANAGEMENT | Facility: CLINIC | Age: 54
End: 2019-07-23

## 2019-07-24 DIAGNOSIS — M25.512 LEFT SHOULDER PAIN, UNSPECIFIED CHRONICITY: Primary | ICD-10-CM

## 2019-07-25 ENCOUNTER — OFFICE VISIT (OUTPATIENT)
Dept: ORTHOPEDICS | Facility: CLINIC | Age: 54
End: 2019-07-25
Payer: COMMERCIAL

## 2019-07-25 ENCOUNTER — ANCILLARY PROCEDURE (OUTPATIENT)
Dept: GENERAL RADIOLOGY | Facility: CLINIC | Age: 54
End: 2019-07-25
Attending: ORTHOPAEDIC SURGERY
Payer: COMMERCIAL

## 2019-07-25 VITALS
WEIGHT: 200.1 LBS | OXYGEN SATURATION: 97 % | SYSTOLIC BLOOD PRESSURE: 140 MMHG | BODY MASS INDEX: 34.35 KG/M2 | DIASTOLIC BLOOD PRESSURE: 81 MMHG | HEART RATE: 65 BPM

## 2019-07-25 DIAGNOSIS — M75.122 COMPLETE TEAR OF LEFT ROTATOR CUFF, UNSPECIFIED WHETHER TRAUMATIC: Primary | ICD-10-CM

## 2019-07-25 DIAGNOSIS — M25.512 LEFT SHOULDER PAIN, UNSPECIFIED CHRONICITY: ICD-10-CM

## 2019-07-25 PROCEDURE — 99213 OFFICE O/P EST LOW 20 MIN: CPT | Performed by: ORTHOPAEDIC SURGERY

## 2019-07-25 PROCEDURE — 73030 X-RAY EXAM OF SHOULDER: CPT | Mod: LT | Performed by: RADIOLOGY

## 2019-07-25 RX ORDER — LEVOTHYROXINE SODIUM 88 UG/1
88 TABLET ORAL DAILY
COMMUNITY
End: 2019-12-05

## 2019-07-25 NOTE — PROGRESS NOTES
"CHIEF CONCERN: Status post left arthroscopic rotator cuff repair, subacromial decompression, open biceps tenodesis  DATE OF SURGERY: 4/11/17    HISTORY OF PRESENT ILLNESS: Ms. Sims is a 51 year old female who is now over 2 years status post the above procedure. She comes in today after having some new pain with over this past weekend.  She states that it felt like a bruise over the upper lateral aspect of the shoulder.  She does not describe a specific injury or new activity.  She describes this as \"discomfort, not pain.\"      PHYSICAL EXAM:  Adult female in no distress  Respirations even and unlabored  Focused Musculoskeletal Exam: Skin intact. No erythema. Sensation intact all dermatomes into the hand to light touch. EPL, FPL, and Intrinsics intact. Right shoulder active motion is FE to 165, ER at side to 70, and IR to T12.  Negative Neer and Lopez. No pain on palpation over the AC joint. No pain on palpation over the long head of the biceps. No pain with Speed's or O'Briens. Strength 4+/5 in FE and ER without pain    IMAGING:   Left shoulder xrays demonstrate the subacomial space is well maintained. Normal glenohumeral articulation without pain    ASSESSMENT:  1. Two years status post the above procedure    PLAN:  Discussed that exam and xrays today are reassuring. Patient is going to resume some of her home shoulder exercises/rehab. If she does not see improvement in the next 2-4 weeks she will call the office and I would move toward an MRI at that point.  "

## 2019-07-25 NOTE — PATIENT INSTRUCTIONS
Thanks for coming today.  [facility]  [address] [place] [address]    To schedule future appointments in Ortho Clinic, you may call [phone].    To schedule ordered imaging by your provider:   Call Central Imaging [phone]    To schedule an injection ordered by your provider:  Call Central [facility] Injection scheduling line: [phone]  [facility] available online at:  [email or web address]    Please call if any further questions or concerns ([phone]).  Clinic hours 8 am to 5 pm.    Return to clinic (call) if symptoms worsen or fail to improve.

## 2019-07-25 NOTE — NURSING NOTE
Mary Sims's chief complaint for this visit includes:  Chief Complaint   Patient presents with     Left Shoulder - Pain     S/p left arthroscopic rotator cuff repair, subacromial decompression, open biceps tenodesis, sx: 4/11/17       PCP: Janie Riley    Referring Provider:  No referring provider defined for this encounter.    /81 (BP Location: Left arm, Patient Position: Sitting, Cuff Size: Adult Large)   Pulse 65   Wt 90.8 kg (200 lb 1.6 oz)   LMP 08/04/2015 (Approximate)   SpO2 97%   BMI 34.35 kg/m    Data Unavailable     Do you need any medication refills at today's visit? No    Right hand dominant    Gayathri Jauregui CMA

## 2019-12-05 ENCOUNTER — OFFICE VISIT (OUTPATIENT)
Dept: FAMILY MEDICINE | Facility: CLINIC | Age: 54
End: 2019-12-05

## 2019-12-05 VITALS
BODY MASS INDEX: 33.63 KG/M2 | OXYGEN SATURATION: 100 % | WEIGHT: 197 LBS | SYSTOLIC BLOOD PRESSURE: 138 MMHG | DIASTOLIC BLOOD PRESSURE: 86 MMHG | TEMPERATURE: 98.1 F | RESPIRATION RATE: 16 BRPM | HEART RATE: 69 BPM | HEIGHT: 64 IN

## 2019-12-05 DIAGNOSIS — M79.671 RIGHT FOOT PAIN: ICD-10-CM

## 2019-12-05 DIAGNOSIS — I10 HYPERTENSION GOAL BP (BLOOD PRESSURE) < 140/90: Primary | ICD-10-CM

## 2019-12-05 DIAGNOSIS — E66.09 CLASS 1 OBESITY DUE TO EXCESS CALORIES WITH SERIOUS COMORBIDITY AND BODY MASS INDEX (BMI) OF 33.0 TO 33.9 IN ADULT: ICD-10-CM

## 2019-12-05 DIAGNOSIS — E66.811 CLASS 1 OBESITY DUE TO EXCESS CALORIES WITH SERIOUS COMORBIDITY AND BODY MASS INDEX (BMI) OF 33.0 TO 33.9 IN ADULT: ICD-10-CM

## 2019-12-05 DIAGNOSIS — L98.9 SKIN LESION OF BACK: ICD-10-CM

## 2019-12-05 DIAGNOSIS — G57.11 MERALGIA PARESTHETICA, RIGHT LOWER LIMB: ICD-10-CM

## 2019-12-05 DIAGNOSIS — Z28.21 INFLUENZA VACCINATION DECLINED BY PATIENT: ICD-10-CM

## 2019-12-05 PROCEDURE — 99214 OFFICE O/P EST MOD 30 MIN: CPT | Performed by: NURSE PRACTITIONER

## 2019-12-05 RX ORDER — GABAPENTIN 300 MG/1
300 CAPSULE ORAL 3 TIMES DAILY
Qty: 90 CAPSULE | Refills: 1 | Status: SHIPPED | OUTPATIENT
Start: 2019-12-05 | End: 2020-01-30

## 2019-12-05 ASSESSMENT — MIFFLIN-ST. JEOR: SCORE: 1468.59

## 2019-12-05 ASSESSMENT — PAIN SCALES - GENERAL: PAINLEVEL: NO PAIN (0)

## 2019-12-05 NOTE — PATIENT INSTRUCTIONS
At [facility], we strive to deliver an exceptional experience to you, every time we see you.  If you receive a survey in the mail, please send us back your thoughts. We really do value your feedback.    Based on your medical history, these are the current health maintenance/preventive care services that you are due for (some may have been done at this visit.)  Health Maintenance Due   Topic Date Due     ADVANCE CARE PLANNING  1965     ZOSTER IMMUNIZATION (1 of 2) 06/04/2013     INFLUENZA VACCINE (1) 09/01/2019     HPV  05/15/2020         Suggested websites for health information:  Www.[employer]Track : Up to date and easily searchable information on multiple topics.  Www.medlineplus.gov : medication info, interactive tutorials, watch real surgeries online  Www.familydoctor.org : good info from the Academy of Family Physicians  Www.cdc.gov : public health info, travel advisories, epidemics (H1N1)  Www.aap.org : children's health info, normal development, vaccinations  Www.health.[facility].[place].us : [place] dept of health, public health issues in [place], N1N1    Your care team:                            Family Medicine Internal Medicine   MD Stan Santoro MD Shantel Branch-Fleming, MD Katya Georgiev PA-C Nam Ho, MD Pediatrics   ANTELMO Guerin, MD Bethany Shelton CNP, MD Deborah Mielke, MD Kim Thein, APRN [facility]      Clinic hours: Monday - Thursday 7 am-7 pm; Fridays 7 am-5 pm.   Urgent care: Monday - Friday 11 am-9 pm; Saturday and Sunday 9 am-5 pm.  Pharmacy : Monday -Thursday 8 am-8 pm; Friday 8 am-6 pm; Saturday and Sunday 9 am-5 pm.     Clinic: [phone]   Pharmacy: [phone]    Patient Education     Recognizing Skin Cancer  Doing monthly skin checkups is the best way to find new marks or skin changes. During your skin checkups, be sure to follow the ABCDEs of skin checks. This means checking moles or other  growths for Asymmetry, Border, Color, Diameter, and Evolving (changing). Note, too, any new growths, or, if any of your growths bleed, itch, look different, or are painful.  The ABCDEs of skin checks  Check your moles or growths for signs of melanoma using ABCDE:    Asymmetry: the sides of the mole or growth don t match    Border: the edges are ragged, notched, or blurred    Color: the color within the mole or growth varies    Diameter: the mole or growth is larger than 6 mm (size of a pencil eraser)    Evolving: the size, shape, or color of the mole or growth is changing (evolving is not shown below)       In addition to the ABCDEs, other warning signs of skin cancer include:    A spot or mole that looks different from all other marks on your skin    Changes in how an area feels, such as itching, tenderness, or pain    Changes in the skin's surface, such as oozing, bleeding, or scaliness    A sore that does not heal    New swelling or redness beyond the border of a mole  Who s at risk?  Anyone can get skin cancer. But you are at greater risk if you have:    Fair skin, light-colored hair, or light-colored eyes    Many moles or abnormal moles on your skin    A history of sunburns from sunlight or tanning beds    A family history of skin cancer    A history of exposure to radiation or chemicals    A weakened immune system  Also, a personal history of skin cancer puts you at risk for recurring skin cancer.  How to check your skin  Do your monthly skin checkups in front of a full-length mirror. Check all parts of your body, including your:    Head (ears, face, neck, and scalp)    Torso (front, back, and sides)    Arms (tops, undersides, upper, and lower armpits)    Hands (palms, backs, and fingers, including under the nails)    Buttocks and genitals    Legs (front, back, and sides)    Feet (tops, soles, toes, including under the nails, and between toes)  If you have a lot of moles, take digital photos of them each  month. Make sure to take photos both up close and from a distance. These can help you see if any moles change over time.  When to seek medical treatment  Most skin changes are not cancer. But if you see any changes in your skin, call your doctor right away. Only he or she can diagnose a problem. If you have skin cancer, seeing your doctor can be the first step toward getting the treatment that could save your life.   Date Last Reviewed: 1/1/2017 2000-2018 [employer]. [address] [place] [address], [place] [id number]. All rights reserved. This information is not intended as a substitute for professional medical care. Always follow your healthcare professional's instructions.

## 2019-12-05 NOTE — PROGRESS NOTES
Subjective     Mary Sims is a 54 year old female who presents to clinic today for the following health issues:    HPI     Joint Pain    Onset: 2 months ago    Description:   Location: right thigh/ knee  Character: Sharp and Stabbing    Intensity: intermittent    Progression of Symptoms: worse    Accompanying Signs & Symptoms:  Other symptoms: numbness, tingling and swelling    History:   Previous similar pain: YES      Precipitating factors:   Trauma or overuse: no     Alleviating factors:  Improved by: nothing    Therapies Tried and outcome: None      Concern - Mole on back  Onset: noticed 1 week ago    Description:   Mole on upper back: dark, raised slight pain    Intensity: moderate, severe    Progression of Symptoms:  same    Accompanying Signs & Symptoms:  Slight pain    Previous history of similar problem:   none    Precipitating factors:   Worsened by: none    Alleviating factors:  Improved by: none    Therapies Tried and outcome: None  Patient was scratching with  A back scratcher and it got caught on a mole that bled.    Hypertension Follow-up      Do you check your blood pressure regularly outside of the clinic? No     Are you following a low salt diet? Yes    Are your blood pressures ever more than 140 on the top number (systolic) OR more   than 90 on the bottom number (diastolic), for example 140/90?       Patient Active Problem List   Diagnosis     CARDIOVASCULAR SCREENING; LDL GOAL LESS THAN 160     Hypothyroidism     Hypertension goal BP (blood pressure) < 140/90     WONG (iron deficiency anemia)     Obesity     Nontraumatic tear of supraspinatus tendon, left     Insomnia, unspecified type     De Quervain's disease (tenosynovitis)     Right-sided low back pain without sciatica, unspecified chronicity     Past Surgical History:   Procedure Laterality Date     ARTHROSCOPY SHLDR ROTATOR CUFF REPAIR, SUBACROMIAL DECOMP, DIST CLAVICLE RESECTION, BICEP TENODESIS Left 4/11/2017    Procedure: ARTHROSCOPY  SHOULDER ROTATOR CUFF REPAIR, SUBACROMIAL DECOMPRESSION, DISTAL CLAVICLE RESECTION, OPEN BICEP TENODESIS REPAIR;  Surgeon: Dorina Rodriguez MD;  Location: [facility]     C  DELIVERY ONLY       COLONOSCOPY  2013    Procedure: COLONOSCOPY;  colonoscopy, screening;  Surgeon: Dalton Lala MD;  Location: [facility] OR     SURGICAL HISTORY OF -       RT ANKLE Fx AND REPAIR (PIN,PLATE,SCREWS)     THYROIDECTOMY         Social History     Tobacco Use     Smoking status: Never Smoker     Smokeless tobacco: Never Used   Substance Use Topics     Alcohol use: Yes     Comment: SELDOM      Family History   Problem Relation Age of Onset     Hypertension Mother      Diabetes Mother      Hypertension Father      Alcohol/Drug Father         alcohol     Crohn's Disease Son      Hypertension Other      Cancer No family hx of      Cerebrovascular Disease No family hx of      Thyroid Disease No family hx of      Glaucoma No family hx of      Macular Degeneration No family hx of          Current Outpatient Medications   Medication Sig Dispense Refill     metoprolol succinate ER (TOPROL-XL) 25 MG 24 hr tablet Take 1 tablet (25 mg) by mouth daily 90 tablet 3     naproxen (NAPROSYN) 500 MG tablet Take 1 tablet (500 mg) by mouth 2 times daily as needed for moderate pain 90 tablet 1     levothyroxine (SYNTHROID/LEVOTHROID) 75 MCG tablet Take 1 tablet (75 mcg) by mouth daily (Patient not taking: Reported on 2019) 90 tablet 3     order for DME Equipment being ordered: walking boot (Patient not taking: Reported on 2019) 1 Units 0     BP Readings from Last 3 Encounters:   19 138/86   19 140/81   05/15/19 138/86    Wt Readings from Last 3 Encounters:   19 89.4 kg (197 lb)   19 90.8 kg (200 lb 1.6 oz)   05/15/19 90.9 kg (200 lb 6.4 oz)         Reviewed and updated as needed this visit by Provider         Review of Systems   ROS COMP: Constitutional, HEENT, cardiovascular, pulmonary, gi and gu  "systems are negative, except as otherwise noted.      Objective    /86 (BP Location: Left arm, Patient Position: Chair, Cuff Size: Adult Large)   Pulse 69   Temp 98.1  F (36.7  C) (Oral)   Resp 16   Ht 1.626 m (5' 4\")   Wt 89.4 kg (197 lb)   LMP 08/04/2015 (Approximate)   SpO2 100%   Breastfeeding No   BMI 33.81 kg/m    Body mass index is 33.81 kg/m .  Physical Exam   GENERAL: healthy, alert and no distress  EYES: Eyes grossly normal to inspection, PERRL and conjunctivae and sclerae normal  HENT: ear canals and TM's normal, nose and mouth without ulcers or lesions  NECK: no adenopathy, no asymmetry, masses, or scars and thyroid normal to palpation  RESP: lungs clear to auscultation - no rales, rhonchi or wheezes  CV: regular rate and rhythm, normal S1 S2, no S3 or S4, no murmur, click or rub, no peripheral edema and peripheral pulses strong  ABDOMEN: soft, nontender, no hepatosplenomegaly, no masses and bowel sounds normal  MS: decreased sensation lateral right thigh just above the knee (size of her hand), no swelling, erythema, no gross musculoskeletal defects noted, no edema  SKIN: Hyperpigmented 2 mm mole mid right back with crust on it just above braline, otherwise, no suspicious lesions or rashes  NEURO: Normal strength and tone, mentation intact and speech normal  BACK: no CVA tenderness, no paralumbar tenderness  PSYCH: mentation appears normal, affect normal/bright  LYMPH: normal ant/post cervical, supraclavicular nodes    Diagnostic Test Results:  Labs reviewed in Epic  none         Assessment & Plan     1. Hypertension goal BP (blood pressure) < 140/90  BP well controlled, continue current management, Reviewed low salt diet, benefits of regular exercise, weight loss    2. Meralgia paresthetica, right lower limb  Will do trial of Neurontin    3. Skin lesion of back  Likely caught a mole with the back scratcher- the mole looks benign.  We can recheck the lesion once it has healed in a few " "weeks, reviewed ABCD's of skin cancer.    4. Influenza vaccination declined by patient  Conscientious objector.    5.  Class 1 obesity due to excess calories with serious comorbidity and body mass index (BMI) of 33.0-33.9 in adult  Benefits of weight loss reviewed in detail, encouraged her to cut back on the carbohydrates in the diet, consume more fruits and vegetables, drink plenty of water, avoid fruit juices, sodas, get 150 min moderate exercise/week.  Recheck weight in 6 months.       BMI:   Estimated body mass index is 33.81 kg/m  as calculated from the following:    Height as of this encounter: 1.626 m (5' 4\").    Weight as of this encounter: 89.4 kg (197 lb).   Weight management plan: Discussed healthy diet and exercise guidelines        Work on weight loss  Regular exercise  See Patient Instructions    No follow-ups on file.    PABLO Glez [facility]      "

## 2019-12-06 ENCOUNTER — OFFICE VISIT (OUTPATIENT)
Dept: PODIATRY | Facility: CLINIC | Age: 54
End: 2019-12-06
Payer: COMMERCIAL

## 2019-12-06 VITALS — DIASTOLIC BLOOD PRESSURE: 80 MMHG | SYSTOLIC BLOOD PRESSURE: 122 MMHG | HEART RATE: 68 BPM

## 2019-12-06 DIAGNOSIS — M19.071 PRIMARY LOCALIZED OSTEOARTHROSIS OF RIGHT ANKLE AND FOOT: Primary | ICD-10-CM

## 2019-12-06 PROCEDURE — 99213 OFFICE O/P EST LOW 20 MIN: CPT | Performed by: PODIATRIST

## 2019-12-06 NOTE — PATIENT INSTRUCTIONS
Weight management plan: Patient was referred to their PCP to discuss a diet and exercise plan.  We wish you continued good healing. If you have any questions or concerns, please do not hesitate to contact us at [phone]    Please remember to call and schedule a follow up appointment if one was recommended at your earliest convenience.   PODIATRY CLINIC HOURS  TELEPHONE NUMBER    Dr. Guero Ibarra D[facility]P.[facility]    Clinics:  [facility]    [facility]   Tuesday 1PM-6PM  [place]  Wednesday 7AM-2PM  [facility]  Thursday 10AM-6PM  [place]  Friday 7AM-3PM  [place]  Specialty schedulers:   [phone] to make an appointment with any Specialty Provider.        Urgent Care locations:    [facility] Monday-Friday 5 pm - 9 pm. Saturday-Sunday 9 am -5pm    Monday-Friday 11 am - 9 pm Saturday 9 am - 5 pm     Monday-Sunday 12 noon-8PM [phone]     If you need a medication refill, please contact us you may need lab work and/or a follow up visit prior to your refill (i.e. Antifungal medications).    [employer] (secure e-mail communication and access to your chart) to send a message or to make an appointment.    If MRI needed please call [facility] at [phone]

## 2019-12-06 NOTE — PROGRESS NOTES
S:      3/8/18   Patient seen in consult form Laura Riley and complains of foot pain.  Points to dorsum of right second/third tarsometatarsal joint.  Has had this for 2 years.  Describes it as a burning pain.  Aggrevated by activity and relieved by rest.  Positive history of post static dyskinesia.  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  History of ankle fracture with ORIF on this foot.  At times feels better with no shoes pressing on these joints    12/6/19   patient returns for right foot pain.  She points to the right second and third tarsometatarsal joint as well as the naviculocuneiform joint.  She has pain with this.  Is aggravated activity and relieved by rest.  She also believes the bony bulk on the dorsum is getting larger.  She wears a good stiff functional orthotic at work.  She has released her shoes to keep the pressure off this.  She takes anti-inflammatories as needed.  She is wondering about other treatment options.  She denies any erythema or ecchymosis.  She denies numbness       ROS: See above       Allergies   Allergen Reactions     No Known Drug Allergy        Current Outpatient Medications   Medication Sig Dispense Refill     gabapentin (NEURONTIN) 300 MG capsule Take 1 capsule (300 mg) by mouth 3 times daily 90 capsule 1     levothyroxine (SYNTHROID/LEVOTHROID) 75 MCG tablet Take 1 tablet (75 mcg) by mouth daily (Patient not taking: Reported on 7/25/2019) 90 tablet 3     metoprolol succinate ER (TOPROL-XL) 25 MG 24 hr tablet Take 1 tablet (25 mg) by mouth daily 90 tablet 3     naproxen (NAPROSYN) 500 MG tablet Take 1 tablet (500 mg) by mouth 2 times daily as needed for moderate pain 90 tablet 1     order for DME Equipment being ordered: walking boot (Patient not taking: Reported on 7/25/2019) 1 Units 0       Patient Active Problem List   Diagnosis     CARDIOVASCULAR SCREENING; LDL GOAL LESS THAN 160     Hypothyroidism     Hypertension goal BP (blood pressure) < 140/90     WONG (iron  deficiency anemia)     Obesity     Nontraumatic tear of supraspinatus tendon, left     Insomnia, unspecified type     De Quervain's disease (tenosynovitis)     Right-sided low back pain without sciatica, unspecified chronicity       Past Medical History:   Diagnosis Date     Graves disease      Hypertension      Hypothyroidism      MEDICAL HISTORY OF -     S/P RADIOACTIVE IODINE     Palpitation        Past Surgical History:   Procedure Laterality Date     ARTHROSCOPY SHLDR ROTATOR CUFF REPAIR, SUBACROMIAL DECOMP, DIST CLAVICLE RESECTION, BICEP TENODESIS Left 2017    Procedure: ARTHROSCOPY SHOULDER ROTATOR CUFF REPAIR, SUBACROMIAL DECOMPRESSION, DISTAL CLAVICLE RESECTION, OPEN BICEP TENODESIS REPAIR;  Surgeon: Dorina Rodriguez MD;  [facility] OR     C  DELIVERY ONLY       COLONOSCOPY  2013    Procedure: COLONOSCOPY;  colonoscopy, screening;  Surgeon: Dalton Lala MD;  Location:  OR     SURGICAL HISTORY OF -       RT ANKLE Fx AND REPAIR (PIN,PLATE,SCREWS)     THYROIDECTOMY         Family History   Problem Relation Age of Onset     Hypertension Mother      Diabetes Mother      Hypertension Father      Alcohol/Drug Father         alcohol     Crohn's Disease Son      Hypertension Other      Cancer No family hx of      Cerebrovascular Disease No family hx of      Thyroid Disease No family hx of      Glaucoma No family hx of      Macular Degeneration No family hx of        Social History     Tobacco Use     Smoking status: Never Smoker     Smokeless tobacco: Never Used   Substance Use Topics     Alcohol use: Yes     Comment: SELDOM          O: /80 (BP Location: Right arm)   Pulse 68   LMP 2015 (Approximate) .      Constitutional/ general:  Pt is in no apparent distress, appears well-nourished.  Cooperative with history and physical exam.     Psych:  The patient answered questions appropriately.  Normal affect.  Seems to have reasonable expectations, in terms of  treatment.     Eyes:  Visual scanning/ tracking without deficit.     Ears:  Response to auditory stimuli is normal.  negative hearing aid devices.  Auricles in proper alignment.     Lymphatic:  Popliteal lymph nodes not enlarged.     Lungs:  Non labored breathing, non labored speech. No cough.  No audible wheezing. Even, quiet breathing.       Vascular:  positive pedal pulses bilaterally for both the DP and PT arteries.  CFT < 3 sec.  negative ankle edema.  positive pedal hair growth.    Neuro:  Alert and oriented x 3. Coordinated gait.  Light touch sensation is intact to the L4, L5, S1 distributions. No obvious deficits.  No evidence of neurological-based weakness, spasticity, or contracture in the lower extremities.     Derm: Normal texture and turgor.  No erythema, ecchymosis, or cyanosis.      Musculoskeletal:  Cavus arch with weightbearing.  No forefoot or rear foot deformities noted.  MS 5/5 all compartments.  Normal ROM all fore foot and rearfoot joints.  No equinus.  Pain with palpation and ROM of the right second/third tarsometatarsal joint, especially the second.  There is bossing here.  There is also some bossing over the NC joint.  No pain with stressing any muscle compartments.  No erythema edema or ecchymosis or masses noted.      Xrays show decreased joint and subchondral sclerosis of the second/third tarsometatarsal joint and NC joint.    A:  Right midfoot arthritis.    P:  X-rays from past personally reviewed.  Again explained to patient she has arthritis in these joints.   Patient already wearing stiff shoes at all times and re-lacing these to offload the dorsal bossing.  We also discussed physical therapy to help her pain.  She declines at this time.  She would like to inquire about surgery.  Discussed she could have the bossing removed however this might cause more  motion and more pain on the underlying arthritic joint.  We also discussed joint fusion.  She is somewhat interested in this.    discussed I do not do the surgery and we will refer her to Dr. Frances for further evaluation of this.  RTC PRN      Guero Ibarra DPM, FACFAS

## 2019-12-06 NOTE — LETTER
12/6/2019         RE: aMry Sims  [address]  [facility] [address]        Dear Colleague,    Thank you for referring your patient, Mary Sims, to the [facility]. Please see a copy of my visit note below.    S:      3/8/18   Patient seen in consult form Laura Riley and complains of foot pain.  Points to dorsum of right second/third tarsometatarsal joint.  Has had this for 2 years.  Describes it as a burning pain.  Aggrevated by activity and relieved by rest.  Positive history of post static dyskinesia.  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  History of ankle fracture with ORIF on this foot.  At times feels better with no shoes pressing on these joints    12/6/19   patient returns for right foot pain.  She points to the right second and third tarsometatarsal joint as well as the naviculocuneiform joint.  She has pain with this.  Is aggravated activity and relieved by rest.  She also believes the bony bulk on the dorsum is getting larger.  She wears a good stiff functional orthotic at work.  She has released her shoes to keep the pressure off this.  She takes anti-inflammatories as needed.  She is wondering about other treatment options.  She denies any erythema or ecchymosis.  She denies numbness       ROS: See above       Allergies   Allergen Reactions     No Known Drug Allergy        Current Outpatient Medications   Medication Sig Dispense Refill     gabapentin (NEURONTIN) 300 MG capsule Take 1 capsule (300 mg) by mouth 3 times daily 90 capsule 1     levothyroxine (SYNTHROID/LEVOTHROID) 75 MCG tablet Take 1 tablet (75 mcg) by mouth daily (Patient not taking: Reported on 7/25/2019) 90 tablet 3     metoprolol succinate ER (TOPROL-XL) 25 MG 24 hr tablet Take 1 tablet (25 mg) by mouth daily 90 tablet 3     naproxen (NAPROSYN) 500 MG tablet Take 1 tablet (500 mg) by mouth 2 times daily as needed for moderate pain 90 tablet 1     order for DME Equipment being ordered: walking  boot (Patient not taking: Reported on 2019) 1 Units 0       Patient Active Problem List   Diagnosis     CARDIOVASCULAR SCREENING; LDL GOAL LESS THAN 160     Hypothyroidism     Hypertension goal BP (blood pressure) < 140/90     WONG (iron deficiency anemia)     Obesity     Nontraumatic tear of supraspinatus tendon, left     Insomnia, unspecified type     De Quervain's disease (tenosynovitis)     Right-sided low back pain without sciatica, unspecified chronicity       Past Medical History:   Diagnosis Date     Graves disease      Hypertension      Hypothyroidism      MEDICAL HISTORY OF -     S/P RADIOACTIVE IODINE     Palpitation        Past Surgical History:   Procedure Laterality Date     ARTHROSCOPY SHLDR ROTATOR CUFF REPAIR, SUBACROMIAL DECOMP, DIST CLAVICLE RESECTION, BICEP TENODESIS Left 2017    Procedure: ARTHROSCOPY SHOULDER ROTATOR CUFF REPAIR, SUBACROMIAL DECOMPRESSION, DISTAL CLAVICLE RESECTION, OPEN BICEP TENODESIS REPAIR;  Surgeon: Dorina Rodriguez MD;  [facility]     C  DELIVERY ONLY       COLONOSCOPY  2013    Procedure: COLONOSCOPY;  colonoscopy, screening;  Surgeon: Dalton Lala MD;  Location:  OR     SURGICAL HISTORY OF -       RT ANKLE Fx AND REPAIR (PIN,PLATE,SCREWS)     THYROIDECTOMY         Family History   Problem Relation Age of Onset     Hypertension Mother      Diabetes Mother      Hypertension Father      Alcohol/Drug Father         alcohol     Crohn's Disease Son      Hypertension Other      Cancer No family hx of      Cerebrovascular Disease No family hx of      Thyroid Disease No family hx of      Glaucoma No family hx of      Macular Degeneration No family hx of        Social History     Tobacco Use     Smoking status: Never Smoker     Smokeless tobacco: Never Used   Substance Use Topics     Alcohol use: Yes     Comment: SELDOM          O: /80 (BP Location: Right arm)   Pulse 68   LMP 2015 (Approximate) .      Constitutional/  general:  Pt is in no apparent distress, appears well-nourished.  Cooperative with history and physical exam.     Psych:  The patient answered questions appropriately.  Normal affect.  Seems to have reasonable expectations, in terms of treatment.     Eyes:  Visual scanning/ tracking without deficit.     Ears:  Response to auditory stimuli is normal.  negative hearing aid devices.  Auricles in proper alignment.     Lymphatic:  Popliteal lymph nodes not enlarged.     Lungs:  Non labored breathing, non labored speech. No cough.  No audible wheezing. Even, quiet breathing.       Vascular:  positive pedal pulses bilaterally for both the DP and PT arteries.  CFT < 3 sec.  negative ankle edema.  positive pedal hair growth.    Neuro:  Alert and oriented x 3. Coordinated gait.  Light touch sensation is intact to the L4, L5, S1 distributions. No obvious deficits.  No evidence of neurological-based weakness, spasticity, or contracture in the lower extremities.     Derm: Normal texture and turgor.  No erythema, ecchymosis, or cyanosis.      Musculoskeletal:  Cavus arch with weightbearing.  No forefoot or rear foot deformities noted.  MS 5/5 all compartments.  Normal ROM all fore foot and rearfoot joints.  No equinus.  Pain with palpation and ROM of the right second/third tarsometatarsal joint, especially the second.  There is bossing here.  There is also some bossing over the NC joint.  No pain with stressing any muscle compartments.  No erythema edema or ecchymosis or masses noted.      Xrays show decreased joint and subchondral sclerosis of the second/third tarsometatarsal joint and NC joint.    A:  Right midfoot arthritis.    P:  X-rays from past personally reviewed.  Again explained to patient she has arthritis in these joints.   Patient already wearing stiff shoes at all times and re-lacing these to offload the dorsal bossing.  We also discussed physical therapy to help her pain.  She declines at this time.  She would like  to inquire about surgery.  Discussed she could have the bossing removed however this might cause more  motion and more pain on the underlying arthritic joint.  We also discussed joint fusion.  She is somewhat interested in this.   discussed I do not do the surgery and we will refer her to Dr. Frances for further evaluation of this.  RTC PRN      Guero Ibarra DPM, FACFAS           Again, thank you for allowing me to participate in the care of your patient.        Sincerely,        Guero Ibarra DPM

## 2019-12-12 ENCOUNTER — PREP FOR PROCEDURE (OUTPATIENT)
Dept: PODIATRY | Facility: CLINIC | Age: 54
End: 2019-12-12

## 2019-12-12 ENCOUNTER — TELEPHONE (OUTPATIENT)
Dept: PODIATRY | Facility: CLINIC | Age: 54
End: 2019-12-12

## 2019-12-12 ENCOUNTER — OFFICE VISIT (OUTPATIENT)
Dept: PODIATRY | Facility: CLINIC | Age: 54
End: 2019-12-12
Attending: PODIATRIST
Payer: COMMERCIAL

## 2019-12-12 ENCOUNTER — ANCILLARY PROCEDURE (OUTPATIENT)
Dept: GENERAL RADIOLOGY | Facility: CLINIC | Age: 54
End: 2019-12-12
Attending: PODIATRIST
Payer: COMMERCIAL

## 2019-12-12 VITALS
SYSTOLIC BLOOD PRESSURE: 170 MMHG | WEIGHT: 197 LBS | DIASTOLIC BLOOD PRESSURE: 101 MMHG | HEIGHT: 64 IN | BODY MASS INDEX: 33.63 KG/M2

## 2019-12-12 DIAGNOSIS — M19.071 ARTHRITIS OF RIGHT FOOT: Primary | ICD-10-CM

## 2019-12-12 DIAGNOSIS — M19.071 ARTHRITIS OF RIGHT FOOT: ICD-10-CM

## 2019-12-12 DIAGNOSIS — M19.071 ARTHRITIS OF FOOT, RIGHT: Primary | ICD-10-CM

## 2019-12-12 PROCEDURE — 82306 VITAMIN D 25 HYDROXY: CPT | Performed by: PODIATRIST

## 2019-12-12 PROCEDURE — 99214 OFFICE O/P EST MOD 30 MIN: CPT | Performed by: PODIATRIST

## 2019-12-12 PROCEDURE — 73630 X-RAY EXAM OF FOOT: CPT | Mod: RT

## 2019-12-12 PROCEDURE — 36415 COLL VENOUS BLD VENIPUNCTURE: CPT | Performed by: PODIATRIST

## 2019-12-12 ASSESSMENT — MIFFLIN-ST. JEOR: SCORE: 1468.59

## 2019-12-12 NOTE — PATIENT INSTRUCTIONS
"Thank you for choosing [facility] Podiatry / Foot & Ankle Surgery!    DR. CUENCA'S CLINIC LOCATIONS:   MONDAY - [place] TUESDAY - [place]   [address] [facility]  [address]   [place] [address] [place] [address]   [phone]       THURSDAY AM - [place] THURSDAY PM - [place]   [address]   [place] [address] [place] [address]   [phone]       FRIDAY AM - [place] SET UP SURGERY: [phone]   [address] APPOINTMENTS: [phone]   [place] [address] BILLING QUESTIONS: [phone] FAX NUMBER: [phone]     FOOT & ANKLE SURGERY PLANNING CHECKLIST  If you have decided to have surgery, follow these 5 steps to get the procedure scheduled and to have the proper paperwork filled out. If you are unsure about surgery or would like to sit down and further discuss your issue and treatment options, please make an appointment.    1.  Pick the date that you would like to have surgery. Surgery is done on a Wednesday at [facility]. Keep in mind that you will likely need at least 2 weeks off after the procedure for proper rest and healing.    2.  Call the surgery scheduling line at [phone] to get the procedure scheduled. Our  will also help you make your pre-operative physical with a primary doctor, your surgery consult appointment with me and your one week follow up after surgery for your first dressing change.    3.  If our surgery scheduler does not make your surgery consultation appointment with me then call to schedule that. When making the appointment, say \"I need to make a 30 minute surgical consult appointment\". It is recommended that you bring a spouse, family member or friend with you. There will be lots of information presented. It can be overwhelming and it is better to have someone there to help sort out the details.    4. Contact your employer to request time " off from work if needed. If there is going to be FMLA used, please have them fax the forms to [phone] at least one week prior to surgery.    * If you have any post-operative questions regarding your procedure, call our triage team at the [facility] at [phone] (option 2 > option 3)

## 2019-12-12 NOTE — TELEPHONE ENCOUNTER
Patient called with interest to move forward with surgery. Please place case request for right foot.       Hayden Cerrato, Surgery Scheduler

## 2019-12-12 NOTE — PROGRESS NOTES
"Foot & Ankle Surgery   December 12, 2019    S:  Pt is seen today for evaluation of right lower extremity pain.  She has seen Dr Ibarra in the past for right foot pain, most recently 12/6/19.  She has a very large bony prominence at the dorsal midfoot.  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  She changes how she laces her shoes, to minimize pressure on the area, and has orthotics.  This does help but pain can be 7/10 \"once or twice a week\".  Without shoes she doesn't have a whole lot of pain, certainly worse with shoe gear pressure.  Has also done ice/heat, \"pain medication\".      Vitals:    12/12/19 1001   BP: (!) 170/101   Weight: 89.4 kg (197 lb)   Height: 1.626 m (5' 4\")   '      ROS - Pos for CC.  Patient denies current nausea, vomiting, chills, fevers, belly pain, calf pain, chest pain or SOB.  Complete remainder of ROS it otherwise neg.      PE:  Gen:   No apparent distress  Eye:    Visual scanning without deficit  Ear:    Response to auditory stimuli wnl  Lung:    Non-labored breathing on RA noted  Abd:    NTND per patient report  Lymph:    Neg for pitting/non-pitting edema BLE  Vasc:    Pulses palpable, CFT minimally delayed  Neuro:    Light touch sensation intact to all sensory nerve distributions without paresthesias  Derm:    Neg for nodules, lesions or ulcerations  MSK:    Right lower extremity - very large/prominent exostosis over the dorsal midfoot.  Clinically, this appears a bit more proximal than the TMT, but certainly these joints could be involved.  Tender with pressure, but can have pain even without shoe gear.  Calf:    Neg for redness, swelling or tenderness      Imaging:  3 views WB with metallic marker over bony prominence - midfoot arthritis with bony prominences.  Unremarkable ankle hardware.      Labs:  Vit D draw in process    Assessment:  54 year old female with midfoot arthritis with prominent exostosis right lower extremity       Plan:  " Discussed etiologies, anatomy and options  1.  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  Indicates TMT and N-C joints as main joint(s) involved  -we discussed orthotics(has) and shoe gear/lacing modifications.  She has done non-surgical therapies without sufficient relief  -surgically, discussed 2 options:  Simple bone spur resection vs bone spur resection and fusion of involved joints  -discussed post-operative course for both scenarios  -xrays/Vit D draw today  -surg jacqueline handout    Job duties; time off work -  at [employer]; on feet 8-12 hours/day; 2 weeks min  Smoking history - neg  Vit D - draw prior to surgery  Diabetic/A1c - neg  Hemoglobin - draw prior to surgery  Clot history - neg  Allergies to surgical implants/suture - neg  Allergies/issues with narcotics - neg    Procedure(s):  1.  Right midfoot fusions  Consent: above  Diagnosis:  arthritis  Equipment/Vendor: [employer] midfoot plating system.          Follow up:  Prn for surg consult or sooner with acute issues      Body mass index is 33.81 kg/m .  Weight management plan: Patient was referred to their PCP to discuss a diet and exercise plan.         Choco Frances DPM FACFAS FACFAOM  Podiatric Foot & Ankle Surgeon  [facility]  [phone]

## 2019-12-12 NOTE — LETTER
"    12/12/2019         RE: Mary Sims  [address]  [place] [address]        Dear Colleague,    Thank you for referring your patient, Mary Sims, to the [facility]. Please see a copy of my visit note below.    Foot & Ankle Surgery   December 12, 2019    S:  Pt is seen today for evaluation of right lower extremity pain.  She has seen Dr Ibarra in the past for right foot pain, most recently 12/6/19.  She has a very large bony prominence at the dorsal midfoot.  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  She changes how she laces her shoes, to minimize pressure on the area, and has orthotics.  This does help but pain can be 7/10 \"once or twice a week\".  Without shoes she doesn't have a whole lot of pain, certainly worse with shoe gear pressure.  Has also done ice/heat, \"pain medication\".      Vitals:    12/12/19 1001   BP: (!) 170/101   Weight: 89.4 kg (197 lb)   Height: 1.626 m (5' 4\")   '      ROS - Pos for CC.  Patient denies current nausea, vomiting, chills, fevers, belly pain, calf pain, chest pain or SOB.  Complete remainder of ROS it otherwise neg.      PE:  Gen:   No apparent distress  Eye:    Visual scanning without deficit  Ear:    Response to auditory stimuli wnl  Lung:    Non-labored breathing on RA noted  Abd:    NTND per patient report  Lymph:    Neg for pitting/non-pitting edema BLE  Vasc:    Pulses palpable, CFT minimally delayed  Neuro:    Light touch sensation intact to all sensory nerve distributions without paresthesias  Derm:    Neg for nodules, lesions or ulcerations  MSK:    Right lower extremity - very large/prominent exostosis over the dorsal midfoot.  Clinically, this appears a bit more proximal than the TMT, but certainly these joints could be involved.  Tender with pressure, but can have pain even without shoe gear.  Calf:    Neg for redness, swelling or tenderness      Imaging:  3 views WB with metallic marker over bony " prominence - midfoot arthritis with bony prominences.  Unremarkable ankle hardware.      Labs:  Vit D draw in process    Assessment:  54 year old female with midfoot arthritis with prominent exostosis right lower extremity       Plan:  Discussed etiologies, anatomy and options  1.  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  Indicates TMT and N-C joints as main joint(s) involved  -we discussed orthotics(has) and shoe gear/lacing modifications.  She has done non-surgical therapies without sufficient relief  -surgically, discussed 2 options:  Simple bone spur resection vs bone spur resection and fusion of involved joints  -discussed post-operative course for both scenarios  -xrays/Vit D draw today  -surg jacqueline handout    Job duties; time off work -  at [employer]; on feet 8-12 hours/day; 2 weeks min  Smoking history - neg  Vit D - draw prior to surgery  Diabetic/A1c - neg  Hemoglobin - draw prior to surgery  Clot history - neg  Allergies to surgical implants/suture - neg  Allergies/issues with narcotics - neg    Procedure(s):  1.  Right midfoot fusions  Consent: above  Diagnosis:  arthritis  Equipment/Vendor: [employer] midfoot plating system.          Follow up:  Prn for surg consult or sooner with acute issues      Body mass index is 33.81 kg/m .  Weight management plan: Patient was referred to their PCP to discuss a diet and exercise plan.         Choco Frances DPM FACFAS FACFAOM  Podiatric Foot & Ankle Surgeon  [facility]  [phone]      Again, thank you for allowing me to participate in the care of your patient.        Sincerely,        Choco Frances DPM, EVELYN

## 2019-12-13 PROBLEM — M19.071 ARTHRITIS OF FOOT, RIGHT: Status: ACTIVE | Noted: 2019-12-13

## 2019-12-13 LAB — DEPRECATED CALCIDIOL+CALCIFEROL SERPL-MC: 8 UG/L (ref 20–75)

## 2019-12-13 NOTE — TELEPHONE ENCOUNTER
Type of surgery: MIDFOOT BONE SPUR RESECTION WITH JOINT FUSIONS RIGHT LOWER EXTREMITY  Location of surgery: [facility]  Date and time of surgery: 02/12/2020 @ 7:30 AM  Surgeon: Dr Frances  Pre-Op Appt Date: 01/27/20 @ 9:20AM  Surg Consult: 01/30/20 @ 8:00 AM  Post-Op Appt Date: 02/20/20 @ 11:00 AM   Packet sent out: Yes  Pre-cert/Authorization completed:  No  Date: 12/13/19 EV

## 2019-12-30 DIAGNOSIS — E03.9 HYPOTHYROIDISM, UNSPECIFIED TYPE: ICD-10-CM

## 2019-12-30 NOTE — TELEPHONE ENCOUNTER
"Requested Prescriptions   Pending Prescriptions Disp Refills     levothyroxine (SYNTHROID/LEVOTHROID) 75 MCG tablet [Pharmacy Med Name: LEVOTHYROXINE SODIUM 75MCG TABS]  Last Written Prescription Date:  01/10/19  Last Fill Quantity: 90,  # refills: 3   Last Office Visit with [facility], JOSE MARTINP or [facility] prescribing provider:  12/05/19-Thein   Future Office Visit:    Next 5 appointments (look out 90 days)    Jan 27, 2020  9:20 AM CST  Pre-Op physical with PABLO Dean CNP  [facility] [address] [facility] [address]  [phone]   Jan 30, 2020  8:00 AM CST  Return Visit with Choco Frances DPM  [facility] [address] [facility] [address]  [phone]   Feb 20, 2020 11:00 AM CST  Return Visit with Choco Frances DPM  [facility] [address] [facility] [address]  [phone]        90 tablet 3     Sig: TAKE ONE TABLET BY MOUTH EVERY DAY       Thyroid Protocol Passed - 12/30/2019  4:11 AM        Passed - Patient is 12 years or older        Passed - Recent (12 mo) or future (30 days) visit within the authorizing provider's specialty     Patient has had an office visit with the authorizing provider or a provider within the authorizing providers department within the previous 12 mos or has a future within next 30 days. See \"Patient Info\" tab in inbasket, or \"Choose Columns\" in Meds & Orders section of the refill encounter.              Passed - Medication is active on med list        Passed - Normal TSH on file in past 12 months     Recent Labs   Lab Test 05/15/19  1026   TSH 1.03              Passed - No active pregnancy on record     If patient is pregnant or has had a positive pregnancy test, please check TSH.          Passed - No positive pregnancy test in past 12 months     If patient is pregnant or has had a positive pregnancy test, " please check TSH.

## 2020-01-02 RX ORDER — LEVOTHYROXINE SODIUM 75 UG/1
TABLET ORAL
Qty: 90 TABLET | Refills: 0 | Status: SHIPPED | OUTPATIENT
Start: 2020-01-02 | End: 2020-03-26

## 2020-01-02 NOTE — TELEPHONE ENCOUNTER
Prescription approved per G Refill Protocol.    Nica Schmidt RN  [facility]

## 2020-01-04 ENCOUNTER — TRANSFERRED RECORDS (OUTPATIENT)
Dept: HEALTH INFORMATION MANAGEMENT | Facility: CLINIC | Age: 55
End: 2020-01-04

## 2020-01-26 DIAGNOSIS — G57.11 MERALGIA PARESTHETICA, RIGHT LOWER LIMB: ICD-10-CM

## 2020-01-26 DIAGNOSIS — M79.671 RIGHT FOOT PAIN: ICD-10-CM

## 2020-01-26 NOTE — TELEPHONE ENCOUNTER
Requested Prescriptions   Pending Prescriptions Disp Refills     gabapentin (NEURONTIN) 300 MG capsule [Pharmacy Med Name: GABAPENTIN 300MG CAPS]  Last Written Prescription Date:  12/5/19  Last Fill Quantity: 90,  # refills: 1   Last Office Visit with [facility] or  Health prescribing provider:  12/5/19   Future Office Visit:    Next 5 appointments (look out 90 days)    Jan 27, 2020  9:20 AM CST  Pre-Op physical with PABLO Dean CNP  [facility] [address] [facility] [address]  [phone]   Jan 30, 2020  8:00 AM CST  [facility] Visit with Choco Frances DPM  [facility] [address] [place] [id number] [phone]   Feb 20, 2020 11:00 AM CST  Return Visit with Choco Frances DPM  [facility] [address] [place] [id number] [phone]          90 capsule 1     Sig: TAKE ONE CAPSULE BY MOUTH THREE TIMES A DAY       There is no refill protocol information for this order        naproxen (NAPROSYN) 500 MG tablet [Pharmacy Med Name: NAPROXEN 500MG TABS]  Last Written Prescription Date:  12/14/17  Last Fill Quantity: 90,  # refills: 1   Last Office Visit with [facility] or  Health prescribing provider:  12/5/19   Future Office Visit:    Next 5 appointments (look out 90 days)    Jan 27, 2020  9:20 AM CST  Pre-Op physical with PABLO Dean CNP  [facility]) [address] [facility] [address]  [phone]   Jan 30, 2020  8:00 AM CST  [facility] Visit with Choco Frances DPM  [facility] [address] [place] [id number] [phone]   Feb 20, 2020 11:00 AM CST  [facility] Visit with Choco Frances DPM  [facility] [address] [facility]  [phone]          60 tablet 3     Sig: TAKE ONE TABLET BY MOUTH TWICE A DAY WITH MEALS       NSAID Medications Failed - 1/26/2020  3:49 AM        Failed - Blood pressure under 140/90 in past 12 months     BP Readings from Last 3 Encounters:   12/12/19 (!) 170/101   12/06/19 122/80   12/05/19 138/86                 Failed - Normal ALT on file in past 12 months     Recent Labs   Lab Test 04/25/18  0847   ALT 21             Failed - Normal AST on file in past 12 months     Recent Labs   Lab Test 04/25/18  0847   AST 15             Failed - Normal CBC on file in past 12 months     Recent Labs   Lab Test 05/15/19  1026 04/25/18  0847   WBC  --  4.7   RBC  --  4.79   HGB 13.8 13.9   HCT  --  42.2   PLT  --  198                 Passed - Recent (12 mo) or future (30 days) visit within the authorizing provider's specialty     Patient has had an office visit with the authorizing provider or a provider within the authorizing providers department within the previous 12 mos or has a future within next 30 days. See \"Patient Info\" tab in inbasket, or \"Choose Columns\" in Meds & Orders section of the refill encounter.              Passed - Patient is age 6-64 years        Passed - Medication is active on med list        Passed - No active pregnancy on record        Passed - Normal serum creatinine on file in past 12 months     Recent Labs   Lab Test 05/15/19  1026   CR 0.75             Passed - No positive pregnancy test in past 12 months          "

## 2020-01-27 ENCOUNTER — OFFICE VISIT (OUTPATIENT)
Dept: FAMILY MEDICINE | Facility: CLINIC | Age: 55
End: 2020-01-27
Payer: COMMERCIAL

## 2020-01-27 VITALS
OXYGEN SATURATION: 99 % | BODY MASS INDEX: 34.49 KG/M2 | HEART RATE: 68 BPM | RESPIRATION RATE: 16 BRPM | DIASTOLIC BLOOD PRESSURE: 88 MMHG | SYSTOLIC BLOOD PRESSURE: 131 MMHG | HEIGHT: 64 IN | WEIGHT: 202 LBS | TEMPERATURE: 98.2 F

## 2020-01-27 DIAGNOSIS — E03.9 HYPOTHYROIDISM, UNSPECIFIED TYPE: ICD-10-CM

## 2020-01-27 DIAGNOSIS — Z01.818 PREOP GENERAL PHYSICAL EXAM: Primary | ICD-10-CM

## 2020-01-27 DIAGNOSIS — M19.071 ARTHRITIS OF FOOT, RIGHT: ICD-10-CM

## 2020-01-27 DIAGNOSIS — I10 HYPERTENSION GOAL BP (BLOOD PRESSURE) < 140/90: ICD-10-CM

## 2020-01-27 DIAGNOSIS — D50.9 IRON DEFICIENCY ANEMIA, UNSPECIFIED IRON DEFICIENCY ANEMIA TYPE: ICD-10-CM

## 2020-01-27 LAB
CREAT SERPL-MCNC: 0.72 MG/DL (ref 0.52–1.04)
GFR SERPL CREATININE-BSD FRML MDRD: >90 ML/MIN/{1.73_M2}
HGB BLD-MCNC: 13.6 G/DL (ref 11.7–15.7)
POTASSIUM SERPL-SCNC: 4.1 MMOL/L (ref 3.4–5.3)

## 2020-01-27 PROCEDURE — 36415 COLL VENOUS BLD VENIPUNCTURE: CPT | Performed by: NURSE PRACTITIONER

## 2020-01-27 PROCEDURE — 93000 ELECTROCARDIOGRAM COMPLETE: CPT | Performed by: NURSE PRACTITIONER

## 2020-01-27 PROCEDURE — 82565 ASSAY OF CREATININE: CPT | Performed by: NURSE PRACTITIONER

## 2020-01-27 PROCEDURE — 99214 OFFICE O/P EST MOD 30 MIN: CPT | Performed by: NURSE PRACTITIONER

## 2020-01-27 PROCEDURE — 84132 ASSAY OF SERUM POTASSIUM: CPT | Performed by: NURSE PRACTITIONER

## 2020-01-27 PROCEDURE — 85018 HEMOGLOBIN: CPT | Performed by: NURSE PRACTITIONER

## 2020-01-27 ASSESSMENT — PAIN SCALES - GENERAL: PAINLEVEL: NO PAIN (1)

## 2020-01-27 ASSESSMENT — MIFFLIN-ST. JEOR: SCORE: 1491.27

## 2020-01-27 NOTE — PATIENT INSTRUCTIONS
Before Your Surgery      Call your surgeon if there is any change in your health. This includes signs of a cold or flu (such as a sore throat, runny nose, cough, rash or fever).    Do not smoke, drink alcohol or take over the counter medicine (unless your surgeon or primary care doctor tells you to) for the 24 hours before and after surgery.    If you take prescribed drugs: Follow your doctor s orders about which medicines to take and which to stop until after surgery.    Eating and drinking prior to surgery: follow the instructions from your surgeon    Take a shower or bath the night before surgery. Use the soap your surgeon gave you to gently clean your skin. If you do not have soap from your surgeon, use your regular soap. Do not shave or scrub the surgery site.  Wear clean pajamas and have clean sheets on your bed.       At [facility], we strive to deliver an exceptional experience to you, every time we see you.  If you receive a survey in the mail, please send us back your thoughts. We really do value your feedback.    Based on your medical history, these are the current health maintenance/preventive care services that you are due for (some may have been done at this visit.)  Health Maintenance Due   Topic Date Due     ADVANCE CARE PLANNING  1965     ZOSTER IMMUNIZATION (1 of 2) 06/04/2013     INFLUENZA VACCINE (1) 09/01/2019     PHQ-2  01/01/2020     EYE EXAM  01/24/2020     HPV TEST  05/15/2020     PAP  05/15/2020         Suggested websites for health information:  Www.[employer].org : Up to date and easily searchable information on multiple topics.  Www.medlineplus.gov : medication info, interactive tutorials, watch real surgeries online  Www.familydoctor.org : good info from the Academy of Family Physicians  Www.cdc.gov : public health info, travel advisories, epidemics (H1N1)  Www.aap.org : children's health info, normal development, vaccinations  Www.health.state.[place].us : [place]  dept of health, public health issues in [place], N1N1    Your care team:                            Family Medicine Internal Medicine   MD Stan Santoro MD Shantel Branch-Fleming, MD Katya Georgiev PA-C Nam Ho, MD Pediatrics   ANTELMO Guerin, JO Renae APRN MD Bethany Koroma MD Deborah Mielke, MD Kim Thein, APRN [facility]      Clinic hours: Monday - Thursday 7 am-7 pm; Fridays 7 am-5 pm.   Urgent care: Monday - Friday 11 am-9 pm; Saturday and Sunday 9 am-5 pm.  Pharmacy : Monday -Thursday 8 am-8 pm; Friday 8 am-6 pm; Saturday and Sunday 9 am-5 pm.     Clinic: [phone]   Pharmacy: [phone]    Patient Education     Presurgery Checklist  You are scheduled to have surgery. The healthcare staff will try to make your stay comfortable. Use the guidelines below to remind yourself what to do before surgery. Be sure to follow any specific pre-op instructions from your surgeon or nurse.  Preparing for surgery    If you are having abdominal surgery, ask what you need to do to clear your bowel.    Tell your surgeon if you have allergies to any medicines, latex, or foods.    Ask your surgeon if you might need a blood transfusion during surgery and if so, how to prepare for it. In some cases, you can donate blood before surgery. If needed, this blood can be given back (transfused) to you during or after surgery.    Arrange for an adult family member or friend to drive you home after surgery. If possible, have someone ready to help you at home as you recover.    Call the surgeon if you get a cold, fever, sore throat, diarrhea, or other health problem just before surgery. Your surgeon can decide whether or not to postpone the surgery.  Medicines    Tell your surgeon about all medicines you take, including prescription and over-the-counter products such as herbal remedies and vitamins. Ask if you should continue taking them.    If you take ibuprofen,  naproxen, or blood thinners (anticoagulants) such as aspirin, clopidogrel, or warfarin, ask your surgeon whether you should stop taking them and how long before surgery you should stop.    You may be told to take antibiotics just before surgery to prevent infection. If so, follow instructions carefully on how to take them.    If you are told to take blood thinners to help prevent blood clots after surgery, be sure to follow the instructions on how to take them.  Stop smoking  If you smoke, healing may take longer. So at least 2 week(s) before surgery, stop smoking.  Bathing or showering before surgery    If instructed, wash with antibacterial soap. Afterward, do not use lotions, oils, or powders.    If you are having surgery on the head, you may be asked to shampoo with antibacterial soap. Follow instructions for doing so.  Do not remove hair from the surgery site  Do not shave hair from the incision site, unless you are given specific instructions to do so. Usually, if hair needs to be removed, it will be done at the hospital right before surgery.  Don t eat or drink    Follow any directions you are given for not eating or drinking before surgery. If you don't follow instructions about when to stop eating and drinking, your procedure may be postponed or rescheduled for another day. This is a safety issue.    You can brush your teeth and rinse your mouth, but don t swallow any water.  Day of surgery    Don't wear makeup. Don't use perfume, deodorant, or hairspray. Remove nail polish and artificial nails.    Leave jewelry (including rings), watches, and other valuables at home.    Be sure to bring health insurance cards or forms and a photo ID.    Bring a list of your medicines (include the name, dose, how often you take them, and the time last dose was taken).    Arrive on time at the hospital or surgery facility.  Date Last Reviewed: 12/1/2016 2000-2018 [employer]. [address] [place] [address],  [place] [address]. All rights reserved. This information is not intended as a substitute for professional medical care. Always follow your healthcare professional's instructions.     Stop taking Naprosyn 3 days prior to surgery.

## 2020-01-27 NOTE — PROGRESS NOTES
[address] [address]  [phone]  Dept: [phone]    PRE-OP EVALUATION:  Today's date: 2020    Mary Sims (: 1965) presents for pre-operative evaluation assessment as requested by Choco Jimenez.  She requires evaluation and anesthesia risk assessment prior to undergoing surgery/procedure for treatment of right foot-MIDFOOT BONE SPUR RESECTION WITH JOINT FUSIONS RIGHT LOWER EXTREMITY  .    Fax number for surgical facility: Available electronically  Primary Physician: Janie Riley  Type of Anesthesia Anticipated: General and Popliteal Block    Patient has a Health Care Directive or Living Will:  YES will bring copy    Preop Questions 2020   Who is doing your surgery? Dr Choco Salgado   What are you having done? Removing bone spurs and fusing bones   Date of Surgery/Procedure: 2020   Facility or Hospital where procedure/surgery will be performed: [facility]   1.  Do you have a history of Heart attack, stroke, stent, coronary bypass surgery, or other heart surgery? No   2.  Do you ever have any pain or discomfort in your chest? No   3.  Do you have a history of  Heart Failure? No   4.   Are you troubled by shortness of breath when:  walking on a level surface, or up a slight hill, or at night? No   5.  Do you currently have a cold, bronchitis or other respiratory infection? No   6.  Do you have a cough, shortness of breath, or wheezing? No   7.  Do you sometimes get pains in the calves of your legs when you walk? No   8. Do you or anyone in your family have previous history of blood clots? YES - Son with Crohns had DVT   9.  Do you or does anyone in your family have a serious bleeding problem such as prolonged bleeding following surgeries or cuts? No   10. Have you ever had problems with anemia or been told to take iron pills? YES - as a child and during pregnancy, none since   11. Have you  had any abnormal blood loss such as black, tarry or bloody stools, or abnormal vaginal bleeding? No   12. Have you ever had a blood transfusion? YES - 1991 with C section   13. Have you or any of your relatives ever had problems with anesthesia? No   14. Do you have sleep apnea, excessive snoring or daytime drowsiness? No   15. Do you have any prosthetic heart valves? No   16. Do you have prosthetic joints? No   17. Is there any chance that you may be pregnant? No         HPI:     HPI related to upcoming procedure: Patient ahs arthritis right foot and is scheduled for MIDFOOT BONE SPUR RESECTION WITH JOINT FUSIONS RIGHT LOWER EXTREMITY on 2/12/2020.      See problem list for active medical problems.  Problems all longstanding and stable, except as noted/documented.  See ROS for pertinent symptoms related to these conditions.    HYPERTENSION - Patient has longstanding history of HTN , currently denies any symptoms referable to elevated blood pressure. Specifically denies chest pain, palpitations, dyspnea, orthopnea, PND or peripheral edema. Blood pressure readings have been in normal range. Current medication regimen is as listed below. Patient denies any side effects of medication.     HYPOTHYROIDISM - Patient has a longstanding history of chronic Hypothyroidism. Patient has been doing well, noting no tremor, insomnia, hair loss or changes in skin texture. Continues to take medications as directed, without adverse reactions or side effects. Last TSH   Lab Results   Component Value Date    TSH 1.03 05/15/2019   .        MEDICAL HISTORY:     Patient Active Problem List    Diagnosis Date Noted     Arthritis of foot, right 12/13/2019     Priority: Medium     Added automatically from request for surgery [id number]       Insomnia, unspecified type 04/25/2018     Priority: Medium     De Quervain's disease (tenosynovitis) 04/25/2018     Priority: Medium     Right-sided low back pain without sciatica, unspecified chronicity  2018     Priority: Medium     Nontraumatic tear of supraspinatus tendon, left 2016     Priority: Medium     Obesity 2016     Priority: Medium     WONG (iron deficiency anemia) 2014     Priority: Medium     Hypothyroidism 2011     Priority: Medium     Hypertension goal BP (blood pressure) < 140/90 2011     Priority: Medium     CARDIOVASCULAR SCREENING; LDL GOAL LESS THAN 160 10/31/2010     Priority: Medium      Past Medical History:   Diagnosis Date     Arthritis of foot, right 2019     Graves disease      Hypertension      Hypothyroidism      MEDICAL HISTORY OF -     S/P RADIOACTIVE IODINE     Palpitation      Past Surgical History:   Procedure Laterality Date     ARTHROSCOPY SHLDR ROTATOR CUFF REPAIR, SUBACROMIAL DECOMP, DIST CLAVICLE RESECTION, BICEP TENODESIS Left 2017    Procedure: ARTHROSCOPY SHOULDER ROTATOR CUFF REPAIR, SUBACROMIAL DECOMPRESSION, DISTAL CLAVICLE RESECTION, OPEN BICEP TENODESIS REPAIR;  Surgeon: Dorina Rodriguez MD;  Location: [facility] OR     C  DELIVERY ONLY       COLONOSCOPY  2013    Procedure: COLONOSCOPY;  colonoscopy, screening;  Surgeon: Dalton Lala MD;  Location: [facility] OR     SURGICAL HISTORY OF -       RT ANKLE Fx AND REPAIR (PIN,PLATE,SCREWS)     THYROIDECTOMY       Current Outpatient Medications   Medication Sig Dispense Refill     gabapentin (NEURONTIN) 300 MG capsule Take 1 capsule (300 mg) by mouth 3 times daily 90 capsule 1     levothyroxine (SYNTHROID/LEVOTHROID) 75 MCG tablet TAKE ONE TABLET BY MOUTH EVERY DAY 90 tablet 0     metoprolol succinate ER (TOPROL-XL) 25 MG 24 hr tablet Take 1 tablet (25 mg) by mouth daily 90 tablet 3     naproxen (NAPROSYN) 500 MG tablet Take 1 tablet (500 mg) by mouth 2 times daily as needed for moderate pain 90 tablet 1     order for DME Equipment being ordered: walking boot (Patient not taking: Reported on 2019) 1 Units 0     OTC products: None, except as noted  "above    Allergies   Allergen Reactions     No Known Drug Allergy       Latex Allergy: NO    Social History     Tobacco Use     Smoking status: Never Smoker     Smokeless tobacco: Never Used   Substance Use Topics     Alcohol use: Yes     Comment: SELDOM      History   Drug Use No       REVIEW OF SYSTEMS:   Constitutional, HEENT, cardiovascular, pulmonary, gi and gu systems are negative, except as otherwise noted.    EXAM:   /88 (BP Location: Left arm, Patient Position: Chair, Cuff Size: Adult Regular)   Pulse 68   Temp 98.2  F (36.8  C) (Oral)   Resp 16   Ht 1.626 m (5' 4\")   Wt 91.6 kg (202 lb)   LMP 08/04/2015 (Approximate)   SpO2 99%   Breastfeeding No   BMI 34.67 kg/m      GENERAL APPEARANCE: healthy, alert and no distress     EYES: EOMI, PERRL     HENT: ear canals and TM's normal and nose and mouth without ulcers or lesions     NECK: no adenopathy, no asymmetry, masses, or scars and thyroid normal to palpation     RESP: lungs clear to auscultation - no rales, rhonchi or wheezes     CV: regular rates and rhythm, normal S1 S2, no S3 or S4 and no murmur, click or rub     ABDOMEN:  soft, nontender, no HSM or masses and bowel sounds normal     MS: extremities normal- no gross deformities noted, no evidence of inflammation in joints, FROM in all extremities.     SKIN: no suspicious lesions or rashes     NEURO: Normal strength and tone, sensory exam grossly normal, mentation intact and speech normal     PSYCH: mentation appears normal. and affect normal/bright     LYMPHATICS: No cervical adenopathy    DIAGNOSTICS:     Hemoglobin (indicated for history of anemia or procedure with significant blood loss such as tonsillectomy, major intraperitoneal surgery, vascular surgery, major spine surgery, total joint replacement)  Serum Potassium  Serum Creatinine  Labs Drawn and in Process:   Unresulted Labs Ordered in the Past 30 Days of this Admission     No orders found from 12/28/2019 to 1/28/2020.    "       Recent Labs   Lab Test 05/15/19  1026 04/25/18  0847  01/16/15  1358   HGB 13.8 13.9   < > 13.2   PLT  --  198  --  236    141   < >  --    POTASSIUM 4.4 4.1   < >  --    CR 0.75 0.62   < >  --     < > = values in this interval not displayed.      Component      Latest Ref Rng & Units 1/27/2020   Creatinine      0.52 - 1.04 mg/dL 0.72   GFR Estimate      >60 mL/min/1.73:m2 >90   GFR Estimate If Black      >60 mL/min/1.73:m2 >90   Hemoglobin      11.7 - 15.7 g/dL 13.6   Potassium      3.4 - 5.3 mmol/L 4.1       IMPRESSION:   Reason for surgery/procedure: arthritis right foot/MIDFOOT BONE SPUR RESECTION WITH JOINT FUSIONS RIGHT LOWER EXTREMITY  Diagnosis/reason for consult: preoperative exam      The proposed surgical procedure is considered INTERMEDIATE risk.    REVISED CARDIAC RISK INDEX  The patient has the following serious cardiovascular risks for perioperative complications such as (MI, PE, VFib and 3  AV Block):  No serious cardiac risks  INTERPRETATION: 0 risks: Class I (very low risk - 0.4% complication rate)    The patient has the following additional risks for perioperative complications:  No identified additional risks      ICD-10-CM    1. Preop general physical exam Z01.818 EKG 12-lead complete w/read - Clinics   2. Arthritis of foot, right M19.071    3. Hypertension goal BP (blood pressure) < 140/90 I10 Hemoglobin     Potassium     Creatinine   4. Hypothyroidism, unspecified type E03.9    5. Iron deficiency anemia, unspecified iron deficiency anemia type D50.9        RECOMMENDATIONS:     --Consult hospital rounder / IM to assist post-op medical management    --Patient is to take all scheduled medications on the day of surgery EXCEPT for modifications listed below.    Anticoagulant or Antiplatelet Medication Use  NSAIDS: Naproxen (Naprosyn):   Stop 2-3 days prior to surgery        APPROVAL GIVEN to proceed with proposed procedure, without further diagnostic evaluation       Signed  Electronically by: PABLO Glez CNP    Copy of this evaluation report is provided to requesting physician.    [facility] Preop Guidelines    Revised Cardiac Risk Index

## 2020-01-27 NOTE — PROGRESS NOTES
Faxed Pre=op notes, labs and Ekg to [facility], [phone], right fax confirmed at 3:21 pm today, 1/27/2020.  Trang Chirinos MA  [facility]  2nd Floor  Primary Care

## 2020-01-29 NOTE — TELEPHONE ENCOUNTER
Routing refill request to provider for review/approval because:  Labs not current:  ALT, AST, CBC last drawn on 04/25/2018  BP out of range    Meghan Dejesus RN  [place]

## 2020-01-30 ENCOUNTER — OFFICE VISIT (OUTPATIENT)
Dept: PODIATRY | Facility: CLINIC | Age: 55
End: 2020-01-30
Payer: COMMERCIAL

## 2020-01-30 VITALS
DIASTOLIC BLOOD PRESSURE: 78 MMHG | SYSTOLIC BLOOD PRESSURE: 132 MMHG | HEIGHT: 64 IN | WEIGHT: 202 LBS | BODY MASS INDEX: 34.49 KG/M2

## 2020-01-30 DIAGNOSIS — M19.071 ARTHRITIS OF RIGHT FOOT: ICD-10-CM

## 2020-01-30 DIAGNOSIS — M19.071 ARTHRITIS OF FOOT, RIGHT: Primary | ICD-10-CM

## 2020-01-30 PROCEDURE — 99214 OFFICE O/P EST MOD 30 MIN: CPT | Performed by: PODIATRIST

## 2020-01-30 RX ORDER — ERGOCALCIFEROL 1.25 MG/1
50000 CAPSULE, LIQUID FILLED ORAL WEEKLY
Qty: 12 CAPSULE | Refills: 1 | Status: SHIPPED | OUTPATIENT
Start: 2020-01-30 | End: 2020-03-02

## 2020-01-30 RX ORDER — NAPROXEN 500 MG/1
TABLET ORAL
Qty: 60 TABLET | Refills: 3 | Status: ON HOLD | OUTPATIENT
Start: 2020-01-30 | End: 2020-02-12

## 2020-01-30 RX ORDER — GABAPENTIN 300 MG/1
CAPSULE ORAL
Qty: 90 CAPSULE | Refills: 1 | Status: SHIPPED | OUTPATIENT
Start: 2020-01-30 | End: 2020-03-26

## 2020-01-30 ASSESSMENT — MIFFLIN-ST. JEOR: SCORE: 1491.27

## 2020-01-30 NOTE — PATIENT INSTRUCTIONS
Thank you for choosing [facility] Podiatry / Foot & Ankle Surgery!    DR. CUENCA'S CLINIC LOCATIONS:   MONDAY - [place] TUESDAY - [place]   [address] [facility]  [address]   [place] [address] [place] [address]   [phone]       THURSDAY AM - [place] THURSDAY PM - [place]   [address]   [place] [address] [place] [address]   [phone]       FRIDAY AM - [place] SET UP SURGERY: [phone] [address] APPOINTMENTS: [phone]   [place] [address] BILLING QUESTIONS: [phone] FAX NUMBER: [phone]     FOOT & ANKLE SURGICAL RISKS  Undergoing surgical procedure involves a certain amount of risks. Risks of complications vary, depending on the complexity of the surgery and how you take care of the surgical area during the healing process. Complications can range from minor infection to death. Some complications are temporary while others will be permanent. Your surgeon weighs the risk of complications versus the potential benefit of undergoing the procedure. You need to consider your tolerance for unexpected problems as you decide whether to undergo surgery.    Foot and ankle surgery involves cutting skin, bone, ligaments, blood vessels, and joints. These structures heal well but not without consequence. Any break in the skin can lead to infection. A deep infection can involve bone or joints, which can be devastating. Deep infection can lead to further surgeries such as amputation or could spread to other parts of the body. Most infections are minor and easily treated with oral antibiotics. Infections are often times from bacteria already present on your skin. Proper care of the surgical site is an essential component of avoiding infection. Do not get the bandage wet and take proper care of external pins to avoid these complications.    Joint stiffness is inherent to any foot or ankle  surgery. Joint surgery is a major component of reconstructive foot and ankle procedures. The ligaments and tissue around the joint are released for exposure and/or correction of alignment. Scar tissue limits joint mobility. This can lead to hypersensitivity to touch, pain, problems wearing shoes, and need for revision surgery.    Bones do not always heal after surgery. Poor healing after a bone cut of joint fusion can lead to an extended period of casting, bone stimulators, or repeat surgery. A surgical nonunion is when bones do not heal properly. Smoking will almost always increase your risks of having postoperative complications. So if you smoke, quit now.    Bone grafting is sometimes necessary during the original or repeat surgery. Bone is sometimes taken from other parts of the body, freeze-dried cadaveric bone, or synthetic bone grafts may be used as needed.    BLOOD CLOT PREVENTION & EDUCATION  Foot and ankle surgery can lead to blood clots in the large veins of your legs. This is called a deep venous thrombosis or DVT. A DVT can be life threatening if a portion of the clot breaks away and travels to the lungs. A clot in the lungs is called a pulmonary embolism or PE.    Your risk of developing a DVT is dependent on many factors. Risk factors associated with surgery include the type of surgery you are having (foot and ankle surgery is considered a much lower risk that knee, hip, or abdominal surgery), how long you are in a cast/boot, restricted ambulation, inability to move the ankle, and if you are hospitalized after surgery.    A number of medical conditions also increase your risk of DVT, including diabetes, use of estrogen medications such as birth control pills or postmenopausal medications, obesity, pregnancy, heart failure, cancer, etc.    Other risk factors include heavy smoking, advanced age (over 60 years old, but even those over 40 have increased risk), family of personal history of blood clots or  clotting disorders. Symptoms of a DVT in the leg may include swelling, tenderness, a warm feeling or redness. A DVT can occur in both legs even if only one side is being treated. If you have these symptoms, call your doctor immediately.    Symptoms of a PE include chest pain, shortness of breath or the need to breath rapidly that is not associated with exercise, difficulty breathing, rapid heart rate, a feeling of passing out, and coughing or coughing up blood. A PE is an emergency so you need to be evaluated in the ER immediately if any of these symptoms occur. You could die from a PE. Call 911 right away. PE and DVT can occur without any symptoms in the leg and chest.    Prevention of DVT and PE is important. Your doctor may apply various types of compression to your legs before and after surgery. In addition, high risk patients may be place on a short course of blood thinning medication after surgery.    You can reduce your risk for DVT after surgery by getting up and moving/crawling/crutching around the house once or twice each hour while awake in the first few weeks. Seated range of motion exercises of your ankle and leg may also help. Moving your legs keeps blood flowing through your leg veins and reduced any pooling of blood that may clot. Be sure to follow your doctor's instructions on elevation and weight bearing restrictions.      SURGICAL DRESSING  Your surgical dressing is a sterile dressing and should be left in place until removed by your doctor or their assistant at your first postop visit in clinic. Keep the dressing dry by covering it with a plastic bag during showers, taking baths with your surgical foot hanging outside of the tub, or by sponge bathing. If the dressing does become wet or dirty, call the clinic as it most likely needs to be changed. Do not change it yourself unless told otherwise by your surgeon. The safest plan is to wait to shower for three days after surgery. So take a long  shower the morning of surgery.    Do not wear regular shoes with a surgical bandage and/or external pins in your foot. Wear loose fitting clothing that will easily slide over the dressing. Do not cover your surgical foot with blankets as it can contaminate the dressing. Also, remember to keep it away from your pets as they may try to chew on it.    If your surgeon places external pins in your foot, you must keep your foot dry until the pins are removed at six to eight weeks after surgery. Pins should be covered for protection but still examine the pin sites for loosening, movement, infection, or drainage whenever possible. Do not apply ointment on the pin sites and never push a loose pin back into place.    PRESURGICAL MEDICATION RECOMMENDATIONS  Certain prescription, over-the-counter and herbal medications interfere with healing after an operation. The main concern related to medications that increase bleeding at the surgical site. Excess blood under the incision results in poor wound healing, excess pain, increased scarring, and a higher risk of infection.    Some medications slow the healing process of bone. Medications can also interfere with anesthesia drugs that keep you asleep during the operation. It is important to ensure that these medications are out of your system prior to the operation. The list below details a number of medications that are of concern. Pay special attention to how long you should avoid these medications prior to surgery. Please note that this list is not complete. You should ask your surgeon, pharmacist, or primary care physician if you are uncertain about other medications. Any herbal supplement not listed should be discontinued at least one week prior to surgery.    Aspirin: stop one week prior and may restart the day after surgery. This medication promotes bleeding.  Motrin/Ibuprofen/Aleve/Advil/NSAIDS: stop one week prior to surgery. These medications promote bleeding and may delay  bone healing. Avoid these medications at least six weeks postop.  Coumadin: your primary care doctor will manage your coumadin in relation to surgery.  Enbrel: stop two weeks prior and may restart two weeks after. It may affect soft tissue healing and increase infection risk.  Remicade: stop eight to twelve weeks prior and may restart two weeks after. It can affect soft tissue healing and increase infection risk.  Humera: stop four weeks prior and may restart two weeks after. It can affect soft tissue healing and increase infection risk.  Methotrexate: stop taking on dose prior to surgery. It may be restarted when the wound is healing well.  Kava: stop at least one day prior and may restart one day after. It can cause increased sedative effects of anesthetics.  Ephedra (ma vee): stop at least one day prior and may restart one day after. It can increase risk of heart attack or stroke and bleeding at the surgical site.  [place]'s Wort: stop at least five days prior and may restart one day after. It can diminish the effects of medications given during surgery.  Ginseng: stop at least one week prior and may restart one day after. It lowers blood sugar and can increase bleeding at the surgical site.  Ginkgo: stop 36 hours prior and may restart one day after. It can increase bleeding at the surgical site.  Garlic: stop at least one week prior and may restart one day after.  Valerian: slowly decrease the dose over a few weeks prior to avoid withdrawal symptoms. It can increase sedative effects of anesthetics.  Echinacea: no stop/start date. It can be associated with allergic reactions and suppression of your immune system.  Vitamin E/Omgea-3/Fish Oil/Flax/Glucosamine/Chondroitin: stop two weeks prior and may restart one day after. They can increase bleeding at the surgical site.      TIPS FOR SUCCESSFUL POST-OP HEALING  How you care for your surgical site is critically important to achieve successful results. Avoidance  of injury, infection, excess swelling, scar tissue, and stiffness are completely dependent on the care you provide over the next six weeks after surgery.    Your foot requires significant rest and elevation. Sitting for long hours with your foot elevated, however, will create its own problems. Expect muscle aches, back pain, cramps etc. Optimal posture, lumbar support, back exercises, ice, and heat may help with your new aches and pains. DO not apply a heating pad to your foot or leg, as this can worsen pain or swelling. Instead apply ice packs behind the involved knee. Do not apply it directly to the skin.    Narcotic pain medications and inactivity may also cause constipation. Limiting the use of these narcotics will help minimize this problem. The pain medication will not completely alleviate your pain. The purpose of pain pills is to take the edge off and help you get through the first few days. You can substitute extra strength Tylenol if pain is milder. Do not take Tylenol and prescription pain pills at the same time. Do not take more than 4,000mg of Tylenol in 24 hours. You can also minimize constipation by drinking plenty of water, eating lots of fruits and veggies, and taking the appropriate amount of Metamucil or other fiber supplements. These measures should be continued while on narcotic pain medications and if you remain inactive.    Showering can be a major challenge after surgery. Your incision requires about three days to become sealed from water. Your bandage should not get wet or removed. Attempt to avoid showing for three days after surgery and try a sponge bath instead. It can be dangerous showering while standing on only one foot so be careful. Consider borrowing a shower chair. If the bandage does get wet, call us immediately for a dressing change as this can slow the healing process or cause infection.    External pins need to be kept dry without ointment or moisture. Keep them covered at all  times. Protect them from clothing, blankets, and pets.  Any change or movement of the pins deserves a call to clinic. A loose pin will need to be removed. Never push them back into your foot.    Stiffness will develop after any operation due to scarring. The scar tissue begins to form immediately after the surgery. Inactivity can cause excess stiffness and may lead to blood clots, scar tissue, and adhesions.        SCAR CARE  Scarring is an unfortunate but unavoidable part of surgery. Every person scars differently and there is no way to predict how an individual's scar will look. Once the sutures are removed, there are a few things you can do to help minimize the scar tissue formation.    As soon as the skin is incised during surgery, the body is taking steps to prepare for healing. After about three days, the body has sent cells to the incision to begin the healing process. These cells, called fibroblasts, make collagen, a protein in the skin that helps provide strength. Once the skin has been sufficiently strengthened, the sutures are removed. Over the next year, the body synthesizes new collagen and breaks down old collagen to help achieve a strong scar that allows the foot and ankle to function appropriately. This is where patients can help the appearance of the scare, as it will change over the next year.    1. Do not expose the scar to the sun for one year.  2. Wear shoes/socks or cover your scar with zinc oxide  3. Any sun exposure can permanently darken the scar  4. Massage the scar 2-3 times a day to break down the collagen  5. Apply lotion/Vitamin E on the scar using some pressure  6. Try over the counter products like Mederma/Scar Zone    SHOWERING BEFORE SURGERY  You must wash with the soap twice before coming to the hospital for your surgery. This will decrease bacteria (germs) on your skin. It will also help reduce your chance of infection (illness caused by germs) after surgery.  Read the directions  and safety tips on the bottle of soap. Wash once the evening before surgery and once the morning of surgery. Use 4 ounces of soap each time. When showering, it is best to use 2 fresh washcloths and a fresh towel.   Items you will need for showerin newly washed washcloths    2 newly washed towels    8 ounces of soap  FOLLOW THESE INSTRUCTIONS:  The evening before surgery   1. Shower or bathe as you normally would, and use your regular soap and a clean washcloth. Give special attention to places where your incision (surgical cut) or catheters will be. This includes your groin area. Rinse well. You may wash your hair with your regular shampoo.  2. Next, wash your entire body from your chin down with the antiseptic soap. See the next page for directions about how to do this.  3. Rinse well and dry off using a newly washed towel.  The morning of surgery  Repeat steps 1, 2 and 3.   Other suggestions:    Wear freshly washed pajamas or clothing after your evening shower.    Wear freshly washed clothes the day of surgery.    Wash and change your bed sheets the day before surgery to have clean bed sheets after you shower and when you get home from surgery.    If you have trouble washing all areas, make sure someone helps you.    Don t use any deodorant, lotion or powder after your shower.    Women who are menstruating should wear a fresh sanitary pad to the hospital.    Hibiclens - 4% CHG  1. Put about 1 ounce of soap onto a clean, damp washcloth. Wash your skin from your chin down to your toes. Pay special attention to your incision areas.  2. Gently rub your incision area and surrounding areas.  3. Repeat steps 1 and 2 until you have used 4 ounces. Make sure you gently rub your incision area and surrounding areas for a full 5 minutes.   4. Rinse with water and towel dry with a clean towel.       * If you have any post-operative questions regarding your procedure, call our triage team at the [facility] at [phone] (option 2 > option 3).      BODY WEIGHT AND YOUR FEET  The following information is included in the after visit summary for all patients. Body weight can be a sensitive issue to discuss in clinic, but we think the following information is very important. Although we focus on the feet and ankles, we do support the overall health of our patients.     Many things can cause foot and ankle problems. Foot structure, activity level, foot mechanics and injuries are common causes of pain. One very important issue that often goes unmentioned, is body weight. Extra weight can cause increased stress on muscles, ligaments, bones and tendons. Sometimes just a few extra pounds is all it takes to put one over her/his threshold. Without reducing that stress, it can be difficult to alleviate pain. As Foot & Ankle specialists, our job is addressing the lower extremity problem and possible causes. Regarding extra body weight, we encourage patients to discuss diet and weight management plans with their primary care doctors. It is this team approach that gives you the best opportunity for pain relief and getting you back on your feet.      [place] has a Comprehensive Weight Management Program. This program includes counseling, education, non-surgical and surgical approaches to weight loss. If you are interested in learning more either talk to you primary care provider or call [phone].

## 2020-01-30 NOTE — Clinical Note
Amauri Bolanos saw Mary today for surgical consultation regarding extensive right midfoot arthritis.  We discussed the procedure, post-op course and risks, and she was given a copy of her post-op instructions.Thanks.Choco

## 2020-01-30 NOTE — PROGRESS NOTES
"Foot & Ankle Surgery   January 30, 2020    S:  Pt is seen today for surgical consult for right dorsal foot spurring/midfoot arthritis.  Conservative therapies that have been attempted without sufficient relief include shoes, RICE/NSAID.  Because of insufficient relief, the patient wishes to forego further non-operative cares in favor of surgical intervention.  No guarantees have been given to the outcome.  Mentions she is doing on a cruise 10 weeks after surgery, but will be able to rest her foot for that 1st week of her trip    Vitals:    01/30/20 0759   BP: 132/78   Weight: 91.6 kg (202 lb)   Height: 1.626 m (5' 4\")     Body mass index is 34.67 kg/m .    ROS - Pos for CC.  Patient denies current nausea, vomiting, chills, fevers, belly pain, calf pain, chest pain or SOB.  Complete remainder of ROS is otherwise neg.      PE:  VASC -   Pulses are palpable, CFT minimally delayed  NEURO -   Light touch intact to all sensory nerve distributions without reported paresthesias.  DERM:    Neg for nodules, lesions or ulcerations  MSK:    Large spur over midfoot, centered over 2nd TMT and 2nd met-cuneiform joint.  Surrounding arthritis noted on films, but patient states this isn't symptomatic.  LYMPH:     Neg for pitting/non-pitting edema, increased warmth or redness  CALF:   Neg for redness, swelling or tenderness.    Imaging:  FINDINGS: A paperclip was placed over the patient's dorsal bump.  Beneath the skin marker, there is dorsal osteophytosis at the  naviculocuneiform articulation and second TMT joint (with underlying  osteoarthritis). Calcaneal spurring. Medial malleolar screw and distal  fibular plate-screw fixation.    Labs:    Vitamin D Deficiency screening 8      Assessment:  54 year old female midfoot arthritis right lower extremity     Plan:  The surgical procedure(s) was discussed in detail today.  Risks that were discussed include, but are not limited to, infection, wound healing complications, temporary or " permanent nerve damage/numbness, painful scarring, possible recurrence of/new deformity, over-correction, under-correction, possible abnormal bone healing, fixation/hardware failure, continued or new pain, the need to revise the procedure, as well as blood clots, limb loss, pain syndrome and death.  After a discussion of the procedure, risks, and post-operative care and course, the patient has elected to forego further non-operative management in favor of surgical intervention.  No guarantees were given.  The patient was informed that swelling and generalized discomfort can persist for months, and full recovery can often take up to a year.  I anticipate discontinuation of prescription pain medication at/shortly after suture removal.    Diagnosis:  above  Procedure:  Bone spur resection with 2nd TMT and 2nd met-cuneiform fusions  Activity Level:  NWB 6-10 weeks  Pain Management:  Percocet, atarax, vistaril  DVT prophylaxis:  .  Patient instructed on ankle ROM/calf massage exercises; patient advised on early frequent ambulation  Allergies:     Allergies   Allergen Reactions     No Known Drug Allergy      Dispo:  Same day  WB assistive device:  RollAbout x 3 months; has crutches  Smoking:  neg  Vit D status:  8 as of Dec 2019.  Given Rx for 07240P weekly x 12 weeks today.  Consider daily supplement, although she states she takes enough pills  Clot/bleeding disorder history:   neg  Job duties/anticipated time off:  2 weeks min    Billing today is based on a time of >25 minutes, more than 50% of which was spent on counseling and coordinating care.    Body mass index is 34.67 kg/m .  Weight management plan: Patient was referred to their PCP to discuss a diet and exercise plan.      Choco Frances DPM FACFAS FACFAOM  Podiatric Foot & Ankle Surgeon  [facility]  [phone]

## 2020-02-04 ENCOUNTER — TELEPHONE (OUTPATIENT)
Dept: FAMILY MEDICINE | Facility: CLINIC | Age: 55
End: 2020-02-04

## 2020-02-04 NOTE — TELEPHONE ENCOUNTER
.Reason for call:  Form   Our goal is to have forms completed within 72 hours, however some forms may require a visit or additional information.     Who is the form from? Patient  Where did the form come from? Patient or family brought in     What clinic location was the form placed at? [facility]  Where was the form placed? [facility] bin at the  Box/Folder  What number is listed as a contact on the form? [phone]    Phone call message - patient request for a letter, form or note:     Date needed: as soon as possible  Medical Records. [facility]  Has the patient signed a consent form for release of information? Not Applicable    Additional comments: form needs to be sent to medical records for [facility]    Type of letter, form or note: medical    Phone number to reach patient:  Cell number on file:    Telephone Information:   Mobile [phone]       Best Time:  Anytime    Can we leave a detailed message on this number?  YES

## 2020-02-11 ENCOUNTER — DOCUMENTATION ONLY (OUTPATIENT)
Dept: OTHER | Facility: CLINIC | Age: 55
End: 2020-02-11

## 2020-02-11 ENCOUNTER — ANESTHESIA EVENT (OUTPATIENT)
Dept: SURGERY | Facility: CLINIC | Age: 55
End: 2020-02-11
Payer: COMMERCIAL

## 2020-02-12 ENCOUNTER — APPOINTMENT (OUTPATIENT)
Dept: GENERAL RADIOLOGY | Facility: CLINIC | Age: 55
End: 2020-02-12
Attending: PODIATRIST
Payer: COMMERCIAL

## 2020-02-12 ENCOUNTER — HOSPITAL ENCOUNTER (OUTPATIENT)
Facility: CLINIC | Age: 55
Discharge: HOME | End: 2020-02-12
Attending: PODIATRIST | Admitting: PODIATRIST
Payer: COMMERCIAL

## 2020-02-12 ENCOUNTER — ANESTHESIA (OUTPATIENT)
Dept: SURGERY | Facility: CLINIC | Age: 55
End: 2020-02-12
Payer: COMMERCIAL

## 2020-02-12 VITALS
HEIGHT: 64 IN | RESPIRATION RATE: 16 BRPM | DIASTOLIC BLOOD PRESSURE: 87 MMHG | OXYGEN SATURATION: 95 % | SYSTOLIC BLOOD PRESSURE: 131 MMHG | HEART RATE: 83 BPM | TEMPERATURE: 97 F | WEIGHT: 203 LBS | BODY MASS INDEX: 34.66 KG/M2

## 2020-02-12 DIAGNOSIS — M19.071 ARTHRITIS OF FOOT, RIGHT: ICD-10-CM

## 2020-02-12 DIAGNOSIS — Z98.890 S/P FOOT SURGERY, RIGHT: Primary | ICD-10-CM

## 2020-02-12 PROCEDURE — 25000128 H RX IP 250 OP 636: Performed by: PODIATRIST

## 2020-02-12 PROCEDURE — 71000013 ZZH RECOVERY PHASE 1 LEVEL 1 EA ADDTL HR: Performed by: PODIATRIST

## 2020-02-12 PROCEDURE — C1713 ANCHOR/SCREW BN/BN,TIS/BN: HCPCS | Performed by: PODIATRIST

## 2020-02-12 PROCEDURE — 37000009 ZZH ANESTHESIA TECHNICAL FEE, EACH ADDTL 15 MIN: Performed by: PODIATRIST

## 2020-02-12 PROCEDURE — 40000170 ZZH STATISTIC PRE-PROCEDURE ASSESSMENT II: Performed by: PODIATRIST

## 2020-02-12 PROCEDURE — 25000566 ZZH SEVOFLURANE, EA 15 MIN: Performed by: PODIATRIST

## 2020-02-12 PROCEDURE — 25000125 ZZHC RX 250: Performed by: NURSE ANESTHETIST, CERTIFIED REGISTERED

## 2020-02-12 PROCEDURE — 25800030 ZZH RX IP 258 OP 636: Performed by: SURGERY

## 2020-02-12 PROCEDURE — C1762 CONN TISS, HUMAN(INC FASCIA): HCPCS | Performed by: PODIATRIST

## 2020-02-12 PROCEDURE — 40000985 XR FOOT PORT RT 2 VW: Mod: RT

## 2020-02-12 PROCEDURE — 71000027 ZZH RECOVERY PHASE 2 EACH 15 MINS: Performed by: PODIATRIST

## 2020-02-12 PROCEDURE — 27110028 ZZH OR GENERAL SUPPLY NON-STERILE: Performed by: PODIATRIST

## 2020-02-12 PROCEDURE — 36000058 ZZH SURGERY LEVEL 3 EA 15 ADDTL MIN: Performed by: PODIATRIST

## 2020-02-12 PROCEDURE — 71000012 ZZH RECOVERY PHASE 1 LEVEL 1 FIRST HR: Performed by: PODIATRIST

## 2020-02-12 PROCEDURE — 25000128 H RX IP 250 OP 636: Performed by: SURGERY

## 2020-02-12 PROCEDURE — 28730 FUSION OF FOOT BONES: CPT | Mod: RT | Performed by: PODIATRIST

## 2020-02-12 PROCEDURE — 36000060 ZZH SURGERY LEVEL 3 W FLUORO 1ST 30 MIN: Performed by: PODIATRIST

## 2020-02-12 PROCEDURE — 25800030 ZZH RX IP 258 OP 636: Performed by: NURSE ANESTHETIST, CERTIFIED REGISTERED

## 2020-02-12 PROCEDURE — 25000128 H RX IP 250 OP 636: Performed by: NURSE ANESTHETIST, CERTIFIED REGISTERED

## 2020-02-12 PROCEDURE — 40000277 XR SURGERY CARM FLUORO LESS THAN 5 MIN W STILLS

## 2020-02-12 PROCEDURE — 37000008 ZZH ANESTHESIA TECHNICAL FEE, 1ST 30 MIN: Performed by: PODIATRIST

## 2020-02-12 PROCEDURE — 27210794 ZZH OR GENERAL SUPPLY STERILE: Performed by: PODIATRIST

## 2020-02-12 DEVICE — GRAFT BONE CRUSH CANC 15ML 400075: Type: IMPLANTABLE DEVICE | Site: FOOT | Status: FUNCTIONAL

## 2020-02-12 DEVICE — LO-PRO SCRW,TI,3.0MMX 24MMCORT
Type: IMPLANTABLE DEVICE | Site: FOOT | Status: FUNCTIONAL
Brand: ARTHREX®

## 2020-02-12 DEVICE — 3.0 X 30MM VAL SCREW, TI
Type: IMPLANTABLE DEVICE | Site: FOOT | Status: FUNCTIONAL
Brand: ARTHREX®

## 2020-02-12 DEVICE — LO-PRO SCRW,TI,3.0MMX 16MMCORT
Type: IMPLANTABLE DEVICE | Site: FOOT | Status: FUNCTIONAL
Brand: ARTHREX®

## 2020-02-12 DEVICE — 3.0 X 34MM VAL SCREW, TI
Type: IMPLANTABLE DEVICE | Site: FOOT | Status: FUNCTIONAL
Brand: ARTHREX®

## 2020-02-12 DEVICE — GRAFT BONE PUTTY DBX 01ML 038010: Type: IMPLANTABLE DEVICE | Site: FOOT | Status: FUNCTIONAL

## 2020-02-12 DEVICE — 3.0 X 10MM VAL SCREW, TI
Type: IMPLANTABLE DEVICE | Site: FOOT | Status: FUNCTIONAL
Brand: ARTHREX®

## 2020-02-12 DEVICE — 3.0 X 20MM VAL SCREW, TI
Type: IMPLANTABLE DEVICE | Site: FOOT | Status: FUNCTIONAL
Brand: ARTHREX®

## 2020-02-12 DEVICE — 3.0 X 24MM VAL SCREW, TI
Type: IMPLANTABLE DEVICE | Site: FOOT | Status: FUNCTIONAL
Brand: ARTHREX®

## 2020-02-12 DEVICE — LO-PRO MTP PLATE, STR, LONG, TI
Type: IMPLANTABLE DEVICE | Site: FOOT | Status: FUNCTIONAL
Brand: ARTHREX®

## 2020-02-12 DEVICE — 3.0 X 28MM VAL SCREW, TI
Type: IMPLANTABLE DEVICE | Site: FOOT | Status: FUNCTIONAL
Brand: ARTHREX®

## 2020-02-12 RX ORDER — SODIUM CHLORIDE, SODIUM LACTATE, POTASSIUM CHLORIDE, CALCIUM CHLORIDE 600; 310; 30; 20 MG/100ML; MG/100ML; MG/100ML; MG/100ML
INJECTION, SOLUTION INTRAVENOUS CONTINUOUS
Status: DISCONTINUED | OUTPATIENT
Start: 2020-02-12 | End: 2020-02-12 | Stop reason: HOSPADM

## 2020-02-12 RX ORDER — OXYCODONE HYDROCHLORIDE 5 MG/1
10 TABLET ORAL
Status: DISCONTINUED | OUTPATIENT
Start: 2020-02-12 | End: 2020-02-12 | Stop reason: HOSPADM

## 2020-02-12 RX ORDER — LIDOCAINE HYDROCHLORIDE 20 MG/ML
INJECTION, SOLUTION INFILTRATION; PERINEURAL PRN
Status: DISCONTINUED | OUTPATIENT
Start: 2020-02-12 | End: 2020-02-12

## 2020-02-12 RX ORDER — ONDANSETRON 2 MG/ML
INJECTION INTRAMUSCULAR; INTRAVENOUS PRN
Status: DISCONTINUED | OUTPATIENT
Start: 2020-02-12 | End: 2020-02-12

## 2020-02-12 RX ORDER — ONDANSETRON 2 MG/ML
4 INJECTION INTRAMUSCULAR; INTRAVENOUS EVERY 30 MIN PRN
Status: DISCONTINUED | OUTPATIENT
Start: 2020-02-12 | End: 2020-02-12 | Stop reason: HOSPADM

## 2020-02-12 RX ORDER — IBUPROFEN 600 MG/1
TABLET, FILM COATED ORAL
Qty: 28 TABLET | Refills: 0 | Status: SHIPPED | OUTPATIENT
Start: 2020-02-12 | End: 2020-03-26

## 2020-02-12 RX ORDER — MEPERIDINE HYDROCHLORIDE 25 MG/ML
12.5 INJECTION INTRAMUSCULAR; INTRAVENOUS; SUBCUTANEOUS
Status: DISCONTINUED | OUTPATIENT
Start: 2020-02-12 | End: 2020-02-12 | Stop reason: HOSPADM

## 2020-02-12 RX ORDER — DEXAMETHASONE SODIUM PHOSPHATE 4 MG/ML
INJECTION, SOLUTION INTRA-ARTICULAR; INTRALESIONAL; INTRAMUSCULAR; INTRAVENOUS; SOFT TISSUE
Status: DISCONTINUED
Start: 2020-02-12 | End: 2020-02-12 | Stop reason: HOSPADM

## 2020-02-12 RX ORDER — OXYCODONE AND ACETAMINOPHEN 5; 325 MG/1; MG/1
TABLET ORAL
Qty: 16 TABLET | Refills: 0 | Status: SHIPPED | OUTPATIENT
Start: 2020-02-12 | End: 2020-03-26

## 2020-02-12 RX ORDER — ERGOCALCIFEROL 1.25 MG/1
50000 CAPSULE, LIQUID FILLED ORAL WEEKLY
Qty: 12 CAPSULE | Refills: 0 | Status: SHIPPED | OUTPATIENT
Start: 2020-02-12 | End: 2022-11-15

## 2020-02-12 RX ORDER — PROPOFOL 10 MG/ML
INJECTION, EMULSION INTRAVENOUS PRN
Status: DISCONTINUED | OUTPATIENT
Start: 2020-02-12 | End: 2020-02-12

## 2020-02-12 RX ORDER — ONDANSETRON 4 MG/1
4 TABLET, ORALLY DISINTEGRATING ORAL EVERY 30 MIN PRN
Status: DISCONTINUED | OUTPATIENT
Start: 2020-02-12 | End: 2020-02-12 | Stop reason: HOSPADM

## 2020-02-12 RX ORDER — LIDOCAINE HYDROCHLORIDE 20 MG/ML
INJECTION, SOLUTION INFILTRATION; PERINEURAL
Status: DISCONTINUED
Start: 2020-02-12 | End: 2020-02-12 | Stop reason: HOSPADM

## 2020-02-12 RX ORDER — ROPIVACAINE HYDROCHLORIDE 5 MG/ML
INJECTION, SOLUTION EPIDURAL; INFILTRATION; PERINEURAL
Status: DISCONTINUED
Start: 2020-02-12 | End: 2020-02-12 | Stop reason: HOSPADM

## 2020-02-12 RX ORDER — EPHEDRINE SULFATE 50 MG/ML
INJECTION, SOLUTION INTRAMUSCULAR; INTRAVENOUS; SUBCUTANEOUS PRN
Status: DISCONTINUED | OUTPATIENT
Start: 2020-02-12 | End: 2020-02-12

## 2020-02-12 RX ORDER — NALOXONE HYDROCHLORIDE 0.4 MG/ML
.1-.4 INJECTION, SOLUTION INTRAMUSCULAR; INTRAVENOUS; SUBCUTANEOUS
Status: DISCONTINUED | OUTPATIENT
Start: 2020-02-12 | End: 2020-02-12 | Stop reason: HOSPADM

## 2020-02-12 RX ORDER — CEFAZOLIN SODIUM 2 G/100ML
2 INJECTION, SOLUTION INTRAVENOUS
Status: COMPLETED | OUTPATIENT
Start: 2020-02-12 | End: 2020-02-12

## 2020-02-12 RX ORDER — FENTANYL CITRATE 50 UG/ML
25-50 INJECTION, SOLUTION INTRAMUSCULAR; INTRAVENOUS
Status: DISCONTINUED | OUTPATIENT
Start: 2020-02-12 | End: 2020-02-12 | Stop reason: HOSPADM

## 2020-02-12 RX ORDER — HYDROXYZINE HYDROCHLORIDE 25 MG/1
TABLET, FILM COATED ORAL
Qty: 16 TABLET | Refills: 0 | Status: SHIPPED | OUTPATIENT
Start: 2020-02-12 | End: 2020-03-26

## 2020-02-12 RX ORDER — BUPIVACAINE HYDROCHLORIDE 5 MG/ML
INJECTION, SOLUTION EPIDURAL; INTRACAUDAL
Status: DISCONTINUED
Start: 2020-02-12 | End: 2020-02-12 | Stop reason: HOSPADM

## 2020-02-12 RX ORDER — PROPOFOL 10 MG/ML
INJECTION, EMULSION INTRAVENOUS CONTINUOUS PRN
Status: DISCONTINUED | OUTPATIENT
Start: 2020-02-12 | End: 2020-02-12

## 2020-02-12 RX ORDER — DEXAMETHASONE SODIUM PHOSPHATE 4 MG/ML
INJECTION, SOLUTION INTRA-ARTICULAR; INTRALESIONAL; INTRAMUSCULAR; INTRAVENOUS; SOFT TISSUE PRN
Status: DISCONTINUED | OUTPATIENT
Start: 2020-02-12 | End: 2020-02-12

## 2020-02-12 RX ORDER — FENTANYL CITRATE 50 UG/ML
25-50 INJECTION, SOLUTION INTRAMUSCULAR; INTRAVENOUS
Status: COMPLETED | OUTPATIENT
Start: 2020-02-12 | End: 2020-02-12

## 2020-02-12 RX ORDER — CEFAZOLIN SODIUM 1 G/3ML
1 INJECTION, POWDER, FOR SOLUTION INTRAMUSCULAR; INTRAVENOUS SEE ADMIN INSTRUCTIONS
Status: DISCONTINUED | OUTPATIENT
Start: 2020-02-12 | End: 2020-02-12 | Stop reason: HOSPADM

## 2020-02-12 RX ORDER — FENTANYL CITRATE 50 UG/ML
INJECTION, SOLUTION INTRAMUSCULAR; INTRAVENOUS PRN
Status: DISCONTINUED | OUTPATIENT
Start: 2020-02-12 | End: 2020-02-12

## 2020-02-12 RX ADMIN — CEFAZOLIN SODIUM 1 G: 2 INJECTION, SOLUTION INTRAVENOUS at 09:31

## 2020-02-12 RX ADMIN — ONDANSETRON 4 MG: 2 INJECTION INTRAMUSCULAR; INTRAVENOUS at 07:36

## 2020-02-12 RX ADMIN — BUPIVACAINE HYDROCHLORIDE 40 ML GIVEN: 5 INJECTION, SOLUTION EPIDURAL; INTRACAUDAL; PERINEURAL at 07:20

## 2020-02-12 RX ADMIN — DEXAMETHASONE SODIUM PHOSPHATE 4 MG: 4 INJECTION, SOLUTION INTRA-ARTICULAR; INTRALESIONAL; INTRAMUSCULAR; INTRAVENOUS; SOFT TISSUE at 07:39

## 2020-02-12 RX ADMIN — Medication 10 MG: at 07:42

## 2020-02-12 RX ADMIN — SODIUM CHLORIDE, POTASSIUM CHLORIDE, SODIUM LACTATE AND CALCIUM CHLORIDE: 600; 310; 30; 20 INJECTION, SOLUTION INTRAVENOUS at 07:31

## 2020-02-12 RX ADMIN — PROPOFOL 100 MCG/KG/MIN: 10 INJECTION, EMULSION INTRAVENOUS at 07:36

## 2020-02-12 RX ADMIN — PROPOFOL 200 MG: 10 INJECTION, EMULSION INTRAVENOUS at 07:36

## 2020-02-12 RX ADMIN — FENTANYL CITRATE 50 MCG: 50 INJECTION, SOLUTION INTRAMUSCULAR; INTRAVENOUS at 07:52

## 2020-02-12 RX ADMIN — SODIUM CHLORIDE, POTASSIUM CHLORIDE, SODIUM LACTATE AND CALCIUM CHLORIDE: 600; 310; 30; 20 INJECTION, SOLUTION INTRAVENOUS at 06:45

## 2020-02-12 RX ADMIN — Medication 10 MG: at 07:57

## 2020-02-12 RX ADMIN — ONDANSETRON 4 MG: 2 INJECTION INTRAMUSCULAR; INTRAVENOUS at 09:17

## 2020-02-12 RX ADMIN — MIDAZOLAM 2 MG: 1 INJECTION INTRAMUSCULAR; INTRAVENOUS at 07:36

## 2020-02-12 RX ADMIN — CEFAZOLIN SODIUM 2 G: 2 INJECTION, SOLUTION INTRAVENOUS at 07:31

## 2020-02-12 RX ADMIN — PHENYLEPHRINE HYDROCHLORIDE 100 MCG: 10 INJECTION INTRAVENOUS at 08:03

## 2020-02-12 RX ADMIN — PHENYLEPHRINE HYDROCHLORIDE 100 MCG: 10 INJECTION INTRAVENOUS at 08:21

## 2020-02-12 RX ADMIN — FENTANYL CITRATE 50 MCG: 0.05 INJECTION, SOLUTION INTRAMUSCULAR; INTRAVENOUS at 07:05

## 2020-02-12 RX ADMIN — LIDOCAINE HYDROCHLORIDE 80 MG: 20 INJECTION, SOLUTION INFILTRATION; PERINEURAL at 07:36

## 2020-02-12 RX ADMIN — MIDAZOLAM HYDROCHLORIDE 2 MG: 1 INJECTION, SOLUTION INTRAMUSCULAR; INTRAVENOUS at 07:04

## 2020-02-12 RX ADMIN — PHENYLEPHRINE HYDROCHLORIDE 100 MCG: 10 INJECTION INTRAVENOUS at 08:35

## 2020-02-12 RX ADMIN — PHENYLEPHRINE HYDROCHLORIDE 100 MCG: 10 INJECTION INTRAVENOUS at 08:27

## 2020-02-12 RX ADMIN — FENTANYL CITRATE 50 MCG: 50 INJECTION, SOLUTION INTRAMUSCULAR; INTRAVENOUS at 07:36

## 2020-02-12 ASSESSMENT — MIFFLIN-ST. JEOR: SCORE: 1495.8

## 2020-02-12 ASSESSMENT — ENCOUNTER SYMPTOMS: DYSRHYTHMIAS: 0

## 2020-02-12 NOTE — ANESTHESIA PREPROCEDURE EVALUATION
Anesthesia Pre-Procedure Evaluation    Patient: Mary Sims   MRN: [id number] : 1965          Preoperative Diagnosis: Arthritis of foot, right [M19.071]    Procedure(s):  MIDFOOT BONE SPUR RESECTION WITH JOINT FUSIONS RIGHT LOWER EXTREMITY    Past Medical History:   Diagnosis Date     Arthritis of foot, right 2019     Graves disease      Hypertension      Hypothyroidism      MEDICAL HISTORY OF -     S/P RADIOACTIVE IODINE     Palpitation     pt denies     Past Surgical History:   Procedure Laterality Date     ARTHROSCOPY SHLDR ROTATOR CUFF REPAIR, SUBACROMIAL DECOMP, DIST CLAVICLE RESECTION, BICEP TENODESIS Left 2017    Procedure: ARTHROSCOPY SHOULDER ROTATOR CUFF REPAIR, SUBACROMIAL DECOMPRESSION, DISTAL CLAVICLE RESECTION, OPEN BICEP TENODESIS REPAIR;  Surgeon: Dorina Rodriguez MD;  Location: [facility] OR     C  DELIVERY ONLY       COLONOSCOPY  2013    Procedure: COLONOSCOPY;  colonoscopy, screening;  Surgeon: Dalton Lala MD;  Location:  OR     SURGICAL HISTORY OF -       RT ANKLE Fx AND REPAIR (PIN,PLATE,SCREWS)     THYROIDECTOMY       Allergies   Allergen Reactions     No Known Drug Allergy      Social History     Tobacco Use     Smoking status: Never Smoker     Smokeless tobacco: Never Used   Substance Use Topics     Alcohol use: Yes     Comment: SELDOM      Prior to Admission medications    Medication Sig Start Date End Date Taking? Authorizing Provider   gabapentin (NEURONTIN) 300 MG capsule TAKE ONE CAPSULE BY MOUTH THREE TIMES A DAY 20  Yes Janie Riley APRN CNP   levothyroxine (SYNTHROID/LEVOTHROID) 75 MCG tablet TAKE ONE TABLET BY MOUTH EVERY DAY 20  Yes Janie Riley APRN CNP   metoprolol succinate ER (TOPROL-XL) 25 MG 24 hr tablet Take 1 tablet (25 mg) by mouth daily 5/15/19  Yes Janie Riley APRN CNP   naproxen (NAPROSYN) 500 MG tablet TAKE ONE TABLET BY MOUTH TWICE A DAY WITH MEALS 20  Yes Janie Riley APRN CNP    vitamin D2 (ERGOCALCIFEROL) 64691 units (1250 mcg) capsule Take 1 capsule (50,000 Units) by mouth once a week 1/30/20  Yes Choco Frances DPM   order for DME Equipment being ordered: [employer] x 3 months. 1/30/20   Choco Frances DPM     Current Facility-Administered Medications Ordered in Epic   Medication Dose Route Frequency Last Rate Last Dose     ceFAZolin (ANCEF) 1 g vial to attach to  ml bag for ADULT or 50 ml bag for PEDS  1 g Intravenous See Admin Instructions         ceFAZolin (ANCEF) intermittent infusion 2 g in 100 mL dextrose PRE-MIX  2 g Intravenous Pre-Op/Pre-procedure x 1 dose         No current [place] [facility]-ordered outpatient medications on file.       Recent Labs   Lab Test 01/27/20  1020 05/15/19  1026 04/25/18  0847   NA  --  140 141   POTASSIUM 4.1 4.4 4.1   CHLORIDE  --  107 108   CO2  --  25 26   ANIONGAP  --  8 7   GLC  --  89 90   BUN  --  19 18   CR 0.72 0.75 0.62   ALYSIA  --  8.6 9.1     Recent Labs   Lab Test 01/27/20  1020 05/15/19  1026 04/25/18  0847  01/16/15  1358   WBC  --   --  4.7  --  7.9   HGB 13.6 13.8 13.9   < > 13.2   PLT  --   --  198  --  236    < > = values in this interval not displayed.     Recent Labs   Lab Test 01/07/19  1051   ABO A   RH Pos     No results for input(s): TROPI in the last 92200 hours.  No results for input(s): PH, PCO2, PO2, HCO3 in the last 20516 hours.  No results for input(s): HCG in the last 06525 hours.  No results found for this or any previous visit (from the past 744 hour(s)).  No results found for this or any previous visit (from the past 4320 hour(s)).      RECENT LABS:       Anesthesia Evaluation     .             ROS/MED HX    ENT/Pulmonary:       Neurologic:  - neg neurologic ROS     Cardiovascular:     (+) hypertension----. : . . . :. .      (-) CAD, LYNN and arrhythmias   METS/Exercise Tolerance:     Hematologic:         Musculoskeletal:   (+) arthritis,  -       GI/Hepatic:  - neg GI/hepatic ROS       Renal/Genitourinary:        "  Endo:     (+) thyroid problem hypothyroidism Thyroid disease - Other, Obesity, .      Psychiatric:         Infectious Disease:         Malignancy:         Other:                          Physical Exam  Normal systems: dental    Airway   Mallampati: II  TM distance: >3 FB  Neck ROM: full    Dental     Cardiovascular   Rhythm and rate: regular and normal      Pulmonary    breath sounds clear to auscultation            Lab Results   Component Value Date    WBC 4.7 04/25/2018    HGB 13.6 01/27/2020    HCT 42.2 04/25/2018     04/25/2018     05/15/2019    POTASSIUM 4.1 01/27/2020    CHLORIDE 107 05/15/2019    CO2 25 05/15/2019    BUN 19 05/15/2019    CR 0.72 01/27/2020    GLC 89 05/15/2019    ALYSIA 8.6 05/15/2019    ALBUMIN 4.0 04/25/2018    PROTTOTAL 7.9 04/25/2018    ALT 21 04/25/2018    AST 15 04/25/2018    ALKPHOS 75 04/25/2018    BILITOTAL 1.0 04/25/2018    TSH 1.03 05/15/2019    T4 1.45 01/07/2019       Preop Vitals  BP Readings from Last 3 Encounters:   01/30/20 132/78   01/27/20 131/88   12/12/19 (!) 170/101    Pulse Readings from Last 3 Encounters:   01/27/20 68   12/06/19 68   12/05/19 69      Resp Readings from Last 3 Encounters:   01/27/20 16   12/05/19 16   11/20/18 14    SpO2 Readings from Last 3 Encounters:   01/27/20 99%   12/05/19 100%   07/25/19 97%      Temp Readings from Last 1 Encounters:   01/27/20 36.8  C (98.2  F) (Oral)    Ht Readings from Last 1 Encounters:   01/30/20 1.626 m (5' 4\")      Wt Readings from Last 1 Encounters:   01/30/20 91.6 kg (202 lb)    Estimated body mass index is 34.67 kg/m  as calculated from the following:    Height as of 1/30/20: 1.626 m (5' 4\").    Weight as of 1/30/20: 91.6 kg (202 lb).       Anesthesia Plan      History & Physical Review  History and physical reviewed and following examination; no interval change.    ASA Status:  2 .    NPO Status:  > 8 hours    Plan for General, For Post-op pain in coordination with surgeon and LMA with Intravenous and " Propofol induction. Maintenance will be Balanced.    PONV prophylaxis:  Ondansetron (or other 5HT-3) and Dexamethasone or Solumedrol  Background propofol   PNB      Postoperative Care  Postoperative pain management:  IV analgesics, Peripheral nerve block (Single Shot) and Multi-modal analgesia.      Consents  Anesthetic plan, risks, benefits and alternatives discussed with:  Patient..                 Richard Reza MD

## 2020-02-12 NOTE — OR NURSING
Patient's  very nervous/antsy during discharge process.  24 year old son also present for discharge instructions.  Son and  state understanding of discharge care.  Patient states that her  is under stress and has anxiety issues.  Patient discharged to home accompanied by son and .

## 2020-02-12 NOTE — BRIEF OP NOTE
[facility]    Brief Operative Note    Pre-operative diagnosis: Arthritis of foot, right [M19.071]  Post-operative diagnosis sp bone spur resection dorsal right foot with 2nd TMT and 2nd naviculocuneiform joint fusions    Procedure: Procedure(s):  MIDFOOT BONE SPUR RESECTION WITH JOINT FUSIONS RIGHT LOWER EXTREMITY  Surgeon: Surgeon(s) and Role:     * Choco Frances DPM - Primary  Anesthesia: Combined General with Popliteal Block   Estimated blood loss: 5ml  Drains: None  Specimens: * No specimens in log *  Findings:   pronounced bone spurring dorsal R foot. DP artery right across spur, successfully retracted without damage during case..  Complications: None.  Implants:   Implant Name Type Inv. Item Serial No.  Lot No. LRB No. Used   GRAFT BONE CRUSH CANC 15ML [id number] Bone/Tissue/Biologic GRAFT BONE CRUSH CANC 15ML [id number] MUSCULOSKELETAL JOHN  Right 1   GRAFT BONE PUTTY DBX 01ML [id number] Bone/Tissue/Biologic GRAFT BONE PUTTY DBX 01ML [id number] MUSCULOSKELETAL JOHN  Right 1   IMP PLATE ARTHREX MTP LP STR CONTOUR LONG TI AR-8944-L Metallic Hardware/[place] IMP PLATE ARTHREX MTP LP STR CONTOUR LONG TI AR-8944-L  ARTHREX  Right 1   IMP SCR ARTHREX MTP LP CORTICAL 3X16MM TI AR-8933-16 Metallic Hardware/[place] IMP SCR ARTHREX MTP LP CORTICAL 3X16MM TI AR-8933-16  ARTHREX  Right 1   IMP SCR ARTHREX MTP LP CORTICAL 3X24MM TI AR-8933-24 Metallic Hardware/[place] IMP SCR ARTHREX MTP LP CORTICAL 3X24MM TI AR-8933-24  ARTHREX  Right 1   IMP SCR ARTHREX AYDIN 3.0X10MM TI AR-8933V-10 Metallic Hardware/[place] IMP SCR ARTHREX AYDIN 3.0X10MM TI AR-8933V-10  ARTHREX  Right 1   IMP SCR ARTHREX AYDIN 3.0X20MM TI AR-8933V-20 Metallic Hardware/[place] IMP SCR ARTHREX AYDIN 3.0X20MM TI AR-8933V-20  ARTHREX  Right 1   IMP SCR ARTHREX AYDIN 3.0X24MM TI AR-8933V-24 Metallic Hardware/[place] IMP SCR ARTHREX AYDIN 3.0X24MM TI AR-8933V-24  ARTHREX  Right 1   IMP SCR ARTHREX AYDIN 3.0X28MM TI  AR-8933V-28 Metallic Hardware/[place] IMP SCR ARTHREX AYDIN 3.0X28MM TI AR-8933V-28  ARTHREX  Right 1   IMP SCR ARTHREX AYDIN 3.0X30MM TI AR-8933V-30 Metallic Hardware/[place] IMP SCR ARTHREX AYDIN 3.0X30MM TI AR-8933V-30  ARTHREX  Right 1   IMP SCR ARTHREX AYDIN 3.0X34MM TI AR-8933V-34 Metallic Hardware/[place] IMP SCR ARTHREX AYDIN 3.0X34MM TI AR-8933V-34  ARTHREX  Right 1

## 2020-02-12 NOTE — ANESTHESIA POSTPROCEDURE EVALUATION
Patient: Mary Sims    Procedure(s):  MIDFOOT BONE SPUR RESECTION WITH JOINT FUSIONS RIGHT LOWER EXTREMITY    Diagnosis:Arthritis of foot, right [M19.071]  Diagnosis Additional Information: No value filed.    Anesthesia Type:  General, For Post-op pain in coordination with surgeon, LMA    Note:  Anesthesia Post Evaluation    Patient location during evaluation: PACU  Patient participation: Able to fully participate in evaluation  Level of consciousness: sleepy but conscious and responsive to verbal stimuli  Pain management: adequate  Airway patency: patent  Cardiovascular status: acceptable and hemodynamically stable  Respiratory status: acceptable and unassisted  Hydration status: acceptable  PONV: none     Anesthetic complications: None          Last vitals:  Vitals:    02/12/20 1000 02/12/20 1015 02/12/20 1030   BP: 129/86 134/89 128/82   Pulse: 85 86 80   Resp: 14 17 13   Temp: 35.8  C (96.5  F)     SpO2: 99% 93% 96%         Electronically Signed By: Richard Reza MD  February 12, 2020  10:51 AM

## 2020-02-12 NOTE — PROGRESS NOTES
S: We received an order in [facility] for a prefabricated aluminum splint PFS splint (TAFO) for post operative surgical procedure.    OG: Splint RLE.  A: I have donned a size Large TAFO on the RLE. I have explained to the Pt how to geo and doff. The TAFO is splinting the RLE  providing triplanar control restricing motion in the sagittal, coronal and transverse planes.  P: Pt will contact us prn.  Miquel BARFIELD

## 2020-02-12 NOTE — ANESTHESIA PROCEDURE NOTES
Peripheral nerve/Neuraxial procedure note : Femoral (via adductor canal)  Pre-Procedure  Performed by Richard Reza MD  Location: pre-op      Pre-Anesthestic Checklist: patient identified, IV checked, site marked, risks and benefits discussed, informed consent, monitors and equipment checked, pre-op evaluation, at physician/surgeon's request and post-op pain management    Timeout  Correct Patient: Yes   Correct Procedure: Yes   Correct Site: Yes   Correct Laterality: Yes   Correct Position: Yes   Site Marked: Yes   .   Procedure Documentation    .    Procedure: Femoral (via adductor canal), right.   Patient Position:supine Local skin infiltrated with 3 mL of 1% lidocaine.    Ultrasound used to identify targeted nerve, plexus, or vascular marker and placed a needle adjacent to it., Ultrasound was used to visualize the spread of the anesthetic in close proximity to the above stated nerve. A permanent image is entered into the patient's record.  Patient Prep/Sterile Barriers; mask, sterile gloves, chlorhexidine gluconate and isopropyl alcohol, patient draped.  .  Needle: insulated   Needle Gauge: 21.    Needle Length (Inches) 3.5   Insertion Method: Single Shot.        Assessment/Narrative  Paresthesias: No.  Injection made incrementally with aspirations every 5 mL..  The placement was negative for: blood aspirated, painful injection and site bleeding.  Bolus given via needle..   Secured via.   Complications: none. Comments:  Medication: 15cc of 0.5% bupivacaine with 1:400k epinephrine  [place]-dissection with saline, 5cc  Dose given via 5cc increments with negative aspiration    Ultrasound Interpretation, Peripheral Nerve Block    1. Under ultrasound guidance, the needle was inserted and placed in close proximity to the target nerve(s).  2. Ultrasound was also used to visualize the spread of the anesthetic in close proximity to the nerve(s) being blocked.  Local anesthetic was administered in incremental  doses, with intermittent negative aspiration.    3. The nerve(s) appeared anatomically normal.  4. There were no apparent abnormal pathological findings.  5. A permanent ultrasound image was saved in the patient's record.    Pt tolerated well.    No complications.      The surgeon has given a verbal order transferring care of this patient to me for the performance of a regional analgesia block for post-op pain control. It is requested of me because I am uniquely trained and qualified to perform this block and the surgeon is neither trained nor qualified to perform this procedure.    Richard Reza MD   7:18 AM

## 2020-02-12 NOTE — DISCHARGE INSTRUCTIONS
Same Day Surgery Discharge Instructions for  Sedation and General Anesthesia       It's not unusual to feel dizzy, light-headed or faint for up to 24 hours after surgery or while taking pain medication.  If you have these symptoms: sit for a few minutes before standing and have someone assist you when you get up to walk or use the bathroom.      You should rest and relax for the next 24 hours. We recommend you make arrangements to have an adult stay with you for at least 24 hours after your discharge.  Avoid hazardous and strenuous activity.      DO NOT DRIVE any vehicle or operate mechanical equipment for 24 hours following the end of your surgery.  Even though you may feel normal, your reactions may be affected by the medication you have received.      Do not drink alcoholic beverages for 24 hours following surgery.       Slowly progress to your regular diet as you feel able. It's not unusual to feel nauseated and/or vomit after receiving anesthesia.  If you develop these symptoms, drink clear liquids (apple juice, ginger ale, broth, 7-up, etc. ) until you feel better.  If your nausea and vomiting persists for 24 hours, please notify your surgeon.        All narcotic pain medications, along with inactivity and anesthesia, can cause constipation. Drinking plenty of liquids and increasing fiber intake will help.      For any questions of a medical nature, call your surgeon.      Do not make important decisions for 24 hours.      If you had general anesthesia, you may have a sore throat for a couple of days related to the breathing tube used during surgery.  You may use Cepacol lozenges to help with this discomfort.  If it worsens or if you develop a fever, contact your surgeon.       If you feel your pain is not well managed with the pain medications prescribed by your surgeon, please contact your surgeon's office to let them know so they can address your concerns.             [facility]    DISCHARGE  "INSTRUCTIONS FOLLOWING   REGIONAL BLOCK ANESTHESIA      Numbness or lack of feeling in the arm/leg that was operated may last up to 24 hours.  The average time is usually 10-15 hours.  You may not be able to lift or move the arm or leg where the operation was by itself during that time.  Long-acting local anesthetic medicines were used to give you long-lasting pain relief.    Wear a sling until your arm is completely \"awake\"    Avoid bumping your arm, leg or foot while it is numb    Avoid extremes of hot or cold while it is numb    Remain quiet and restful the day of surgery.  Resume normal activities gradually over the next day or so as advise by your surgeon.    Do not drive or operate  Any machinery until your extremity is full  \"awake\"    You will have a tingling and prickly sensation in your arm/leg as the feeling begins to return; you can also expect some discomfort. The amount of discomfort is unpredictable, but if you have more pain that can be controlled with the pain medication you received, you should contact your surgeon.  Start to take your pain pills as soon as you start to feel any discomfort or pain.      Reasons to contact your surgeon:    1. Signs of possible infection: Check your incision daily for redness, swelling, warmth, red streaks or foul drainage.   2. Elevated temperature.  3. Pain not controlled with pain medication and/or rest.   4. Uncontrolled nausea or vomiting.  5.   Any questions or concerns.                  **If you have questions or concerns about your procedure,   call Dr. Frances at [phone]**  "

## 2020-02-12 NOTE — ANESTHESIA CARE TRANSFER NOTE
Patient: Mary Sims    Procedure(s):  MIDFOOT BONE SPUR RESECTION WITH JOINT FUSIONS RIGHT LOWER EXTREMITY    Diagnosis: Arthritis of foot, right [M19.071]  Diagnosis Additional Information: No value filed.    Anesthesia Type:   General, For Post-op pain in coordination with surgeon, LMA     Note:  Airway :Face Mask  Patient transferred to:PACU  Comments: Easily arousable.  Denies pain, N&V.  Report to RN.      Vitals: (Last set prior to Anesthesia Care Transfer)    CRNA VITALS  2/12/2020 0912 - 2/12/2020 0946      2/12/2020             NIBP:  (!) 131/91    NIBP Mean:  104                Electronically Signed By: PABLO Valerio CRNA  February 12, 2020  9:46 AM

## 2020-02-12 NOTE — ANESTHESIA PROCEDURE NOTES
Peripheral nerve/Neuraxial procedure note : Sciatic (via popliteal)  Pre-Procedure  Performed by Richard Reza MD  Location: pre-op      Pre-Anesthestic Checklist: patient identified, IV checked, site marked, risks and benefits discussed, informed consent, monitors and equipment checked, pre-op evaluation, at physician/surgeon's request and post-op pain management    Timeout  Correct Patient: Yes   Correct Procedure: Yes   Correct Site: Yes   Correct Laterality: Yes   Correct Position: Yes   Site Marked: Yes   .   Procedure Documentation    .    Procedure: Sciatic (via popliteal), right.   Patient Position:supine Local skin infiltrated with 3 mL of 1% lidocaine.    Ultrasound used to identify targeted nerve, plexus, or vascular marker and placed a needle adjacent to it., Ultrasound was used to visualize the spread of the anesthetic in close proximity to the above stated nerve. A permanent image is entered into the patient's record.  Patient Prep/Sterile Barriers; mask, sterile gloves, chlorhexidine gluconate and isopropyl alcohol, patient draped.  .  Needle: insulated   Needle Gauge: 21.    Needle Length (Inches) 3.5   Insertion Method: Single Shot.        Assessment/Narrative  Paresthesias: No.  Injection made incrementally with aspirations every 5 mL..  The placement was negative for: blood aspirated, painful injection and site bleeding.  Bolus given via needle..   Secured via.   Complications: none. Comments:  Medication: 25cc of 0.5% bupivacaine with 1:400k epinephrine  [place]-dissection with saline, 5cc  Dose given via 5cc increments with negative aspiration    Ultrasound Interpretation, Peripheral Nerve Block    1. Under ultrasound guidance, the needle was inserted and placed in close proximity to the target nerve(s).  2. Ultrasound was also used to visualize the spread of the anesthetic in close proximity to the nerve(s) being blocked.  Local anesthetic was administered in incremental doses, with  intermittent negative aspiration.    3. The nerve(s) appeared anatomically normal.  4. There were no apparent abnormal pathological findings.  5. A permanent ultrasound image was saved in the patient's record.    Pt tolerated well.    No complications.      The surgeon has given a verbal order transferring care of this patient to me for the performance of a regional analgesia block for post-op pain control. It is requested of me because I am uniquely trained and qualified to perform this block and the surgeon is neither trained nor qualified to perform this procedure.    Richard Reza MD   7:17 AM

## 2020-02-12 NOTE — OP NOTE
Procedure Date: 02/12/2020      SURGEON:  Choco Frances DPM      ASSISTANT:  Gil Pham, PGY-1      PREOPERATIVE DIAGNOSIS:  Right mid foot arthritis.      POSTOPERATIVE DIAGNOSIS:  Right mid foot arthritis.      PROCEDURES:   1.  Resection of prominent bone spur, dorsal right foot including over second tarsometatarsal and second cuneiform joints as well as adjacent joints.   2.  Fusion of the right second tarsometatarsal joint.   3.  Fusion of the right second naviculocuneiform joint.      PATHOLOGY:  None.      ANESTHESIA:  General endotracheal with popliteal and saphenous nerve blocks.      HEMOSTASIS:  A well-padded pneumatic thigh cuff.      ESTIMATED BLOOD LOSS:  5 mL.      MATERIALS:  See nursing note for details.  I utilized an Arthrex dorsal locking plate.      INJECTABLES:  Preoperative popliteal and saphenous nerve blocks by anesthesia.      COMPLICATIONS:  None apparent.      INDICATIONS FOR PROCEDURE:  The patient is a pleasant 54-year-old female who was seen in clinic for dorsal right foot pain.  The patient has had a very prominent bone spur across the dorsal aspect of the foot.  This caused pain with shoegear pressure, but the patient also had a moderate amount of pain within the foot consistent with arthritis.  We discussed bone spur resection versus spur resection and mid foot fusion.  She elected to proceed with the latter.      DESCRIPTION OF PROCEDURE:  After obtaining written consent, the patient received a preoperative popliteal and saphenous nerve block done by Anesthesia.  She was transferred to the operating room, placed in the supine position on the operating room table.  She was placed under general anesthesia.  The right lower extremity was then prepped and draped in normal aseptic fashion, exsanguinated and the well-padded pneumatic thigh cuff was inflated.      Attention was directed to the dorsal foot.  Prior to surgery, I marked out the course of the dorsalis pedis artery.  An  incision was carried down to subcutaneous tissue just medial to this.  Bleeding vessels were electrocauterized as necessary.  The dorsalis pedis artery was noted to be slightly medial to the incision and so this was then carefully retracted medially with care to avoid damaging this.  No other neurovascular structures were identified and so the incision was then carried down to bone and careful subperiosteal dissection was performed, retracting the neurovascular bundle medially.  Intraoperative findings were positive for significant dorsal bone spurring throughout the entirety of the incision.  This was resected and remodeled using rongeurs and a sagittal saw.  This included the second tarsometatarsal joint.  The naviculocuneiform complex and partially the first and third tarsometatarsal joints.  After adequate resection was performed the second tarsometatarsal joint and the intermediate naviculocuneiform joints were identified and the joints were resected using a combination of rongeur and curettes.  Subchondral plates were then fenestrated with a smooth 0.062 K-wire.  The defect was then packed with a DBX/crushed cancellous allograft bone cement.  We utilized the Arthrex linear locking plate and were able to successfully span the naviculocuneiform and the tarsometatarsal joints without violating the talonavicular joint.  This was then permanently fixated using combination of locking and nonlocking screws.      The wound was flushed with copious amounts of normal saline.  Deep closure was performed with 3-0 Vicryl with great care to avoid damaging the medial neurovascular bundle.  Subcutaneous closure was done with 4-0 Vicryl and skin was closed with 4-0 nylon.      A dry sterile dressing was applied to the patient's right lower extremity.  She appeared to tolerate the procedure and anesthesia well and was transferred to the PACU with vital signs stable and vascular status intact to the foot.      PLAN:  The patient   will be nonweightbearing and will follow up with me in clinic in approximately 1 week or sooner with any acute issues.         NORA CUENCA DPM             D: 2020   T: 2020   MT: [id number]      Name:     ROHITH HERRON   MRN:      [id number]        Account:        [id number]   :      1965           Procedure Date: 2020      Document: [id number]

## 2020-02-13 ENCOUNTER — TELEPHONE (OUTPATIENT)
Dept: PODIATRY | Facility: CLINIC | Age: 55
End: 2020-02-13

## 2020-02-13 NOTE — TELEPHONE ENCOUNTER
Reason for Call:  Other Medical Question     Detailed comments: Pt had surgery on her right foot and was given a boot to wear home. She would like to know if she is supposed to keep the boot on while healing. She did not find info in her discharge papers.     Phone Number Patient can be reached at: Cell number on file:    Telephone Information:   Mobile [phone]       Best Time: Any    Can we leave a detailed message on this number? YES    Call taken on 2/13/2020 at 1:48 PM by Jennifer Joyner

## 2020-02-13 NOTE — TELEPHONE ENCOUNTER
Spoke with pt and advised she can remove the splint while at home resting but to wear it if she is out of the house/moving around a lot to protect it. She plans to mostly just remove it to ice it.

## 2020-02-20 ENCOUNTER — OFFICE VISIT (OUTPATIENT)
Dept: PODIATRY | Facility: CLINIC | Age: 55
End: 2020-02-20
Payer: COMMERCIAL

## 2020-02-20 VITALS
SYSTOLIC BLOOD PRESSURE: 136 MMHG | DIASTOLIC BLOOD PRESSURE: 76 MMHG | WEIGHT: 203 LBS | HEIGHT: 64 IN | BODY MASS INDEX: 34.66 KG/M2

## 2020-02-20 DIAGNOSIS — M19.071 ARTHRITIS OF FOOT, RIGHT: Primary | ICD-10-CM

## 2020-02-20 DIAGNOSIS — Z98.890 S/P FOOT SURGERY, RIGHT: ICD-10-CM

## 2020-02-20 PROCEDURE — 99024 POSTOP FOLLOW-UP VISIT: CPT | Performed by: PODIATRIST

## 2020-02-20 ASSESSMENT — MIFFLIN-ST. JEOR: SCORE: 1495.8

## 2020-02-20 NOTE — PATIENT INSTRUCTIONS
Thank you for choosing [facility] Podiatry / Foot & Ankle Surgery!    DR. CUENCA'S CLINIC LOCATIONS:   MONDAY - [place] TUESDAY AM - [place]   [address] [facility]  [address]   [place] [address] [place] [address]   [phone]       THURSDAY AM - [place] THURSDAY PM - [place]   [address]   [place] [address] [place] [address]   [phone]       FRIDAY AM - [place] SET UP SURGERY: [phone] [address] APPOINTMENTS: [phone]   [place] [address] BILLING QUESTIONS: [phone] FAX NUMBER: [phone]       Follow Up:  1 week - 2/27/20 at 10:45am      FYI: The following information is included in the after visit summary for all patients:  Body weight can be a sensitive issue to discuss in clinic, but we think the following information is very important. Although we focus on the feet and ankles, we do support the overall health of our patients. Many things can cause foot and ankle problems. Foot structure, activity level, foot mechanics and injuries are common causes of pain. One very important issue that often goes unmentioned, is body weight. Extra weight can cause increased stress on muscles, ligaments, bones and tendons. Sometimes just a few extra pounds is all it takes to put one over her/his threshold. Without reducing that stress, it can be difficult to alleviate pain. As Foot & Ankle specialists, our job is addressing the lower extremity problem and possible causes. Regarding extra body weight, we encourage patients to discuss diet and weight management plans with their primary care doctors. It is this team approach that gives you the best opportunity for pain relief and getting you back on your feet. [place] has a Comprehensive Weight Management Program. This program includes counseling, education, non-surgical and surgical approaches to weight loss. If you are interested in  learning more either talk to you primary care provider or call [phone].

## 2020-02-20 NOTE — PROGRESS NOTES
"Foot & Ankle Surgery  February 20, 2020    S:  Patient in today approx 1 week sp dorsal R foot bone spur resection with 2nd TMT and middle N-C joint fusions.  Pain levels low.  Has taken 4 percocet so far.  Wondering if she can continue the ibuprofen.   fell recently, injured his spine, had surgery this morning    /76   Ht 1.626 m (5' 4\")   Wt 92.1 kg (203 lb)   LMP 08/04/2015 (Approximate)   BMI 34.84 kg/m        ROS - positive for CC.  Patient denies current nausea, vomiting, chills, fevers, belly pain, calf pain, chest pain or SOB.  Complete remainder of ROS is otherwise neg.    PE - sutures intact, skin margins coapted.  No drainage/SOI.  Moderate dorsal edema noted.  Skin shows no trophic, color or temperature changes otherwise.  No calf redness, swelling or pain noted otherwise.    Imaging - IMPRESSION: Interval second tarsometatarsal arthrodesis with a long  screw plate extending from the navicular through the mid second  metatarsal diaphysis. No other bony changes since the prior exam.  Previous ORIF of bimalleolar fracture is again noted. There is a  prominent plantar calcaneal spur. Postoperative soft tissue air and  swelling is seen over the dorsum of the midfoot and forefoot.      A/P - 54 year old yo patient approx 1 week sp above procedure  -personally reviewed imaging and surgical findings  -redressed foot  -continue all post-op instructions without change; reviewed  -ok to continue ibuprofen, but advised lower dose.  Discussed possibility of NSAID limiting healing    Follow up  -  1 week for suture removal or sooner with acute issues    Plan for [id number] - once cleared to return to duties     Body mass index is 34.84 kg/m .  Weight management plan: Patient was referred to their PCP to discuss a diet and exercise plan.      Choco Frances DPM FACFAS FACFAOM  Podiatric Foot & Ankle Surgeon  [facility]  [phone]    "

## 2020-02-20 NOTE — LETTER
"    2/20/2020         RE: Mary Sims  [address]  [place] [address]        Dear Colleague,    Thank you for referring your patient, Mary Sims, to the [facility]. Please see a copy of my visit note below.    Foot & Ankle Surgery  February 20, 2020    S:  Patient in today approx 1 week sp dorsal R foot bone spur resection with 2nd TMT and middle N-C joint fusions.  Pain levels low.  Has taken 4 percocet so far.  Wondering if she can continue the ibuprofen.   fell recently, injured his spine, had surgery this morning    /76   Ht 1.626 m (5' 4\")   Wt 92.1 kg (203 lb)   LMP 08/04/2015 (Approximate)   BMI 34.84 kg/m         ROS - positive for CC.  Patient denies current nausea, vomiting, chills, fevers, belly pain, calf pain, chest pain or SOB.  Complete remainder of ROS is otherwise neg.    PE - sutures intact, skin margins coapted.  No drainage/SOI.  Moderate dorsal edema noted.  Skin shows no trophic, color or temperature changes otherwise.  No calf redness, swelling or pain noted otherwise.    Imaging - IMPRESSION: Interval second tarsometatarsal arthrodesis with a long  screw plate extending from the navicular through the mid second  metatarsal diaphysis. No other bony changes since the prior exam.  Previous ORIF of bimalleolar fracture is again noted. There is a  prominent plantar calcaneal spur. Postoperative soft tissue air and  swelling is seen over the dorsum of the midfoot and forefoot.      A/P - 54 year old yo patient approx 1 week sp above procedure  -personally reviewed imaging and surgical findings  -redressed foot  -continue all post-op instructions without change; reviewed  -ok to continue ibuprofen, but advised lower dose.  Discussed possibility of NSAID limiting healing    Follow up  -  1 week for suture removal or sooner with acute issues    Plan for [id number] - once cleared to return to duties     Body mass index is 34.84 kg/m .  Weight " management plan: Patient was referred to their PCP to discuss a diet and exercise plan.      [facility] Frances DPM [facility] FACFAOM  Podiatric Foot & Ankle Surgeon  [facility]  [phone]      Again, thank you for allowing me to participate in the care of your patient.        Sincerely,        Choco Frances DPM, DPM

## 2020-02-23 ENCOUNTER — HEALTH MAINTENANCE LETTER (OUTPATIENT)
Age: 55
End: 2020-02-23

## 2020-02-27 ENCOUNTER — OFFICE VISIT (OUTPATIENT)
Dept: PODIATRY | Facility: CLINIC | Age: 55
End: 2020-02-27
Payer: COMMERCIAL

## 2020-02-27 VITALS
WEIGHT: 200 LBS | HEART RATE: 77 BPM | BODY MASS INDEX: 34.15 KG/M2 | HEIGHT: 64 IN | SYSTOLIC BLOOD PRESSURE: 133 MMHG | DIASTOLIC BLOOD PRESSURE: 90 MMHG

## 2020-02-27 DIAGNOSIS — Z98.890 S/P FOOT SURGERY, RIGHT: Primary | ICD-10-CM

## 2020-02-27 DIAGNOSIS — M19.071 ARTHRITIS OF FOOT, RIGHT: ICD-10-CM

## 2020-02-27 PROCEDURE — 99024 POSTOP FOLLOW-UP VISIT: CPT | Performed by: PODIATRIST

## 2020-02-27 ASSESSMENT — MIFFLIN-ST. JEOR: SCORE: 1482.19

## 2020-02-27 NOTE — LETTER
"    2/27/2020         RE: Mary Sims  [address]  [place] [address]        Dear Colleague,    Thank you for referring your patient, Mary Sims, to the [facility]. Please see a copy of my visit note below.    Foot & Ankle Surgery  February 27, 2020    S:  Patient in today approx 2 weeks sp midfoot fusions, including 2nd TMT and intermediate N-C joint.  Pain levels improving.  Her  is home now.  Her 24 year old son is at home helping out    BP (!) 133/90   Pulse 77   Ht 1.626 m (5' 4\")   Wt 90.7 kg (200 lb)   LMP 08/04/2015 (Approximate)   BMI 34.33 kg/m         ROS - positive for CC.  Patient denies current nausea, vomiting, chills, fevers, belly pain, calf pain, chest pain or SOB.  Complete remainder of ROS is otherwise neg.    PE - incision healing well.  Moderate edema along incision, with some paresthesias/sensitivities distal to the incision, but no drainage/SOI.  Skin shows no trophic, color or temperature changes otherwise.  No calf redness, swelling or pain noted otherwise.    A/P - 54 year old yo patient approx 2 weeks sp above procedure  -sutures removed, s-s applied. Remove in 1 week if still present  -continue - NWB; DVT exercises; compression, tensogrip dispensed  -change - ice/elevate prn; ok to increase activity levels; ok to wash foot tomorrow, but no soaking/submerging x 1 week    Follow up  -  4 weeks or sooner with acute issues      Body mass index is 34.33 kg/m .  Weight management plan: Patient was referred to their PCP to discuss a diet and exercise plan.      Choco Frances DPM FACFAS FACFAOM  Podiatric Foot & Ankle Surgeon  [facility]  [phone]        Again, thank you for allowing me to participate in the care of your patient.        Sincerely,        Choco Frances DPM, EVELYN    "

## 2020-02-27 NOTE — PATIENT INSTRUCTIONS
Thank you for choosing [facility] Podiatry / Foot & Ankle Surgery!    DR. CUENCA'S CLINIC LOCATIONS:   MONDAY - [place] TUESDAY AM - [place]   [address] [facility]  [address]   [place] [address] [place] [address]   [phone]       THURSDAY AM - [place]AY PM - [place]   [address] [facility] [address]   [place] [address] [place] [address]   [phone]       FRIDAY AM - [place] SET UP SURGERY: [phone] [address] APPOINTMENTS: [phone]   [place] [address] BILLING QUESTIONS: [phone] FAX NUMBER: [phone]     Follow up:     Next steps:     Please read through the following handouts and if you have any questions, please feel free to call us or send a Chain message!        Mary to follow up with Primary Care provider regarding elevated blood  pressure.

## 2020-02-27 NOTE — PROGRESS NOTES
"Foot & Ankle Surgery  February 27, 2020    S:  Patient in today approx 2 weeks sp midfoot fusions, including 2nd TMT and intermediate N-C joint.  Pain levels improving.  Her  is home now.  Her 24 year old son is at home helping out    BP (!) 133/90   Pulse 77   Ht 1.626 m (5' 4\")   Wt 90.7 kg (200 lb)   LMP 08/04/2015 (Approximate)   BMI 34.33 kg/m        ROS - positive for CC.  Patient denies current nausea, vomiting, chills, fevers, belly pain, calf pain, chest pain or SOB.  Complete remainder of ROS is otherwise neg.    PE - incision healing well.  Moderate edema along incision, with some paresthesias/sensitivities distal to the incision, but no drainage/SOI.  Skin shows no trophic, color or temperature changes otherwise.  No calf redness, swelling or pain noted otherwise.    A/P - 54 year old yo patient approx 2 weeks sp above procedure  -sutures removed, s-s applied. Remove in 1 week if still present  -continue - NWB; DVT exercises; compression, tensogrip dispensed  -change - ice/elevate prn; ok to increase activity levels; ok to wash foot tomorrow, but no soaking/submerging x 1 week    Follow up  -  4 weeks or sooner with acute issues      Body mass index is 34.33 kg/m .  Weight management plan: Patient was referred to their PCP to discuss a diet and exercise plan.      Choco Frances DPM FACFAS FACFAOM  Podiatric Foot & Ankle Surgeon  [facility]  [phone]      "

## 2020-02-29 ENCOUNTER — NURSE TRIAGE (OUTPATIENT)
Dept: NURSING | Facility: CLINIC | Age: 55
End: 2020-02-29

## 2020-02-29 NOTE — TELEPHONE ENCOUNTER
"\"I had foot surgery on 2/12 and saw my MD on 2/27 for follow up and at that time he gave me a compression, tensogrip sock. I was never given any instructions on how long to wear it and when to take it off.\" Nothing noted per epic as well. Advised usually it's to be worn during the day when you are up and moving around and ok to take off at night while sleeping, keep foot elevated on a pillow whenever possible. I also advised she call her MD on Monday 3/2 to discuss further instructions. No triage was needed.  Regina Diaz RN [facility]        Reason for Disposition    General information question, no triage required and triager able to answer question    Protocols used: INFORMATION ONLY CALL-A-AH      "

## 2020-03-02 ENCOUNTER — ANCILLARY PROCEDURE (OUTPATIENT)
Dept: GENERAL RADIOLOGY | Facility: CLINIC | Age: 55
End: 2020-03-02
Attending: NURSE PRACTITIONER
Payer: COMMERCIAL

## 2020-03-02 ENCOUNTER — OFFICE VISIT (OUTPATIENT)
Dept: FAMILY MEDICINE | Facility: CLINIC | Age: 55
End: 2020-03-02
Payer: COMMERCIAL

## 2020-03-02 VITALS
DIASTOLIC BLOOD PRESSURE: 83 MMHG | BODY MASS INDEX: 34.15 KG/M2 | SYSTOLIC BLOOD PRESSURE: 138 MMHG | RESPIRATION RATE: 15 BRPM | TEMPERATURE: 98.5 F | WEIGHT: 200 LBS | HEART RATE: 75 BPM | HEIGHT: 64 IN | OXYGEN SATURATION: 97 %
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
## 2020-03-02 DIAGNOSIS — M25.532 LEFT WRIST PAIN: ICD-10-CM
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
## 2020-03-02 PROCEDURE — 73110 X-RAY EXAM OF WRIST: CPT | Mod: LT

## 2020-03-02 PROCEDURE — 73130 X-RAY EXAM OF HAND: CPT | Mod: LT

## 2020-03-02 PROCEDURE — 99213 OFFICE O/P EST LOW 20 MIN: CPT | Performed by: NURSE PRACTITIONER

## 2020-03-02 ASSESSMENT — MIFFLIN-ST. JEOR: SCORE: 1482.19

## 2020-03-02 NOTE — PATIENT INSTRUCTIONS
Does appear like a sprain.  Recommend icing, wearing the brace, and taking antiinflammatories (naproxen 500 mg twice a day OR ibuprofen 600 mg four times a day) to help with the inflammation and pain.  FOLLOW UP if not improving in 1-2 weeks.  Patient Education     RICE     Rest an injury, elevate it, and use ice and compression as directed.   RICE stands for rest, ice, compression, and elevation. These can limit pain and swelling after an injury. RICE may be recommended to help treat breaks (fractures), sprains, strains, and bruises or bumps.   Home care  Here are the details of RICE:    Rest. Limit the use of the injured body part. This helps prevent further damage to the body part and gives it time to heal. In some cases, you may need a sling, brace, splint, or cast to help keep the body part still until it has healed.    Ice. Applying ice right after an injury helps relieve pain and swelling. To make an ice pack, put ice cubes in a plastic bag that seals at the top. Wrap the bag in a clean, thin towel or cloth. Then place it over the injured area. Do this for 10 to 15 minutes every 3 to 4 hours. Continue for the next 1 to 3 days or until your symptoms improve. Never put ice directly on your skin. Don't ice an area longer than 15 minutes at a time.    Compression. Putting pressure on an injury helps reduce swelling and provides support. Wrap the injured area firmly with an elastic bandage or wrap. Make sure not to wrap the bandage too tightly or you will cut off blood flow to the injured area. If your bandage loosens, rewrap it.    Elevation. Keeping an injury raised or elevated above the level of your heart reduces swelling, pain, and throbbing. For instance, if you have a broken leg, it may help to rest your leg on several pillows when sitting or lying down. Try to keep the injured area elevated as often as possible.  Follow-up care  Follow up with your healthcare provider, or as advised.  When to seek medical  advice  Call your healthcare provider right away if any of these occur:    Fever of 100.4 F (38 C) or higher, or as directed by your healthcare provider    Chills    Increased pain or swelling in the injured body part    Injured body part becomes cold, blue, numb, or tingly    Signs of infection. These include warmth in the skin, redness, drainage, or bad smell coming from the injured body part.  Date Last Reviewed: 6/1/2018 2000-2019 [employer]. [address] [place] [address]. All rights reserved. This information is not intended as a substitute for professional medical care. Always follow your healthcare professional's instructions.

## 2020-03-02 NOTE — PROGRESS NOTES
Subjective     Mary Sims is a 54 year old female who presents to clinic today for the following health issues:    HPI   Chief Complaint   Patient presents with     Fall      fell on thumb       Patient Active Problem List   Diagnosis     CARDIOVASCULAR SCREENING; LDL GOAL LESS THAN 160     Hypothyroidism     Hypertension goal BP (blood pressure) < 140/90     WONG (iron deficiency anemia)     Obesity     Nontraumatic tear of supraspinatus tendon, left     Insomnia, unspecified type     De Quervain's disease (tenosynovitis)     Right-sided low back pain without sciatica, unspecified chronicity     Arthritis of foot, right     Past Surgical History:   Procedure Laterality Date     ARTHRODESIS FOOT Right 2020    Procedure: MIDFOOT BONE SPUR RESECTION WITH JOINT FUSIONS RIGHT LOWER EXTREMITY;  Surgeon: Choco Frances DPM;  Location: [facility]     ARTHROSCOPY SHLDR ROTATOR CUFF REPAIR, SUBACROMIAL DECOMP, DIST CLAVICLE RESECTION, BICEP TENODESIS Left 2017    Procedure: ARTHROSCOPY SHOULDER ROTATOR CUFF REPAIR, SUBACROMIAL DECOMPRESSION, DISTAL CLAVICLE RESECTION, OPEN BICEP TENODESIS REPAIR;  Surgeon: Dorina Rodriguez MD;  Location: [facility]     C  DELIVERY ONLY       COLONOSCOPY  2013    Procedure: COLONOSCOPY;  colonoscopy, screening;  Surgeon: Dalton Lala MD;  Location: [facility] OR     SURGICAL HISTORY OF -       RT ANKLE Fx AND REPAIR (PIN,PLATE,SCREWS)     THYROIDECTOMY         Social History     Tobacco Use     Smoking status: Never Smoker     Smokeless tobacco: Never Used   Substance Use Topics     Alcohol use: Yes     Comment: SELDOM      Family History   Problem Relation Age of Onset     Hypertension Mother      Diabetes Mother      Hypertension Father      Alcohol/Drug Father         alcohol     Crohn's Disease Son      Hypertension Other      Cancer No family hx of      Cerebrovascular Disease No family hx of      Thyroid Disease No family hx of      Glaucoma No  "family hx of      Macular Degeneration No family hx of          Current Outpatient Medications   Medication Sig Dispense Refill     gabapentin (NEURONTIN) 300 MG capsule TAKE ONE CAPSULE BY MOUTH THREE TIMES A DAY 90 capsule 1     hydrOXYzine (ATARAX) 25 MG tablet Take 1-2 tablets every 4-6 hours as needed for pain control. 16 tablet 0     ibuprofen (ADVIL/MOTRIN) 600 MG tablet Take 600mg of ibuprofen every 6 hours x 7 days to assist in pain control.  If you are not tolerating the medication, you are ok to stop. 28 tablet 0     levothyroxine (SYNTHROID/LEVOTHROID) 75 MCG tablet TAKE ONE TABLET BY MOUTH EVERY DAY 90 tablet 0     metoprolol succinate ER (TOPROL-XL) 25 MG 24 hr tablet Take 1 tablet (25 mg) by mouth daily 90 tablet 3     order for DME Equipment being ordered: [employer] x 3 months. 1 Device 0     oxyCODONE-acetaminophen (PERCOCET) 5-325 MG tablet Take 1-2 tablets every 4-6 hours as needed for pain.  Do not take other tylenol products with this medication, as too much tylenol can be damaging to the liver. 16 tablet 0     vitamin D2 (ERGOCALCIFEROL) 17662 units (1250 mcg) capsule Take 1 capsule (50,000 Units) by mouth once a week 12 capsule 0     Allergies   Allergen Reactions     No Known Drug Allergy      BP Readings from Last 3 Encounters:   03/02/20 138/83   02/27/20 (!) 133/90   02/20/20 136/76    Wt Readings from Last 3 Encounters:   03/02/20 90.7 kg (200 lb)   02/27/20 90.7 kg (200 lb)   02/20/20 92.1 kg (203 lb)                      Reviewed and updated as needed this visit by Provider         Review of Systems   ROS COMP: Constitutional, HEENT, cardiovascular, pulmonary, gi and gu systems are negative, except as otherwise noted.      Objective    /83   Pulse 75   Temp 98.5  F (36.9  C)   Resp 15   Ht 1.626 m (5' 4\")   Wt 90.7 kg (200 lb)   LMP 08/04/2015 (Approximate)   SpO2 97%   BMI 34.33 kg/m    Body mass index is 34.33 kg/m .  Physical Exam   GENERAL: healthy, alert and no " distress  MS: left wrist with full ROM but tender to palpation along ulnar head.  Normal ROM in digits.  No swelling or rednes    Diagnostic Test Results:  Xray - final read pending        Assessment & Plan       ICD-10-CM    1. Falls, initial encounter W19.XXXA XR Hand Left G/E 3 Views     XR Wrist Left G/E 3 Views   2. Left wrist pain M25.532 Wrist/Arm Supplies Order      xray negative by my read.  Will recommend brace RICE.  Follow-up on final xray results.      Patient Instructions     Does appear like a sprain.  Recommend icing, wearing the brace, and taking antiinflammatories (naproxen 500 mg twice a day OR ibuprofen 600 mg four times a day) to help with the inflammation and pain.  FOLLOW UP if not improving in 1-2 weeks.  Patient Education     RICE     Rest an injury, elevate it, and use ice and compression as directed.   RICE stands for rest, ice, compression, and elevation. These can limit pain and swelling after an injury. RICE may be recommended to help treat breaks (fractures), sprains, strains, and bruises or bumps.   Home care  Here are the details of RICE:    Rest. Limit the use of the injured body part. This helps prevent further damage to the body part and gives it time to heal. In some cases, you may need a sling, brace, splint, or cast to help keep the body part still until it has healed.    Ice. Applying ice right after an injury helps relieve pain and swelling. To make an ice pack, put ice cubes in a plastic bag that seals at the top. Wrap the bag in a clean, thin towel or cloth. Then place it over the injured area. Do this for 10 to 15 minutes every 3 to 4 hours. Continue for the next 1 to 3 days or until your symptoms improve. Never put ice directly on your skin. Don't ice an area longer than 15 minutes at a time.    Compression. Putting pressure on an injury helps reduce swelling and provides support. Wrap the injured area firmly with an elastic bandage or wrap. Make sure not to wrap the  bandage too tightly or you will cut off blood flow to the injured area. If your bandage loosens, rewrap it.    Elevation. Keeping an injury raised or elevated above the level of your heart reduces swelling, pain, and throbbing. For instance, if you have a broken leg, it may help to rest your leg on several pillows when sitting or lying down. Try to keep the injured area elevated as often as possible.  Follow-up care  Follow up with your healthcare provider, or as advised.  When to seek medical advice  Call your healthcare provider right away if any of these occur:    Fever of 100.4 F (38 C) or higher, or as directed by your healthcare provider    Chills    Increased pain or swelling in the injured body part    Injured body part becomes cold, blue, numb, or tingly    Signs of infection. These include warmth in the skin, redness, drainage, or bad smell coming from the injured body part.  Date Last Reviewed: 6/1/2018 2000-2019 [employer]. [address] [place] [address], [place] [address]. All rights reserved. This information is not intended as a substitute for professional medical care. Always follow your healthcare professional's instructions.               Return in about 2 weeks (around 3/16/2020), or if symptoms worsen or fail to improve.    PABLO Estevez [facility]      DME (Durable Medical Equipment) Orders and Documentation  Orders Placed This Encounter   Procedures     Wrist/Arm Supplies Order      The patient was assessed and it was determined the patient is in need of the following listed DME Supplies/Equipment. Please complete supporting documentation below to demonstrate medical necessity.      Wrist/Arm Bracing Supplies Documentation  Patient requires the use of the ordered bracing device due to following medical need/condition: fall, left wrist pain

## 2020-03-26 ENCOUNTER — OFFICE VISIT (OUTPATIENT)
Dept: PODIATRY | Facility: CLINIC | Age: 55
End: 2020-03-26
Payer: COMMERCIAL

## 2020-03-26 ENCOUNTER — ANCILLARY PROCEDURE (OUTPATIENT)
Dept: GENERAL RADIOLOGY | Facility: CLINIC | Age: 55
End: 2020-03-26
Attending: PODIATRIST
Payer: COMMERCIAL

## 2020-03-26 VITALS — HEART RATE: 81 BPM | SYSTOLIC BLOOD PRESSURE: 132 MMHG | OXYGEN SATURATION: 97 % | DIASTOLIC BLOOD PRESSURE: 97 MMHG

## 2020-03-26 DIAGNOSIS — Z98.890 S/P FOOT SURGERY, RIGHT: Primary | ICD-10-CM

## 2020-03-26 DIAGNOSIS — E03.9 HYPOTHYROIDISM, UNSPECIFIED TYPE: ICD-10-CM

## 2020-03-26 DIAGNOSIS — G57.11 MERALGIA PARESTHETICA, RIGHT LOWER LIMB: ICD-10-CM

## 2020-03-26 DIAGNOSIS — Z98.890 S/P FOOT SURGERY, RIGHT: ICD-10-CM

## 2020-03-26 PROCEDURE — 99024 POSTOP FOLLOW-UP VISIT: CPT | Performed by: PODIATRIST

## 2020-03-26 PROCEDURE — 73630 X-RAY EXAM OF FOOT: CPT | Mod: RT

## 2020-03-26 RX ORDER — GABAPENTIN 300 MG/1
CAPSULE ORAL
Qty: 90 CAPSULE | Refills: 1 | Status: SHIPPED | OUTPATIENT
Start: 2020-03-26 | End: 2020-05-28

## 2020-03-26 RX ORDER — LEVOTHYROXINE SODIUM 75 UG/1
TABLET ORAL
Qty: 90 TABLET | Refills: 0 | Status: SHIPPED | OUTPATIENT
Start: 2020-03-26 | End: 2020-06-28

## 2020-03-26 NOTE — TELEPHONE ENCOUNTER
"Requested Prescriptions   Pending Prescriptions Disp Refills     gabapentin (NEURONTIN) 300 MG capsule [Pharmacy Med Name: GABAPENTIN 300MG CAPS]        Last Written Prescription Date:  01/30/2020  Last Fill Quantity: 90,   # refills: 1  [facility]  Future Office visit:    Next 5 appointments (look out 90 days)    Mar 26, 2020 11:15 AM CDT  Return Visit with Choco Frances DPM  [facility] ([address] [address]  [phone]           Routing refill request to provider for review/approval because:  Drug not on the [facility], [facility] or  [email or web address] refill protocol or controlled substance 90 capsule 1     Sig: TAKE ONE CAPSULE BY MOUTH THREE TIMES A DAY       There is no refill protocol information for this order        levothyroxine (SYNTHROID/LEVOTHROID) 75 MCG tablet [Pharmacy Med Name: LEVOTHYROXINE SODIUM 75MCG TABS]  Last Written Prescription Date:  01/02/2020  Last Fill Quantity: 90,  # refills: 0   Last Office Visit with [facility], [facility] or  [email or web address] prescribing provider:  [facility]   Future Office Visit:    Next 5 appointments (look out 90 days)    Mar 26, 2020 11:15 AM CDT  Return Visit with Choco Frances DPM  [facility] ([facility]) [address] [facility] [address]  [phone]        90 tablet 0     Sig: TAKE ONE TABLET BY MOUTH EVERY DAY       Thyroid Protocol Passed - 3/26/2020  3:50 AM        Passed - Patient is 12 years or older        Passed - Recent (12 mo) or future (30 days) visit within the authorizing provider's specialty     Patient has had an office visit with the authorizing provider or a provider within the authorizing providers department within the previous 12 mos or has a future within next 30 days. See \"Patient Info\" tab in inbasket, or \"Choose Columns\" in Meds & Orders section of the refill encounter.              Passed - Medication is active on med list        " Passed - Normal TSH on file in past 12 months     Recent Labs   Lab Test 05/15/19  1026   TSH 1.03              Passed - No active pregnancy on record     If patient is pregnant or has had a positive pregnancy test, please check TSH.          Passed - No positive pregnancy test in past 12 months     If patient is pregnant or has had a positive pregnancy test, please check TSH.

## 2020-03-26 NOTE — PATIENT INSTRUCTIONS
Thank you for choosing [facility] Podiatry / Foot & Ankle Surgery!    DR. CUENCA'S CLINIC LOCATIONS:   MONDAY - [place]  TUESDAY AM - [place]   [address] [facility]  [address]   [place] [address] [place] [address]   [phone]       THURSDAY AM - [place] THURSDAY PM - [place]   [address]   [place] [address] [place], [place] [address] [phone]       FRIDAY AM - [place] SET UP SURGERY: [phone] [address] APPOINTMENTS: [phone]   [place] [address] BILLING QUESTIONS: [phone] FAX NUMBER: [phone]     Follow up: 4 weeks        Please read through the following handouts and if you have any questions, please feel free to call us or send a [employer] message!      [employer] / CAM WALKING BOOT INSTRUCTIONS  - Do NOT drive with CAM walker on. This is due to safety and legal issues.   - Do NOT wear the CAM walker on long car/train rides or on an airplane.  - Remove the CAM walker several times a day and do ankle range of motion (ROM) exercises/wiggle toes.  - It is recommended that a thick-soled shoe be worn on the other foot to offset any created leg length issue.   - The boot does not have to be worn at night.   - There is an increased risk of developing a blood clot with lower extremity immobilization. ROM exercises and knee-high compression (tenso /ACE wrap) is recommended to lower that risk.   - You should seek medical attention if you experience calf swelling and/or pain, chest pain, or shortness of breath.             Mary to follow up with Primary Care provider regarding elevated blood pressure. (if equal or greater than 140/90)    Body Mass Index (BMI)  Many things can cause foot and ankle problems. Foot structure, activity level, foot mechanics and injuries are common causes of pain.    One very important issue that often goes unmentioned, is body weight.  Extra weight can  cause increased stress on muscles, ligaments, bones and tendons. Sometimes just a few extra pounds is all it takes to put one over her/his threshold. Without reducing that stress, it can be difficult to alleviate pain.      Some people are uncomfortable addressing this issue, but we feel it is important for you to think about it. As Foot & Ankle specialists, our job is addressing the lower extremity problem and possible causes.     Regarding extra body weight, we encourage patients to discuss diet and weight management plans with their primary care doctors. It is this team approach that gives you the best opportunity for pain relief and getting you back on your feet.

## 2020-03-26 NOTE — PROGRESS NOTES
Foot & Ankle Surgery  March 26, 2020    S:  Patient in today approx 6 weeks sp midfoot fusions.  Pain levels zero.  She states she hasn't had any pain since surgery.  Following post-op instructions    BP (!) 132/97   Pulse 81   LMP 08/04/2015 (Approximate)   SpO2 97%       ROS - positive for CC.  Patient denies current nausea, vomiting, chills, fevers, belly pain, calf pain, chest pain or SOB.  Complete remainder of ROS is otherwise neg.    PE - incision healed.  Mild wei, wnl for this stage post-op.  However, no pain noted with manipulation of midfoot.  Skin shows no trophic, color or temperature changes otherwise.  No calf redness, swelling or pain noted otherwise.    Imaging - IMPRESSION: Again seen are surgical changes of hardware fusion of the  second tarsometatarsal joint as well as hardware in the distal fibula  and medial malleolus. The hardware remains intact and unremarkable.  The second tarsometatarsal joint is not well seen due to  superimposition of the hardware and the degree of bone bridging is  difficult to assess. Again seen is a moderate-sized plantar calcaneal  spur. No other abnormality is seen.    A/P - 54 year old yo patient approx 6 weeks sp above procedure  -personally reviewed imaging  -ok to start heel WB in boot(dispensed) with crutches(has); slowly increase to tolerance with care to avoid exceeding pain threshold with amount of weight/time on foot, as this can have a neg impact on healing  -RICE/tylenol prn  -tensogrip for edema control  -note stating she is not cleared to return to work yet, will update at next visit    Follow up  -  4 weeks or sooner with acute issues    Plan for hxjqch-[id number] - consider after next visit     There is no height or weight on file to calculate BMI.  Weight management plan: Patient was referred to their PCP to discuss a diet and exercise plan.      Choco Frances DPM FACFAS FACFAOM  Podiatric Foot & Ankle Surgeon  [facility]  [phone]

## 2020-03-26 NOTE — LETTER
[address] [address] [phone]          March 26, 2020    RE:  Mary Sims                                                                                                                                                       [address]  [facility] [address]            To whom it may concern:    Mary Sims is under my professional care after undergoing reconstructive right foot surgery on 2/12/20.  She is not cleared to return to work at this time. Her work status will be updated in 4 weeks at her next clinic appointment.    Thank you          Choco Frances, SANAZM FACFAS FACFAOM  Podiatric Foot & Ankle Surgeon  [facility]

## 2020-04-01 ENCOUNTER — TELEPHONE (OUTPATIENT)
Dept: PODIATRY | Facility: CLINIC | Age: 55
End: 2020-04-01

## 2020-04-01 NOTE — TELEPHONE ENCOUNTER
Routed to Dr. Frances to check Gloria tomorrow.    Victor Hugo Guzmán [facility]  Podiatry/Foot & Ankle Surgery  [facility]

## 2020-04-01 NOTE — TELEPHONE ENCOUNTER
Reason for Call:  Form, our goal is to have forms completed with 72 hours, however, some forms may require a visit or additional information.    Type of letter, form or note:  disability    Who is the form from?: [employer] [facility]    Where did the form come from: form was faxed in    What clinic location was the form placed at?: unsure if we have received it. Probably would have been sent to [facility] but pt is unsure.     Where the form was placed:     What number is listed as a contact on the form?: [phone]         Additional comments: pt says that these were sent to us last Thursday 3/26 by her employer. They say that they have not heard back    Call taken on 4/1/2020 at 9:06 AM by Henry Johnson

## 2020-04-02 NOTE — TELEPHONE ENCOUNTER
Spoke with pt. [facility] and we will contact her tomorrow once completed.

## 2020-04-09 NOTE — TELEPHONE ENCOUNTER
Forms received and completed, post-op office notes printed off, will fax forms back this morning(CHAPIN).    Choco Frances DPM [facility] FACFAOM  Podiatric Foot & Ankle Surgeon  [facility]  [phone]

## 2020-04-09 NOTE — TELEPHONE ENCOUNTER
Form faxed to [employer] at [phone] attn. Suzie Styles.  Phone: [phone].  Original sent to abstracting.    Vi DUMONT CMA

## 2020-04-15 ENCOUNTER — TELEPHONE (OUTPATIENT)
Dept: PODIATRY | Facility: CLINIC | Age: 55
End: 2020-04-15

## 2020-04-15 NOTE — LETTER
[address] [id number] [phone]          April 15, 2020    RE:  Mary Sims                                                                                                                                                       [address]  [facility] [address]            To whom it may concern:    Mary Sims is under my professional care after undergoing reconstructive foot surgery.  She has a follow-up appointment with me 5/14/20, at which point I anticipate lifting work restrictions based on healing. However, please continue current restrictions until 5/14/20.      Thank you,    Choco Frances, EVELYN FACFAS FACFAOM  Podiatric Foot & Ankle Surgeon  [facility]

## 2020-04-15 NOTE — TELEPHONE ENCOUNTER
Spoke with pt about rescheduling her appt on 4/23. She asked to postpone to 5/14 when Dr Frances is back in clinic and at [place]. Appointment was changed.    She requests a letter extending work resctritions until 5/14/20 and would like it scanned to her email on file: [email or web address]    Routed to Dr. Frances to advise on writing letter tomorrow.    Victor Hugo Guzmán, GM  Podiatry/Foot & Ankle Surgery  [facility]

## 2020-05-14 ENCOUNTER — ANCILLARY PROCEDURE (OUTPATIENT)
Dept: GENERAL RADIOLOGY | Facility: CLINIC | Age: 55
End: 2020-05-14
Attending: PODIATRIST
Payer: COMMERCIAL

## 2020-05-14 ENCOUNTER — OFFICE VISIT (OUTPATIENT)
Dept: PODIATRY | Facility: CLINIC | Age: 55
End: 2020-05-14
Payer: COMMERCIAL

## 2020-05-14 VITALS
BODY MASS INDEX: 35.85 KG/M2 | DIASTOLIC BLOOD PRESSURE: 74 MMHG | HEIGHT: 64 IN | WEIGHT: 210 LBS | SYSTOLIC BLOOD PRESSURE: 124 MMHG

## 2020-05-14 DIAGNOSIS — Z98.890 S/P FOOT SURGERY, RIGHT: ICD-10-CM

## 2020-05-14 DIAGNOSIS — Z98.890 S/P FOOT SURGERY, RIGHT: Primary | ICD-10-CM

## 2020-05-14 PROCEDURE — 99024 POSTOP FOLLOW-UP VISIT: CPT | Performed by: PODIATRIST

## 2020-05-14 PROCEDURE — 73630 X-RAY EXAM OF FOOT: CPT | Mod: RT

## 2020-05-14 ASSESSMENT — MIFFLIN-ST. JEOR: SCORE: 1527.55

## 2020-05-14 NOTE — LETTER
[facility]  [address] [facility] [address]  [facility] [address]  [phone]          May 14, 2020    RE:  Mary Sims                                                                                                                                                       [address] [facility] [address]            To whom it may concern:    Mary Sims is under my professional care after undergoing right foot surgery.  She is now cleared to return to work 5/17/20 without restrictions.      Sincerely,        Choco Frances DPM FACFAS FACFAOM  Podiatric Foot & Ankle Surgeon  [facility]

## 2020-05-14 NOTE — PROGRESS NOTES
"Foot & Ankle Surgery  May 14, 2020    S:  Patient in today approx 3 mo sp midfoot fusions x 2, on 2/12/20.  Pain levels minimal.  Started protected WB 3/26/20.  States she was full WB in the boot 2 days later, and started walking around outside the boot in comfortable shoe gear this past Tuesday.  Full WB in boot wihtout pain, although she also did mention some discomfort in the \"ball of the foot\", meseret after driving.  Has orthotics from prior to surgery but hasn't been wearing them yet.    /74   Ht 1.626 m (5' 4\")   Wt 95.3 kg (210 lb)   LMP 08/04/2015 (Approximate)   BMI 36.05 kg/m        ROS - positive for CC.  Patient denies current nausea, vomiting, chills, fevers, belly pain, calf pain, chest pain or SOB.  Complete remainder of ROS is otherwise neg.    PE - stressing of 2nd ray/midfoot neg for pain, instability/crepitus.  Skin shows no trophic, color or temperature changes otherwise.  No calf redness, swelling or pain noted otherwise.    Imaging - FINDINGS: Plantar calcaneal spurring. Again there are surgical changes  including a medial malleolar screw, distal fibular plate-screw  fixation, and changes of second TMT arthrodesis. Osteopenia.                                                                      IMPRESSION: No acute fracture or significant change.    A/P - 54 year old yo patient approx 3 mo sp above procedure  -personally reviewed imging.  Full bone bridging not seen yet, but no hardware pathology noted.  -shoe gear and activities as tolerated.  Advised starting slow and gradually increasing until she's back to regular shoe gear  -regarding forefoot pain, anticipate this may resolve as we get further out from surgery and she's used to ambulating on the foot.  Discussed that fusions limit 2nd ray motion, so this may be overloading.  Consider new orthotics.      Follow up  -  3 or sooner with acute issues    Plan for [id number] - 5/17/20     Body mass index is 36.05 kg/m .  Weight " management plan: Patient was referred to their PCP to discuss a diet and exercise plan.      Choco Frances DPM FACFAS FACFAOM  Podiatric Foot & Ankle Surgeon  [facility]  [phone]

## 2020-05-18 ENCOUNTER — VIRTUAL VISIT (OUTPATIENT)
Dept: FAMILY MEDICINE | Facility: CLINIC | Age: 55
End: 2020-05-18
Payer: COMMERCIAL

## 2020-05-18 DIAGNOSIS — E55.9 VITAMIN D DEFICIENCY: ICD-10-CM

## 2020-05-18 DIAGNOSIS — I10 HYPERTENSION GOAL BP (BLOOD PRESSURE) < 140/90: ICD-10-CM

## 2020-05-18 DIAGNOSIS — Z13.6 CARDIOVASCULAR SCREENING; LDL GOAL LESS THAN 130: ICD-10-CM

## 2020-05-18 DIAGNOSIS — E03.9 HYPOTHYROIDISM, UNSPECIFIED TYPE: ICD-10-CM

## 2020-05-18 DIAGNOSIS — H69.91 DYSFUNCTION OF RIGHT EUSTACHIAN TUBE: Primary | ICD-10-CM

## 2020-05-18 PROCEDURE — 99214 OFFICE O/P EST MOD 30 MIN: CPT | Mod: TEL | Performed by: NURSE PRACTITIONER

## 2020-05-18 RX ORDER — LORATADINE 10 MG/1
10 TABLET ORAL DAILY
Qty: 90 TABLET | Refills: 1 | Status: SHIPPED | OUTPATIENT
Start: 2020-05-18 | End: 2020-11-25

## 2020-05-18 RX ORDER — FLUTICASONE PROPIONATE 50 MCG
1 SPRAY, SUSPENSION (ML) NASAL DAILY
Qty: 1.1 ML | Refills: 0 | Status: SHIPPED | OUTPATIENT
Start: 2020-05-18 | End: 2020-06-15

## 2020-05-18 ASSESSMENT — PAIN SCALES - GENERAL: PAINLEVEL: NO PAIN (0)

## 2020-05-18 NOTE — PROGRESS NOTES
"Mary Sims is a 54 year old female who is being evaluated via a billable telephone visit.      The patient has been notified of following:     \"This telephone visit will be conducted via a call between you and your physician/provider. We have found that certain health care needs can be provided without the need for a physical exam.  This service lets us provide the care you need with a short phone conversation.  If a prescription is necessary we can send it directly to your pharmacy.  If lab work is needed we can place an order for that and you can then stop by our lab to have the test done at a later time.    Telephone visits are billed at different rates depending on your insurance coverage. During this emergency period, for some insurers they may be billed the same as an in-person visit.  Please reach out to your insurance provider with any questions.    If during the course of the call the physician/provider feels a telephone visit is not appropriate, you will not be charged for this service.\"    Patient has given verbal consent for Telephone visit?  Yes    What phone number would you like to be contacted at? [phone]    How would you like to obtain your AVS? Felixhart    Subjective     Mary Sims is a 54 year old female who presents via phone visit today for the following health issues:    HPI  Pt has had ringing in her right ear consistently for the past 3-4 weeks. Se has allergy to lilacs otherwise, denies allergies or current allergy symptoms. She has tried OTC Debrox for 1.5 weeks without improvement in symptoms, no wax was removed.  She has tried autoinsufflation with symptoms improvement for a couple of minutes.    Hypertension Follow-up      Do you check your blood pressure regularly outside of the clinic? Yes     Are you following a low salt diet? Yes    Are your blood pressures ever more than 140 on the top number (systolic) OR more   than 90 on the bottom number (diastolic), for example " 140/90? No    Hypothyroidism Follow-up      Since last visit, patient describes the following symptoms: Weight stable, no hair loss, no skin changes, no constipation, no loose stools      Patient Active Problem List   Diagnosis     CARDIOVASCULAR SCREENING; LDL GOAL LESS THAN 160     Hypothyroidism     Hypertension goal BP (blood pressure) < 140/90     WONG (iron deficiency anemia)     Obesity     Nontraumatic tear of supraspinatus tendon, left     Insomnia, unspecified type     De Quervain's disease (tenosynovitis)     Right-sided low back pain without sciatica, unspecified chronicity     Arthritis of foot, right     Past Surgical History:   Procedure Laterality Date     ARTHRODESIS FOOT Right 2020    Procedure: MIDFOOT BONE SPUR RESECTION WITH JOINT FUSIONS RIGHT LOWER EXTREMITY;  Surgeon: Choco Frances DPM;  Location: [facility]     ARTHROSCOPY SHLDR ROTATOR CUFF REPAIR, SUBACROMIAL DECOMP, DIST CLAVICLE RESECTION, BICEP TENODESIS Left 2017    Procedure: ARTHROSCOPY SHOULDER ROTATOR CUFF REPAIR, SUBACROMIAL DECOMPRESSION, DISTAL CLAVICLE RESECTION, OPEN BICEP TENODESIS REPAIR;  Surgeon: Dorina Rodriguez MD;  [facility]     C  DELIVERY ONLY       COLONOSCOPY  2013    Procedure: COLONOSCOPY;  colonoscopy, screening;  Surgeon: Dalton Lala MD;  Location: [facility]     SURGICAL HISTORY OF -       RT ANKLE Fx AND REPAIR (PIN,PLATE,SCREWS)     THYROIDECTOMY         Social History     Tobacco Use     Smoking status: Never Smoker     Smokeless tobacco: Never Used   Substance Use Topics     Alcohol use: Yes     Comment: SELDOM      Family History   Problem Relation Age of Onset     Hypertension Mother      Diabetes Mother      Hypertension Father      Alcohol/Drug Father         alcohol     Crohn's Disease Son      Hypertension Other      Cancer No family hx of      Cerebrovascular Disease No family hx of      Thyroid Disease No family hx of      Glaucoma No family hx of       Macular Degeneration No family hx of          Current Outpatient Medications   Medication Sig Dispense Refill     gabapentin (NEURONTIN) 300 MG capsule TAKE ONE CAPSULE BY MOUTH THREE TIMES A DAY 90 capsule 1     levothyroxine (SYNTHROID/LEVOTHROID) 75 MCG tablet TAKE ONE TABLET BY MOUTH EVERY DAY 90 tablet 0     metoprolol succinate ER (TOPROL-XL) 25 MG 24 hr tablet Take 1 tablet (25 mg) by mouth daily 90 tablet 3     vitamin D2 (ERGOCALCIFEROL) 80049 units (1250 mcg) capsule Take 1 capsule (50,000 Units) by mouth once a week 12 capsule 0     order for DME Equipment being ordered: tall boot size 10 1 Device 0     order for DME Equipment being ordered: RollAbout x 3 months. 1 Device 0     BP Readings from Last 3 Encounters:   05/14/20 124/74   03/26/20 (!) 132/97   03/02/20 138/83    Wt Readings from Last 3 Encounters:   05/14/20 95.3 kg (210 lb)   03/02/20 90.7 kg (200 lb)   02/27/20 90.7 kg (200 lb)                    Reviewed and updated as needed this visit by Provider         Review of Systems   Constitutional, HEENT, cardiovascular, pulmonary, gi and gu systems are negative, except as otherwise noted.       Objective   Reported vitals:  LMP 08/04/2015 (Approximate)   Breastfeeding No    healthy, alert and no distress  PSYCH: Alert and oriented times 3; coherent speech, normal   rate and volume, able to articulate logical thoughts, able   to abstract reason, no tangential thoughts, no hallucinations   or delusions  Her affect is normal and pleasant  RESP: No cough, no audible wheezing, able to talk in full sentences  Remainder of exam unable to be completed due to telephone visits    Diagnostic Test Results:  Labs reviewed in Epic  none         Assessment/Plan:  1. Dysfunction of right eustachian tube  Treating with Claritin and Flonase, reviewed autoinsufflation, return to clinic 1 month if not improved   - loratadine (CLARITIN) 10 MG tablet; Take 1 tablet (10 mg) by mouth daily  Dispense: 90 tablet; Refill:  1  - fluticasone (FLONASE) 50 MCG/ACT nasal spray; Spray 1 spray into both nostrils daily  Dispense: 1.1 mL; Refill: 0    2. Hypertension goal BP (blood pressure) < 140/90  Due for labs, BP Check.  Home BP's in the 120-130/opxsq56-34's.  - **Basic metabolic panel FUTURE anytime; Future  - Albumin Random Urine Quantitative with Creat Ratio; Future    3. Hypothyroidism, unspecified type    - **TSH with free T4 reflex FUTURE anytime; Future    4. Vitamin D deficiency    - **Vitamin D Deficiency FUTURE anytime; Future    5. CARDIOVASCULAR SCREENING; LDL GOAL LESS THAN 130    - Lipid panel reflex to direct LDL Fasting; Future    No follow-ups on file.      Phone call duration:  21 minutes    PABLO Glez CNP

## 2020-05-18 NOTE — PATIENT INSTRUCTIONS
Patient Education     Common Middle Ear Problems    Your middle ear may have been injured or infected recently. Over time, certain growths or bone disease can also harm the middle ear. Left untreated, middle ear problems often lead to lifelong hearing loss. There are two types of hearing loss: conductive and sensorineural. One or both kinds can occur. Injury, infection, certain growths, or bone disease can cause your symptoms. A ruptured eardrum or a long-lasting (chronic) ear infection may be painful and decrease hearing.  Symptoms    Hearing loss in one or both ears    Fluid, often smelly, draining from the ear    Pain, pressure, or discomfort in the ear    Ringing in the ear  Conductive and sensorineural hearing loss  Sound waves may be disrupted before they reach the inner ear. If this happens, conductive hearing loss may occur. The ear canal can be blocked by wax, infection, a tumor, or a foreign object. The eardrum can be injured or infected. Abnormal bone growth, infection, or tumors in the middle ear can block sound waves.  Sound waves may not be processed correctly in the inner ear. If this happens, sensorineural hearing loss may occur. Permanent hearing loss is most commonly associated with sensorineural problems.  The tests and evaluations used to diagnose what type of hearing problem you have will depend on your symptoms.   Date Last Reviewed: 10/1/2016    3960-3693 [employer]. [address] [place] [address], [place] [address]. All rights reserved. This information is not intended as a substitute for professional medical care. Always follow your healthcare professional's instructions.

## 2020-05-25 DIAGNOSIS — I10 HYPERTENSION GOAL BP (BLOOD PRESSURE) < 140/90: ICD-10-CM

## 2020-05-25 DIAGNOSIS — M79.671 RIGHT FOOT PAIN: ICD-10-CM

## 2020-05-25 DIAGNOSIS — G57.11 MERALGIA PARESTHETICA, RIGHT LOWER LIMB: ICD-10-CM

## 2020-05-27 RX ORDER — METOPROLOL SUCCINATE 25 MG/1
TABLET, EXTENDED RELEASE ORAL
Qty: 90 TABLET | Refills: 0 | Status: SHIPPED | OUTPATIENT
Start: 2020-05-27 | End: 2020-08-20

## 2020-05-27 RX ORDER — NAPROXEN 500 MG/1
TABLET ORAL
Qty: 60 TABLET | Refills: 3 | OUTPATIENT
Start: 2020-05-27

## 2020-05-27 NOTE — TELEPHONE ENCOUNTER
Requested Prescriptions   Pending Prescriptions Disp Refills     gabapentin (NEURONTIN) 300 MG capsule [Pharmacy Med Name: GABAPENTIN 300MG CAPS] 90 capsule 1     Sig: TAKE ONE CAPSULE BY MOUTH THREE TIMES A DAY       There is no refill protocol information for this order        Routing refill request to provider for review/approval because:  Drug not on the [facility] refill protocol     Benjamin Vergara RN, BSN, PHN

## 2020-05-27 NOTE — TELEPHONE ENCOUNTER
Routing refill request to provider for review/approval because:    NSAID Medications Failed for the following did not meet protocol.05/25/2020 04:04 AM   Normal ALT on file in past 12 months Protocol Details    Normal AST on file in past 12 months     Normal CBC on file in past 12 months     Medication is active on med list      Danitza Go RN, [facility] Triage

## 2020-05-28 RX ORDER — GABAPENTIN 300 MG/1
300 CAPSULE ORAL 3 TIMES DAILY
Qty: 90 CAPSULE | Refills: 0 | Status: SHIPPED | OUTPATIENT
Start: 2020-05-28 | End: 2020-06-23

## 2020-06-12 DIAGNOSIS — H69.91 DYSFUNCTION OF RIGHT EUSTACHIAN TUBE: ICD-10-CM

## 2020-06-15 RX ORDER — FLUTICASONE PROPIONATE 50 MCG
SPRAY, SUSPENSION (ML) NASAL
Qty: 16 G | Refills: 0 | Status: SHIPPED | OUTPATIENT
Start: 2020-06-15 | End: 2020-11-25

## 2020-06-15 NOTE — TELEPHONE ENCOUNTER
Refilled. If she continues to have ear fullness, she needs to be seen again and may need ENT referral.  Janie SHAH, CNP

## 2020-06-15 NOTE — TELEPHONE ENCOUNTER
Routing refill request to provider for review/approval because:  Associated diagnosis not on protocol.   Cynthia Lujan RN  [facility]

## 2020-06-21 DIAGNOSIS — G57.11 MERALGIA PARESTHETICA, RIGHT LOWER LIMB: ICD-10-CM

## 2020-06-21 DIAGNOSIS — E03.9 HYPOTHYROIDISM, UNSPECIFIED TYPE: ICD-10-CM

## 2020-06-22 NOTE — TELEPHONE ENCOUNTER
Routing refill request to provider for review/approval because:  Drug not on the [facility] refill protocol     Marisela Escalona RN

## 2020-06-23 RX ORDER — GABAPENTIN 300 MG/1
CAPSULE ORAL
Qty: 90 CAPSULE | Refills: 0 | Status: SHIPPED | OUTPATIENT
Start: 2020-06-23 | End: 2020-07-22

## 2020-06-23 NOTE — TELEPHONE ENCOUNTER
Routing refill request to provider for review/approval because:  Labs not current:  TSH  Cynthia Lujan RN  [facility]

## 2020-06-23 NOTE — TELEPHONE ENCOUNTER
Called and spoke to the patient and she has a Physical scheduled for 7/8/2020. Scheduled a lab only appt for 6/25/2020, orders in chart.  Trang Chirinos [facility]  2nd Floor  Primary Care

## 2020-06-25 DIAGNOSIS — E03.9 HYPOTHYROIDISM, UNSPECIFIED TYPE: ICD-10-CM

## 2020-06-25 DIAGNOSIS — E55.9 VITAMIN D DEFICIENCY: ICD-10-CM

## 2020-06-25 DIAGNOSIS — I10 HYPERTENSION GOAL BP (BLOOD PRESSURE) < 140/90: ICD-10-CM

## 2020-06-25 DIAGNOSIS — Z13.6 CARDIOVASCULAR SCREENING; LDL GOAL LESS THAN 130: ICD-10-CM

## 2020-06-25 LAB
ANION GAP SERPL CALCULATED.3IONS-SCNC: 6 MMOL/L (ref 3–14)
BUN SERPL-MCNC: 16 MG/DL (ref 7–30)
CALCIUM SERPL-MCNC: 8.5 MG/DL (ref 8.5–10.1)
CHLORIDE SERPL-SCNC: 110 MMOL/L (ref 94–109)
CHOLEST SERPL-MCNC: 161 MG/DL
CO2 SERPL-SCNC: 27 MMOL/L (ref 20–32)
CREAT SERPL-MCNC: 0.74 MG/DL (ref 0.52–1.04)
CREAT UR-MCNC: 155 MG/DL
DEPRECATED CALCIDIOL+CALCIFEROL SERPL-MC: 25 UG/L (ref 20–75)
GFR SERPL CREATININE-BSD FRML MDRD: 89 ML/MIN/{1.73_M2}
GLUCOSE SERPL-MCNC: 90 MG/DL (ref 70–99)
HDLC SERPL-MCNC: 52 MG/DL
LDLC SERPL CALC-MCNC: 89 MG/DL
MICROALBUMIN UR-MCNC: 14 MG/L
MICROALBUMIN/CREAT UR: 9.03 MG/G CR (ref 0–25)
NONHDLC SERPL-MCNC: 109 MG/DL
POTASSIUM SERPL-SCNC: 3.7 MMOL/L (ref 3.4–5.3)
SODIUM SERPL-SCNC: 143 MMOL/L (ref 133–144)
TRIGL SERPL-MCNC: 101 MG/DL
TSH SERPL DL<=0.005 MIU/L-ACNC: 1.32 MU/L (ref 0.4–4)

## 2020-06-25 PROCEDURE — 82306 VITAMIN D 25 HYDROXY: CPT | Performed by: NURSE PRACTITIONER

## 2020-06-25 PROCEDURE — 80061 LIPID PANEL: CPT | Performed by: NURSE PRACTITIONER

## 2020-06-25 PROCEDURE — 80048 BASIC METABOLIC PNL TOTAL CA: CPT | Performed by: NURSE PRACTITIONER

## 2020-06-25 PROCEDURE — 84443 ASSAY THYROID STIM HORMONE: CPT | Performed by: NURSE PRACTITIONER

## 2020-06-25 PROCEDURE — 82043 UR ALBUMIN QUANTITATIVE: CPT | Performed by: NURSE PRACTITIONER

## 2020-06-25 PROCEDURE — 36415 COLL VENOUS BLD VENIPUNCTURE: CPT | Performed by: NURSE PRACTITIONER

## 2020-06-26 NOTE — RESULT ENCOUNTER NOTE
Mary,     Urine sample is not showing any abnormal protein.  Vitamin D levels are in a normal range and improved from 6 months ago.  Thyroid function, electrolytes, glucose and kidney function are normal.  Cholesterol is at goal for you and improved from last check.    Please do not hesitate to call us at [phone] if you have any questions or concerns.    Thank you,    Samina Ames MD MPH   Covering for Laura Riley

## 2020-06-28 RX ORDER — LEVOTHYROXINE SODIUM 75 UG/1
TABLET ORAL
Qty: 90 TABLET | Refills: 1 | Status: SHIPPED | OUTPATIENT
Start: 2020-06-28 | End: 2020-12-13

## 2020-07-08 ENCOUNTER — OFFICE VISIT (OUTPATIENT)
Dept: FAMILY MEDICINE | Facility: CLINIC | Age: 55
End: 2020-07-08
Payer: COMMERCIAL

## 2020-07-08 VITALS
HEART RATE: 70 BPM | DIASTOLIC BLOOD PRESSURE: 72 MMHG | HEIGHT: 64 IN | BODY MASS INDEX: 36.88 KG/M2 | OXYGEN SATURATION: 97 % | RESPIRATION RATE: 16 BRPM | WEIGHT: 216 LBS | SYSTOLIC BLOOD PRESSURE: 125 MMHG | TEMPERATURE: 98.2 F

## 2020-07-08 DIAGNOSIS — Z12.4 SCREENING FOR MALIGNANT NEOPLASM OF CERVIX: ICD-10-CM

## 2020-07-08 DIAGNOSIS — E28.39 ESTROGEN DEFICIENCY: ICD-10-CM

## 2020-07-08 DIAGNOSIS — Z00.00 ROUTINE GENERAL MEDICAL EXAMINATION AT A HEALTH CARE FACILITY: Primary | ICD-10-CM

## 2020-07-08 DIAGNOSIS — E03.9 HYPOTHYROIDISM, UNSPECIFIED TYPE: ICD-10-CM

## 2020-07-08 DIAGNOSIS — E66.01 MORBID OBESITY (H): ICD-10-CM

## 2020-07-08 DIAGNOSIS — I10 HYPERTENSION GOAL BP (BLOOD PRESSURE) < 140/90: ICD-10-CM

## 2020-07-08 PROCEDURE — 87624 HPV HI-RISK TYP POOLED RSLT: CPT | Performed by: FAMILY MEDICINE

## 2020-07-08 PROCEDURE — 90471 IMMUNIZATION ADMIN: CPT | Performed by: FAMILY MEDICINE

## 2020-07-08 PROCEDURE — 90750 HZV VACC RECOMBINANT IM: CPT | Performed by: FAMILY MEDICINE

## 2020-07-08 PROCEDURE — 99396 PREV VISIT EST AGE 40-64: CPT | Mod: 25 | Performed by: FAMILY MEDICINE

## 2020-07-08 PROCEDURE — G0145 SCR C/V CYTO,THINLAYER,RESCR: HCPCS | Performed by: FAMILY MEDICINE

## 2020-07-08 ASSESSMENT — PAIN SCALES - GENERAL: PAINLEVEL: NO PAIN (0)

## 2020-07-08 ASSESSMENT — MIFFLIN-ST. JEOR: SCORE: 1549.77

## 2020-07-08 NOTE — PROGRESS NOTES
SUBJECTIVE:   CC: Mary Sims is an 55 year old woman who presents for preventive health visit.  She is a new patient to me and normally follows with PABLO Schrader CNP    Healthy Habits:    Do you get at least three servings of calcium containing foods daily (dairy, green leafy vegetables, etc.)? No     Amount of exercise or daily activities, outside of work: None    Problems taking medications regularly No    Medication side effects: No    Have you had an eye exam in the past two years? yes    Do you see a dentist twice per year? no    Do you have sleep apnea, excessive snoring or daytime drowsiness?no      Foot surgery this Feb. Was not wt bearing for 6 weeks. Gained wt through that time frame    Today's PHQ-2 Score:   PHQ-2 ( 1999 [employer]) 7/8/2020 1/27/2020   Q1: Little interest or pleasure in doing things 0 0   Q2: Feeling down, depressed or hopeless 0 0   PHQ-2 Score 0 0   Q1: Little interest or pleasure in doing things - -   Q2: Feeling down, depressed or hopeless - -   PHQ-2 Score - -       Abuse: Current or Past(Physical, Sexual or Emotional)- No  Do you feel safe in your environment? Yes        Social History     Tobacco Use     Smoking status: Never Smoker     Smokeless tobacco: Never Used   Substance Use Topics     Alcohol use: Yes     Comment: SELDOM      If you drink alcohol do you typically have >3 drinks per day or >7 drinks per week? No                       is an alcoholic and was just admitted to [facility]. She had made an ultimatum and moved out of the house prior to his admission.    She reports she is doing overall okay and has very good friend and family support. denies overt depression or anxiety but notes this year has been quite challenging.    Reviewed orders with patient.  Reviewed health maintenance and updated orders accordingly - Yes  Patient Active Problem List   Diagnosis     CARDIOVASCULAR SCREENING; LDL GOAL LESS THAN 160     Hypothyroidism     Hypertension goal BP  (blood pressure) < 140/90     WONG (iron deficiency anemia)     Obesity     Nontraumatic tear of supraspinatus tendon, left     Insomnia, unspecified type     De Quervain's disease (tenosynovitis)     Right-sided low back pain without sciatica, unspecified chronicity     Arthritis of foot, right     Morbid obesity (H)     Past Surgical History:   Procedure Laterality Date     ARTHRODESIS FOOT Right 2020    Procedure: MIDFOOT BONE SPUR RESECTION WITH JOINT FUSIONS RIGHT LOWER EXTREMITY;  Surgeon: Choco Frances DPM;  Location: [facility] OR     ARTHROSCOPY SHLDR ROTATOR CUFF REPAIR, SUBACROMIAL DECOMP, DIST CLAVICLE RESECTION, BICEP TENODESIS Left 2017    Procedure: ARTHROSCOPY SHOULDER ROTATOR CUFF REPAIR, SUBACROMIAL DECOMPRESSION, DISTAL CLAVICLE RESECTION, OPEN BICEP TENODESIS REPAIR;  Surgeon: Dorina Rodriguez MD;  [facility]: [facility] OR     C  DELIVERY ONLY       COLONOSCOPY  2013    Procedure: COLONOSCOPY;  colonoscopy, screening;  Surgeon: Dalton Lala MD;  Location: [facility] OR     SURGICAL HISTORY OF -       RT ANKLE Fx AND REPAIR (PIN,PLATE,SCREWS)     THYROIDECTOMY         Social History     Tobacco Use     Smoking status: Never Smoker     Smokeless tobacco: Never Used   Substance Use Topics     Alcohol use: Yes     Comment: SELDOM      Family History   Problem Relation Age of Onset     Hypertension Mother      Diabetes Mother      Hypertension Father      Alcohol/Drug Father         alcohol     Crohn's Disease Son      Hypertension Other      Cancer No family hx of      Cerebrovascular Disease No family hx of      Thyroid Disease No family hx of      Glaucoma No family hx of      Macular Degeneration No family hx of          Current Outpatient Medications   Medication Sig Dispense Refill     fluticasone (FLONASE) 50 MCG/ACT nasal spray INSTILL 1 SPRAY INTO BOTH NOSTRIL DAILY 16 g 0     gabapentin (NEURONTIN) 300 MG capsule TAKE ONE CAPSULE BY MOUTH THREE TIMES A DAY *DUE TO BE  "SEEN* 90 capsule 0     levothyroxine (SYNTHROID/LEVOTHROID) 75 MCG tablet TAKE ONE TABLET BY MOUTH EVERY DAY 90 tablet 1     loratadine (CLARITIN) 10 MG tablet Take 1 tablet (10 mg) by mouth daily 90 tablet 1     metoprolol succinate ER (TOPROL-XL) 25 MG 24 hr tablet TAKE ONE TABLET BY MOUTH EVERY DAY 90 tablet 0     vitamin D2 (ERGOCALCIFEROL) 29117 units (1250 mcg) capsule Take 1 capsule (50,000 Units) by mouth once a week 12 capsule 0     No Known Allergies    Mammogram Screening: Patient over age 50, mutual decision to screen reflected in health maintenance.    Pertinent mammograms are reviewed under the imaging tab.  History of abnormal Pap smear: NO - age 30-65 PAP every 5 years with negative HPV co-testing recommended  PAP / HPV Latest Ref Rng & Units 5/15/2017 3/11/2013 9/13/2011   PAP - NIL ASC-US(A) ASC-US(A)   HPV 16 DNA NEG Negative - -   HPV 18 DNA NEG Negative - -   OTHER HR HPV NEG Negative - -     Reviewed and updated as needed this visit by clinical staff  Tobacco  Allergies  Meds  Med Hx  Surg Hx  Fam Hx  Soc Hx        Reviewed and updated as needed this visit by Provider            ROS:   ROS: 10 point ROS neg other than the symptoms noted above in the HPI.    OBJECTIVE:   /72 (BP Location: Left arm, Patient Position: Sitting, Cuff Size: Adult Large)   Pulse 70   Temp 98.2  F (36.8  C) (Tympanic)   Resp 16   Ht 1.626 m (5' 4\")   Wt 98 kg (216 lb)   LMP 08/04/2015 (Approximate)   SpO2 97%   BMI 37.08 kg/m    EXAM:  GENERAL: healthy, alert and no distress  EYES: Eyes grossly normal to inspection, PERRL and conjunctivae and sclerae normal  HENT: ear canals and TM's normal, nose and mouth without ulcers or lesions  NECK: no adenopathy, no asymmetry, masses, or scars and thyroid normal to palpation  RESP: lungs clear to auscultation - no rales, rhonchi or wheezes  BREAST: normal without masses, tenderness or nipple discharge and no palpable axillary masses or adenopathy  CV: regular " rate and rhythm, normal S1 S2, no S3 or S4, no murmur, click or rub, no peripheral edema and peripheral pulses strong  ABDOMEN: soft, nontender, no hepatosplenomegaly, no masses and bowel sounds normal   (female): normal female external genitalia, normal urethral meatus, vaginal mucosa pink, moist, well rugated, and normal cervix/adnexa/uterus without masses or discharge  MS: no gross musculoskeletal defects noted, no edema  SKIN: no suspicious lesions or rashes  NEURO: Normal strength and tone, mentation intact and speech normal  PSYCH: mentation appears normal, affect normal/bright    Diagnostic Test Results:  Component      Latest Ref Rng & Units 6/25/2020   Sodium      133 - 144 mmol/L 143   Potassium      3.4 - 5.3 mmol/L 3.7   Chloride      94 - 109 mmol/L 110 (H)   Carbon Dioxide      20 - 32 mmol/L 27   Anion Gap      3 - 14 mmol/L 6   Glucose      70 - 99 mg/dL 90   Urea Nitrogen      7 - 30 mg/dL 16   Creatinine      0.52 - 1.04 mg/dL 0.74   GFR Estimate      >60 mL/min/1.73:m2 89   GFR Estimate If Black      >60 mL/min/1.73:m2 >90   Calcium      8.5 - 10.1 mg/dL 8.5   Cholesterol      <200 mg/dL 161   Triglycerides      <150 mg/dL 101   HDL Cholesterol      >49 mg/dL 52   LDL Cholesterol Calculated      <100 mg/dL 89   Non HDL Cholesterol      <130 mg/dL 109   Creatinine Urine      mg/dL 155   Albumin Urine mg/L      mg/L 14   Albumin Urine mg/g Cr      0 - 25 mg/g Cr 9.03   Vitamin D Deficiency screening      20 - 75 ug/L 25   TSH      0.40 - 4.00 mU/L 1.32       ASSESSMENT/PLAN:   1. Routine general medical examination at a health care facility  -Multiple situational stressors this year, but currently stable.  Follow-up closely discussed if any concerns  -We will have her stop prescription vitamin D and have her start OTC vitamin D at 2000 IUs/day  -Gabapentin dose was transiently decreased with return of her symptoms so she has increased back up to 3 times daily.  Continue current meds    2.  "Screening for malignant neoplasm of cervix  pap    3. Hypothyroidism, unspecified type  TSH is stable, continue current medication    4. Morbid obesity (H)  Reviewed strategies of healthy diet and exercise to help with weight loss    5. Hypertension goal BP (blood pressure) < 140/90  At goal, continue current meds    6. Estrogen deficiency  - DX Hip/Pelvis/Spine; Future    COUNSELING:   Reviewed preventive health counseling, as reflected in patient instructions       Regular exercise       Healthy diet/nutrition       Vision screening       Osteoporosis Prevention/Bone Health       Colon cancer screening       (Julianne)menopause management    Estimated body mass index is 37.08 kg/m  as calculated from the following:    Height as of this encounter: 1.626 m (5' 4\").    Weight as of this encounter: 98 kg (216 lb).    Weight management plan: Discussed healthy diet and exercise guidelines     reports that she has never smoked. She has never used smokeless tobacco.        Counseling Resources:  ATP IV Guidelines  Pooled Cohorts Equation Calculator  Breast Cancer Risk Calculator  FRAX Risk Assessment  ICSI Preventive Guidelines  Dietary Guidelines for Americans, 2010  USDA's MyPlate  ASA Prophylaxis  Lung CA Screening    Vaishnavi Boyd MD  [facility]  "

## 2020-07-08 NOTE — PATIENT INSTRUCTIONS
Can stop ergocalciferol and start OTC vitamin D-3 2,000 international unit(s)/day.     Schedule mammogram and bone scan.       Preventive Health Recommendations  Female Ages 50 - 64    Yearly exam: See your health care provider every year in order to  o Review health changes.   o Discuss preventive care.    o Review your medicines if your doctor has prescribed any.      Get a Pap test every three years (unless you have an abnormal result and your provider advises testing more often).    If you get Pap tests with HPV test, you only need to test every 5 years, unless you have an abnormal result.     You do not need a Pap test if your uterus was removed (hysterectomy) and you have not had cancer.    You should be tested each year for STDs (sexually transmitted diseases) if you're at risk.     Have a mammogram every 1 to 2 years.    Have a colonoscopy at age 50, or have a yearly FIT test (stool test). These exams screen for colon cancer.      Have a cholesterol test every 5 years, or more often if advised.    Have a diabetes test (fasting glucose) every three years. If you are at risk for diabetes, you should have this test more often.     If you are at risk for osteoporosis (brittle bone disease), think about having a bone density scan (DEXA).    Shots: Get a flu shot each year. Get a tetanus shot every 10 years.    Nutrition:     Eat at least 5 servings of fruits and vegetables each day.    Eat whole-grain bread, whole-wheat pasta and brown rice instead of white grains and rice.    Get adequate Calcium and Vitamin D.     Lifestyle    Exercise at least 150 minutes a week (30 minutes a day, 5 days a week). This will help you control your weight and prevent disease.    Limit alcohol to one drink per day.    No smoking.     Wear sunscreen to prevent skin cancer.     See your dentist every six months for an exam and cleaning.    See your eye doctor every 1 to 2 years.    At [facility], we strive  to deliver an exceptional experience to you, every time we see you. If you receive a survey, please complete it as we do value your feedback.  If you have MyChart, you can expect to receive results automatically within 24 hours of their completion.  Your provider will send a note interpreting your results as well.   If you do not have MyChart, you should receive your results in about a week by mail.    Your care team:     Family Medicine   ANTELMO Vasquez, MD Deisi Rasmussen CNP, MD Angela Wermerskirchen, MD    Internal Medicine  Lebron Hong MD     Clinic hours: Monday - Wednesday 7 am-7 pm   Thursdays and Fridays 7 am-5 pm.     [facility]: Monday - Friday 11 am-9 pm,   Saturday and Sunday 9 am-5 pm.     [facility]: Monday - Thursday 8 am - 7 pm; Friday 8 am - 6 pm    Clinic: [phone]   [facility]: [phone]     Use [email or web address] for 24/7 diagnosis and treatment of dozens of conditions.          At [facility], we strive to deliver an exceptional experience to you, every time we see you. If you receive a survey, please complete it as we do value your feedback.  If you have MyChart, you can expect to receive results automatically within 24 hours of their completion.  Your provider will send a note interpreting your results as well.   If you do not have MyChart, you should receive your results in about a week by mail.    Your care team:                            Family Medicine Internal Medicine   MD Stan Santoro MD Shantel Branch-Fleming, MD Katya Georgiev PA-C Megan Hill, APRN CNP Nam Ho, MD Pediatrics   ANTELMO Guerin CNP Paula Brito, MD Amelia Massimini APRN MD Bethany Koroma MD Deborah Mielke, MD Kim Thein, PABLO CNP      Clinic hours: Monday - Thursday 7 am-7 pm; Fridays 7 am-5 pm.   Urgent care:  Monday - Friday 11 am-9 pm; Saturday and Sunday 9 am-5 pm.    Clinic: [phone]       [facility]: Monday - Thursday 8 am - 7 pm; Friday 8 am - 6 pm  [facility]: [phone]     Use [email or web address] for 24/7 diagnosis and treatment of dozens of conditions.

## 2020-07-08 NOTE — NURSING NOTE
Prior to immunization administration, verified patients identity using patient s name and date of birth. Please see Immunization Activity for additional information.     Screening Questionnaire for Adult Immunization    Are you sick today?   No   Do you have allergies to medications, food, a vaccine component or latex?   No   Have you ever had a serious reaction after receiving a vaccination?   No   Do you have a long-term health problem with heart, lung, kidney, or metabolic disease (e.g., diabetes), asthma, a blood disorder, no spleen, complement component deficiency, a cochlear implant, or a spinal fluid leak?  Are you on long-term aspirin therapy?   No   Do you have cancer, leukemia, HIV/AIDS, or any other immune system problem?   No   Do you have a parent, brother, or sister with an immune system problem?   No   In the past 3 months, have you taken medications that affect  your immune system, such as prednisone, other steroids, or anticancer drugs; drugs for the treatment of rheumatoid arthritis, Crohn s disease, or psoriasis; or have you had radiation treatments?   No   Have you had a seizure, or a brain or other nervous system problem?   No   During the past year, have you received a transfusion of blood or blood    products, or been given immune (gamma) globulin or antiviral drug?   No   For women: Are you pregnant or is there a chance you could become       pregnant during the next month?   No   Have you received any vaccinations in the past 4 weeks?   No     Immunization questionnaire answers were all negative.        Per orders of Dr. Boyd, injection of Shingrix given by Natasha Ellis MA. Patient instructed to remain in clinic for 15 minutes afterwards, and to report any adverse reaction to me immediately.       Screening performed by Natasha Ellis MA on 7/8/2020 at 4:36 PM.

## 2020-07-14 LAB
COPATH REPORT: NORMAL
PAP: NORMAL

## 2020-07-15 LAB
FINAL DIAGNOSIS: NORMAL
HPV HR 12 DNA CVX QL NAA+PROBE: NEGATIVE
HPV16 DNA SPEC QL NAA+PROBE: NEGATIVE
HPV18 DNA SPEC QL NAA+PROBE: NEGATIVE
SPECIMEN DESCRIPTION: NORMAL
SPECIMEN SOURCE CVX/VAG CYTO: NORMAL

## 2020-07-19 DIAGNOSIS — G57.11 MERALGIA PARESTHETICA, RIGHT LOWER LIMB: ICD-10-CM

## 2020-07-21 NOTE — TELEPHONE ENCOUNTER
Requested Prescriptions   Pending Prescriptions Disp Refills     gabapentin (NEURONTIN) 300 MG capsule [Pharmacy Med Name: GABAPENTIN 300MG CAPS] 90 capsule 0     Sig: TAKE ONE CAPSULE BY MOUTH THREE TIMES A DAY. *DUE TO BE SEEN       There is no refill protocol information for this order        Routing refill request to provider for review/approval because:  Drug not on the [facility] refill protocol     Benjamin Vergara RN, BSN, PHN

## 2020-07-22 RX ORDER — GABAPENTIN 300 MG/1
CAPSULE ORAL
Qty: 90 CAPSULE | Refills: 1 | Status: SHIPPED | OUTPATIENT
Start: 2020-07-22 | End: 2020-09-17

## 2020-08-20 DIAGNOSIS — I10 HYPERTENSION GOAL BP (BLOOD PRESSURE) < 140/90: ICD-10-CM

## 2020-08-20 RX ORDER — METOPROLOL SUCCINATE 25 MG/1
TABLET, EXTENDED RELEASE ORAL
Qty: 90 TABLET | Refills: 1 | Status: SHIPPED | OUTPATIENT
Start: 2020-08-20 | End: 2021-02-18

## 2020-08-20 NOTE — TELEPHONE ENCOUNTER
Prescription approved per [facility] Refill Protocol.    Emiliana Musa RN

## 2020-09-14 ENCOUNTER — ANCILLARY PROCEDURE (OUTPATIENT)
Dept: BONE DENSITY | Facility: CLINIC | Age: 55
End: 2020-09-14
Attending: FAMILY MEDICINE
Payer: COMMERCIAL

## 2020-09-14 DIAGNOSIS — E28.39 ESTROGEN DEFICIENCY: ICD-10-CM

## 2020-09-14 PROBLEM — M85.89 OSTEOPENIA OF MULTIPLE SITES: Status: ACTIVE | Noted: 2020-09-14

## 2020-09-14 PROCEDURE — 77080 DXA BONE DENSITY AXIAL: CPT | Performed by: RADIOLOGY

## 2020-11-25 ENCOUNTER — OFFICE VISIT (OUTPATIENT)
Dept: FAMILY MEDICINE | Facility: CLINIC | Age: 55
End: 2020-11-25
Payer: COMMERCIAL

## 2020-11-25 ENCOUNTER — ANCILLARY PROCEDURE (OUTPATIENT)
Dept: CT IMAGING | Facility: CLINIC | Age: 55
End: 2020-11-25
Attending: FAMILY MEDICINE
Payer: COMMERCIAL

## 2020-11-25 VITALS
HEIGHT: 64 IN | HEART RATE: 64 BPM | SYSTOLIC BLOOD PRESSURE: 132 MMHG | DIASTOLIC BLOOD PRESSURE: 86 MMHG | BODY MASS INDEX: 35.68 KG/M2 | OXYGEN SATURATION: 99 % | WEIGHT: 209 LBS

## 2020-11-25 DIAGNOSIS — R31.0 GROSS HEMATURIA: ICD-10-CM

## 2020-11-25 DIAGNOSIS — R31.0 GROSS HEMATURIA: Primary | ICD-10-CM

## 2020-11-25 LAB
ALBUMIN UR-MCNC: NEGATIVE MG/DL
APPEARANCE UR: CLEAR
BACTERIA #/AREA URNS HPF: ABNORMAL /HPF
BILIRUB UR QL STRIP: NEGATIVE
COLOR UR AUTO: YELLOW
GLUCOSE UR STRIP-MCNC: NEGATIVE MG/DL
HGB UR QL STRIP: ABNORMAL
KETONES UR STRIP-MCNC: NEGATIVE MG/DL
LEUKOCYTE ESTERASE UR QL STRIP: ABNORMAL
MUCOUS THREADS #/AREA URNS LPF: PRESENT /LPF
NITRATE UR QL: NEGATIVE
NON-SQ EPI CELLS #/AREA URNS LPF: ABNORMAL /LPF
PH UR STRIP: 6 PH (ref 5–7)
RBC #/AREA URNS AUTO: ABNORMAL /HPF
SOURCE: ABNORMAL
SP GR UR STRIP: 1.02 (ref 1–1.03)
UROBILINOGEN UR STRIP-ACNC: 0.2 EU/DL (ref 0.2–1)
WBC #/AREA URNS AUTO: ABNORMAL /HPF

## 2020-11-25 PROCEDURE — 74176 CT ABD & PELVIS W/O CONTRAST: CPT | Mod: TC

## 2020-11-25 PROCEDURE — 81001 URINALYSIS AUTO W/SCOPE: CPT | Performed by: FAMILY MEDICINE

## 2020-11-25 PROCEDURE — 99214 OFFICE O/P EST MOD 30 MIN: CPT | Performed by: FAMILY MEDICINE

## 2020-11-25 ASSESSMENT — PAIN SCALES - GENERAL: PAINLEVEL: NO PAIN (0)

## 2020-11-25 ASSESSMENT — MIFFLIN-ST. JEOR: SCORE: 1518.02

## 2020-11-25 NOTE — PROGRESS NOTES
"Subjective     Mary Sims is a 55 year old female who presents to clinic today for the following health issues:    HPI         Genitourinary - Female  Onset/Duration: about 2 months ago  Description:   Painful urination (Dysuria): no           Frequency: no  Blood in urine (Hematuria): YES  Delay in urine (Hesitency): no  Intensity: moderate  Progression of Symptoms:  worsening and intermittent  Accompanying Signs & Symptoms:  Fever/chills: no  Flank pain: YES  Nausea and vomiting: no  Vaginal symptoms: abnormal vaginal bleeding/spotting  Abdominal/Pelvic Pain: YES  History:   History of frequent UTI s: YES- long time ago  History of kidney stones: YES  Sexually Active: YES  Possibility of pregnancy: No  Precipitating or alleviating factors: None  Therapies tried and outcome:  none       Review of Systems   Constitutional, HEENT, cardiovascular, pulmonary, GI, , musculoskeletal, neuro, skin, endocrine and psych systems are negative, except as otherwise noted.      Objective    /86 (BP Location: Left arm, Patient Position: Chair, Cuff Size: Adult Large)   Pulse 64   Ht 1.626 m (5' 4\")   Wt 94.8 kg (209 lb)   LMP 08/04/2015 (Approximate)   SpO2 99%   BMI 35.87 kg/m    Body mass index is 35.87 kg/m .  Physical Exam   GENERAL: healthy, alert and no distress  NECK: no adenopathy, no asymmetry, masses, or scars and thyroid normal to palpation  RESP: lungs clear to auscultation - no rales, rhonchi or wheezes  CV: regular rate and rhythm, normal S1 S2, no S3 or S4, no murmur, click or rub, no peripheral edema and peripheral pulses strong  ABDOMEN: soft, nontender, no hepatosplenomegaly, no masses and bowel sounds normal  MS: no gross musculoskeletal defects noted, no edema          Assessment & Plan     Gross hematuria  UA looks like dirty catch and no signs of infection. Patient does have history of a stone in the past. Will r/o this with CT scan.   - *UA reflex to Microscopic and Culture ([place] and " [facility] (except [place] and [place])  - Urine Microscopic  - CT Abdomen Pelvis w/o Contrast; Future        See Patient Instructions    No follow-ups on file.    Sean Newby MD, MD  [facility]

## 2020-11-25 NOTE — PATIENT INSTRUCTIONS
At [facility], we strive to deliver an exceptional experience to you, every time we see you. If you receive a survey, please complete it as we do value your feedback.  If you have MyChart, you can expect to receive results automatically within 24 hours of their completion.  Your provider will send a note interpreting your results as well.   If you do not have MyChart, you should receive your results in about a week by mail.    Your care team:                            Family Medicine Internal Medicine   MD Stan Santoro MD Shantel Branch-Fleming, MD Srinivasa Vaka, MD Katya Georgiev PA-C Megan Hill, APRN CNP    Sean Newby, MD Pediatrics   Adan Martin, PASeanC  Idania Merrill, CNP MD Gretel Mckinney APRN CNP   MD Bethany Hayes MD Deborah Mielke, MD Laura Riley, APRN CNP  Justine Hendricks, PASeanC  Chelsey Adler, CNP  MD Deisi Martin MD Angela Wermerskirchen, MD      Clinic hours: Monday - Thursday 7 am-7 pm; Fridays 7 am-5 pm.   Urgent care: Monday - Friday 11 am-9 pm; Saturday and Sunday 9 am-5 pm.    Clinic: [phone]       [facility]: Monday - Thursday 8 am - 7 pm; Friday 8 am - 6 pm  [facility]: [phone]     Use [email or web address] for 24/7 diagnosis and treatment of dozens of conditions.

## 2020-12-13 DIAGNOSIS — E03.9 HYPOTHYROIDISM, UNSPECIFIED TYPE: ICD-10-CM

## 2020-12-13 RX ORDER — LEVOTHYROXINE SODIUM 75 UG/1
TABLET ORAL
Qty: 90 TABLET | Refills: 1 | Status: SHIPPED | OUTPATIENT
Start: 2020-12-13 | End: 2021-06-15

## 2020-12-17 ENCOUNTER — OFFICE VISIT (OUTPATIENT)
Dept: UROLOGY | Facility: CLINIC | Age: 55
End: 2020-12-17
Payer: COMMERCIAL

## 2020-12-17 DIAGNOSIS — R31.0 GROSS HEMATURIA: Primary | ICD-10-CM

## 2020-12-17 LAB
ALBUMIN UR-MCNC: NEGATIVE MG/DL
APPEARANCE UR: CLEAR
BILIRUB UR QL STRIP: NEGATIVE
COLOR UR AUTO: YELLOW
GLUCOSE UR STRIP-MCNC: NEGATIVE MG/DL
HGB UR QL STRIP: ABNORMAL
KETONES UR STRIP-MCNC: NEGATIVE MG/DL
LEUKOCYTE ESTERASE UR QL STRIP: NEGATIVE
MUCOUS THREADS #/AREA URNS LPF: PRESENT /LPF
NITRATE UR QL: NEGATIVE
NON-SQ EPI CELLS #/AREA URNS LPF: ABNORMAL /LPF
PH UR STRIP: 5.5 PH (ref 5–7)
RBC #/AREA URNS AUTO: ABNORMAL /HPF
SOURCE: ABNORMAL
SP GR UR STRIP: 1.02 (ref 1–1.03)
UROBILINOGEN UR STRIP-ACNC: 0.2 EU/DL (ref 0.2–1)
WBC #/AREA URNS AUTO: ABNORMAL /HPF

## 2020-12-17 PROCEDURE — 81001 URINALYSIS AUTO W/SCOPE: CPT | Performed by: UROLOGY

## 2020-12-17 PROCEDURE — 52000 CYSTOURETHROSCOPY: CPT | Performed by: UROLOGY

## 2020-12-17 PROCEDURE — 99205 OFFICE O/P NEW HI 60 MIN: CPT | Mod: 25 | Performed by: UROLOGY

## 2020-12-17 NOTE — PROGRESS NOTES
"Mary Sims is a 55 year old female seen in consultation for hematuria. Consult from Janie Riley      Pt reports 2 mos hx blood spotting on underwear and \"small clots in the toilet.\" No specific gross hematuria. Last menses about 10 yrs ago.    States that she believes she may have had a kidney stone several years ago, never actually saw it. Positive FH nephrolithiasis in her mom. No other FH  disease. Non-smoker. No significant occupational exposure hx.     Hx 2 vag deliveries, one c/s delivery. BM's are satisfactory.    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        Past Medical History:   Diagnosis Date     Arthritis of foot, right 2019     Graves disease      Hypertension      Hypothyroidism      MEDICAL HISTORY OF -     S/P RADIOACTIVE IODINE     Palpitation     pt denies       Past Surgical History:   Procedure Laterality Date     ARTHRODESIS FOOT Right 2020    Procedure: MIDFOOT BONE SPUR RESECTION WITH JOINT FUSIONS RIGHT LOWER EXTREMITY;  Surgeon: Choco Frances DPM;  Location:  OR     ARTHROSCOPY SHLDR ROTATOR CUFF REPAIR, SUBACROMIAL DECOMP, DIST CLAVICLE RESECTION, BICEP TENODESIS Left 2017    Procedure: ARTHROSCOPY SHOULDER ROTATOR CUFF REPAIR, SUBACROMIAL DECOMPRESSION, DISTAL CLAVICLE RESECTION, OPEN BICEP TENODESIS REPAIR;  Surgeon: Dorina Rodriguez MD;  Location: [facility] OR     C  DELIVERY ONLY       COLONOSCOPY  2013    Procedure: COLONOSCOPY;  colonoscopy, screening;  Surgeon: Dalton Lala MD;  Location:  OR     SURGICAL HISTORY OF -       RT ANKLE Fx AND REPAIR (PIN,PLATE,SCREWS)     THYROIDECTOMY         Social History     Socioeconomic History     Marital status:      Spouse name: Not on file     Number of children: Not on file     Years of education: Not on file     Highest education level: Not on file   Occupational History     Not on file   Social Needs     Financial resource strain: Not on file     Food insecurity     " Worry: Not on file     Inability: Not on file     Transportation needs     Medical: Not on file     Non-medical: Not on file   Tobacco Use     Smoking status: Never Smoker     Smokeless tobacco: Never Used   Substance and Sexual Activity     Alcohol use: Yes     Comment: SELDOM      Drug use: No     Sexual activity: Yes     Partners: Male     Birth control/protection: Surgical     Comment: tubal   Lifestyle     Physical activity     Days per week: Not on file     Minutes per session: Not on file     Stress: Not on file   Relationships     Social connections     Talks on phone: Not on file     Gets together: Not on file     Attends Buddhist service: Not on file     Active member of club or organization: Not on file     Attends meetings of clubs or organizations: Not on file     Relationship status: Not on file     Intimate partner violence     Fear of current or ex partner: Not on file     Emotionally abused: Not on file     Physically abused: Not on file     Forced sexual activity: Not on file   Other Topics Concern     Parent/sibling w/ CABG, MI or angioplasty before 65F 55M? Not Asked   Social History Narrative     Not on file       Current Outpatient Medications   Medication Sig Dispense Refill     gabapentin (NEURONTIN) 300 MG capsule TAKE ONE CAPSULE BY MOUTH THREE TIMES A DAY 90 capsule 1     levothyroxine (SYNTHROID/LEVOTHROID) 75 MCG tablet TAKE ONE TABLET BY MOUTH EVERY DAY 90 tablet 1     metoprolol succinate ER (TOPROL-XL) 25 MG 24 hr tablet TAKE ONE TABLET BY MOUTH EVERY DAY 90 tablet 1     vitamin D2 (ERGOCALCIFEROL) 65664 units (1250 mcg) capsule Take 1 capsule (50,000 Units) by mouth once a week 12 capsule 0       Physical Exam:    GENL: NAD.    ABD: Soft, non-tender, no masses.    EG: Well-estrogenized, no masses.    VAGINA: Well-estrogenized, no masses.    BN HYPERMOBILITY: Mild.    CYSTOCELE: Grade 2.    APICAL PROLAPSE: Minimal.    RECTOCELE: Minimal.    BIMANUAL: No mass or  tenderness.      Cysto:    (Informed consent obtained. Pause for cause performed)   Sterile prep.    17 Fr scope inserted through urethra. Systematic examination w 70 degree lens.   PVR: Nil   MUCOSA: Normal without lesion   ORIFICES: Normal location and morphology   Scope withdrawn without untoward effect.    (Pt tolerated procedure without difficulty).      11/25/20 CT (non-contrast):     11/25/20 UA: 2-5 RBC, 5-10 WBC    Today:    Results for orders placed or performed in visit on 12/17/20   UA reflex to Microscopic     Status: Abnormal   Result Value Ref [facility]    Color Urine Yellow     Appearance Urine Clear     Glucose Urine Negative NEG^Negative mg/dL    Bilirubin Urine Negative NEG^Negative    Ketones Urine Negative NEG^Negative mg/dL    Specific Gravity Urine 1.025 1.003 - 1.035    Blood Urine Trace (A) NEG^Negative    pH Urine 5.5 5.0 - 7.0 pH    Protein Albumin Urine Negative NEG^Negative mg/dL    Urobilinogen Urine 0.2 0.2 - 1.0 EU/dL    Nitrite Urine Negative NEG^Negative    Leukocyte Esterase Urine Negative NEG^Negative    Source Midstream Urine    Urine Microscopic     Status: Abnormal   Result Value Ref [facility]    WBC Urine 0 - 5 OTO5^0 - 5 /HPF    RBC Urine O - 2 OTO2^O - 2 /HPF    Squamous Epithelial /LPF Urine Few FEW^Few /LPF    Mucous Urine Present (A) NEG^Negative /LPF         IMP:  1. Spotting; no evidence of  etiology at this point      PLAN:  1. Discussed situation with patient in detail. No further  eval or intervention indicated at this point.  2. Recommend pt see Gyn for eval  3. RTC to us prn  4. 60 minutes spent with patient, more than 50% in counseling and coordination of care for spotting, which did not include time spent for the procedure.

## 2020-12-18 ENCOUNTER — OFFICE VISIT (OUTPATIENT)
Dept: FAMILY MEDICINE | Facility: CLINIC | Age: 55
End: 2020-12-18
Payer: COMMERCIAL

## 2020-12-18 VITALS
DIASTOLIC BLOOD PRESSURE: 86 MMHG | HEART RATE: 96 BPM | TEMPERATURE: 97.5 F | SYSTOLIC BLOOD PRESSURE: 125 MMHG | BODY MASS INDEX: 36.39 KG/M2 | OXYGEN SATURATION: 99 % | WEIGHT: 212 LBS

## 2020-12-18 DIAGNOSIS — N95.0 POSTMENOPAUSAL BLEEDING: Primary | ICD-10-CM

## 2020-12-18 PROCEDURE — 99214 OFFICE O/P EST MOD 30 MIN: CPT | Performed by: NURSE PRACTITIONER

## 2020-12-18 NOTE — PROGRESS NOTES
Subjective     Mary Sims is a 55 year old female who presents to clinic today for the following health issues:    HPI         Menstrual Concern  Onset/Duration: Patient has not has been postmenopausal for the last 10 years, started in the last 2-3 months started rarely now has become daily   Description:   Duration of bleeding episodes: Mostly occurs when patient goes to the bathroom   Frequency of periods: (1st day of one to 1st day of next):  Daily   Describe bleeding/flow:   Clots: no  Number of pads/day: 3        Cramping: moderate to mild, in the last 4 days this has started   Accompanying Signs & Symptoms:  Lightheadedness: no  Temperature intolerance: no  Nosebleeds/Easy bruising: YES- easily bruise   Vaginal Discharge: no  Acne: no  Change in body hair: no  History:  Patient's last menstrual period was 08/04/2015 (approximate).  Previous normal periods: YES  Contraceptive use: Patient was on birth control pills from 14 to 21 then patient had tubes tied   Sexually active: YES  Any bleeding after intercourse: YES  Abnormal PAP Smears: YES- patient thinks shes had one but it was years ago   Precipitating or alleviating factors: None  Therapies tried and outcome: None      No menses in 10 years  Last pap 7/8/2020 -NIL  Occasionally after intercourse may have some red blood  When wipes after using restroom toilet paper is pink  Over the past month tiny blood clots in the bottom of the toilet - maybe 2 or 3 - not every time  Wears Poise pad daily - 1 pad per day  Last TSH July - normal  Last hgb Jan - normal  No dizziness or lightheadedness  Feeling otherwise well  Saw urology who said not urological issue    Review of Systems   Constitutional, HEENT, cardiovascular, pulmonary, gi and gu systems are negative, except as otherwise noted.      Objective    /86 (BP Location: Left arm, Patient Position: Chair, Cuff Size: Adult Large)   Pulse 96   Temp 97.5  F (36.4  C) (Oral)   Wt 96.2 kg (212 lb)   LMP  08/04/2015 (Approximate)   SpO2 99%   Breastfeeding No   BMI 36.39 kg/m    Body mass index is 36.39 kg/m .  Physical Exam   GENERAL: healthy, alert and no distress  ABDOMEN: soft, nontender  PSYCH: mentation appears normal, affect normal/bright            Assessment & Plan     Postmenopausal bleeding  Will get pelvic US and refer to OBGYN after for follow up.  - US Pelvic Complete with Transvaginal; Future  - OB/GYN REFERRAL        See Patient Instructions    Return in about 2 weeks (around 1/1/2021), or if symptoms worsen or fail to improve.     Return precautions discussed, including when to seek urgent/emergent care.    Patient verbalizes understanding and agrees with plan of care. Patient stable for discharge.      PABLO Michel CNP  [facility]    This visit took place during the COVID 19 global pandemic.   PPE worn during the visit included: surgical mask and face shield by me and mask by patient

## 2020-12-18 NOTE — PATIENT INSTRUCTIONS
To schedule your imaging exam at any of the [facility] imaging locations, please call [phone]

## 2020-12-23 ENCOUNTER — ANCILLARY PROCEDURE (OUTPATIENT)
Dept: ULTRASOUND IMAGING | Facility: CLINIC | Age: 55
End: 2020-12-23
Attending: NURSE PRACTITIONER
Payer: COMMERCIAL

## 2020-12-23 DIAGNOSIS — N95.0 POSTMENOPAUSAL BLEEDING: ICD-10-CM

## 2020-12-23 PROCEDURE — 76830 TRANSVAGINAL US NON-OB: CPT | Performed by: RADIOLOGY

## 2020-12-23 PROCEDURE — 76856 US EXAM PELVIC COMPLETE: CPT | Performed by: RADIOLOGY

## 2021-01-05 ENCOUNTER — OFFICE VISIT (OUTPATIENT)
Dept: OBGYN | Facility: CLINIC | Age: 56
End: 2021-01-05
Payer: COMMERCIAL

## 2021-01-05 ENCOUNTER — TELEPHONE (OUTPATIENT)
Dept: OPTOMETRY | Facility: CLINIC | Age: 56
End: 2021-01-05

## 2021-01-05 ENCOUNTER — OFFICE VISIT (OUTPATIENT)
Dept: OPTOMETRY | Facility: CLINIC | Age: 56
End: 2021-01-05
Payer: COMMERCIAL

## 2021-01-05 VITALS — OXYGEN SATURATION: 97 % | DIASTOLIC BLOOD PRESSURE: 84 MMHG | HEART RATE: 66 BPM | SYSTOLIC BLOOD PRESSURE: 127 MMHG

## 2021-01-05 DIAGNOSIS — H52.203 MYOPIA OF BOTH EYES WITH ASTIGMATISM: ICD-10-CM

## 2021-01-05 DIAGNOSIS — H52.4 PRESBYOPIA: ICD-10-CM

## 2021-01-05 DIAGNOSIS — R93.89 ENDOMETRIAL THICKENING ON ULTRASOUND: ICD-10-CM

## 2021-01-05 DIAGNOSIS — N95.0 PMB (POSTMENOPAUSAL BLEEDING): Primary | ICD-10-CM

## 2021-01-05 DIAGNOSIS — H52.13 MYOPIA OF BOTH EYES WITH ASTIGMATISM: ICD-10-CM

## 2021-01-05 DIAGNOSIS — Z01.00 EXAMINATION OF EYES AND VISION: Primary | ICD-10-CM

## 2021-01-05 PROCEDURE — 92310 CONTACT LENS FITTING OU: CPT | Mod: GA | Performed by: OPTOMETRIST

## 2021-01-05 PROCEDURE — 92015 DETERMINE REFRACTIVE STATE: CPT | Performed by: OPTOMETRIST

## 2021-01-05 PROCEDURE — 58100 BIOPSY OF UTERUS LINING: CPT | Performed by: OBSTETRICS & GYNECOLOGY

## 2021-01-05 PROCEDURE — 92014 COMPRE OPH EXAM EST PT 1/>: CPT | Performed by: OPTOMETRIST

## 2021-01-05 PROCEDURE — 99203 OFFICE O/P NEW LOW 30 MIN: CPT | Mod: 25 | Performed by: OBSTETRICS & GYNECOLOGY

## 2021-01-05 PROCEDURE — 88305 TISSUE EXAM BY PATHOLOGIST: CPT | Performed by: PATHOLOGY

## 2021-01-05 SDOH — ECONOMIC STABILITY: FOOD INSECURITY: WITHIN THE PAST 12 MONTHS, YOU WORRIED THAT YOUR FOOD WOULD RUN OUT BEFORE YOU GOT MONEY TO BUY MORE.: NOT ASKED

## 2021-01-05 SDOH — ECONOMIC STABILITY: INCOME INSECURITY: HOW HARD IS IT FOR YOU TO PAY FOR THE VERY BASICS LIKE FOOD, HOUSING, MEDICAL CARE, AND HEATING?: NOT ASKED

## 2021-01-05 SDOH — ECONOMIC STABILITY: FOOD INSECURITY: WITHIN THE PAST 12 MONTHS, THE FOOD YOU BOUGHT JUST DIDN'T LAST AND YOU DIDN'T HAVE MONEY TO GET MORE.: NOT ASKED

## 2021-01-05 SDOH — ECONOMIC STABILITY: TRANSPORTATION INSECURITY
IN THE PAST 12 MONTHS, HAS LACK OF TRANSPORTATION KEPT YOU FROM MEETINGS, WORK, OR FROM GETTING THINGS NEEDED FOR DAILY LIVING?: NOT ASKED

## 2021-01-05 SDOH — ECONOMIC STABILITY: TRANSPORTATION INSECURITY
IN THE PAST 12 MONTHS, HAS THE LACK OF TRANSPORTATION KEPT YOU FROM MEDICAL APPOINTMENTS OR FROM GETTING MEDICATIONS?: NOT ASKED

## 2021-01-05 ASSESSMENT — REFRACTION_CURRENTRX
OD_BRAND: ALCON AIR OPTIX FOR ASTIGMATISM BC 8.7, D 14.5
OS_AXIS: 180
OS_SPHERE: -8.00
OD_AXIS: 180
OS_BRAND: ALCON AIR OPTIX FOR ASTIGMATISM BC 8.7, D 14.5
OD_SPHERE: -8.00
OS_CYLINDER: -1.75
OD_CYLINDER: -1.75

## 2021-01-05 ASSESSMENT — REFRACTION_WEARINGRX
OD_SPHERE: -11.50
OS_AXIS: 093
OD_AXIS: 083
OS_CYLINDER: +2.00
OD_CYLINDER: +2.50
OS_SPHERE: -11.00
OD_ADD: +2.50
OS_ADD: +2.50

## 2021-01-05 ASSESSMENT — CUP TO DISC RATIO
OD_RATIO: 0.2
OS_RATIO: 0.2

## 2021-01-05 ASSESSMENT — CONF VISUAL FIELD
OS_NORMAL: 1
OD_NORMAL: 1

## 2021-01-05 ASSESSMENT — REFRACTION_MANIFEST
OS_ADD: +2.50
OD_CYLINDER: +2.50
OD_ADD: +2.50
OS_SPHERE: -11.00
OS_CYLINDER: +2.00
OD_AXIS: 083
OS_AXIS: 093
OD_SPHERE: -11.75

## 2021-01-05 ASSESSMENT — EXTERNAL EXAM - LEFT EYE: OS_EXAM: NORMAL

## 2021-01-05 ASSESSMENT — EXTERNAL EXAM - RIGHT EYE: OD_EXAM: NORMAL

## 2021-01-05 ASSESSMENT — VISUAL ACUITY
OD_CC: 20/60-1
OS_CC+: -3
METHOD: SNELLEN - LINEAR
CORRECTION_TYPE: CONTACTS
OS_CC: 20/40
OS_CC: 20/20
OD_CC: 2020
OD_CC+: -2

## 2021-01-05 ASSESSMENT — TONOMETRY
OS_IOP_MMHG: 14
IOP_METHOD: TONOPEN
OD_IOP_MMHG: 15

## 2021-01-05 ASSESSMENT — SLIT LAMP EXAM - LIDS
COMMENTS: NORMAL
COMMENTS: NORMAL

## 2021-01-05 NOTE — PATIENT INSTRUCTIONS
If you have any questions regarding your visit, Please contact your care team.     [employer]: [phone]  Women s Health CLINIC HOURS TELEPHONE NUMBER       Guero Matute M.D.    Erica-ROBBY Hernandez-ROBBY Sharma-Medical Assistant    Vaishnavi-  Samantha-     Monday-[place]  8:00a.m-4:45 p.m  Tuesday-[place]  9:00a.m-4:00 p.m  Wednesday-[place] 8:00a.m-4:45 p.m.  Thursday-[place]  8:00a.m-4:45 p.m.  Friday-[place]  8:00a.m-4:45 p.m. [facility]  [address] [address] [place] [address] [phone] ask for [facility]  [phone] Fax  Imaging Crlceomtor-[phone]     Labor and Delivery  [address]  [place], [place] [address] [phone]    [facility]  [address]  [place] [address] [phone] ask [facility]  [phone] Fax  Imaging [phone]     Urgent Care locations:    [place]        [place] Monday-Friday  5 pm - 9 pm  Saturday and Sunday   9 am - 5 pm  Monday-Friday   11 am - 9 pm  Saturday and Sunday   9 am - 5 pm   [phone]   If you need a medication refill, please contact your pharmacy. Please allow 3 business days for your refill to be completed.  As always, Thank you for trusting us with your healthcare needs!

## 2021-01-05 NOTE — PATIENT INSTRUCTIONS
Eyeglass prescription given.    Trial contact lenses will be ordered for . Ok to order if satisfied with change right eye, if not then schedule a contact lens check and wear lenses to that appointment.    Refresh tears 1 drop 2-4x daily.    Return in 1 year for a complete eye exam or sooner if needed.    Wilber Rice, MATTHEW    The affects of the dilating drops last for 4- 6 hours.  You will be more sensitive to light and vision will be blurry up close.  Do not drive if you do not feel comfortable.  Mydriatic sunglasses were given if needed.      Optometry Providers       Clinic Locations                                 Telephone Number   Dr. Rosi Whalen [phone]     [place] [facility] Hours:                [facility] Hours:       [facility] Hours:   [address]   [address] [facility]     [address]  [place] [address]   [place] [address]    [place] [address]  Phone: [phone]                    Phone: [phone]     Phone: [phone]                      Monday 8:00-7:00                          Monday 8:00-7:00                          Monday 8:00-7:00              Tuesday 8:00-6:00                          Tuesday 8:00-7:00                          Tuesday 8:00-7:00              Wednesday 8:00-6:00                  Wednesday 8:00-7:00                   Wednesday 8:00-7:00      Thursday 8:00-6:00                        Thursday 8:00-7:00                         Thursday 8:00-7:00            Friday 8:00-5:00                              Friday 8:00-5:00                              Friday 8:00-5:00    [facility] Hours:   [address] [place] [id number] [phone]    Monday 8:00-7:00  Tuesday 8:00-7:00  Wednesday 8:00-7:00  Thursday 8:00-7:00  Friday 8:00-5:00  Please log on to [email or web address] to order your contact lenses.  The link is found on the Eye Care  and Vision Services page.  As always, Thank you for trusting us with your health care needs!

## 2021-01-05 NOTE — PROGRESS NOTES
Chief Complaint   Patient presents with     Annual Eye Exam     More contacts fit fee ok     Accompanied by self  Previous contact lens wearer? Yes: air optix astig  Comfort of contact lenses :good  Satisfied with current lenses: Yes, od eye power change        Last Eye Exam: 2-282019  Dilated Previously: Yes    What are you currently using to see?  glasses and contacts    Distance Vision Acuity: Noticed gradual change in right eye    Near Vision Acuity: Satisfied with vision while reading  With otc readers over contacts    Eye Comfort: good  Do you use eye drops? 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     Medical, surgical and family histories reviewed and updated 1/5/2021.       OBJECTIVE: See Ophthalmology exam    ASSESSMENT:    ICD-10-CM    1. Examination of eyes and vision  Z01.00 EYE EXAM (SIMPLE-NONBILLABLE)   2. Myopia of both eyes with astigmatism  H52.13 REFRACTION    H52.203 CONTACT LENS FITTING,BILAT w/ signed waiver   3. Presbyopia  H52.4 REFRACTION      PLAN:     Patient Instructions   Eyeglass prescription given.    Trial contact lenses will be ordered for . Ok to order if satisfied with change right eye, if not then schedule a contact lens check and wear lenses to that appointment.    Refresh tears 1 drop 2-4x daily.    Return in 1 year for a complete eye exam or sooner if needed.    Wilber Rice, OD

## 2021-01-05 NOTE — LETTER
1/5/2021         RE: Mary Sims  [address]  [place] [address]        Dear Colleague,    Thank you for referring your patient, Mary Sims, to the [facility]. Please see a copy of my visit note below.    Chief Complaint   Patient presents with     Annual Eye Exam     More contacts fit fee ok     Accompanied by self  Previous contact lens wearer? Yes: air optix astig  Comfort of contact lenses :good  Satisfied with current lenses: Yes, od eye power change        Last Eye Exam: 2-282019  Dilated Previously: Yes    What are you currently using to see?  glasses and contacts    Distance Vision Acuity: Noticed gradual change in right eye    Near Vision Acuity: Satisfied with vision while reading  With otc readers over contacts    Eye Comfort: good  Do you use eye drops? 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     Medical, surgical and family histories reviewed and updated 1/5/2021.       OBJECTIVE: See Ophthalmology exam    ASSESSMENT:    ICD-10-CM    1. Examination of eyes and vision  Z01.00 EYE EXAM (SIMPLE-NONBILLABLE)   2. Myopia of both eyes with astigmatism  H52.13 REFRACTION    H52.203 CONTACT LENS FITTING,BILAT w/ signed waiver   3. Presbyopia  H52.4 REFRACTION      PLAN:     Patient Instructions   Eyeglass prescription given.    Trial contact lenses will be ordered for . Ok to order if satisfied with change right eye, if not then schedule a contact lens check and wear lenses to that appointment.    Refresh tears 1 drop 2-4x daily.    Return in 1 year for a complete eye exam or sooner if needed.    Wilber Rice, OD                           Again, thank you for allowing me to participate in the care of your patient.        Sincerely,        Wilber Rice, OD

## 2021-01-05 NOTE — TELEPHONE ENCOUNTER
Right Og Air Optix for Astigmatism BC 8.7, D 14.5 -8.00 -2.25 180   Left Og Air Optix for Astigmatism BC 8.7, D 14.5 -8.00 -1.75 180      Trial contact lenses will be ordered for . Ok to order if satisfied with change right eye, if not then schedule a contact lens check and wear lenses to that appointment.  Sarah Loja ORDERED CONTACTS 1/5/2021.

## 2021-01-05 NOTE — PROGRESS NOTES
OB-GYN Problem-Oriented Visit or Consultation      Mary Sims is a 55 year old year old P 3 who presents with a chief complaint of abnormal ultrasound.  Referred by Stephanie Whitlock NP.  Patient's last menstrual period was 08/04/2015 (approximate).    HPI:     History of past OC use to control menorrhagia. Menopause 2015. No HT. In the past 3 months noted spotting with wiping that has continued. Had Urology evaluation to exclude bladder source and cysto was neg. Pelvic ultrasound reviewed including images and this showed endometrial thickening at 24 mm. Prior 2014 pelvic ultrasound also noted and was normal. No other pelvic symptoms. Risk factors noted.     Past medical, obstetrical, surgical, family and social history reviewed and as noted or updated in chart.     Allergies, meds and supplements are as noted or updated in chart.      ROS:   Systems reviewed include:  constitutional, gastrointestinal, genitourinary, integumentary, psychological, hematologic/lymphatic and endocrine.    These systems were negative for significant symptoms except for the following additional: none; see HPI.    EXAM:  VS as noted. /84 (BP Location: Left arm, Patient Position: Chair, Cuff Size: Adult Large)   Pulse 66   LMP 08/04/2015 (Approximate)   SpO2 97%   Breastfeeding No   Constitutionally normal.     Pelvis is  normal or negative except for, or in particular noting, the following  pertinent findings: none.      PROCEDURE: Discussed indications, risks, benefits and alternatives of endometrial biopsy. Patient understood and desired to proceed.  After preliminary exam and appropriate preparation, the cervix was exposed and cleansed. A tenaculum was not needed. The uterus sounded to 8 cm. A paracervical block and cervical dilatation were not required. A Pipelle type device was used to obtain the biopsy. No complications. Patient tolerated the procedure well. Instructions reviewed.       Assessment:   Encounter Diagnoses    Name Primary?     PMB (postmenopausal bleeding) Yes     Endometrial thickening on ultrasound          Plan and Recommendations:   I reviewed the condition, causes, differential diagnosis, prognosis, evaluation and management considerations and options.  Questions answered and information given. See orders.   Await Path.   35 minutes spent on the date of encounter doing chart review, history, examination, discussion with patient, and documentation, and further activities as noted above, and review of appropriateness of decision-making for care, and review of test results.   Additional separate time spent counseling on the evaluation and management of the patient's condition(s) beyond discussion of  the procedure itself including its risks, benefits and alternatives and beyond preliminary examination and performance of the procedure itself.      A/P:  Mary was seen today for results.    Diagnoses and all orders for this visit:    PMB (postmenopausal bleeding)  -     Surgical pathology exam  -     ENDOMETRIAL BIOPSY W/O CERVICAL DILATION    Endometrial thickening on ultrasound  -     Surgical pathology exam  -     ENDOMETRIAL BIOPSY W/O CERVICAL DILATION        Guero Matute MD

## 2021-01-07 ENCOUNTER — TELEPHONE (OUTPATIENT)
Dept: OBGYN | Facility: CLINIC | Age: 56
End: 2021-01-07

## 2021-01-07 DIAGNOSIS — C54.1 ENDOMETRIAL CANCER (H): Primary | ICD-10-CM

## 2021-01-07 LAB — COPATH REPORT: NORMAL

## 2021-01-07 NOTE — TELEPHONE ENCOUNTER
RN took call from Dr. Nguyen with pathology reporting on pt's pathology results from 1/5/2021 that results will be released immediately to [facility].    RN routing to Dr. Matute as Dr. Nguyen informed that pathology is showing complex atypical aplasia.    Erica Guillermo RN on 1/7/2021 at 2:35 PM

## 2021-01-08 NOTE — TELEPHONE ENCOUNTER
I called and discussed with patient. Suspect early endometrial cancer. Advise Gyn Onc consult, E&M. Please assist patient in getting appointment either at [facility] or [place] site. Referral placed.     Guero Matute MD

## 2021-01-08 NOTE — TELEPHONE ENCOUNTER
RN attempted to call for an appointment Gyn/Onc said they need to review pt's chart and then will call to schedule appropriately.    Erica Guillermo RN on 1/8/2021 at 9:13 AM

## 2021-01-13 ENCOUNTER — TELEPHONE (OUTPATIENT)
Dept: ONCOLOGY | Facility: CLINIC | Age: 56
End: 2021-01-13

## 2021-01-13 ENCOUNTER — VIRTUAL VISIT (OUTPATIENT)
Dept: ONCOLOGY | Facility: CLINIC | Age: 56
End: 2021-01-13
Attending: OBSTETRICS & GYNECOLOGY
Payer: COMMERCIAL

## 2021-01-13 ENCOUNTER — PRE VISIT (OUTPATIENT)
Dept: ONCOLOGY | Facility: CLINIC | Age: 56
End: 2021-01-13

## 2021-01-13 DIAGNOSIS — C54.1 ENDOMETRIAL CANCER (H): ICD-10-CM

## 2021-01-13 PROCEDURE — 99205 OFFICE O/P NEW HI 60 MIN: CPT | Mod: GT | Performed by: OBSTETRICS & GYNECOLOGY

## 2021-01-13 PROCEDURE — 999N001193 HC VIDEO/TELEPHONE VISIT; NO CHARGE

## 2021-01-13 RX ORDER — CEFAZOLIN SODIUM 2 G/50ML
2 SOLUTION INTRAVENOUS
Status: CANCELLED | OUTPATIENT
Start: 2021-01-13

## 2021-01-13 RX ORDER — CEFAZOLIN SODIUM 1 G/50ML
1 INJECTION, SOLUTION INTRAVENOUS SEE ADMIN INSTRUCTIONS
Status: CANCELLED | OUTPATIENT
Start: 2021-01-13

## 2021-01-13 NOTE — TELEPHONE ENCOUNTER
Surgery is scheduled with Dr. Brandon on 2/9 at [facility].  Scheduled per availability.    H&P: to be completed by PAC.  PAC: 2/3, virtual     Additional appointments:   NO ADDITIONAL APPOINTMENTS NEEDED AT THIS TIME    COVID-19 test: 2/5 at [facility]   Post-op: 3/3 as a virtual visit .    The RN completed the education regarding the surgery.     Patient will receive surgery arrival and start time from PAC.    The surgery packet was sent via discoapi.    Patient will complete COVID-19 test that was scheduled by surgical coordinator 2-4 days prior to surgery.     I called the patient and was able to confirm the scheduled information above.

## 2021-01-13 NOTE — PROGRESS NOTES
Mary is a 55 year old who is being evaluated via a billable video visit.      How would you like to obtain your AVS? MyChart  If the video visit is dropped, the invitation should be resent by: Text to cell phone: [phone]  Will anyone else be joining your video visit? No        Video-Visit Details    60 minutes.                            Consult Notes on Referred Patient    Date: 2021     G3, P3, 1 prior C section, 2 vaginal deliveries.  She went through menopause at age 51.  She has never been on hormone replacement therapy.  She started having recently some postmenopausal bleeding.  Biopsy showed grade 1 endometrioid endometrial cancer.  She is otherwise eating and drinking well.  No nausea or vomiting, fever or chills.  Normal urinary and bowel function.  No B symptoms.       Dr. Guero Matute MD  [address] [place] [address]       RE: Mary Sims  : 1965  PAULA: 2021    Dear Dr. Guero Matute:    I had the pleasure of seeing your patient Mary Sims here at the Gynecologic Cancer Clinic at the [facility] on 2021.  As you know she is a very pleasant 55 year old woman with a recent diagnosis of grade 1 endometrial cancer.  Given these findings she was subsequently sent to the Gynecologic Cancer Clinic for new patient consultation.         Past Medical History:    Past Medical History:   Diagnosis Date     Arthritis of foot, right 2019     Graves disease      Hypertension      Hypothyroidism      MEDICAL HISTORY OF -     S/P RADIOACTIVE IODINE     Morbid obesity (H) 2020     Osteopenia of multiple sites 2020         Past Surgical History:    Past Surgical History:   Procedure Laterality Date     ARTHRODESIS FOOT Right 2020    Procedure: MIDFOOT BONE SPUR RESECTION WITH JOINT FUSIONS RIGHT LOWER EXTREMITY;  Surgeon: Choco Frances DPM;  Location: [facility]     ARTHROSCOPY SHLDR ROTATOR CUFF REPAIR, SUBACROMIAL DECOMP, DIST  CLAVICLE RESECTION, BICEP TENODESIS Left 2017    Procedure: ARTHROSCOPY SHOULDER ROTATOR CUFF REPAIR, SUBACROMIAL DECOMPRESSION, DISTAL CLAVICLE RESECTION, OPEN BICEP TENODESIS REPAIR;  Surgeon: Dorina Rodriguez MD;  Location: [facility] OR     C  DELIVERY ONLY       COLONOSCOPY  2013    Procedure: COLONOSCOPY;  colonoscopy, screening;  Surgeon: Dalton Lala MD;  Location: [facility] OR     CYSTOSCOPY       SURGICAL HISTORY OF -       RT ANKLE Fx AND REPAIR (PIN,PLATE,SCREWS)     THYROIDECTOMY       TUBAL LIGATION           Health Maintenance:  Health Maintenance Due   Topic Date Due     PHQ-2  2021     She has never had any abnormal Pap smears.  Last colonoscopy about 7 years ago.           Current Medications:     has a current medication list which includes the following prescription(s): gabapentin, levothyroxine, metoprolol succinate er, and vitamin d2.       Allergies:     [unfilled]        Social History:     Social History     Tobacco Use     Smoking status: Never Smoker     Smokeless tobacco: Never Used   Substance Use Topics     Alcohol use: Yes     Comment: SELDOM        History   Drug Use No           Family History:     No family history of cancer.         Family History   Problem Relation Age of Onset     Hypertension Mother      Diabetes Mother      Hypertension Father      Alcohol/Drug Father         alcohol     Crohn's Disease Son      Hypertension Other      Cancer No family hx of      Cerebrovascular Disease No family hx of      Thyroid Disease No family hx of      Glaucoma No family hx of      Macular Degeneration No family hx of          Physical Exam:     LMP 2015 (Approximate)   There is no height or weight on file to calculate BMI.    General Appearance: healthy and alert, no distress      Psychiatric: appropriate mood and affect                                   Assessment:    Mary Sims is a 55 year old woman with a new diagnosis of grade 1  endometrial cancer.     A total of 60 minutes was spent with the patient, 40 minutes of which were spent in counseling the patient and/or treatment planning.      1.  Grade 1 endometrioid endometrial carcinoma.   2.  Normal-sized uterus on ultrasound.      I discussed with the patient that I recommend to proceed with a robotic hysterectomy, bilateral salpingo-oophorectomy and then possible sentinel lymph node sampling, possible open, possible debulking and cystoscopy.  We will have her see my colleagues in Anesthesia for preoperative optimization as well as discussed obtaining a COVID test before surgery.  After risks and benefits were discussed with the patient, she agrees with this plan.  She is very appreciative of her care.  All questions were answered.      Risks, benefits and alternatives to proceed discussed in detail with the patient. Risks include but are not limited to bleeding, infection, possible injury to surrounding organs including bowel, bladder, ureter, need for second procedure/surgery related to complications from first procedure, postoperative medical complications such as cardiopulmonary events, lymphedema, lymphocyst, thromboembolic events. Consent for surgery, blood transfusion signed.  Will arrange appropriate preoperative blood work, CXR, EKG. Patient also advised on need for postoperative surveillance and/or adjuvant therapy. Questions answered.        Thank you for allowing us to participate in the care of your patient.         Sincerely,      Zacarias Brandon MD, MS    Department of Obstetrics and Gynecology   Division of Gynecologic Oncology   [facility]  Phone: [phone]      CC  Patient Care Team:  Janie Riley APRN CNP as PCP - General (Nurse Practitioner - Family)  Stan Weber MD as Referring Physician (Internal Medicine)  Janie Riley APRN CNP as Assigned PCP  Choco Frances DPM as Assigned Musculoskeletal  Provider  Shadi Hammond MD as Assigned Surgical Provider  ALEXA GREEN

## 2021-01-13 NOTE — LETTER
2021      RE: Mary Sims  [address]  [facility] [address]       Mary is a 55 year old who is being evaluated via a billable video visit.      How would you like to obtain your AVS? MyChart  If the video visit is dropped, the invitation should be resent by: Text to cell phone: [phone]  Will anyone else be joining your video visit? No  {If patient encounters technical issues they should call [phone] :[phone]}    Video Start Time: {video visit start/end time for provider to select:654866}  Video-Visit Details    60 minutes.                            Consult Notes on Referred Patient    Date: 2021     G3, P3, 1 prior C section, 2 vaginal deliveries.  She went through menopause at age 51.  She has never been on hormone replacement therapy.  She started having recently some postmenopausal bleeding.  Biopsy showed grade 1 endometrioid endometrial cancer.  She is otherwise eating and drinking well.  No nausea or vomiting, fever or chills.  Normal urinary and bowel function.  No B symptoms.       Dr. Guero Matute MD  [address] [place] [address]       RE: Mary Sims  : 1965  PAULA: 2021    Dear Dr. Guero Matute:    I had the pleasure of seeing your patient Mary Sims here at the Gynecologic Cancer Clinic at the [facility] on 2021.  As you know she is a very pleasant 55 year old woman with a recent diagnosis of grade 1 endometrial cancer.  Given these findings she was subsequently sent to the Gynecologic Cancer Clinic for new patient consultation.         Past Medical History:    Past Medical History:   Diagnosis Date     Arthritis of foot, right 2019     Graves disease      Hypertension      Hypothyroidism      MEDICAL HISTORY OF -     S/P RADIOACTIVE IODINE     Morbid obesity (H) 2020     Osteopenia of multiple sites 2020         Past Surgical History:    Past Surgical History:   Procedure Laterality Date      ARTHRODESIS FOOT Right 2020    Procedure: MIDFOOT BONE SPUR RESECTION WITH JOINT FUSIONS RIGHT LOWER EXTREMITY;  Surgeon: Choco Frances DPM;  Location: [facility]     ARTHROSCOPY SHLDR ROTATOR CUFF REPAIR, SUBACROMIAL DECOMP, DIST CLAVICLE RESECTION, BICEP TENODESIS Left 2017    Procedure: ARTHROSCOPY SHOULDER ROTATOR CUFF REPAIR, SUBACROMIAL DECOMPRESSION, DISTAL CLAVICLE RESECTION, OPEN BICEP TENODESIS REPAIR;  Surgeon: Dorina Rodriguez MD;  [facility]     C  DELIVERY ONLY       COLONOSCOPY  2013    Procedure: COLONOSCOPY;  colonoscopy, screening;  Surgeon: Dalton Lala MD;  Location: [facility] OR     CYSTOSCOPY       SURGICAL HISTORY OF -       RT ANKLE Fx AND REPAIR (PIN,PLATE,SCREWS)     THYROIDECTOMY       TUBAL LIGATION           Health Maintenance:  Health Maintenance Due   Topic Date Due     PHQ-2  2021     She has never had any abnormal Pap smears.  Last colonoscopy about 7 years ago.           Current Medications:     has a current medication list which includes the following prescription(s): gabapentin, levothyroxine, metoprolol succinate er, and vitamin d2.       Allergies:     [unfilled]        Social History:     Social History     Tobacco Use     Smoking status: Never Smoker     Smokeless tobacco: Never Used   Substance Use Topics     Alcohol use: Yes     Comment: SELDOM        History   Drug Use No           Family History:     No family history of cancer.         Family History   Problem Relation Age of Onset     Hypertension Mother      Diabetes Mother      Hypertension Father      Alcohol/Drug Father         alcohol     Crohn's Disease Son      Hypertension Other      Cancer No family hx of      Cerebrovascular Disease No family hx of      Thyroid Disease No family hx of      Glaucoma No family hx of      Macular Degeneration No family hx of          Physical Exam:     LMP 2015 (Approximate)   There is no height or weight on file to  calculate BMI.    General Appearance: healthy and alert, no distress      Psychiatric: appropriate mood and affect                                   Assessment:    Mary Sims is a 55 year old woman with a new diagnosis of grade 1 endometrial cancer.     A total of 60 minutes was spent with the patient, 40 minutes of which were spent in counseling the patient and/or treatment planning.      1.  Grade 1 endometrioid endometrial carcinoma.   2.  Normal-sized uterus on ultrasound.      I discussed with the patient that I recommend to proceed with a robotic hysterectomy, bilateral salpingo-oophorectomy and then possible sentinel lymph node sampling, possible open, possible debulking and cystoscopy.  We will have her see my colleagues in Anesthesia for preoperative optimization as well as discussed obtaining a COVID test before surgery.  After risks and benefits were discussed with the patient, she agrees with this plan.  She is very appreciative of her care.  All questions were answered.      Risks, benefits and alternatives to proceed discussed in detail with the patient. Risks include but are not limited to bleeding, infection, possible injury to surrounding organs including bowel, bladder, ureter, need for second procedure/surgery related to complications from first procedure, postoperative medical complications such as cardiopulmonary events, lymphedema, lymphocyst, thromboembolic events. Consent for surgery, blood transfusion signed.  Will arrange appropriate preoperative blood work, CXR, EKG. Patient also advised on need for postoperative surveillance and/or adjuvant therapy. Questions answered.        Thank you for allowing us to participate in the care of your patient.         Sincerely,      Zacarias Brandon MD, MS    Department of Obstetrics and Gynecology   Division of Gynecologic Oncology   [facility]  Phone: [phone]      CC  Patient Care Team:  Janie Riley  PABLO MERINO CNP as PCP - General (Nurse Practitioner - Family)  Stan Weber MD as Referring Physician (Internal Medicine)  Janie Riley APRN CNP as Assigned PCP  Choco Frances DPM as Assigned Musculoskeletal Provider  Shadi Hammond MD as Assigned Surgical Provider  ALEXA GREEN

## 2021-01-13 NOTE — Clinical Note
"    1/13/2021         RE: Mary Sims  [address]  [facility] [address]        Dear Colleague,    Thank you for referring your patient, Mary Sims, to the [facility]. Please see a copy of my visit note below.    Mary is a 55 year old who is being evaluated via a billable video visit.      How would you like to obtain your AVS? MyChart  If the video visit is dropped, the invitation should be resent by: Text to cell phone: [phone]  Will anyone else be joining your video visit? No  {If patient encounters technical issues they should call [phone] :[phone]}    Video Start Time: {video visit start/end time for provider to select:152948}  Video-Visit Details    Type of service:  Video Visit    Video End Time:{video visit start/end time for provider to select:152948}    Originating Location (pt. Location): {video visit patient location:688948::\"Home\"}    Distant Location (provider location):  [facility]     Platform used for Video Visit: {Virtual Visit Platforms:320797::\"[facility]\"}        Again, thank you for allowing me to participate in the care of your patient.        Sincerely,        Zacarias Brandon MD  "

## 2021-01-14 NOTE — TELEPHONE ENCOUNTER
FUTURE VISIT INFORMATION      SURGERY INFORMATION:    Date: 21    Location: [facility] OR    Surgeon:  Zacarias Brandon MD    Anesthesia Type:  General  Procedure: HYSTERECTOMY, TOTAL, ROBOT-ASSISTED, LAPAROSCOPIC, WITH SALPINGO-OOPHORECTOMY, possible sentinel lymph node sampling,  possible open, possible debulking,     cystoscopy         Consult: virtual visit 21    RECORDS REQUESTED FROM:       Primary Care Provider: Janie Riley APRN [facility]    Pertinent Medical History: Hypertension    Most recent EKG+ Tracin20

## 2021-01-22 ENCOUNTER — PATIENT OUTREACH (OUTPATIENT)
Dept: ONCOLOGY | Facility: CLINIC | Age: 56
End: 2021-01-22

## 2021-01-22 NOTE — PROGRESS NOTES
RN reached out to patient for preoperative teaching and answer any questions.    Patient unavailable. Voicemail with call back number left.     Arin Rojas RN

## 2021-01-28 DIAGNOSIS — Z11.59 ENCOUNTER FOR SCREENING FOR OTHER VIRAL DISEASES: Primary | ICD-10-CM

## 2021-02-02 NOTE — PATIENT INSTRUCTIONS
Preparing for Your Surgery      Name:  Mary Sims   MRN:  [id number]   :  1965   Today's Date:  2021       Arriving for surgery:  Surgery date:  21  Arrival time:  06:00 am    Restrictions due to COVID 19:  One consistent visitor per patient is allowed  No ill visitors  All visitors must wear face mask     parking is available for anyone with mobility limitations or disabilities.  ([place]  24 hours/ 7 days a week; [facility]  7 am- 3:30 pm, Mon- Fri)    Please come to:   [facility], [place] [address]  [address] [id number]  -    Please proceed to the Surgery Lounge on the 3rd floor. [phone]?     - ?If you are in need of directions, wheelchair or escort please stop at the Information Desk in the lobby.  Inform the information person that you are here for surgery; a wheelchair and escort will be provided to the Surgery Lounge .?     What can I eat or drink?  -  You may eat and drink normally for up to 8 hours before your surgery.   -  You may have clear liquids until 2 hours before surgery.     Examples of clear liquids:  Water  Clear broth  Juices (apple, white grape, white cranberry  and cider) without pulp  Noncarbonated, powder based beverages  (lemonade and Christiano-Aid)  Sodas (Sprite, 7-Up, ginger ale and seltzer)  Coffee or tea (without milk or cream)  Gatorade    -  No Alcohol for at least 24 hours before surgery     Which medicines can I take?    Hold Aspirin for 7 days before surgery.   Hold Multivitamins for 7 days before surgery.  Hold Supplements for 7 days before surgery.  Hold Ibuprofen (Advil, Motrin) for 1 day before surgery--unless otherwise directed by surgeon.  Hold Naproxen (Aleve) for 4 days before surgery.  -  PLEASE TAKE these medications the day of surgery:  All scheduled morning medications.    How do I prepare myself?  - Please take 2 showers before surgery using Scrubcare or Hibiclens soap.    Use this soap only from the  neck to your toes.     Leave the soap on your skin for one minute--then rinse thoroughly.      You may use your own shampoo and conditioner; no other hair products.   - Please remove all jewelry and body piercings.  - No lotions, deodorants or fragrance.  - No makeup or fingernail polish.   - Bring your ID and insurance card.    - All patients are required to have a Covid-19 test within 4 days of surgery/procedure.      -Patients will be contacted by the [facility] scheduling team within 1 week of surgery to make an appointment.      - Patients may call the Scheduling team at [phone] if they have not been scheduled within 4 days of  surgery.    Questions or Concerns:    - For any questions regarding the day of surgery or your hospital stay, please contact the Pre Admission Nursing Office at [phone].       - If you have health changes between today and your surgery please call your surgeon.       For questions after surgery please call your surgeons office.

## 2021-02-03 ENCOUNTER — PRE VISIT (OUTPATIENT)
Dept: SURGERY | Facility: CLINIC | Age: 56
End: 2021-02-03

## 2021-02-03 ENCOUNTER — ANESTHESIA EVENT (OUTPATIENT)
Dept: SURGERY | Facility: CLINIC | Age: 56
End: 2021-02-03
Payer: COMMERCIAL

## 2021-02-03 ENCOUNTER — VIRTUAL VISIT (OUTPATIENT)
Dept: SURGERY | Facility: CLINIC | Age: 56
End: 2021-02-03
Payer: COMMERCIAL

## 2021-02-03 DIAGNOSIS — Z01.818 PREOP EXAMINATION: Primary | ICD-10-CM

## 2021-02-03 PROCEDURE — 99203 OFFICE O/P NEW LOW 30 MIN: CPT | Mod: 95 | Performed by: CLINICAL NURSE SPECIALIST

## 2021-02-03 ASSESSMENT — PAIN SCALES - GENERAL: PAINLEVEL: NO PAIN (0)

## 2021-02-03 ASSESSMENT — ENCOUNTER SYMPTOMS: DYSRHYTHMIAS: 0

## 2021-02-03 NOTE — PROGRESS NOTES
Mary is a 55 year old who is being evaluated via a billable video visit.      How would you like to obtain your AVS? MyChart    Will anyone else be joining your video visit? No      HPI         Review of Systems         Physical Exam           Video-Visit Details    JESSICA Sherwood LPN

## 2021-02-03 NOTE — H&P
Pre-Operative H & P     CC:  Preoperative exam to assess for increased cardiopulmonary risk while undergoing surgery and anesthesia.    Date of Encounter: 2/3/2021  Primary Care Physician:  Janie Riley  Reason for visit: Endometrial cancer (H) [C54.1]  HPI  Mary Sims is a 55 year old female who presents for pre-operative H & P in preparation for HYSTERECTOMY, TOTAL, ROBOT-ASSISTED, LAPAROSCOPIC, WITH SALPINGO-OOPHORECTOMY, possible sentinel lymph node sampling, possible open, possible debulking, cystoscopy with Dr. Brandon on 21 at [facility]. History is obtained from the patient and medical records.     At the beginning of this visit patient ID was confirmed using name and .     Video-Visit Details    Type of service:  Video Visit    Patient verbally consented to video service today: YES      Video Start Time: 9:10 am  Video End Time (time video stopped): 9:17am    Originating Location (pt. Location): Home    Distant Location (provider location):  [facility] PREOPERAT[facility]    Mode of Communication:  Video Conference [facility]    Patient who was evaluated by her OB/Gyn after noting spotting with wiping over that past 3-4 months. She initially had a urology evaluation to exclude bladder source with cystoscopy that was negative. She then had a pelvic US which revealed endometrial thickening at 24 mm. An endometrial biopsy was performed which revealed endometrial cancer. She was referred to Dr. Brandon and was counseled for above procedure.     Her history is otherwise significant for HTN, Graves disease s/p radioactive iodine, hypothyroidism, and arthritis. She has a chronic itching/tingling sensation on the side of her right leg and uses gabapentin to control. Today patient denies fever, cough, shortness of breath, chest pain, irregular HR, or ankle edema.     Past Medical History  Past Medical History:   Diagnosis Date      Arthritis of foot, right 2019     Endometrial cancer (H) 2021     Graves disease      Hypertension      Hypothyroidism      MEDICAL HISTORY OF -     S/P RADIOACTIVE IODINE     Morbid obesity (H) 2020     Osteopenia of multiple sites 2020       Past Surgical History  Past Surgical History:   Procedure Laterality Date     ARTHRODESIS FOOT Right 2020    Procedure: MIDFOOT BONE SPUR RESECTION WITH JOINT FUSIONS RIGHT LOWER EXTREMITY;  Surgeon: Choco Frances DPM;  Location: [facility] OR     ARTHROSCOPY SHLDR ROTATOR CUFF REPAIR, SUBACROMIAL DECOMP, DIST CLAVICLE RESECTION, BICEP TENODESIS Left 2017    Procedure: ARTHROSCOPY SHOULDER ROTATOR CUFF REPAIR, SUBACROMIAL DECOMPRESSION, DISTAL CLAVICLE RESECTION, OPEN BICEP TENODESIS REPAIR;  Surgeon: Dorina Rodriguez MD;  Location: [facility] OR     C  DELIVERY ONLY       COLONOSCOPY  2013    Procedure: COLONOSCOPY;  colonoscopy, screening;  Surgeon: Dalton Lala MD;  Location: [facility] OR     CYSTOSCOPY       SURGICAL HISTORY OF -       RT ANKLE Fx AND REPAIR (PIN,PLATE,SCREWS)     THYROIDECTOMY       TUBAL LIGATION         Hx of Blood transfusions/reactions: Denies.      Hx of abnormal bleeding or anti-platelet use: Denies.     Menstrual history: Patient's last menstrual period was 2015 (approximate).    Steroid use in the last year: Denies.     Personal or FH with difficulty with Anesthesia:  Denies.     Prior to Admission Medications  Current Outpatient Medications   Medication Sig Dispense Refill     Acetaminophen (TYLENOL ARTHRITIS PAIN PO) Take by mouth as needed       gabapentin (NEURONTIN) 300 MG capsule TAKE ONE CAPSULE BY MOUTH THREE TIMES A DAY (Patient taking differently: 2 times daily ) 90 capsule 1     levothyroxine (SYNTHROID/LEVOTHROID) 75 MCG tablet TAKE ONE TABLET BY MOUTH EVERY DAY (Patient taking differently: every morning Separate oral administration of iron- or calcium-containing products  and levothyroxine by at least 4 hours.) 90 tablet 1     metoprolol succinate ER (TOPROL-XL) 25 MG 24 hr tablet TAKE ONE TABLET BY MOUTH EVERY DAY (Patient taking differently: At Bedtime ) 90 tablet 1     vitamin D2 (ERGOCALCIFEROL) 65566 units (1250 mcg) capsule Take 1 capsule (50,000 Units) by mouth once a week (Patient taking differently: Take 50,000 Units by mouth once a week ) 12 capsule 0       Allergies  No Known Allergies    Social History  Social History     Socioeconomic History     Marital status:      Spouse name: Not on file     Number of children: 3     Years of education: Not on file     Highest education level: Not on file   Occupational History     Not on file   Social Needs     Financial resource strain: Not on file     Food insecurity     Worry: Not on file     Inability: Not on file     Transportation needs     Medical: Not on file     Non-medical: Not on file   Tobacco Use     Smoking status: Never Smoker     Smokeless tobacco: Never Used   Substance and Sexual Activity     Alcohol use: Yes     Comment: SELDOM      Drug use: No     Sexual activity: Yes     Partners: Male     Birth control/protection: Surgical     Comment: tubal   Lifestyle     Physical activity     Days per week: Not on file     Minutes per session: Not on file     Stress: Not on file   Relationships     Social connections     Talks on phone: Not on file     Gets together: Not on file     Attends Confucianism service: Not on file     Active member of club or organization: Not on file     Attends meetings of clubs or organizations: Not on file     Relationship status: Not on file     Intimate partner violence     Fear of current or ex partner: Not on file     Emotionally abused: Not on file     Physically abused: Not on file     Forced sexual activity: Not on file   Other Topics Concern     Parent/sibling w/ CABG, MI or angioplasty before 65F 55M? Not Asked   Social History Narrative     Not on file       Family  "History  Family History   Problem Relation Age of Onset     Hypertension Mother      Diabetes Mother      Hypertension Father      Alcohol/Drug Father         alcohol     Crohn's Disease Son      Hypertension Other      Cancer No family hx of      Cerebrovascular Disease No family hx of      Thyroid Disease No family hx of      Glaucoma No family hx of      Macular Degeneration No family hx of      Personally reviewed    LABS:  CBC:   Lab Results   Component Value Date    WBC 4.7 04/25/2018    WBC 7.9 01/16/2015    HGB 13.6 01/27/2020    HGB 13.8 05/15/2019    HCT 42.2 04/25/2018    HCT 39.5 01/16/2015     04/25/2018     01/16/2015     BMP:   Lab Results   Component Value Date     06/25/2020     05/15/2019    POTASSIUM 3.7 06/25/2020    POTASSIUM 4.1 01/27/2020    CHLORIDE 110 (H) 06/25/2020    CHLORIDE 107 05/15/2019    CO2 27 06/25/2020    CO2 25 05/15/2019    BUN 16 06/25/2020    BUN 19 05/15/2019    CR 0.74 06/25/2020    CR 0.72 01/27/2020    GLC 90 06/25/2020    GLC 89 05/15/2019     COAGS: No results found for: PTT, INR, FIBR  POC: No results found for: BGM, HCG, HCGS  OTHER:   Lab Results   Component Value Date    ALYSIA 8.5 06/25/2020    ALBUMIN 4.0 04/25/2018    PROTTOTAL 7.9 04/25/2018    ALT 21 04/25/2018    AST 15 04/25/2018    ALKPHOS 75 04/25/2018    BILITOTAL 1.0 04/25/2018    TSH 1.32 06/25/2020    T4 1.45 01/07/2019        Preop Vitals    BP Readings from Last 3 Encounters:   01/05/21 127/84   12/18/20 125/86   11/25/20 132/86    Pulse Readings from Last 3 Encounters:   01/05/21 66   12/18/20 96   11/25/20 64      Resp Readings from Last 3 Encounters:   07/08/20 16   03/02/20 15   02/12/20 16    SpO2 Readings from Last 3 Encounters:   01/05/21 97%   12/18/20 99%   11/25/20 99%      Temp Readings from Last 1 Encounters:   12/18/20 97.5  F (36.4  C) (Oral)    Ht Readings from Last 1 Encounters:   11/25/20 1.626 m (5' 4\")      Wt Readings from Last 1 Encounters:   12/18/20 96.2 " "kg (212 lb)    Estimated body mass index is 36.39 kg/m  as calculated from the following:    Height as of 11/25/20: 1.626 m (5' 4\").    Weight as of 12/18/20: 96.2 kg (212 lb).     ROS/MED HX  The complete review of systems is negative other than noted in the HPI or here.   ENT/Pulmonary:  - neg pulmonary ROS     Neurologic:     (+) other neuro, chronic itching/tingling right side of leg-gabapentin.    Cardiovascular:     (+) hypertension-range: good control/ ----Previous cardiac testing   Echo: Date: Results:    Stress Test: Date: Results:    ECG Reviewed: Date: 1.27.20 Results:  1/27/20  Sinus rhythm, rate 62, normal rate, rhythm, intvervals, axis, no acute ST_T wave changes consistent with ischemia, normal EKG.    Cath: Date: Results:     (-) CAD, LYNN and arrhythmias   METS/Exercise Tolerance: Comment: Works at [employer] >4 METS   Hematologic:  - neg hematologic  ROS       Musculoskeletal:   (+) arthritis,       GI/Hepatic:  - neg GI/hepatic ROS       Renal/Genitourinary:  - neg Renal ROS       Endo:     (+) thyroid problem, hypothyroidism Thyroid disease - Other Graves s/p radioactive iodine, Obesity,       Psychiatric:  - neg psychiatric ROS    (-) chronic opioid use history   Infectious Disease:  - neg infectious disease ROS       Malignancy: (+) Malignancy, History of Other.Other CA Endometrial cancer Active status post.      Other:    (+) , no H/O Chronic Pain,           PHYSICAL EXAM:   Mental Status/Neuro: A/A/O; Age Appropriate   Airway: Facies: Feasible  Mallampati: Not Assessed  Mouth/Opening: Not Assessed  TM distance: Not Assessed  Neck ROM: Not Assessed   Respiratory:    CV:    Comments: Virtual visit     Dental: Normal Dentition              Physical Exam  Constitutional: Awake, alert, no apparent distress, and appears stated age.  Respiratory: No cough or obvious dyspnea.  Neuropsychiatric: Calm, cooperative. Normal affect.   Please refer to the physical examination documented by the " anesthesiologist in the anesthesia record on the day of surgery    EKG: Personally reviewed 1/27/20  Sinus rhythm, rate 62, normal rate, rhythm, intvervals, axis, no acute ST-T wave changes consistent with ischemia, normal EKG.    US Pelvis 12/23/20                                                                   IMPRESSION: Thickened endometrium up to 24 mm in transvaginal  measurement for a postmenopausal patient comment given the reported  history of postmenopausal bleeding, this does raise concern for  neoplasia/hyperplasia and tissue sampling is recommended. Small amount  of free fluid in the posterior cul-de-sac and left adnexa.  Nonvisualization of left ovary. The visualized right ovary appears  normal.     CT abd/pelvis 11/25/20  IMPRESSION:  1.  Multiple probable hepatic cysts are noted.  2.  No evidence for renal or ureteral calculus or urinary system  obstruction. No other urinary system abnormality is identified.  3.   Mild thickening of the wall of the proximal transverse colon is  likely due to incomplete distention. Mild colitis is considered a less  likely possibility.  4.  Etiology for patient's symptoms is otherwise not definitely seen.  5.  Evaluation of the solid organs of the abdomen and pelvis and of  the renal collecting systems is limited due to lack of IV contrast.    Imaging reviewed by this provider    Outside records reviewed from: Care Everywhere    ASSESSMENT and PLAN  Mary Sims is a 55 year old female scheduled to undergo HYSTERECTOMY, TOTAL, ROBOT-ASSISTED, LAPAROSCOPIC, WITH SALPINGO-OOPHORECTOMY, possible sentinel lymph node sampling, possible open, possible debulking, cystoscopy with Dr. Brandon on 2/9/21. She has the following specific operative considerations:   - RCRI : 0.9% risk of major adverse cardiac event.   - Anesthesia considerations:  Refer to PAC assessment in anesthesia records  - VTE risk: 1.8%  - BNETLEY # of risks 3/8 = Intermediate risks  - Risk of PONV score =  3.  If > 2, anti-emetic intervention recommended. If 3 or > anti emetic intervention recommended with two or more meds    --Endometrial cancer with above procedure now planned.   --HTN good control on metoprolol at HS. No other cardiac history, symptoms or meds. EKG SR. Good activity tolerance. Works at [employer].  --Nonsmoker. Denies pulmonary symptoms. Intermediate risk for BENTLEY but denies symptoms.   --Hypothyroidism. Will take Synthroid on DOS.  --Chronic itching/tingling sensation side of right leg. Will take gabapentin on DOS.  --No history of blood transfusion. Type and screen to be drawn on DOS.    Arrival time, NPO, shower and medication instructions provided by nursing staff today.     Patient is optimized and is acceptable candidate for the proposed procedure.  No further diagnostic evaluation is needed.         PABLO Dexter CNS  Preoperative Assessment Center  [facility]  Phone: [phone]  Fax: [phone]

## 2021-02-03 NOTE — ANESTHESIA PREPROCEDURE EVALUATION
"Anesthesia Pre-Procedure Evaluation    Patient: Mary Sims   MRN:     [id number] Gender:   female   Age:    55 year old :      1965        Preoperative Diagnosis: Endometrial cancer (H) [C54.1]   Procedure(s):  HYSTERECTOMY, TOTAL, ROBOT-ASSISTED, LAPAROSCOPIC, WITH SALPINGO-OOPHORECTOMY, possible sentinel lymph node sampling,  possible open, possible debulking,  cystoscopy     LABS:  CBC:   Lab Results   Component Value Date    WBC 4.7 2018    WBC 7.9 2015    HGB 13.6 2020    HGB 13.8 05/15/2019    HCT 42.2 2018    HCT 39.5 2015     2018     2015     BMP:   Lab Results   Component Value Date     2020     05/15/2019    POTASSIUM 3.7 2020    POTASSIUM 4.1 2020    CHLORIDE 110 (H) 2020    CHLORIDE 107 05/15/2019    CO2 27 2020    CO2 25 05/15/2019    BUN 16 2020    BUN 19 05/15/2019    CR 0.74 2020    CR 0.72 2020    GLC 90 2020    GLC 89 05/15/2019     COAGS: No results found for: PTT, INR, FIBR  POC: No results found for: BGM, HCG, HCGS  OTHER:   Lab Results   Component Value Date    ALYSIA 8.5 2020    ALBUMIN 4.0 2018    PROTTOTAL 7.9 2018    ALT 21 2018    AST 15 2018    ALKPHOS 75 2018    BILITOTAL 1.0 2018    TSH 1.32 2020    T4 1.45 2019        Preop Vitals    BP Readings from Last 3 Encounters:   21 127/84   20 125/86   20 132/86    Pulse Readings from Last 3 Encounters:   21 66   20 96   20 64      Resp Readings from Last 3 Encounters:   20 16   20 15   20 16    SpO2 Readings from Last 3 Encounters:   21 97%   20 99%   20 99%      Temp Readings from Last 1 Encounters:   20 97.5  F (36.4  C) (Oral)    Ht Readings from Last 1 Encounters:   20 1.626 m (5' 4\")      Wt Readings from Last 1 Encounters:   20 96.2 kg (212 lb)    Estimated body mass " "index is 36.39 kg/m  as calculated from the following:    Height as of 20: 1.626 m (5' 4\").    Weight as of 20: 96.2 kg (212 lb).     LDA:        Past Medical History:   Diagnosis Date     Arthritis of foot, right 2019     Endometrial cancer (H) 2021     Graves disease      Hypertension      Hypothyroidism      MEDICAL HISTORY OF -     S/P RADIOACTIVE IODINE     Morbid obesity (H) 2020     Osteopenia of multiple sites 2020      Past Surgical History:   Procedure Laterality Date     ARTHRODESIS FOOT Right 2020    Procedure: MIDFOOT BONE SPUR RESECTION WITH JOINT FUSIONS RIGHT LOWER EXTREMITY;  Surgeon: Choco Frances DPM;  Location: [facility] OR     ARTHROSCOPY SHLDR ROTATOR CUFF REPAIR, SUBACROMIAL DECOMP, DIST CLAVICLE RESECTION, BICEP TENODESIS Left 2017    Procedure: ARTHROSCOPY SHOULDER ROTATOR CUFF REPAIR, SUBACROMIAL DECOMPRESSION, DISTAL CLAVICLE RESECTION, OPEN BICEP TENODESIS REPAIR;  Surgeon: Dorina Rodriguez MD;  Location: [facility] OR     C  DELIVERY ONLY       COLONOSCOPY  2013    Procedure: COLONOSCOPY;  colonoscopy, screening;  Surgeon: Dalton Lala MD;  Location: [facility] OR     CYSTOSCOPY       SURGICAL HISTORY OF -       RT ANKLE Fx AND REPAIR (PIN,PLATE,SCREWS)     THYROIDECTOMY       TUBAL LIGATION        No Known Allergies     Anesthesia Evaluation     . Pt has had prior anesthetic. Type: General.    No history of anesthetic complications          ROS/MED HX    ENT/Pulmonary:  - neg pulmonary ROS     Neurologic:     (+) other neuro, chronic itching/tingling right side of leg-gabapentin.    Cardiovascular:     (+) hypertension-range: good control/ ----Previous cardiac testing   Echo: Date: Results:    Stress Test: Date: Results:    ECG Reviewed: Date: 20 Results:  20  Sinus rhythm, rate 62, normal rate, rhythm, intvervals, axis, no acute ST_T wave changes consistent with ischemia, normal EKG.    Cath: Date: " Results:     (-) CAD, LYNN and arrhythmias   METS/Exercise Tolerance: Comment: Works at [employer] >4 METS   Hematologic:  - neg hematologic  ROS       Musculoskeletal:   (+) arthritis,       GI/Hepatic:  - neg GI/hepatic ROS       Renal/Genitourinary:  - neg Renal ROS       Endo:     (+) thyroid problem, hypothyroidism Thyroid disease - Other Graves s/p radioactive iodine, Obesity,       Psychiatric:  - neg psychiatric ROS    (-) chronic opioid use history   Infectious Disease:  - neg infectious disease ROS       Malignancy: (+) Malignancy, History of Other.Other CA Endometrial cancer Active status post.      Other:    (+) , no H/O Chronic Pain,                       PHYSICAL EXAM:   Mental Status/Neuro: A/A/O; Age Appropriate   Airway: Facies: Feasible  Mallampati: II  Mouth/Opening: Full  TM distance: > 6 cm  Neck ROM: Full   Respiratory:   Resp. Rate: Normal     Resp. Effort: Normal      CV: Rhythm: Regular  Rate: Age appropriate   Comments: Virtual visit     Dental: Normal Dentition                Assessment:   ASA SCORE: 2    H&P: History and physical reviewed and following examination; no interval change.   Smoking Status:  Non-Smoker/Unknown   NPO Status: NPO Appropriate     Plan:   Anes. Type:  General   Pre-Medication: Midazolam   Induction:  IV (Standard)   Airway: ETT; Oral   Access/Monitoring: PIV   Maintenance: Balanced     Postop Plan:   Postop Pain: Opioids  Postop Sedation/Airway: Not planned  Disposition: Inpatient/Admit     PONV Management:   Adult Risk Factors: Female, Non-Smoker, Postop Opioids   Prevention: Ondansetron, Dexamethasone     CONSENT: Direct conversation   Plan and risks discussed with: Patient                [unfilled]  PAC Discussion and Assessment    ASA Classification: 3  Case is suitable for: Axis  Anesthetic techniques and relevant risks discussed: GA  Invasive monitoring and risk discussed: No    Possibility and Risk of blood transfusion discussed: No            PAC  Resident/NP Anesthesia Assessment: Mary Sims is a 55 year old female scheduled to undergo HYSTERECTOMY, TOTAL, ROBOT-ASSISTED, LAPAROSCOPIC, WITH SALPINGO-OOPHORECTOMY, possible sentinel lymph node sampling, possible open, possible debulking, cystoscopy with Dr. Brandon on 2/9/21. She has the following specific operative considerations:   - RCRI : 0.9% risk of major adverse cardiac event.   - VTE risk: 1.8%  - BENTLEY # of risks 3/8 = Intermediate risks  - Risk of PONV score = 3.  If > 2, anti-emetic intervention recommended. If 3 or > anti emetic intervention recommended with two or more meds    --Endometrial cancer with above procedure now planned.   --HTN good control on metoprolol at HS. No other cardiac history, symptoms or meds. EKG SR. Good activity tolerance. Works at [employer].  --Nonsmoker. Denies pulmonary symptoms. Intermediate risk for BENTLEY but denies symptoms.   --Hypothyroidism. Will take Synthroid on DOS.  --Chronic itching/tingling sensation side of right leg. Will take gabapentin on DOS.  --No history of blood transfusion. Type and screen to be drawn on DOS.      Patient is optimized and is acceptable candidate for the proposed procedure.  No further diagnostic evaluation is needed.           Reviewed and Signed by PAC Mid-Level Provider/Resident  Mid-Level Provider/Resident: PABLO Torres, CNS  Date: 2/3/21  Time: 9:00am                                PABLO Dexter

## 2021-02-05 ENCOUNTER — PATIENT OUTREACH (OUTPATIENT)
Dept: ONCOLOGY | Facility: CLINIC | Age: 56
End: 2021-02-05

## 2021-02-05 DIAGNOSIS — Z11.59 ENCOUNTER FOR SCREENING FOR OTHER VIRAL DISEASES: ICD-10-CM

## 2021-02-05 LAB
LABORATORY COMMENT REPORT: NORMAL
SARS-COV-2 RNA RESP QL NAA+PROBE: NEGATIVE
SARS-COV-2 RNA RESP QL NAA+PROBE: NORMAL
SPECIMEN SOURCE: NORMAL
SPECIMEN SOURCE: NORMAL

## 2021-02-05 PROCEDURE — U0005 INFEC AGEN DETEC AMPLI PROBE: HCPCS | Performed by: OBSTETRICS & GYNECOLOGY

## 2021-02-05 PROCEDURE — U0003 INFECTIOUS AGENT DETECTION BY NUCLEIC ACID (DNA OR RNA); SEVERE ACUTE RESPIRATORY SYNDROME CORONAVIRUS 2 (SARS-COV-2) (CORONAVIRUS DISEASE [COVID-19]), AMPLIFIED PROBE TECHNIQUE, MAKING USE OF HIGH THROUGHPUT TECHNOLOGIES AS DESCRIBED BY CMS-2020-01-R: HCPCS | Performed by: OBSTETRICS & GYNECOLOGY

## 2021-02-05 NOTE — PROGRESS NOTES
The following completed via phone     Relevant Diagnosis: Endometrial cancer      Teaching Topic: HYSTERECTOMY, TOTAL, ROBOT-ASSISTED, LAPAROSCOPIC, WITH SALPINGO-OOPHORECTOMY, possible sentinel lymph node sampling, (Bilateral Abdomen) possible open, possible debulking, (Abdomen) cystoscopy (N/A Urethra)    Person(s) involved in teaching :  Patient  Alone   Motivation Level:  Asks Questions:    Yes      Eager to Learn:     Yes     Cooperative:          Yes    Receptive (willing. Able to accept information):    Yes      Patient and those who are listed above demonstrates understanding of the following:   Reason for the appointment, diagnosis and treatment plan:   Yes   Knowledge of proper use of medications and conditions for which they are ordered (with special attention to potential side effects or drug interactions): Yes   Which situations necessitate calling provider and whom to contact: Yes         Nutritional needs and diet plan:  Yes      Pain management techniques:     Yes, Pain Scale   Diet:   Yes, [facility] Diet Instructions    Teaching Concerns addressed: Yes    Infection Prevention:  Patient and those who are listed above demonstrate understanding of the following:  Pre-Op CHG Bathing Instructions: Yes  Surgical procedure site care taught:   Yes   Signs and symptoms of infection taught: Yes       Instructional Materials discussed  Hysterectomy Guidelines  Pain Assessment Tool   Home Care after Major Abdominal or Vaginal Surgery    Accommodations Brochure  Phone numbers for [facility] and [facility]      Comments:  DENY Rojas RN

## 2021-02-09 ENCOUNTER — ANESTHESIA (OUTPATIENT)
Dept: SURGERY | Facility: CLINIC | Age: 56
End: 2021-02-09
Payer: COMMERCIAL

## 2021-02-09 ENCOUNTER — HOSPITAL ENCOUNTER (OUTPATIENT)
Facility: CLINIC | Age: 56
Discharge: HOME | End: 2021-02-09
Attending: OBSTETRICS & GYNECOLOGY | Admitting: OBSTETRICS & GYNECOLOGY
Payer: COMMERCIAL

## 2021-02-09 VITALS
SYSTOLIC BLOOD PRESSURE: 128 MMHG | HEIGHT: 64 IN | RESPIRATION RATE: 14 BRPM | WEIGHT: 207.89 LBS | OXYGEN SATURATION: 100 % | HEART RATE: 77 BPM | DIASTOLIC BLOOD PRESSURE: 73 MMHG | TEMPERATURE: 97 F | BODY MASS INDEX: 35.49 KG/M2

## 2021-02-09 DIAGNOSIS — Z98.890 S/P LAPAROSCOPY: Primary | ICD-10-CM

## 2021-02-09 DIAGNOSIS — C54.1 ENDOMETRIAL CANCER (H): ICD-10-CM

## 2021-02-09 LAB
ABO + RH BLD: NORMAL
ABO + RH BLD: NORMAL
B-HCG SERPL-ACNC: 6 IU/L (ref 0–5)
BLD GP AB SCN SERPL QL: NORMAL
BLOOD BANK CMNT PATIENT-IMP: NORMAL
CREAT SERPL-MCNC: 0.78 MG/DL (ref 0.52–1.04)
GFR SERPL CREATININE-BSD FRML MDRD: 84 ML/MIN/{1.73_M2}
GLUCOSE BLDC GLUCOMTR-MCNC: 97 MG/DL (ref 70–99)
HGB BLD-MCNC: 14.2 G/DL (ref 11.7–15.7)
PLATELET # BLD AUTO: 245 10E9/L (ref 150–450)
POTASSIUM SERPL-SCNC: 3.9 MMOL/L (ref 3.4–5.3)
SPECIMEN EXP DATE BLD: NORMAL

## 2021-02-09 PROCEDURE — 250N000013 HC RX MED GY IP 250 OP 250 PS 637: Performed by: STUDENT IN AN ORGANIZED HEALTH CARE EDUCATION/TRAINING PROGRAM

## 2021-02-09 PROCEDURE — 85049 AUTOMATED PLATELET COUNT: CPT | Performed by: OBSTETRICS & GYNECOLOGY

## 2021-02-09 PROCEDURE — 258N000003 HC RX IP 258 OP 636: Performed by: ANESTHESIOLOGY

## 2021-02-09 PROCEDURE — 88307 TISSUE EXAM BY PATHOLOGIST: CPT | Mod: 26 | Performed by: PATHOLOGY

## 2021-02-09 PROCEDURE — 88341 IMHCHEM/IMCYTCHM EA ADD ANTB: CPT | Mod: 26 | Performed by: PATHOLOGY

## 2021-02-09 PROCEDURE — 84702 CHORIONIC GONADOTROPIN TEST: CPT | Performed by: OBSTETRICS & GYNECOLOGY

## 2021-02-09 PROCEDURE — 88112 CYTOPATH CELL ENHANCE TECH: CPT | Mod: TC | Performed by: OBSTETRICS & GYNECOLOGY

## 2021-02-09 PROCEDURE — 85018 HEMOGLOBIN: CPT | Performed by: OBSTETRICS & GYNECOLOGY

## 2021-02-09 PROCEDURE — 84132 ASSAY OF SERUM POTASSIUM: CPT | Performed by: OBSTETRICS & GYNECOLOGY

## 2021-02-09 PROCEDURE — 86900 BLOOD TYPING SEROLOGIC ABO: CPT | Performed by: OBSTETRICS & GYNECOLOGY

## 2021-02-09 PROCEDURE — 88307 TISSUE EXAM BY PATHOLOGIST: CPT | Mod: TC | Performed by: OBSTETRICS & GYNECOLOGY

## 2021-02-09 PROCEDURE — 86850 RBC ANTIBODY SCREEN: CPT | Performed by: OBSTETRICS & GYNECOLOGY

## 2021-02-09 PROCEDURE — 250N000011 HC RX IP 250 OP 636: Performed by: NURSE ANESTHETIST, CERTIFIED REGISTERED

## 2021-02-09 PROCEDURE — 250N000009 HC RX 250: Performed by: OBSTETRICS & GYNECOLOGY

## 2021-02-09 PROCEDURE — 36415 COLL VENOUS BLD VENIPUNCTURE: CPT | Performed by: OBSTETRICS & GYNECOLOGY

## 2021-02-09 PROCEDURE — 88360 TUMOR IMMUNOHISTOCHEM/MANUAL: CPT | Mod: TC | Performed by: OBSTETRICS & GYNECOLOGY

## 2021-02-09 PROCEDURE — 88305 TISSUE EXAM BY PATHOLOGIST: CPT | Mod: TC | Performed by: OBSTETRICS & GYNECOLOGY

## 2021-02-09 PROCEDURE — 370N000017 HC ANESTHESIA TECHNICAL FEE, PER MIN: Performed by: OBSTETRICS & GYNECOLOGY

## 2021-02-09 PROCEDURE — 360N000080 HC SURGERY LEVEL 7, PER MIN: Performed by: OBSTETRICS & GYNECOLOGY

## 2021-02-09 PROCEDURE — 88342 IMHCHEM/IMCYTCHM 1ST ANTB: CPT | Mod: TC,XU | Performed by: OBSTETRICS & GYNECOLOGY

## 2021-02-09 PROCEDURE — 86901 BLOOD TYPING SEROLOGIC RH(D): CPT | Performed by: OBSTETRICS & GYNECOLOGY

## 2021-02-09 PROCEDURE — 250N000011 HC RX IP 250 OP 636: Performed by: ANESTHESIOLOGY

## 2021-02-09 PROCEDURE — 999N001017 HC STATISTIC GLUCOSE BY METER IP

## 2021-02-09 PROCEDURE — 88342 IMHCHEM/IMCYTCHM 1ST ANTB: CPT | Mod: 26 | Performed by: PATHOLOGY

## 2021-02-09 PROCEDURE — 82565 ASSAY OF CREATININE: CPT | Performed by: OBSTETRICS & GYNECOLOGY

## 2021-02-09 PROCEDURE — 999N001018 HC STATISTIC H-CELL BLOCK W/STAIN: Performed by: OBSTETRICS & GYNECOLOGY

## 2021-02-09 PROCEDURE — 999N000141 HC STATISTIC PRE-PROCEDURE NURSING ASSESSMENT: Performed by: OBSTETRICS & GYNECOLOGY

## 2021-02-09 PROCEDURE — 88305 TISSUE EXAM BY PATHOLOGIST: CPT | Mod: 26 | Performed by: PATHOLOGY

## 2021-02-09 PROCEDURE — 250N000011 HC RX IP 250 OP 636: Performed by: OBSTETRICS & GYNECOLOGY

## 2021-02-09 PROCEDURE — 272N000001 HC OR GENERAL SUPPLY STERILE: Performed by: OBSTETRICS & GYNECOLOGY

## 2021-02-09 PROCEDURE — 710N000010 HC RECOVERY PHASE 1, LEVEL 2, PER MIN: Performed by: OBSTETRICS & GYNECOLOGY

## 2021-02-09 PROCEDURE — 88309 TISSUE EXAM BY PATHOLOGIST: CPT | Mod: TC | Performed by: OBSTETRICS & GYNECOLOGY

## 2021-02-09 PROCEDURE — 250N000025 HC SEVOFLURANE, PER MIN: Performed by: OBSTETRICS & GYNECOLOGY

## 2021-02-09 PROCEDURE — 88112 CYTOPATH CELL ENHANCE TECH: CPT | Mod: 26 | Performed by: PATHOLOGY

## 2021-02-09 PROCEDURE — 250N000009 HC RX 250: Performed by: NURSE ANESTHETIST, CERTIFIED REGISTERED

## 2021-02-09 PROCEDURE — 88360 TUMOR IMMUNOHISTOCHEM/MANUAL: CPT | Mod: 26 | Performed by: PATHOLOGY

## 2021-02-09 PROCEDURE — 710N000012 HC RECOVERY PHASE 2, PER MINUTE: Performed by: OBSTETRICS & GYNECOLOGY

## 2021-02-09 PROCEDURE — 88309 TISSUE EXAM BY PATHOLOGIST: CPT | Mod: 26 | Performed by: PATHOLOGY

## 2021-02-09 PROCEDURE — 88341 IMHCHEM/IMCYTCHM EA ADD ANTB: CPT | Mod: TC,XU | Performed by: OBSTETRICS & GYNECOLOGY

## 2021-02-09 RX ORDER — IBUPROFEN 600 MG/1
600 TABLET, FILM COATED ORAL EVERY 6 HOURS PRN
Qty: 12 TABLET | Refills: 0 | Status: SHIPPED | OUTPATIENT
Start: 2021-02-09 | End: 2021-06-07

## 2021-02-09 RX ORDER — PROPOFOL 10 MG/ML
INJECTION, EMULSION INTRAVENOUS PRN
Status: DISCONTINUED | OUTPATIENT
Start: 2021-02-09 | End: 2021-02-09

## 2021-02-09 RX ORDER — NALOXONE HYDROCHLORIDE 0.4 MG/ML
0.2 INJECTION, SOLUTION INTRAMUSCULAR; INTRAVENOUS; SUBCUTANEOUS
Status: DISCONTINUED | OUTPATIENT
Start: 2021-02-09 | End: 2021-02-09 | Stop reason: HOSPADM

## 2021-02-09 RX ORDER — HYDROMORPHONE HYDROCHLORIDE 1 MG/ML
.3-.5 INJECTION, SOLUTION INTRAMUSCULAR; INTRAVENOUS; SUBCUTANEOUS EVERY 5 MIN PRN
Status: DISCONTINUED | OUTPATIENT
Start: 2021-02-09 | End: 2021-02-09 | Stop reason: HOSPADM

## 2021-02-09 RX ORDER — HYDRALAZINE HYDROCHLORIDE 20 MG/ML
INJECTION INTRAMUSCULAR; INTRAVENOUS PRN
Status: DISCONTINUED | OUTPATIENT
Start: 2021-02-09 | End: 2021-02-09

## 2021-02-09 RX ORDER — LIDOCAINE HYDROCHLORIDE 20 MG/ML
INJECTION, SOLUTION INFILTRATION; PERINEURAL PRN
Status: DISCONTINUED | OUTPATIENT
Start: 2021-02-09 | End: 2021-02-09

## 2021-02-09 RX ORDER — FENTANYL CITRATE 50 UG/ML
25-50 INJECTION, SOLUTION INTRAMUSCULAR; INTRAVENOUS
Status: DISCONTINUED | OUTPATIENT
Start: 2021-02-09 | End: 2021-02-09 | Stop reason: HOSPADM

## 2021-02-09 RX ORDER — INDOCYANINE GREEN AND WATER 25 MG
KIT INJECTION PRN
Status: DISCONTINUED | OUTPATIENT
Start: 2021-02-09 | End: 2021-02-09 | Stop reason: HOSPADM

## 2021-02-09 RX ORDER — AMOXICILLIN 250 MG
1-2 CAPSULE ORAL 2 TIMES DAILY
Qty: 30 TABLET | Refills: 0 | Status: SHIPPED | OUTPATIENT
Start: 2021-02-09 | End: 2021-06-07

## 2021-02-09 RX ORDER — SODIUM CHLORIDE, SODIUM LACTATE, POTASSIUM CHLORIDE, CALCIUM CHLORIDE 600; 310; 30; 20 MG/100ML; MG/100ML; MG/100ML; MG/100ML
INJECTION, SOLUTION INTRAVENOUS CONTINUOUS
Status: DISCONTINUED | OUTPATIENT
Start: 2021-02-09 | End: 2021-02-09 | Stop reason: HOSPADM

## 2021-02-09 RX ORDER — FENTANYL CITRATE 50 UG/ML
INJECTION, SOLUTION INTRAMUSCULAR; INTRAVENOUS PRN
Status: DISCONTINUED | OUTPATIENT
Start: 2021-02-09 | End: 2021-02-09

## 2021-02-09 RX ORDER — LIDOCAINE 40 MG/G
CREAM TOPICAL
Status: DISCONTINUED | OUTPATIENT
Start: 2021-02-09 | End: 2021-02-09 | Stop reason: HOSPADM

## 2021-02-09 RX ORDER — OXYCODONE HYDROCHLORIDE 5 MG/1
5 TABLET ORAL
Status: DISCONTINUED | OUTPATIENT
Start: 2021-02-09 | End: 2021-02-09 | Stop reason: HOSPADM

## 2021-02-09 RX ORDER — NALOXONE HYDROCHLORIDE 0.4 MG/ML
0.4 INJECTION, SOLUTION INTRAMUSCULAR; INTRAVENOUS; SUBCUTANEOUS
Status: DISCONTINUED | OUTPATIENT
Start: 2021-02-09 | End: 2021-02-09 | Stop reason: HOSPADM

## 2021-02-09 RX ORDER — CEFAZOLIN SODIUM 1 G/3ML
1 INJECTION, POWDER, FOR SOLUTION INTRAMUSCULAR; INTRAVENOUS SEE ADMIN INSTRUCTIONS
Status: DISCONTINUED | OUTPATIENT
Start: 2021-02-09 | End: 2021-02-09 | Stop reason: HOSPADM

## 2021-02-09 RX ORDER — IBUPROFEN 200 MG
600 TABLET ORAL
Status: COMPLETED | OUTPATIENT
Start: 2021-02-09 | End: 2021-02-09

## 2021-02-09 RX ORDER — ONDANSETRON 4 MG/1
4 TABLET, ORALLY DISINTEGRATING ORAL EVERY 30 MIN PRN
Status: DISCONTINUED | OUTPATIENT
Start: 2021-02-09 | End: 2021-02-09 | Stop reason: HOSPADM

## 2021-02-09 RX ORDER — CEFAZOLIN SODIUM 2 G/100ML
2 INJECTION, SOLUTION INTRAVENOUS
Status: COMPLETED | OUTPATIENT
Start: 2021-02-09 | End: 2021-02-09

## 2021-02-09 RX ORDER — DEXAMETHASONE SODIUM PHOSPHATE 4 MG/ML
INJECTION, SOLUTION INTRA-ARTICULAR; INTRALESIONAL; INTRAMUSCULAR; INTRAVENOUS; SOFT TISSUE PRN
Status: DISCONTINUED | OUTPATIENT
Start: 2021-02-09 | End: 2021-02-09

## 2021-02-09 RX ORDER — ONDANSETRON 4 MG/1
4 TABLET, ORALLY DISINTEGRATING ORAL
Status: DISCONTINUED | OUTPATIENT
Start: 2021-02-09 | End: 2021-02-09 | Stop reason: HOSPADM

## 2021-02-09 RX ORDER — OXYCODONE HYDROCHLORIDE 5 MG/1
5 TABLET ORAL EVERY 6 HOURS PRN
Qty: 6 TABLET | Refills: 0 | Status: SHIPPED | OUTPATIENT
Start: 2021-02-09 | End: 2021-02-12

## 2021-02-09 RX ORDER — ONDANSETRON 2 MG/ML
INJECTION INTRAMUSCULAR; INTRAVENOUS PRN
Status: DISCONTINUED | OUTPATIENT
Start: 2021-02-09 | End: 2021-02-09

## 2021-02-09 RX ORDER — ONDANSETRON 2 MG/ML
4 INJECTION INTRAMUSCULAR; INTRAVENOUS EVERY 30 MIN PRN
Status: DISCONTINUED | OUTPATIENT
Start: 2021-02-09 | End: 2021-02-09 | Stop reason: HOSPADM

## 2021-02-09 RX ORDER — ACETAMINOPHEN 325 MG/1
650 TABLET ORAL EVERY 4 HOURS PRN
Qty: 24 TABLET | Refills: 0 | Status: SHIPPED | OUTPATIENT
Start: 2021-02-09 | End: 2021-06-07

## 2021-02-09 RX ADMIN — PROPOFOL 150 MG: 10 INJECTION, EMULSION INTRAVENOUS at 08:04

## 2021-02-09 RX ADMIN — SUGAMMADEX 200 MG: 100 INJECTION, SOLUTION INTRAVENOUS at 10:38

## 2021-02-09 RX ADMIN — HYDROMORPHONE HYDROCHLORIDE 0.3 MG: 1 INJECTION, SOLUTION INTRAMUSCULAR; INTRAVENOUS; SUBCUTANEOUS at 12:12

## 2021-02-09 RX ADMIN — FENTANYL CITRATE 25 MCG: 50 INJECTION, SOLUTION INTRAMUSCULAR; INTRAVENOUS at 11:49

## 2021-02-09 RX ADMIN — DEXAMETHASONE SODIUM PHOSPHATE 10 MG: 4 INJECTION, SOLUTION INTRA-ARTICULAR; INTRALESIONAL; INTRAMUSCULAR; INTRAVENOUS; SOFT TISSUE at 08:30

## 2021-02-09 RX ADMIN — LIDOCAINE HYDROCHLORIDE 100 MG: 20 INJECTION, SOLUTION INFILTRATION; PERINEURAL at 08:04

## 2021-02-09 RX ADMIN — IBUPROFEN 600 MG: 200 TABLET, FILM COATED ORAL at 12:04

## 2021-02-09 RX ADMIN — HYDRALAZINE HYDROCHLORIDE 5 MG: 20 INJECTION INTRAMUSCULAR; INTRAVENOUS at 08:50

## 2021-02-09 RX ADMIN — ROCURONIUM BROMIDE 20 MG: 10 INJECTION INTRAVENOUS at 09:53

## 2021-02-09 RX ADMIN — FENTANYL CITRATE 50 MCG: 50 INJECTION, SOLUTION INTRAMUSCULAR; INTRAVENOUS at 08:48

## 2021-02-09 RX ADMIN — PROMETHAZINE HYDROCHLORIDE 12.5 MG: 25 INJECTION INTRAMUSCULAR; INTRAVENOUS at 14:54

## 2021-02-09 RX ADMIN — Medication 1 G: at 10:21

## 2021-02-09 RX ADMIN — HYDROMORPHONE HYDROCHLORIDE 0.4 MG: 1 INJECTION, SOLUTION INTRAMUSCULAR; INTRAVENOUS; SUBCUTANEOUS at 12:36

## 2021-02-09 RX ADMIN — FENTANYL CITRATE 50 MCG: 50 INJECTION, SOLUTION INTRAMUSCULAR; INTRAVENOUS at 08:42

## 2021-02-09 RX ADMIN — HYDRALAZINE HYDROCHLORIDE 5 MG: 20 INJECTION INTRAMUSCULAR; INTRAVENOUS at 09:02

## 2021-02-09 RX ADMIN — SODIUM CHLORIDE, POTASSIUM CHLORIDE, SODIUM LACTATE AND CALCIUM CHLORIDE: 600; 310; 30; 20 INJECTION, SOLUTION INTRAVENOUS at 07:53

## 2021-02-09 RX ADMIN — FENTANYL CITRATE 25 MCG: 50 INJECTION, SOLUTION INTRAMUSCULAR; INTRAVENOUS at 11:28

## 2021-02-09 RX ADMIN — MIDAZOLAM 2 MG: 1 INJECTION INTRAMUSCULAR; INTRAVENOUS at 07:50

## 2021-02-09 RX ADMIN — Medication 2 G: at 08:21

## 2021-02-09 RX ADMIN — HYDROMORPHONE HYDROCHLORIDE 0.3 MG: 1 INJECTION, SOLUTION INTRAMUSCULAR; INTRAVENOUS; SUBCUTANEOUS at 12:20

## 2021-02-09 RX ADMIN — ONDANSETRON 4 MG: 2 INJECTION INTRAMUSCULAR; INTRAVENOUS at 10:25

## 2021-02-09 RX ADMIN — ONDANSETRON 4 MG: 2 INJECTION INTRAMUSCULAR; INTRAVENOUS at 11:27

## 2021-02-09 RX ADMIN — FENTANYL CITRATE 50 MCG: 50 INJECTION, SOLUTION INTRAMUSCULAR; INTRAVENOUS at 08:04

## 2021-02-09 RX ADMIN — SODIUM CHLORIDE, POTASSIUM CHLORIDE, SODIUM LACTATE AND CALCIUM CHLORIDE: 600; 310; 30; 20 INJECTION, SOLUTION INTRAVENOUS at 11:10

## 2021-02-09 RX ADMIN — FENTANYL CITRATE 50 MCG: 50 INJECTION, SOLUTION INTRAMUSCULAR; INTRAVENOUS at 12:00

## 2021-02-09 RX ADMIN — ROCURONIUM BROMIDE 60 MG: 10 INJECTION INTRAVENOUS at 08:04

## 2021-02-09 RX ADMIN — ROCURONIUM BROMIDE 20 MG: 10 INJECTION INTRAVENOUS at 09:18

## 2021-02-09 ASSESSMENT — MIFFLIN-ST. JEOR: SCORE: 1513

## 2021-02-09 NOTE — OR NURSING
"Patient reporting nausea and pain. Reports pain as tender and like a \"big cramp\". Pain medications as ordered administered. Warm blanket to abdomen. Taking in ice chips without issue. Nausea treated with IV medications as ordered, Sea-Bands bilaterally, Sawyer EO, and calm environment. Patient sleeping soundly between cares. AVSS.   "

## 2021-02-09 NOTE — ANESTHESIA PROCEDURE NOTES
Airway   Date/Time: 2/9/2021 8:07 AM   Patient location during procedure: OR  Staff -   Other Anesthesia Staff: Torrey Stubbs  Performed By: SRNA    Consent for Airway   Urgency: elective    Indications and Patient Condition  Indications for airway management: ezequiel-procedural  Induction type:intravenousMask difficulty assessment: 1 - vent by mask    Final Airway Details  Final airway type: endotracheal airway  Successful airway:ETT - single  Endotracheal Airway Details   ETT size (mm): 7.0  Cuffed: yes  Successful intubation technique: direct laryngoscopy  Grade View of Cords: 1  Adjucts: stylet  Measured from: gums/teeth  Secured at (cm): 22  Secured with: silk tape    Post intubation assessment   Placement verified by: capnometry, equal breath sounds and chest rise   Number of attempts at approach: 1  Secured with:silk tape  Dentition: Intact and Unchanged

## 2021-02-09 NOTE — ANESTHESIA POSTPROCEDURE EVALUATION
Patient: Mary Sims    Procedure(s):  HYSTERECTOMY, TOTAL, ROBOT-ASSISTED, LAPAROSCOPIC, WITH SALPINGO-OOPHORECTOMY, PELVIC WASHINGS, SENTINEL LYMPHNODE SAMPLING  Cystoscopy    Diagnosis:Endometrial cancer (H) [C54.1]  Diagnosis Additional Information: No value filed.    Anesthesia Type:  General    Note:  Disposition: Admission   Postop Pain Control: Uneventful            Sign Out: Well controlled pain   PONV: No   Neuro/Psych: Uneventful            Sign Out: Acceptable/Baseline neuro status   Airway/Respiratory: Uneventful            Sign Out: Acceptable/Baseline resp. status   CV/Hemodynamics: Uneventful            Sign Out: Acceptable CV status   Other NRE:    DID A NON-ROUTINE EVENT OCCUR?          Last vitals:  Vitals:    02/09/21 1110 02/09/21 1120 02/09/21 1130   BP: 121/76 123/79 122/79   Pulse: 66 68 72   Resp: 12 13 26   Temp: 35.9  C (96.6  F) 36  C (96.8  F) 36  C (96.8  F)   SpO2: 99% 99% 97%       Last vitals prior to Anesthesia Care Transfer:  CRNA VITALS  2/9/2021 1021 - 2/9/2021 1121      2/9/2021             NIBP:  130/77    Pulse:  70    SpO2:  99 %    Resp Rate (observed):  14    EKG:  Sinus rhythm          Electronically Signed By: Smita Welsh MD  February 9, 2021  11:50 AM

## 2021-02-09 NOTE — DISCHARGE INSTRUCTIONS
If you have obstructive sleep apnea     You were given anesthesia medicine during surgery to keep you comfortable and free of pain. After surgery, you may have more apnea spells because of this medicine and other medicines you were given. The spells may last longer than usual.     At home:        Keep using the continuous positive airway pressure (CPAP) device when you sleep. Unless your health care provider tells you not to, use it when you sleep, day or night. CPAP is a common device used to treat obstructive sleep apnea.  [facility], [place]  Same-Day Surgery   Adult Discharge Orders & Instructions     For 24 hours after surgery    1. Get plenty of rest.  A responsible adult must stay with you for at least 24 hours after you leave the hospital.   2. Do not drive or use heavy equipment.  If you have weakness or tingling, don't drive or use heavy equipment until this feeling goes away.  3. Do not drink alcohol.  4. Avoid strenuous or risky activities.  Ask for help when climbing stairs.   5. You may feel lightheaded.  IF so, sit for a few minutes before standing.  Have someone help you get up.   6. If you have nausea (feel sick to your stomach): Drink only clear liquids such as apple juice, ginger ale, broth or 7-Up.  Rest may also help.  Be sure to drink enough fluids.  Move to a regular diet as you feel able.  7. You may have a slight fever. Call the doctor if your fever is over 100 F (37.7 C) (taken under the tongue) or lasts longer than 24 hours.  8. You may have a dry mouth, a sore throat, muscle aches or trouble sleeping.  These should go away after 24 hours.  9. Do not make important or legal decisions.   Call your doctor for any of the followin.  Signs of infection (fever, growing tenderness at the surgery site, a large amount of drainage or bleeding, severe pain, foul-smelling drainage, redness, swelling).    2. It has been over 8 to 10 hours since surgery and you are  still not able to urinate (pass water).    3.  Headache for over 24 hours.      To contact a doctor, call ____Dr Brandon at the Gynecology/Oncology clinic at [phone]______ or:        [phone] and ask for the resident on call for   __Surgery / Gynecology / Oncology__ (answered 24 hours a day)      Emergency Department:    [facility]: [phone]       (TTY for hearing impaired: [phone])    [facility]: [phone]       (TTY for hearing impaired: [phone])

## 2021-02-09 NOTE — OP NOTE
GYNECOLOGIC ONCOLOGY OPERATION NOTE    PATIENT: Mary Sims  MRN: [id number]  DATE OF SURGERY: 2/9/2021     PREOPERATIVE DIAGNOSES:    1. Complex atypical hyperplasia with foci bordering grade 1 endometrial cancer    POSTOPERATIVE DIAGNOSES:    1. Grade 1 endometrial cancer    PROCEDURES:    1. Robotic-assisted total laparoscopic hysterectomy  2. Bilateral salpingo-oopherectomy  3. [place] lymph node mapping and sampling  4. Cystoscopy    SURGEON: Dr. Aleksandr MD     ASSISTANTS:    1. Smita Monte MD, 2nd year fellow  2. Arin Worley MD 3rd year resident  3. Tex Eldridge, 4th year medical student     ANESTHESIA: General endotracheal.     ESTIMATED BLOOD LOSS: 50 ml      IV FLUIDS: 700 ml    TOTAL URINE OUTPUT: 150 ml    DRAINS: None      FINDINGS: On exam under anesthesia, normal appearing cervix without lesions. Vagina without nodularity. On bimanual, anteverted uterus, mobile, normal size with no adnexal masses appreciated. On laparoscopy, normal appearing liver, diaphragm, stomach edge. Peritoneum without implants. On abdominal entry, no evidence of injury. In the pelvis, uterus, bilateral ovaries and fallopian tubes appeared normal without masses. No clinically enlarged lymph nodes. ICG mapped to bilateral external iliac sentinel lymph nodes. Frozen section: tumor size 3.8 cm endometrioid carcinoma.  Following procedure, bilateral ureters were easily visualized to vermiculate. Cystoscopy revealed normal bladder epithelium, robust bilateral ureteral efflux. At end of procedure, vaginal cuff intact and hemostatic, vagina without lacerations. Hemostatic pedicles at end of procedure.     SPECIMENS:    1. Pelvic washings  2. Uterus, cervix, bilateral tubes and ovaries  3. Bilateral sentinel lymph nodes  4. Bilateral pelvic lymph nodes    COMPLICATIONS: None    CONDITION: Stable to PACU.     INDICATIONS: Mary Sims is a 55 year old postmenopausal female who presented originally for concerns of  hematuria. A cystoscopy was performed which was unremarkable. She subsequently had a pelvic ultrasound which demonstrated a 24mm endometrial stripe. Subsequently, an endometrial biopsy was performed which revealed complex atypical hyperplasia with a foci bordering on grade 1 endometrial cancer. She was recommended to undergo the above procedure. Written informed consent was obtained.     OPERATIVE PROCEDURE IN DETAIL: Consent was reviewed with the patient in the preoperative setting and confirmed. She received prophylactic antibiotics. The patient was transferred to the operating room and placed in dorsal supine position. General anesthetic was obtained in the usual manner without noted difficulties. The patient was then positioned onto Alfredo stirrups and an exam under anesthesia was performed with findings as described above. Loco catheter was then placed under sterile techniques and the patient was prepped and draped for the above-mentioned procedure.     Timeout was called at which point the patient's name, procedure and operative site was confirmed by the operative team. Initially, a speculum was placed in the vagina. The anterior lip of the cervix was grasped and indocyanine green dye was injected at the 3 and 9 o'clock positions both superficially and deep for a total of 4 ml. No vaginal manipulator was inserted. Attention was then turned to the upper abdomen. Initially, a stab incision was made at the level of the umbilicus. The umbilicus was then elevated and the Veress needle introduced through this stab incision. The abdomen was insufflated with an opening pressure of 2 mmHg. The Veress needle was removed. Sites for the da Martina laparoscopic ports were demarcated, total of 5, initiating midline approximately 4 cm above the umbilicus and positioned in a semicircular fashion around the upper abdomen. The initial midline 8 mm port was inserted without difficulties, the left 2 lateral 8 mm da Martina ports were  inserted and on the right an 8 mm da Vinic port and an 8 mm AirSeal port all under direct visualization without any vascular injury noted. At this point, the patient was placed into steep Trendelenburg. The pelvis was inspected as well as the upper abdomen as noted above. Pelvic washings were obtained. Bowels were packed up into the upper abdomen with gentle traction. At this point, da Martina was docked onto the ports, all appropriate instruments were inserted.     The procedure was initiated by identifying and opening the paravesicle spaces in both the right and left retroperitoneum. On the right, the round ligament was tented away and transected laterally. We opened the retroperitoneal space and identified the right superior vesicle artery. The paravesicle space was opened and the obturator nerve was identified. The same procedure was carried out on the left side. We then tracked the sentinel nodes on both the right and left sides. The sentinel nodes on both sides were along the external iliac vessels. Attention was then turned to the hysterectomy.    First on the right, the peritoneal dissection was carried out parallel to the infundibulopelvic ligament. A window was created in the medial leaf of the peritoneum isolating the ovarian vasculature with the ureter in view. The vessels were cauterized with the vessel sealer and transected. The posterior and anterior broad ligaments were then taken down to the level of the uterine vessels on the right. A bladder flap was then initiated at the lower uterine segment in the vesicouterine peritoneum. Our attention was then turned to the left side. In a similar manner, the peritoneal dissection was carried out parallel to the infundibulopelvic ligament on the left. A window was created in the medial leaf of the peritoneum isolating the ovarian vasculature with the ureter in view. The vessels were cauterized with the vessel sealer and transected. The posterior and anterior  broad ligaments were then taken down to the level of the uterine vessels on the left. We connected the bladder flap.    At this point, the bilateral uterine vessels were taken with the vessel sealer. First on the right, the uterine artery and vein were coagulated and cut, allowing the pedicle and ureter to fall laterally. The bladder flap was further created and pushed below the level of the cervix. Then on the left, the uterine artery and vein were coagulated and cut, allowing the pedicle and ureter to fall laterally. Additional straight bites were taken bilaterally with care to stay medial to the prior pedicle. We then began colpotomy on the anterior side. This was continued around circumferentially, amputating the cervix, uterus, bilateral tubes and ovaries. The specimen was delivered through the vagina without. Specimen was sent for frozen, which returned as above.    A vaginal pneumo-occluder balloon was placed in the vagina to maintain insufflation. The vaginal cuff was closed with interrupted sutures using 0-Vicryl. The pelvis was irrigated and excellent hemostasis was noted.    We then proceeded with sentinel lymph node sampling. The previously identified sentinel lymph nodes were identified. Starting on the right, the peritoneum overlying the right external iliac artery was incised and the sentinel node was resected and sent to pathology. Hemostasis was appreciated. Attention was turned towards the left. In a similar manner, the peritoneum overlying the left external iliac artery was incised and the sentinel node was  resected and sent to pathology. Hemostasis was noted.    Next, we performed cystoscopy using 70-degree angled scope. We noted normal bladder mucosa and efflux from bilateral ureteral orifices. At this point, the vaginal balloon was removed from the vagina. All laparoscopic instruments and ports were removed and CO2 allowed to escape from the abdomen. Skin was reapproximated with 4-0 Monocryl  sutures and appropriate dressing applied.     Sponge, lap and needle counts were reported as correct x2 and instrument count was correct x1.     Zacarias Brandon MD, MS    Department of Obstetrics and Gynecology   Division of Gynecologic Oncology   [facility]  Phone: [phone]

## 2021-02-09 NOTE — ANESTHESIA CARE TRANSFER NOTE
Patient: Mary Sims    Procedure(s):  HYSTERECTOMY, TOTAL, ROBOT-ASSISTED, LAPAROSCOPIC, WITH SALPINGO-OOPHORECTOMY, PELVIC WASHINGS, SENTINEL LYMPHNODE SAMPLING  Cystoscopy    Diagnosis: Endometrial cancer (H) [C54.1]  Diagnosis Additional Information: No value filed.    Anesthesia Type:   General     Note:      Level of Consciousness: drowsy  Oxygen Supplementation: nasal cannula  Level of Supplemental Oxygen (L/min / FiO2): 4  Independent Airway: airway patency satisfactory and stable  Dentition: dentition unchanged  Vital Signs Stable: post-procedure vital signs reviewed and stable    Patient transferred to: PACU  Comments: Anesthesia Care Transfer Note    Patient: Mary Sims    Transferred to: PACU with supplemental O2    Patient vital signs: stable    Airway: none    Monitors on, VSS, breathing spontaneously, report to ROBBY Sanabria CRNA   2/9/2021 10:54 AM  Handoff Report: Identifed the Patient, Identified the Reponsible Provider, Reviewed the pertinent medical history, Discussed the surgical course, Reviewed Intra-OP anesthesia mangement and issues during anesthesia, Set expectations for post-procedure period and Allowed opportunity for questions and acknowledgement of understanding      Vitals: (Last set prior to Anesthesia Care Transfer)  CRNA VITALS  2/9/2021 1021 - 2/9/2021 1054      2/9/2021             Resp Rate (observed):  10        Electronically Signed By: PABLO Grimes CRNA  February 9, 2021  10:54 AM

## 2021-02-09 NOTE — OR NURSING
Reviewed all discharge instructions with patient ride/responsible adult Renetta Ladd. Questions answered and resources reviewed.

## 2021-02-09 NOTE — BRIEF OP NOTE
[facility]    Brief Operative Note    Pre-operative diagnosis: Endometrial cancer (H) [C54.1]  Post-operative diagnosis Same as pre-operative diagnosis    Procedure: Procedure(s):  HYSTERECTOMY, TOTAL, ROBOT-ASSISTED WITH BILATERAL SALPINGO-OOPHORECTOMY, PELVIC WASHINGS, SENTINEL LYMPH NODE SAMPLING AND DISSECTION, CYSTOSCOPY  Surgeon: Surgeon(s) and Role:     * Zacarias Brandon MD - Primary     * Arin Worley MD - Resident - Assisting     * Smita Monte MD - Fellow - Assisting  Anesthesia: General   Estimated blood loss: 50 ml   IVF: 700 ml crystalloid  Urine: 150 ml   Drains: None  Specimens:   ID Type Source Tests Collected by Time Destination   1 : Pelvic washings Washings Pelvis CYTOLOGY NON GYN Zacarias Brandon MD 2/9/2021  9:08 AM    A : Cervix, Uterus, Bilateral Fallopian tubes and ovaries Organ Uterus with Bilateral Ovaries and Fallopian Tubes SURGICAL PATHOLOGY EXAM Zacarias Brandon MD 2/9/2021  9:40 AM    B : RIGTH External Iliac [place] Lymph node Tissue Lymph Node SURGICAL PATHOLOGY EXAM Zacarias Brandon MD 2/9/2021 10:10 AM    C : LEFT pelvic sentinel lymph node Tissue Lymph Node SURGICAL PATHOLOGY EXAM Zacarias Brandon MD 2/9/2021 10:20 AM      Findings:  On exam under anesthesia, normal appearing cervix without lesions. Vagina without nodularity. On bimanual, anteverted uterus, mobile, normal size with no adnexal masses appreciated. On laparoscopy, normal appearing liver, diaphragm, stomach edge. Peritoneum without implants. On abdominal entry, no evidence of injury. In the pelvis, uterus, bilateral ovaries and fallopian tubes appeared normal without masses. No clinically enlarged lymph nodes. ICG mapped to bilateral external iliac sentinel lymph nodes. Frozen section: tumor size 3.8 cm endometrioid carcinoma.  Following procedure, bilateral ureters were easily visualized to vermiculate. Cystoscopy revealed normal  bladder epithelium, robust bilateral ureteral efflux. At end of procedure, vaginal cuff intact and hemostatic, vagina without lacerations. Hemostatic pedicles at end of procedure.     Complications: None.  Implants: * No implants in log *    **Please page Gyn Onc Pager [phone] with questions regarding this patient**    Smita Monte MD  Fellow, Gynecologic Oncology

## 2021-02-09 NOTE — PROGRESS NOTES
Gynecologic Oncology Postoperative Check Note  2/9/2021    S: Patient reports she is doing well postoperatively. She initially had some nausea but that is resolved. She never had emesis. She has now eaten several crackers and drank fluids. Pain is well controlled with pain medications. Ambulating without pain. Voiding spontaneously. Tolerating crackers and water without nausea or vomiting. Denies chest pain, shortness of breath, dizziness, or other concerns at this time.     O:  Vitals:    02/09/21 1545 02/09/21 1600 02/09/21 1615 02/09/21 1630   BP: 111/74 127/77 124/84    Pulse: 66 79 90    Resp:       Temp:       TempSrc:       SpO2: 97% 96% 98% 100%   Weight:       Height:         Gen: NAD, sitting up in the chair  Cardio: RRR, S1/S2, no murmurs  Resp: CTAB, no wheezing or crackles  Abdomen: soft, appropriately tender, nondistended, incisions covered with primapore dressings that are clean, dry, and intact.   Extremities: Non-tender, no edema    A/P: 55 year old POD#0 s/p RA-TLH, BSO, sentinal lymph node biopsy, and cystoscopy. Doing well and meeting post op goals. She feels ready to go home. Discussed restrictions and return precautions.    Dz: grade 1 endometrial cancer, 3.8cm tumor on frozen.   FEN: Advance as tolerated, tolerating juice and crackers  Pain: tylenol, ibuprofen, oxycodone PRN  Heme: Hgb 14.2 > EBL 50   CV/Pulm: Hx HTN, resume metoprolol  GI: nausea resolved, tolerating PO intake  : voiding spontaneously  ID: s/p pre-op antibiotics  Endo: Hx hypothyroidism, resume levothyroxine, no acute issues  Psych/Neuro/MSK: resume home gabapentin    Dispo: discharge to home    Miquel Bhat MD  OB/GYN PGY-1  02/09/21 3:59 PM  Gyn onc pager: [phone]

## 2021-02-10 ENCOUNTER — TELEPHONE (OUTPATIENT)
Dept: FAMILY MEDICINE | Facility: CLINIC | Age: 56
End: 2021-02-10

## 2021-02-10 ENCOUNTER — PATIENT OUTREACH (OUTPATIENT)
Dept: CARE COORDINATION | Facility: CLINIC | Age: 56
End: 2021-02-10

## 2021-02-10 LAB — COPATH REPORT: NORMAL

## 2021-02-10 NOTE — TELEPHONE ENCOUNTER
Patient discharged from [facility]  ( inpatient or ER).    Discharge location: [facility]  Discharge date: TUE 09-FEB-2021  Diagnosis: Endometrial Cancer    Please follow up as appropriate. If no follow up required, please close encounter.

## 2021-02-10 NOTE — PROGRESS NOTES
[facility]: Post-Discharge Note  SITUATION                                                      Admission:    Admission Date: 02/09/21   Reason for Admission: Endometrial cancer  Discharge:   Discharge Date: 02/09/21  Discharge Diagnosis: Endometrial cancer    BACKGROUND                                                      Mary Sims is a 55 year old postmenopausal female who presented originally for concerns of hematuria. A cystoscopy was performed which was unremarkable. She subsequently had a pelvic ultrasound which demonstrated a 24mm endometrial stripe. Subsequently, an endometrial biopsy was performed which revealed complex atypical hyperplasia with a foci bordering on grade 1 endometrial cancer. She was recommended to undergo the above procedure. Written informed consent was obtained.     ASSESSMENT      Discharge Assessment  Patient reports symptoms are: Improved  Does the patient have all of their medications?: Yes  Does patient know what their new medications are for?: Yes  Does patient have a follow-up appointment scheduled?: Yes  Does patient have any other questions or concerns?: No    Post-op  Did the patient have surgery or a procedure: Yes  Fever: No  Chills: No  Eating & Drinking: eating and drinking without complaints/concerns  Bowel Function: normal  Urinary Status: voiding without complaint/concerns        PLAN                                                      Outpatient Plan:  As below.    Future Appointments   Date Time Provider Department Center   3/3/2021 10:40 AM Zacarias Brandon MD [facility]           Karrie Mercado

## 2021-02-15 ENCOUNTER — PATIENT OUTREACH (OUTPATIENT)
Dept: ONCOLOGY | Facility: CLINIC | Age: 56
End: 2021-02-15

## 2021-02-15 LAB — COPATH REPORT: NORMAL

## 2021-02-15 NOTE — PROGRESS NOTES
RN attempted post surgical call.    Patient unavailable. Voicemail with call back number left.     Arin Rojas RN

## 2021-02-18 DIAGNOSIS — I10 HYPERTENSION GOAL BP (BLOOD PRESSURE) < 140/90: ICD-10-CM

## 2021-02-18 RX ORDER — METOPROLOL SUCCINATE 25 MG/1
TABLET, EXTENDED RELEASE ORAL
Qty: 90 TABLET | Refills: 1 | Status: SHIPPED | OUTPATIENT
Start: 2021-02-18 | End: 2021-07-13

## 2021-02-20 ENCOUNTER — HEALTH MAINTENANCE LETTER (OUTPATIENT)
Age: 56
End: 2021-02-20

## 2021-03-03 ENCOUNTER — VIRTUAL VISIT (OUTPATIENT)
Dept: ONCOLOGY | Facility: CLINIC | Age: 56
End: 2021-03-03
Attending: OBSTETRICS & GYNECOLOGY
Payer: COMMERCIAL

## 2021-03-03 DIAGNOSIS — Z12.31 VISIT FOR SCREENING MAMMOGRAM: ICD-10-CM

## 2021-03-03 DIAGNOSIS — C54.1 ENDOMETRIAL CANCER (H): Primary | ICD-10-CM

## 2021-03-03 PROCEDURE — 99024 POSTOP FOLLOW-UP VISIT: CPT | Mod: 95 | Performed by: OBSTETRICS & GYNECOLOGY

## 2021-03-03 PROCEDURE — 77067 SCR MAMMO BI INCL CAD: CPT | Mod: GC | Performed by: RADIOLOGY

## 2021-03-03 PROCEDURE — 77063 BREAST TOMOSYNTHESIS BI: CPT | Mod: GC | Performed by: RADIOLOGY

## 2021-03-03 PROCEDURE — 999N001193 HC VIDEO/TELEPHONE VISIT; NO CHARGE

## 2021-03-03 NOTE — PATIENT INSTRUCTIONS
Follow up with Gyn/Onc NP in 3-4 months (in-person)  Scheduling will reach out to assist with this

## 2021-03-03 NOTE — LETTER
"    3/3/2021         RE: Mary Sims  [address]  [id number]  [facility] [address]        Dear Colleague,    Thank you for referring your patient, Mary Sims, to the [facility]. Please see a copy of my visit note below.    Mary is a 55 year old who is being evaluated via a billable video visit.      How would you like to obtain your AVS? MyChart  If the video visit is dropped, the invitation should be resent by: Text to cell phone: [phone]  Will anyone else be joining your video visit? No      I have reviewed and updated the patient's allergies and medication list.    Concerns: No new concerns.   Refills: None needed.        Vitals - Patient Reported  Weight (Patient Reported): 93 kg (205 lb)  Height (Patient Reported): 162.6 cm (5' 4\")  BMI (Based on Pt Reported Ht/Wt): 35.19  Pain Score: No Pain (0)    Allyson Milan CMA    Video 20 minutes.                Follow Up Notes on Referred Patient    Date: 3/3/2021       Dr. Zacarias Brandon MD  [address] [place] [id number]       RE: Mary Sims  : 1965  PAULA: 3/3/2021    Dear Dr. Zacarias Brandon:    Mary Sims is a 55 year old woman with a diagnosis of stage IA grade 1 endometrial cancer.     She has been doing well, no nausea, vomiting, fever, chills, normal urinary and bowel function. No vaginal bleeding or discharge, the incisions are healing well. No B-symptoms.    Past Medical History:    Past Medical History:   Diagnosis Date     Arthritis of foot, right 2019     Endometrial cancer (H) 2021     Graves disease      Hypertension      Hypothyroidism      MEDICAL HISTORY OF -     S/P RADIOACTIVE IODINE     Morbid obesity (H) 2020     Osteopenia of multiple sites 2020         Past Surgical History:    Past Surgical History:   Procedure Laterality Date     ARTHRODESIS FOOT Right 2020    Procedure: MIDFOOT BONE SPUR RESECTION WITH JOINT FUSIONS RIGHT LOWER EXTREMITY;  " Surgeon: Choco Frances DPM;  Location: [facility]     ARTHROSCOPY SHLDR ROTATOR CUFF REPAIR, SUBACROMIAL DECOMP, DIST CLAVICLE RESECTION, BICEP TENODESIS Left 2017    Procedure: ARTHROSCOPY SHOULDER ROTATOR CUFF REPAIR, SUBACROMIAL DECOMPRESSION, DISTAL CLAVICLE RESECTION, OPEN BICEP TENODESIS REPAIR;  Surgeon: Dorina Rodriguez MD;  [facility]: [facility]     C  DELIVERY ONLY       COLONOSCOPY  2013    Procedure: COLONOSCOPY;  colonoscopy, screening;  Surgeon: Dalton Lala MD;  Location: [facility]     CYSTOSCOPY       CYSTOSCOPY N/A 2021    Procedure: Cystoscopy;  Surgeon: Zacarias Brandon MD;  Location: [facility]     DAVINCI HYSTERECTOMY TOTAL, BILATERAL SALPINGO-OOPHORECTOMY, COMBINED Bilateral 2021    Procedure: HYSTERECTOMY, TOTAL, ROBOT-ASSISTED, LAPAROSCOPIC, WITH SALPINGO-OOPHORECTOMY, PELVIC WASHINGS, SENTINEL LYMPHNODE SAMPLING;  Surgeon: Zacarias Brandon MD;  Location: [facility] OR     SURGICAL HISTORY OF -       RT ANKLE Fx AND REPAIR (PIN,PLATE,SCREWS)     THYROIDECTOMY       TUBAL LIGATION           Health Maintenance Due   Topic Date Due     MAMMO SCREENING  2020       Current Medications:     Current Outpatient Medications   Medication Sig Dispense Refill     gabapentin (NEURONTIN) 300 MG capsule TAKE ONE CAPSULE BY MOUTH THREE TIMES A DAY (Patient taking differently: 2 times daily ) 90 capsule 1     levothyroxine (SYNTHROID/LEVOTHROID) 75 MCG tablet TAKE ONE TABLET BY MOUTH EVERY DAY (Patient taking differently: every morning Separate oral administration of iron- or calcium-containing products and levothyroxine by at least 4 hours.) 90 tablet 1     metoprolol succinate ER (TOPROL-XL) 25 MG 24 hr tablet TAKE ONE TABLET BY MOUTH EVERY DAY 90 tablet 1     vitamin D2 (ERGOCALCIFEROL) 82666 units (1250 mcg) capsule Take 1 capsule (50,000 Units) by mouth once a week (Patient taking differently: Take 50,000 Units by mouth once a week ) 12 capsule 0      acetaminophen (TYLENOL) 325 MG tablet Take 2 tablets (650 mg) by mouth every 4 hours as needed for other (mild pain) 24 tablet 0     ibuprofen (ADVIL/MOTRIN) 600 MG tablet Take 1 tablet (600 mg) by mouth every 6 hours as needed for pain (mild) 12 tablet 0     senna-docusate (SENOKOT-S/PERICOLACE) 8.6-50 MG tablet Take 1-2 tablets by mouth 2 times daily Take while on oral narcotics to prevent or treat constipation. 30 tablet 0         Allergies:      No Known Allergies     Social History:     Social History     Tobacco Use     Smoking status: Never Smoker     Smokeless tobacco: Never Used   Substance Use Topics     Alcohol use: Yes     Comment: SELDOM        History   Drug Use No         Family History:       Family History   Problem Relation Age of Onset     Hypertension Mother      Diabetes Mother      Hypertension Father      Alcohol/Drug Father         alcohol     Crohn's Disease Son      Hypertension Other      Cancer No family hx of      Cerebrovascular Disease No family hx of      Thyroid Disease No family hx of      Glaucoma No family hx of      Macular Degeneration No family hx of          Physical Exam:     LMP 08/04/2015 (Approximate)   There is no height or weight on file to calculate BMI.    General Appearance: healthy and alert, no distress     Psychiatric: appropriate mood and affect                                     Assessment:    Mary Sims is a 55 year old woman with a diagnosis of  stage IA grade 1 endometrial cancer.     A total of 20 minutes was spent with the patient, 15 minutes of which were spent in counseling the patient and/or treatment planning.    1. Stage IA grade 1 endometrial cancer  2. Low risk endometrial cancer    We discussed no need for adjuvant treatment and she will be followed with surveillance exams and visits every 3/4 month for the first 2 years then every 6 month for 3 years and then yearly. We discussed sings and symptoms of recurrence. The patient agrees with this  plan.  She is very appreciative of her care.  All questions were answered.     Zacarias Brandon MD, MS    Department of Obstetrics and Gynecology   Division of Gynecologic Oncology   [facility]  Phone: [phone]      CC  Patient Care Team:  Janie Riley APRN CNP as PCP - General (Nurse Practitioner - Family)  Stan Weber MD as Referring Physician (Internal Medicine)  Janie Riley APRN CNP as Assigned PCP  Choco Frances DPM as Assigned Musculoskeletal Provider  Guero Matute MD as Assigned OBGYN Provider  Wilber Rice OD as Assigned Surgical Provider

## 2021-03-03 NOTE — PROGRESS NOTES
"Mary is a 55 year old who is being evaluated via a billable video visit.      How would you like to obtain your AVS? MyChart  If the video visit is dropped, the invitation should be resent by: Text to cell phone: [phone]  Will anyone else be joining your video visit? No      I have reviewed and updated the patient's allergies and medication list.    Concerns: No new concerns.   Refills: None needed.        Vitals - Patient Reported  Weight (Patient Reported): 93 kg (205 lb)  Height (Patient Reported): 162.6 cm (5' 4\")  BMI (Based on Pt Reported Ht/Wt): 35.19  Pain Score: No Pain (0)    Allyson Milan CMA    Video 20 minutes.                Follow Up Notes on Referred Patient    Date: 3/3/2021       Dr. Zacarias Brandon MD  [address] [place] [address]       RE: Mary Sims  : 1965  PAULA: 3/3/2021    Dear Dr. Zacarias Brandon:    Mary Sims is a 55 year old woman with a diagnosis of stage IA grade 1 endometrial cancer.     She has been doing well, no nausea, vomiting, fever, chills, normal urinary and bowel function. No vaginal bleeding or discharge, the incisions are healing well. No B-symptoms.    Past Medical History:    Past Medical History:   Diagnosis Date     Arthritis of foot, right 2019     Endometrial cancer (H) 2021     Graves disease      Hypertension      Hypothyroidism      MEDICAL HISTORY OF -     S/P RADIOACTIVE IODINE     Morbid obesity (H) 2020     Osteopenia of multiple sites 2020         Past Surgical History:    Past Surgical History:   Procedure Laterality Date     ARTHRODESIS FOOT Right 2020    Procedure: MIDFOOT BONE SPUR RESECTION WITH JOINT FUSIONS RIGHT LOWER EXTREMITY;  Surgeon: Choco Frances DPM;  Location: [facility]     ARTHROSCOPY SHLDR ROTATOR CUFF REPAIR, SUBACROMIAL DECOMP, DIST CLAVICLE RESECTION, BICEP TENODESIS Left 2017    Procedure: ARTHROSCOPY SHOULDER ROTATOR CUFF REPAIR, SUBACROMIAL DECOMPRESSION, DISTAL " CLAVICLE RESECTION, OPEN BICEP TENODESIS REPAIR;  Surgeon: Dorina Rodriguez MD;  [facility]     C  DELIVERY ONLY       COLONOSCOPY  2013    Procedure: COLONOSCOPY;  colonoscopy, screening;  Surgeon: Dalton Lala MD;  Location: [facility]     CYSTOSCOPY       CYSTOSCOPY N/A 2021    Procedure: Cystoscopy;  Surgeon: Zacarias Brandon MD;  [facility]     DAVINCI HYSTERECTOMY TOTAL, BILATERAL SALPINGO-OOPHORECTOMY, COMBINED Bilateral 2021    Procedure: HYSTERECTOMY, TOTAL, ROBOT-ASSISTED, LAPAROSCOPIC, WITH SALPINGO-OOPHORECTOMY, PELVIC WASHINGS, SENTINEL LYMPHNODE SAMPLING;  Surgeon: Zacarias Brandon MD;  [facility]: [facility]     SURGICAL HISTORY OF -       RT ANKLE Fx AND REPAIR (PIN,PLATE,SCREWS)     THYROIDECTOMY       TUBAL LIGATION           Health Maintenance Due   Topic Date Due     MAMMO SCREENING  2020       Current Medications:     Current Outpatient Medications   Medication Sig Dispense Refill     gabapentin (NEURONTIN) 300 MG capsule TAKE ONE CAPSULE BY MOUTH THREE TIMES A DAY (Patient taking differently: 2 times daily ) 90 capsule 1     levothyroxine (SYNTHROID/LEVOTHROID) 75 MCG tablet TAKE ONE TABLET BY MOUTH EVERY DAY (Patient taking differently: every morning Separate oral administration of iron- or calcium-containing products and levothyroxine by at least 4 hours.) 90 tablet 1     metoprolol succinate ER (TOPROL-XL) 25 MG 24 hr tablet TAKE ONE TABLET BY MOUTH EVERY DAY 90 tablet 1     vitamin D2 (ERGOCALCIFEROL) 32943 units (1250 mcg) capsule Take 1 capsule (50,000 Units) by mouth once a week (Patient taking differently: Take 50,000 Units by mouth once a week ) 12 capsule 0     acetaminophen (TYLENOL) 325 MG tablet Take 2 tablets (650 mg) by mouth every 4 hours as needed for other (mild pain) 24 tablet 0     ibuprofen (ADVIL/MOTRIN) 600 MG tablet Take 1 tablet (600 mg) by mouth every 6 hours as needed for pain (mild) 12 tablet 0      senna-docusate (SENOKOT-S/PERICOLACE) 8.6-50 MG tablet Take 1-2 tablets by mouth 2 times daily Take while on oral narcotics to prevent or treat constipation. 30 tablet 0         Allergies:      No Known Allergies     Social History:     Social History     Tobacco Use     Smoking status: Never Smoker     Smokeless tobacco: Never Used   Substance Use Topics     Alcohol use: Yes     Comment: SELDOM        History   Drug Use No         Family History:       Family History   Problem Relation Age of Onset     Hypertension Mother      Diabetes Mother      Hypertension Father      Alcohol/Drug Father         alcohol     Crohn's Disease Son      Hypertension Other      Cancer No family hx of      Cerebrovascular Disease No family hx of      Thyroid Disease No family hx of      Glaucoma No family hx of      Macular Degeneration No family hx of          Physical Exam:     LMP 08/04/2015 (Approximate)   There is no height or weight on file to calculate BMI.    General Appearance: healthy and alert, no distress     Psychiatric: appropriate mood and affect                                     Assessment:    Mary Sims is a 55 year old woman with a diagnosis of  stage IA grade 1 endometrial cancer.     A total of 20 minutes was spent with the patient, 15 minutes of which were spent in counseling the patient and/or treatment planning.    1. Stage IA grade 1 endometrial cancer  2. Low risk endometrial cancer    We discussed no need for adjuvant treatment and she will be followed with surveillance exams and visits every 3/4 month for the first 2 years then every 6 month for 3 years and then yearly. We discussed sings and symptoms of recurrence. The patient agrees with this plan.  She is very appreciative of her care.  All questions were answered.     Zacarias Brandon MD, MS    Department of Obstetrics and Gynecology   Division of Gynecologic Oncology   [facility]  Phone:  [phone]        Patient Care Team:  Janie Riley APRN CNP as PCP - General (Nurse Practitioner - Family)  Gus, Stan Hamilton MD as Referring Physician (Internal Medicine)  Janie Riley APRN CNP as Assigned PCP  Choco Frances DPM as Assigned Musculoskeletal Provider  Guero Matute MD as Assigned OBGYN Provider  Wilber Rice OD as Assigned Surgical Provider  Hector Woody MD as Assigned Cancer Care Provider  HECTOR WOODY

## 2021-03-26 ENCOUNTER — TELEPHONE (OUTPATIENT)
Dept: ONCOLOGY | Facility: CLINIC | Age: 56
End: 2021-03-26

## 2021-03-26 NOTE — TELEPHONE ENCOUNTER
[facility] has requested additional Med records from 3-1-2-2021 to present.   I faxed over a Progress note with Shirleyff from 03/03/2021 and a Mammogram from 03/03/2021.    Linda Sloan MA

## 2021-04-13 DIAGNOSIS — G57.11 MERALGIA PARESTHETICA, RIGHT LOWER LIMB: ICD-10-CM

## 2021-04-13 NOTE — TELEPHONE ENCOUNTER
Routing refill request to provider for review/approval because:  Drug not on the [facility] refill protocol     Glory ORTIZN, RN

## 2021-04-14 RX ORDER — GABAPENTIN 300 MG/1
CAPSULE ORAL
Qty: 90 CAPSULE | Refills: 1 | Status: SHIPPED | OUTPATIENT
Start: 2021-04-14 | End: 2021-06-15

## 2021-04-23 ENCOUNTER — TELEPHONE (OUTPATIENT)
Dept: ONCOLOGY | Facility: CLINIC | Age: 56
End: 2021-04-23

## 2021-04-23 NOTE — TELEPHONE ENCOUNTER
Restrictions for Work  forms received via Fax from [facility].      Forms to be completed and put in folder for provider to approve.    Fax #:  [phone]  Claim: [id number]    Linda Sloan MA

## 2021-04-29 NOTE — TELEPHONE ENCOUNTER
Signed form received and faxed to [employer], fax # [phone]. Successful transmission verified via rightfax. Copy of forms sent to scanning, original forms mailed to patient's home address on file.    Allyson Milan CMA     Detail Level: Simple Pt advised to moisturize.\\nPt advised on tool to apply to unreachable areas on back on [employer] or at [employer]

## 2021-05-06 ENCOUNTER — PATIENT OUTREACH (OUTPATIENT)
Dept: ONCOLOGY | Facility: CLINIC | Age: 56
End: 2021-05-06

## 2021-05-06 NOTE — PROGRESS NOTES
RN received the following:      I got a voicemail from Frandy( I think that was her name) Donnell Douglass wondering if Mary was ok to return to work or if needed more time off.    She requested call back at [phone]  reference #[id number]     RN reached out to patient to discuss above. Patient unavailable. Voicemail with call back number left.     Arin Rojas RN

## 2021-05-10 ENCOUNTER — PATIENT OUTREACH (OUTPATIENT)
Dept: ONCOLOGY | Facility: CLINIC | Age: 56
End: 2021-05-10

## 2021-05-10 NOTE — PROGRESS NOTES
RN received the following:    I got a voicemail from Frandy( I think that was her name) Donnell Douglass wondering if Mary was ok to return to work or if needed more time off.    She requested call back at [phone]  reference #[id number]     RN was able to discuss the above with patient. Per patient she returned to work March 21st with no issues. Patient OK with Donnell Douglass being updated.     RN reached out to Donnell Douglass and the above information was given.     Arin Rojas RN

## 2021-06-07 NOTE — PROGRESS NOTES
Gynecologic Oncology Follow Up Note    Date: 2021    RE: Mary Sims  : 1965  PAULA: 2021    CC: Stage IA grade 1 endometrioid endometrial adenocarcinoma    HPI:  Mary Sims is a 56 year old woman with a history of stage IA grade 1 endometrioid endometrial adenocarcinoma.  She is s/p TLH-BSO 21 which served as her treatment.  She is here today for a surveillance visit.     Oncology History:    Initially, se was seen for vaginal spotting.    2020: Pelvic US  IMPRESSION: Thickened endometrium up to 24 mm in transvaginal  measurement for a postmenopausal patient comment given the reported  history of postmenopausal bleeding, this does raise concern for  neoplasia/hyperplasia and tissue sampling is recommended. Small amount  of free fluid in the posterior cul-de-sac and left adnexa.  Nonvisualization of left ovary. The visualized right ovary appears  Normal.    21: EMB  FINAL DIAGNOSIS:   Uterus, endometrium: Biopsy:   - At least complex atypical hyperplasia with foci bordering on FIGO grade   1 endometrioid carcinoma, see   comment     21: Robotic-assisted total laparoscopic hysterectomy, bilateral salpingo-oopherectomy, sentinel lymph node mapping and sampling, cystoscopy  FINAL DIAGNOSIS:   A. UTERUS, CERVIX, BILATERAL TUBES AND OVARIES, HYSTERECTOMY AND BILATERAL    SALPINGO-OOPHORECTOMY:   - Endometrioid endometrial adenocarcinoma, FIGO grade 1, MMR proficient   - Unremarkable ovaries with follicular cysts   - Cervix with reactive changes and nabothian cysts   - Unremarkable fallopian tubes   B. SENTINEL LYMPH NODE, RIGHT EXTERNAL ILIAC, LYMPHADENECTOMY:   - One reactive lymph node, negative for malignancy (0/1)   C. SENTINEL LYMPH NODE, LEFT PELVIC, LYMPHADENECTOMY:   - One reactive lymph node, negative for malignancy (0/1)              Today she comes to clinic feeling well and is without concern.  She denies any vaginal bleeding, no changes in her bowel or bladder habits, no  nausea/emesis, no lower extremity edema, and no difficulties eating or sleeping. She denies any abdominal discomfort/bloating, no fevers or chills, and no chest pain or shortness of breath.  She has noticed some back pain that she has had for a while that seems to be worsening over the last couple months.  It is now a daily occurrence.  The pain is centered in her lower back and worsens throughout the day.  She is current with her annual physical, colon cancer screening, mammogram, and she is fully vaccinated against COVID.                  Health Maintenance  Colonoscopy: 8/27/2013  Mammogram: 3/3/21  Annual physical: 7/8/2020  Dexa: 9/14/2020  COVID vaccine: 3/21/21, 4/10/21      Review of Systems     Constitutional:  Negative for fever, chills, weight loss, weight gain, fatigue, decreased appetite, night sweats, recent stressors, height gain, height loss, post-operative complications, incisional pain, hallucinations, increased energy, hyperactivity and confused.   HENT:  Negative for ear pain, hearing loss, tinnitus, nosebleeds, trouble swallowing, hoarse voice, mouth sores, sore throat, ear discharge, tooth pain, gum tenderness, taste disturbance, smell disturbance, hearing aid, bleeding gums, dry mouth, sinus pain, sinus congestion and neck mass.    Eyes:  Negative for double vision, pain, redness, eye pain, decreased vision, eye watering, eye bulging, eye dryness, flashing lights, spots, floaters, strabismus, tunnel vision, jaundice and eye irritation.   Respiratory:   Negative for cough, hemoptysis, sputum production, shortness of breath, wheezing, sleep disturbances due to breathing, snores loudly, respiratory pain, dyspnea on exertion, cough disturbing sleep and postural dyspnea.    Cardiovascular:  Negative for chest pain, dyspnea on exertion, palpitations, orthopnea, claudication, leg swelling, fingers/toes turn blue, hypertension, hypotension, syncope, history of heart murmur, chest pain on exertion,  chest pain at rest, pacemaker, few scattered varicosities, leg pain, sleep disturbances due to breathing, tachycardia, light-headedness, exercise intolerance and edema.   Gastrointestinal:  Negative for heartburn, nausea, vomiting, abdominal pain, diarrhea, constipation, blood in stool, melena, rectal pain, bloating, hemorrhoids, bowel incontinence, jaundice, rectal bleeding, coffee ground emesis and change in stool.   Genitourinary:  Negative for bladder incontinence, dysuria, urgency, hematuria, flank pain, vaginal discharge, difficulty urinating, genital sores, dyspareunia, decreased libido, nocturia, voiding less frequently, arousal difficulty, abnormal vaginal bleeding, excessive menstruation, menstrual changes, hot flashes, vaginal dryness and postmenopausal bleeding.   Musculoskeletal:  Positive for back pain. Negative for myalgias, joint swelling, arthralgias, stiffness, muscle cramps, neck pain, bone pain, muscle weakness and fracture.   Skin:  Negative for nail changes, itching, poor wound healing, rash, hair changes, skin changes, acne, warts, poor wound healing, scarring, flaky skin, Raynaud's phenomenon, sensitivity to sunlight and skin thickening.   Neurological:  Negative for dizziness, tingling, tremors, speech change, seizures, loss of consciousness, weakness, light-headedness, numbness, headaches, disturbances in coordination, extremity numbness, memory loss, difficulty walking and paralysis.   Endo/Heme:  Negative for anemia, swollen glands and bruises/bleeds easily.   Psychiatric/Behavioral:  Negative for depression, hallucinations, memory loss, decreased concentration, mood swings and panic attacks.    Breast:  Negative for breast discharge, breast mass, breast pain and nipple retraction.   Endocrine:  Negative for altered temperature regulation, polyphagia, polydipsia, unwanted hair growth and change in facial hair.        Past Medical History:    Past Medical History:   Diagnosis Date      Arthritis of foot, right 2019     Endometrial cancer (H) 2021     Graves disease      Hypertension      Hypothyroidism      MEDICAL HISTORY OF -     S/P RADIOACTIVE IODINE     Morbid obesity (H) 2020     Osteopenia of multiple sites 2020         Past Surgical History:    Past Surgical History:   Procedure Laterality Date     ARTHRODESIS FOOT Right 2020    Procedure: MIDFOOT BONE SPUR RESECTION WITH JOINT FUSIONS RIGHT LOWER EXTREMITY;  Surgeon: Choco Frances DPM;  Location: [facility] OR     ARTHROSCOPY SHLDR ROTATOR CUFF REPAIR, SUBACROMIAL DECOMP, DIST CLAVICLE RESECTION, BICEP TENODESIS Left 2017    Procedure: ARTHROSCOPY SHOULDER ROTATOR CUFF REPAIR, SUBACROMIAL DECOMPRESSION, DISTAL CLAVICLE RESECTION, OPEN BICEP TENODESIS REPAIR;  Surgeon: Dorina Rodriguez MD;  Location: [facility] OR     C  DELIVERY ONLY       COLONOSCOPY  2013    Procedure: COLONOSCOPY;  colonoscopy, screening;  Surgeon: Dalton Lala MD;  Location: [facility] OR     CYSTOSCOPY       CYSTOSCOPY N/A 2021    Procedure: Cystoscopy;  Surgeon: Zacarias Brandon MD;  Location: [facility] OR     DAVINCI HYSTERECTOMY TOTAL, BILATERAL SALPINGO-OOPHORECTOMY, COMBINED Bilateral 2021    Procedure: HYSTERECTOMY, TOTAL, ROBOT-ASSISTED, LAPAROSCOPIC, WITH SALPINGO-OOPHORECTOMY, PELVIC WASHINGS, SENTINEL LYMPHNODE SAMPLING;  Surgeon: Zacarias Brandon MD;  Location: [facility] OR     SURGICAL HISTORY OF -       RT ANKLE Fx AND REPAIR (PIN,PLATE,SCREWS)     THYROIDECTOMY       TUBAL LIGATION           Health Maintenance Due   Topic Date Due     BMP  2021     MICROALBUMIN  2021     TSH W/FREE T4 REFLEX  2021     PREVENTIVE CARE VISIT  2021       Current Medications:     Current Outpatient Medications   Medication Sig Dispense Refill     gabapentin (NEURONTIN) 300 MG capsule TAKE ONE CAPSULE BY MOUTH THREE TIMES A DAY 90 capsule 1     levothyroxine (SYNTHROID/LEVOTHROID) 75 MCG  tablet Take 1 tablet (75 mcg) by mouth daily Separate oral administration of iron- or calcium-containing products and levothyroxine by at least 4 hours. 30 tablet 0     metoprolol succinate ER (TOPROL-XL) 25 MG 24 hr tablet TAKE ONE TABLET BY MOUTH EVERY DAY 90 tablet 1     vitamin D2 (ERGOCALCIFEROL) 51478 units (1250 mcg) capsule Take 1 capsule (50,000 Units) by mouth once a week (Patient taking differently: Take 50,000 Units by mouth once a week ) 12 capsule 0         Allergies:      No Known Allergies     Social History:     Social History     Tobacco Use     Smoking status: Never Smoker     Smokeless tobacco: Never Used   Substance Use Topics     Alcohol use: Yes     Comment: SELDOM        History   Drug Use No         Family History:     The patient's family history is notable for:    Family History   Problem Relation Age of Onset     Hypertension Mother      Diabetes Mother      Hypertension Father      Alcohol/Drug Father         alcohol     Crohn's Disease Son      Hypertension Other      Cancer No family hx of      Cerebrovascular Disease No family hx of      Thyroid Disease No family hx of      Glaucoma No family hx of      Macular Degeneration No family hx of          Physical Exam:     BP (!) 137/92   Pulse 59   Temp 97.9  F (36.6  C) (Oral)   Wt 97.6 kg (215 lb 3.2 oz)   LMP 08/04/2015 (Approximate)   SpO2 98%   BMI 36.94 kg/m    Body mass index is 36.94 kg/m .    General Appearance: healthy and alert, no distress     HEENT: no palpable nodules or masses        Cardiovascular: regular rate and rhythm, no gallops, rubs or murmurs     Respiratory: lungs clear, no rales, rhonchi or wheezes    Musculoskeletal: extremities non tender and without edema    Skin: no lesions or rashes     Neurological: normal gait, no gross defects     Psychiatric: appropriate mood and affect                               Hematological: normal cervical, supraclavicular and inguinal lymph nodes     Gastrointestinal:        abdomen soft, non-tender, non-distended, no organomegaly or masses    Genitourinary: External genitalia and urethral meatus appears normal.  Vagina is smooth without nodularity or masses.  Vulvovaginal dryness noted.  Cervix is surgically absent.  Bimanual exam reveal no masses, nodularity or fullness.  Recto-vaginal exam confirms these findings.      Assessment:    Mary Sims is a 56 year old woman with a history of stage IA grade 1 endometrioid endometrial adenocarcinoma.  She is s/p TLH-BSO 2/9/21 which served as her treatment.  She is here today for a surveillance visit.        20 minutes spent on the date of the encounter doing chart review, history and exam, documentation, and further activities as noted above.      Plan:     1.) Endometrial adenocarcinoma:  JACKIE on exam.  RTC in 3 months for her next surveillance visit.  Plan for surveillance visits every 3 months for the first 2 years post treatment (2/2023), followed by every 6 months for an additional 3 years thereafter (2/2026), then annually.  Reviewed signs and symptoms for when she should contact the clinic or seek additional care.  Patient to contact the clinic with any questions or concerns in the interim.        Genetic risk factors were assessed and her MMR proteins were intact.      Labs and/or tests ordered include:  None.     2.) Health maintenance:  Issues addressed today include following up with PCP for annual health maintenance and non-gynecologic issues.     3.) Back pain:  Discussed that her description of her back pain is consistent with the degenerative changes seen on her CT from 11/2020.  Encouraged to follow up with PCP for management and potentially start physical therapy to help strengthen the area.    4.) Vulvovaginal dryness:  Discussed that if her vulvovaginal dryness doesn't bother her she does not have to do anything about it.  If she experiences itching or pain she can apply organic coconut oil to vulva for dryness at  bedtime and use water based lubricants for intercourse.        Radha Sanders, DNP, APRN, FNP-C  Nurse Practitioner  Division of Gynecologic Oncology  Pager: [phone]     CC  Patient Care Team:  Janie Riley APRN CNP as PCP - General (Nurse Practitioner - Family)  Vocal, Stan Hamilton MD as Referring Physician (Internal Medicine)  Choco Frances DPM as Assigned Musculoskeletal Provider  Guero Matute MD as Assigned OBGYN Provider  Wilber Rice OD as Assigned Surgical Provider  Zacarias Brandon MD as Assigned Cancer Care Provider  Vaishnavi Boyd MD as Assigned PCP  SELF, REFERRED

## 2021-06-20 ASSESSMENT — ENCOUNTER SYMPTOMS
BACK PAIN: 1
MUSCLE CRAMPS: 0
NECK PAIN: 0
MUSCLE WEAKNESS: 0
MYALGIAS: 0
JOINT SWELLING: 0
ARTHRALGIAS: 0
STIFFNESS: 0

## 2021-06-22 ENCOUNTER — ONCOLOGY VISIT (OUTPATIENT)
Dept: ONCOLOGY | Facility: CLINIC | Age: 56
End: 2021-06-22
Attending: NURSE PRACTITIONER
Payer: COMMERCIAL

## 2021-06-22 VITALS
TEMPERATURE: 97.9 F | OXYGEN SATURATION: 98 % | SYSTOLIC BLOOD PRESSURE: 137 MMHG | DIASTOLIC BLOOD PRESSURE: 92 MMHG | BODY MASS INDEX: 36.94 KG/M2 | HEART RATE: 59 BPM | WEIGHT: 215.2 LBS

## 2021-06-22 DIAGNOSIS — G89.29 CHRONIC MIDLINE LOW BACK PAIN, UNSPECIFIED WHETHER SCIATICA PRESENT: ICD-10-CM

## 2021-06-22 DIAGNOSIS — N90.89 VULVOVAGINAL DRYNESS: ICD-10-CM

## 2021-06-22 DIAGNOSIS — M54.50 CHRONIC MIDLINE LOW BACK PAIN, UNSPECIFIED WHETHER SCIATICA PRESENT: ICD-10-CM

## 2021-06-22 DIAGNOSIS — N89.8 VULVOVAGINAL DRYNESS: ICD-10-CM

## 2021-06-22 DIAGNOSIS — C54.1 ENDOMETRIAL CANCER (H): Primary | ICD-10-CM

## 2021-06-22 DIAGNOSIS — Z85.42 ENCOUNTER FOR FOLLOW-UP SURVEILLANCE OF ENDOMETRIAL CANCER: ICD-10-CM

## 2021-06-22 DIAGNOSIS — Z08 ENCOUNTER FOR FOLLOW-UP SURVEILLANCE OF ENDOMETRIAL CANCER: ICD-10-CM

## 2021-06-22 PROCEDURE — G0463 HOSPITAL OUTPT CLINIC VISIT: HCPCS

## 2021-06-22 PROCEDURE — 99213 OFFICE O/P EST LOW 20 MIN: CPT | Performed by: NURSE PRACTITIONER

## 2021-06-22 ASSESSMENT — ENCOUNTER SYMPTOMS
SORE THROAT: 0
PALPITATIONS: 0
EYE PAIN: 0
EYE REDNESS: 0
SPUTUM PRODUCTION: 0
INCREASED ENERGY: 0
MUSCLE CRAMPS: 0
SNORES LOUDLY: 0
SYNCOPE: 0
SHORTNESS OF BREATH: 0
MYALGIAS: 0
LEG SWELLING: 0
COUGH DISTURBING SLEEP: 0
PANIC: 0
WHEEZING: 0
WEAKNESS: 0
VOMITING: 0
SMELL DISTURBANCE: 0
POLYPHAGIA: 0
STIFFNESS: 0
MEMORY LOSS: 0
DECREASED CONCENTRATION: 0
NAIL CHANGES: 0
SINUS PAIN: 0
POOR WOUND HEALING: 0
DOUBLE VISION: 0
HOT FLASHES: 0
HEARTBURN: 0
EYE IRRITATION: 0
TACHYCARDIA: 0
EXERCISE INTOLERANCE: 0
CHILLS: 0
TREMORS: 0
SLEEP DISTURBANCES DUE TO BREATHING: 0
BREAST MASS: 0
PARALYSIS: 0
NECK PAIN: 0
LIGHT-HEADEDNESS: 0
SPEECH CHANGE: 0
JAUNDICE: 0
NAUSEA: 0
LEG PAIN: 0
JOINT SWELLING: 0
MUSCLE WEAKNESS: 0
HOARSE VOICE: 0
DECREASED APPETITE: 0
HALLUCINATIONS: 0
TASTE DISTURBANCE: 0
TROUBLE SWALLOWING: 0
SINUS CONGESTION: 0
BOWEL INCONTINENCE: 0
DISTURBANCES IN COORDINATION: 0
HEMATURIA: 0
DEPRESSION: 0
DIARRHEA: 0
HEMOPTYSIS: 0
ORTHOPNEA: 0
HYPERTENSION: 0
CLAUDICATION: 0
DYSPNEA ON EXERTION: 0
CONSTIPATION: 0
FEVER: 0
FATIGUE: 0
DIFFICULTY URINATING: 0
NUMBNESS: 0
INSOMNIA: 0
BACK PAIN: 1
LOSS OF CONSCIOUSNESS: 0
DYSURIA: 0
BLOATING: 0
EXTREMITY NUMBNESS: 0
DIZZINESS: 0
POLYDIPSIA: 0
NIGHT SWEATS: 0
POSTURAL DYSPNEA: 0
RECTAL PAIN: 0
FLANK PAIN: 0
ALTERED TEMPERATURE REGULATION: 0
TINGLING: 0
COUGH: 0
NERVOUS/ANXIOUS: 0
RESPIRATORY PAIN: 0
WEIGHT LOSS: 0
NECK MASS: 0
SEIZURES: 0
EYE WATERING: 0
ARTHRALGIAS: 0
HYPOTENSION: 0
RECTAL BLEEDING: 0
BLOOD IN STOOL: 0
SWOLLEN GLANDS: 0
WEIGHT GAIN: 0
BREAST PAIN: 0
SKIN CHANGES: 0
DECREASED LIBIDO: 0
HEADACHES: 0
ABDOMINAL PAIN: 0
BRUISES/BLEEDS EASILY: 0

## 2021-06-22 ASSESSMENT — PAIN SCALES - GENERAL: PAINLEVEL: NO PAIN (0)

## 2021-06-22 NOTE — NURSING NOTE
"Oncology Rooming Note    June 22, 2021 6:57 AM   Mary Sims is a 56 year old female who presents for:    Chief Complaint   Patient presents with     Oncology Clinic Visit     ENDOMETIRAL CANCER      Initial Vitals: BP (!) 137/92   Pulse 59   Temp 97.9  F (36.6  C) (Oral)   Wt 97.6 kg (215 lb 3.2 oz)   LMP 08/04/2015 (Approximate)   SpO2 98%   BMI 36.94 kg/m   Estimated body mass index is 36.94 kg/m  as calculated from the following:    Height as of 2/9/21: 1.626 m (5' 4\").    Weight as of this encounter: 97.6 kg (215 lb 3.2 oz). Body surface area is 2.1 meters squared.  No Pain (0) Comment: Data Unavailable   Patient's last menstrual period was 08/04/2015 (approximate).  Allergies reviewed: Yes  Medications reviewed: Yes    Medications: Medication refills not needed today.  Pharmacy name entered into [facility]: [facility] [address] [place] [address]    Clinical concerns: NONE       Nayeli Gottlieb CMA              "

## 2021-06-22 NOTE — LETTER
2021         RE: Mary Sims  [id number]  [id number]  [facility] [address]        Dear Colleague,    Thank you for referring your patient, Mary Sims, to the [facility]. Please see a copy of my visit note below.    Gynecologic Oncology Follow Up Note    Date: 2021    RE: Mary Sims  : 1965  PAULA: 2021    CC: Stage IA grade 1 endometrioid endometrial adenocarcinoma    HPI:  Mary Sims is a 56 year old woman with a history of stage IA grade 1 endometrioid endometrial adenocarcinoma.  She is s/p TLH-BSO 21 which served as her treatment.  She is here today for a surveillance visit.     Oncology History:    Initially, se was seen for vaginal spotting.    2020: Pelvic US  IMPRESSION: Thickened endometrium up to 24 mm in transvaginal  measurement for a postmenopausal patient comment given the reported  history of postmenopausal bleeding, this does raise concern for  neoplasia/hyperplasia and tissue sampling is recommended. Small amount  of free fluid in the posterior cul-de-sac and left adnexa.  Nonvisualization of left ovary. The visualized right ovary appears  Normal.    21: EMB  FINAL DIAGNOSIS:   Uterus, endometrium: Biopsy:   - At least complex atypical hyperplasia with foci bordering on FIGO grade   1 endometrioid carcinoma, see   comment     21: Robotic-assisted total laparoscopic hysterectomy, bilateral salpingo-oopherectomy, sentinel lymph node mapping and sampling, cystoscopy  FINAL DIAGNOSIS:   A. UTERUS, CERVIX, BILATERAL TUBES AND OVARIES, HYSTERECTOMY AND BILATERAL    SALPINGO-OOPHORECTOMY:   - Endometrioid endometrial adenocarcinoma, FIGO grade 1, MMR proficient   - Unremarkable ovaries with follicular cysts   - Cervix with reactive changes and nabothian cysts   - Unremarkable fallopian tubes   B. SENTINEL LYMPH NODE, RIGHT EXTERNAL ILIAC, LYMPHADENECTOMY:   - One reactive lymph node, negative for malignancy (0/1)   C.  SENTINEL LYMPH NODE, LEFT PELVIC, LYMPHADENECTOMY:   - One reactive lymph node, negative for malignancy (0/1)              Today she comes to clinic feeling well and is without concern.  She denies any vaginal bleeding, no changes in her bowel or bladder habits, no nausea/emesis, no lower extremity edema, and no difficulties eating or sleeping. She denies any abdominal discomfort/bloating, no fevers or chills, and no chest pain or shortness of breath.  She has noticed some back pain that she has had for a while that seems to be worsening over the last couple months.  It is now a daily occurrence.  The pain is centered in her lower back and worsens throughout the day.  She is current with her annual physical, colon cancer screening, mammogram, and she is fully vaccinated against COVID.                  Health Maintenance  Colonoscopy: 8/27/2013  Mammogram: 3/3/21  Annual physical: 7/8/2020  Dexa: 9/14/2020  COVID vaccine: 3/21/21, 4/10/21      Review of Systems     Constitutional:  Negative for fever, chills, weight loss, weight gain, fatigue, decreased appetite, night sweats, recent stressors, height gain, height loss, post-operative complications, incisional pain, hallucinations, increased energy, hyperactivity and confused.   HENT:  Negative for ear pain, hearing loss, tinnitus, nosebleeds, trouble swallowing, hoarse voice, mouth sores, sore throat, ear discharge, tooth pain, gum tenderness, taste disturbance, smell disturbance, hearing aid, bleeding gums, dry mouth, sinus pain, sinus congestion and neck mass.    Eyes:  Negative for double vision, pain, redness, eye pain, decreased vision, eye watering, eye bulging, eye dryness, flashing lights, spots, floaters, strabismus, tunnel vision, jaundice and eye irritation.   Respiratory:   Negative for cough, hemoptysis, sputum production, shortness of breath, wheezing, sleep disturbances due to breathing, snores loudly, respiratory pain, dyspnea on exertion, cough  disturbing sleep and postural dyspnea.    Cardiovascular:  Negative for chest pain, dyspnea on exertion, palpitations, orthopnea, claudication, leg swelling, fingers/toes turn blue, hypertension, hypotension, syncope, history of heart murmur, chest pain on exertion, chest pain at rest, pacemaker, few scattered varicosities, leg pain, sleep disturbances due to breathing, tachycardia, light-headedness, exercise intolerance and edema.   Gastrointestinal:  Negative for heartburn, nausea, vomiting, abdominal pain, diarrhea, constipation, blood in stool, melena, rectal pain, bloating, hemorrhoids, bowel incontinence, jaundice, rectal bleeding, coffee ground emesis and change in stool.   Genitourinary:  Negative for bladder incontinence, dysuria, urgency, hematuria, flank pain, vaginal discharge, difficulty urinating, genital sores, dyspareunia, decreased libido, nocturia, voiding less frequently, arousal difficulty, abnormal vaginal bleeding, excessive menstruation, menstrual changes, hot flashes, vaginal dryness and postmenopausal bleeding.   Musculoskeletal:  Positive for back pain. Negative for myalgias, joint swelling, arthralgias, stiffness, muscle cramps, neck pain, bone pain, muscle weakness and fracture.   Skin:  Negative for nail changes, itching, poor wound healing, rash, hair changes, skin changes, acne, warts, poor wound healing, scarring, flaky skin, Raynaud's phenomenon, sensitivity to sunlight and skin thickening.   Neurological:  Negative for dizziness, tingling, tremors, speech change, seizures, loss of consciousness, weakness, light-headedness, numbness, headaches, disturbances in coordination, extremity numbness, memory loss, difficulty walking and paralysis.   Endo/Heme:  Negative for anemia, swollen glands and bruises/bleeds easily.   Psychiatric/Behavioral:  Negative for depression, hallucinations, memory loss, decreased concentration, mood swings and panic attacks.    Breast:  Negative for breast  discharge, breast mass, breast pain and nipple retraction.   Endocrine:  Negative for altered temperature regulation, polyphagia, polydipsia, unwanted hair growth and change in facial hair.        Past Medical History:    Past Medical History:   Diagnosis Date     Arthritis of foot, right 2019     Endometrial cancer (H) 2021     Graves disease      Hypertension      Hypothyroidism      MEDICAL HISTORY OF -     S/P RADIOACTIVE IODINE     Morbid obesity (H) 2020     Osteopenia of multiple sites 2020         Past Surgical History:    Past Surgical History:   Procedure Laterality Date     ARTHRODESIS FOOT Right 2020    Procedure: MIDFOOT BONE SPUR RESECTION WITH JOINT FUSIONS RIGHT LOWER EXTREMITY;  Surgeon: Choco Frances DPM;  Location: [facility] OR     ARTHROSCOPY SHLDR ROTATOR CUFF REPAIR, SUBACROMIAL DECOMP, DIST CLAVICLE RESECTION, BICEP TENODESIS Left 2017    Procedure: ARTHROSCOPY SHOULDER ROTATOR CUFF REPAIR, SUBACROMIAL DECOMPRESSION, DISTAL CLAVICLE RESECTION, OPEN BICEP TENODESIS REPAIR;  Surgeon: Dorina Rodriguez MD;  Location: [facility] OR     C  DELIVERY ONLY       COLONOSCOPY  2013    Procedure: COLONOSCOPY;  colonoscopy, screening;  Surgeon: Dalton Lala MD;  Location: [facility] OR     CYSTOSCOPY       CYSTOSCOPY N/A 2021    Procedure: Cystoscopy;  Surgeon: Zacarias Brandon MD;  Location: [facility] OR     DAVINCI HYSTERECTOMY TOTAL, BILATERAL SALPINGO-OOPHORECTOMY, COMBINED Bilateral 2021    Procedure: HYSTERECTOMY, TOTAL, ROBOT-ASSISTED, LAPAROSCOPIC, WITH SALPINGO-OOPHORECTOMY, PELVIC WASHINGS, SENTINEL LYMPHNODE SAMPLING;  Surgeon: Zacarias Brandon MD;  Location: [facility] OR     SURGICAL HISTORY OF -       RT ANKLE Fx AND REPAIR (PIN,PLATE,SCREWS)     THYROIDECTOMY       TUBAL LIGATION           Health Maintenance Due   Topic Date Due     BMP  2021     MICROALBUMIN  2021     TSH W/FREE T4 REFLEX  2021     PREVENTIVE CARE  VISIT  07/08/2021       Current Medications:     Current Outpatient Medications   Medication Sig Dispense Refill     gabapentin (NEURONTIN) 300 MG capsule TAKE ONE CAPSULE BY MOUTH THREE TIMES A DAY 90 capsule 1     levothyroxine (SYNTHROID/LEVOTHROID) 75 MCG tablet Take 1 tablet (75 mcg) by mouth daily Separate oral administration of iron- or calcium-containing products and levothyroxine by at least 4 hours. 30 tablet 0     metoprolol succinate ER (TOPROL-XL) 25 MG 24 hr tablet TAKE ONE TABLET BY MOUTH EVERY DAY 90 tablet 1     vitamin D2 (ERGOCALCIFEROL) 27351 units (1250 mcg) capsule Take 1 capsule (50,000 Units) by mouth once a week (Patient taking differently: Take 50,000 Units by mouth once a week ) 12 capsule 0         Allergies:      No Known Allergies     Social History:     Social History     Tobacco Use     Smoking status: Never Smoker     Smokeless tobacco: Never Used   Substance Use Topics     Alcohol use: Yes     Comment: SELDOM        History   Drug Use No         Family History:     The patient's family history is notable for:    Family History   Problem Relation Age of Onset     Hypertension Mother      Diabetes Mother      Hypertension Father      Alcohol/Drug Father         alcohol     Crohn's Disease Son      Hypertension Other      Cancer No family hx of      Cerebrovascular Disease No family hx of      Thyroid Disease No family hx of      Glaucoma No family hx of      Macular Degeneration No family hx of          Physical Exam:     BP (!) 137/92   Pulse 59   Temp 97.9  F (36.6  C) (Oral)   Wt 97.6 kg (215 lb 3.2 oz)   LMP 08/04/2015 (Approximate)   SpO2 98%   BMI 36.94 kg/m    Body mass index is 36.94 kg/m .    General Appearance: healthy and alert, no distress     HEENT: no palpable nodules or masses        Cardiovascular: regular rate and rhythm, no gallops, rubs or murmurs     Respiratory: lungs clear, no rales, rhonchi or wheezes    Musculoskeletal: extremities non tender and without  edema    Skin: no lesions or rashes     Neurological: normal gait, no gross defects     Psychiatric: appropriate mood and affect                               Hematological: normal cervical, supraclavicular and inguinal lymph nodes     Gastrointestinal:       abdomen soft, non-tender, non-distended, no organomegaly or masses    Genitourinary: External genitalia and urethral meatus appears normal.  Vagina is smooth without nodularity or masses.  Vulvovaginal dryness noted.  Cervix is surgically absent.  Bimanual exam reveal no masses, nodularity or fullness.  Recto-vaginal exam confirms these findings.      Assessment:    Mary Sims is a 56 year old woman with a history of stage IA grade 1 endometrioid endometrial adenocarcinoma.  She is s/p TLH-BSO 2/9/21 which served as her treatment.  She is here today for a surveillance visit.        20 minutes spent on the date of the encounter doing chart review, history and exam, documentation, and further activities as noted above.      Plan:     1.) Endometrial adenocarcinoma:  JACKIE on exam.  RTC in 3 months for her next surveillance visit.  Plan for surveillance visits every 3 months for the first 2 years post treatment (2/2023), followed by every 6 months for an additional 3 years thereafter (2/2026), then annually.  Reviewed signs and symptoms for when she should contact the clinic or seek additional care.  Patient to contact the clinic with any questions or concerns in the interim.        Genetic risk factors were assessed and her MMR proteins were intact.      Labs and/or tests ordered include:  None.     2.) Health maintenance:  Issues addressed today include following up with PCP for annual health maintenance and non-gynecologic issues.     3.) Back pain:  Discussed that her description of her back pain is consistent with the degenerative changes seen on her CT from 11/2020.  Encouraged to follow up with PCP for management and potentially start physical therapy to  help strengthen the area.    4.) Vulvovaginal dryness:  Discussed that if her vulvovaginal dryness doesn't bother her she does not have to do anything about it.  If she experiences itching or pain she can apply organic coconut oil to vulva for dryness at bedtime and use water based lubricants for intercourse.        Radha Sanders, DNP, APRN, FNP-C  Nurse Practitioner  Division of Gynecologic Oncology  Pager: [phone]     CC  Patient Care Team:  Janie Riley APRN CNP as PCP - General (Nurse Practitioner - Family)  Vocal, Stan Hamilton MD as Referring Physician (Internal Medicine)  Choco Frances DPM as Assigned Musculoskeletal Provider  Guero Matute MD as Assigned OBGYN Provider  Wilber Rice OD as Assigned Surgical Provider  Zacarias Brandon MD as Assigned Cancer Care Provider  Vaishnavi Boyd MD as Assigned PCP

## 2021-07-09 DIAGNOSIS — E03.9 HYPOTHYROIDISM, UNSPECIFIED TYPE: ICD-10-CM

## 2021-07-09 RX ORDER — LEVOTHYROXINE SODIUM 75 UG/1
TABLET ORAL
Qty: 30 TABLET | Refills: 0 | OUTPATIENT
Start: 2021-07-09

## 2021-07-09 NOTE — TELEPHONE ENCOUNTER
Denial sent, should have enough medication until office visit,   Next 5 appointments (look out 90 days)    Jul 13, 2021  8:20 AM  MyChart Short with PABLO Dean CNP  [facility] ([facility] ) [address] [place] [id number]  [phone]        Shireen Gilman RN

## 2021-07-13 ENCOUNTER — OFFICE VISIT (OUTPATIENT)
Dept: FAMILY MEDICINE | Facility: CLINIC | Age: 56
End: 2021-07-13
Payer: COMMERCIAL

## 2021-07-13 VITALS
OXYGEN SATURATION: 100 % | TEMPERATURE: 97.5 F | HEIGHT: 64 IN | BODY MASS INDEX: 35.85 KG/M2 | WEIGHT: 210 LBS | DIASTOLIC BLOOD PRESSURE: 85 MMHG | SYSTOLIC BLOOD PRESSURE: 129 MMHG | HEART RATE: 62 BPM

## 2021-07-13 DIAGNOSIS — M54.50 RIGHT-SIDED LOW BACK PAIN WITHOUT SCIATICA, UNSPECIFIED CHRONICITY: ICD-10-CM

## 2021-07-13 DIAGNOSIS — Z00.00 ROUTINE HISTORY AND PHYSICAL EXAMINATION OF ADULT: Primary | ICD-10-CM

## 2021-07-13 DIAGNOSIS — G57.11 MERALGIA PARESTHETICA, RIGHT LOWER LIMB: ICD-10-CM

## 2021-07-13 DIAGNOSIS — E55.9 VITAMIN D DEFICIENCY: ICD-10-CM

## 2021-07-13 DIAGNOSIS — E03.9 HYPOTHYROIDISM, UNSPECIFIED TYPE: ICD-10-CM

## 2021-07-13 DIAGNOSIS — I10 HYPERTENSION GOAL BP (BLOOD PRESSURE) < 140/90: ICD-10-CM

## 2021-07-13 LAB
ANION GAP SERPL CALCULATED.3IONS-SCNC: 2 MMOL/L (ref 3–14)
BUN SERPL-MCNC: 22 MG/DL (ref 7–30)
CALCIUM SERPL-MCNC: 8.9 MG/DL (ref 8.5–10.1)
CHLORIDE BLD-SCNC: 111 MMOL/L
CO2 SERPL-SCNC: 29 MMOL/L (ref 20–32)
CREAT SERPL-MCNC: 0.84 MG/DL
CREAT UR-MCNC: 123 MG/DL
GFR SERPL CREATININE-BSD FRML MDRD: 77 ML/MIN/1.73M2
GLUCOSE BLD-MCNC: 98 MG/DL (ref 70–99)
MICROALBUMIN UR-MCNC: 8 MG/DL
MICROALBUMIN/CREAT UR: 6.5 MG/G CR (ref 0–25)
POTASSIUM BLD-SCNC: 4.8 MMOL/L (ref 3.4–5.3)
SODIUM SERPL-SCNC: 142 MMOL/L (ref 133–144)
TSH SERPL DL<=0.005 MIU/L-ACNC: 1.14 MU/L (ref 0.4–4)

## 2021-07-13 PROCEDURE — 82306 VITAMIN D 25 HYDROXY: CPT | Performed by: NURSE PRACTITIONER

## 2021-07-13 PROCEDURE — 36415 COLL VENOUS BLD VENIPUNCTURE: CPT | Performed by: NURSE PRACTITIONER

## 2021-07-13 PROCEDURE — 84443 ASSAY THYROID STIM HORMONE: CPT | Performed by: NURSE PRACTITIONER

## 2021-07-13 PROCEDURE — 82043 UR ALBUMIN QUANTITATIVE: CPT | Performed by: NURSE PRACTITIONER

## 2021-07-13 PROCEDURE — 99396 PREV VISIT EST AGE 40-64: CPT | Performed by: NURSE PRACTITIONER

## 2021-07-13 PROCEDURE — 80048 BASIC METABOLIC PNL TOTAL CA: CPT | Performed by: NURSE PRACTITIONER

## 2021-07-13 PROCEDURE — 99213 OFFICE O/P EST LOW 20 MIN: CPT | Mod: 25 | Performed by: NURSE PRACTITIONER

## 2021-07-13 RX ORDER — METOPROLOL SUCCINATE 25 MG/1
25 TABLET, EXTENDED RELEASE ORAL DAILY
Qty: 90 TABLET | Refills: 3 | Status: SHIPPED | OUTPATIENT
Start: 2021-07-13 | End: 2021-08-18

## 2021-07-13 RX ORDER — GABAPENTIN 300 MG/1
CAPSULE ORAL
Qty: 180 CAPSULE | Refills: 3 | Status: SHIPPED | OUTPATIENT
Start: 2021-07-13 | End: 2021-08-18

## 2021-07-13 ASSESSMENT — PAIN SCALES - GENERAL: PAINLEVEL: MILD PAIN (2)

## 2021-07-13 ASSESSMENT — MIFFLIN-ST. JEOR: SCORE: 1517.55

## 2021-07-13 NOTE — PROGRESS NOTES
"    Assessment & Plan     Routine history and physical examination of adult      Hypertension goal BP (blood pressure) < 140/90  BP at goal, cotntnue with regular exercise, weight loss efforts, low salt diet, no concerns with medication compliance.  - Albumin Random Urine Quantitative with Creat Ratio  - **Basic metabolic panel FUTURE anytime  - metoprolol succinate ER (TOPROL-XL) 25 MG 24 hr tablet  Dispense: 90 tablet; Refill: 3    Hypothyroidism, unspecified type  Due for TSH, adjust Synthroid as indicated.  - **TSH with free T4 reflex FUTURE anytime    Vitamin D deficiency    - Vitamin D Deficiency  - Vitamin D Deficiency    Meralgia paresthetica, right lower limb  REfilled Neurontin, consider physical therapy for persistent symptoms.  - gabapentin (NEURONTIN) 300 MG capsule  Dispense: 180 capsule; Refill: 3    Right-sided low back pain without sciatica, unspecified chronicity  Consider physical therapy for persisitent symptoms, again encourage benefits of weight loss and regular eexercise.  - [facility] Referral           BMI:   Estimated body mass index is 36.05 kg/m  as calculated from the following:    Height as of this encounter: 1.626 m (5' 4\").    Weight as of this encounter: 95.3 kg (210 lb).   Weight management plan: Discussed healthy diet and exercise guidelines    Work on weight loss  Regular exercise    Return in about 6 months (around 1/13/2022), or if symptoms worsen or fail to improve, for Follow up.    PABLO Glez CNP  [facility] ASTRIDJESSICA Hernandez is a 56 year old who presents for the following health issues     HPI       Hypertension Follow-up      Do you check your blood pressure regularly outside of the clinic? No     Are you following a low salt diet? Yes    Are your blood pressures ever more than 140 on the top number (systolic) OR more   than 90 on the bottom number (diastolic), for example 140/90? No    Hypothyroidism Follow-up      Since " "last visit, patient describes the following symptoms: Weight stable, no hair loss, no skin changes, no constipation, no loose stools      How many servings of fruits and vegetables do you eat daily?  0-1    On average, how many sweetened beverages do you drink each day (Examples: soda, juice, sweet tea, etc.  Do NOT count diet or artificially sweetened beverages)?   0    How many days per week do you exercise enough to make your heart beat faster? 3 or less    How many minutes a day do you exercise enough to make your heart beat faster? 10 - 19    How many days per week do you miss taking your medication? 0    She also requests refill of Neurontin for her chronic low back pain and meralgia paresthetica right lower limb which works well for pain control.     Annual Wellness Visit    Patient has been advised of split billing requirements and indicates understanding: Yes     Are you in the first 12 months of your Medicare Part B coverage?  No    Physical Health:    In general, how would you rate your overall physical health? good    Outside of work, how many days during the week do you exercise?1 day/week    Outside of work, approximately how many minutes a day do you exercise?15-30 minutes    If you drink alcohol do you typically have >3 drinks per day or >7 drinks per week? No    Do you usually eat at least 4 servings of fruit and vegetables a day, include whole grains & fiber and avoid regularly eating high fat or \"junk\" foods? NO    Do you have any problems taking medications regularly? No    Do you have any side effects from medications? none    Needs assistance for the following daily activities: no assistance needed    Which of the following safety concerns are present in your home?  none identified     Hearing impairment: No    In the past 6 months, have you been bothered by leaking of urine? no    Mental Health:    In general, how would you rate your overall mental or emotional health? good  PHQ-2 Score: " "0    Do you feel safe in your environment? Yes    Have you ever done Advance Care Planning? (For example, a Health Directive, POLST, or a discussion with a medical provider or your loved ones about your wishes)? Yes, advance care planning is on file.    Fall risk:         Do you have sleep apnea, excessive snoring or daytime drowsiness?: yes-snoring, no witnessed apneic spells, sleep is restorative    Current providers sharing in care for this patient include:   Patient Care Team:  Janie Riley APRN CNP as PCP - General (Nurse Practitioner - Family)  Gus, Stan Hamilton MD as Referring Physician (Internal Medicine)  Choco Frances DPM as Assigned Musculoskeletal Provider  Juju, Guero Awad MD as Assigned OBGYN Provider  Wilber Rice OD as Assigned Surgical Provider  Zacarias Brandon MD as Assigned Cancer Care Provider  Vaishnavi Boyd MD as Assigned PCP    Patient has been advised of split billing requirements and indicates understanding: Yes    Review of Systems   Constitutional, HEENT, cardiovascular, pulmonary, gi and gu systems are negative, except as otherwise noted.      Objective    /85 (BP Location: Left arm, Patient Position: Sitting, Cuff Size: Adult Large)   Pulse 62   Temp 97.5  F (36.4  C) (Tympanic)   Ht 1.626 m (5' 4\")   Wt 95.3 kg (210 lb)   LMP 08/04/2015 (Approximate)   SpO2 100%   BMI 36.05 kg/m    Body mass index is 36.05 kg/m .  Physical Exam   GENERAL: healthy, alert and no distress  EYES: Eyes grossly normal to inspection, PERRL and conjunctivae and sclerae normal  HENT: ear canals and TM's normal, nose and mouth without ulcers or lesions  NECK: no adenopathy, no asymmetry, masses, or scars and thyroid normal to palpation  RESP: lungs clear to auscultation - no rales, rhonchi or wheezes  BREAST: normal without masses, tenderness or nipple discharge and no palpable axillary masses or adenopathy  CV: regular rate and rhythm, normal S1 S2, no S3 or " S4, no murmur, click or rub, no peripheral edema and peripheral pulses strong  ABDOMEN: soft, nontender, no hepatosplenomegaly, no masses and bowel sounds normal  MS: no gross musculoskeletal defects noted, no edema  SKIN: no suspicious lesions or rashes  NEURO: Normal strength and tone, mentation intact and speech normal  PSYCH: mentation appears normal, affect normal/bright  LYMPH: no cervical, supraclavicular, axillary, or inguinal adenopathy    Results for orders placed or performed in visit on 07/13/21   Albumin Random Urine Quantitative with Creat Ratio     Status: None   Result Value Ref Range    Creatinine Urine mg/dL 123 mg/dL    Albumin Urine mg/L 8 mg/dL    Albumin Urine mg/g Cr 6.50 0.00 - 25.00 mg/g Cr   **Basic metabolic panel FUTURE anytime     Status: Abnormal   Result Value Ref Range    Sodium 142 133 - 144 mmol/L    Potassium 4.8 3.4 - 5.3 mmol/L    Chloride 111 mmol/L    Carbon Dioxide (CO2) 29 20 - 32 mmol/L    Anion Gap 2 (L) 3 - 14 mmol/L    Urea Nitrogen 22 7 - 30 mg/dL    Creatinine 0.84 mg/dL    Calcium 8.9 8.5 - 10.1 mg/dL    Glucose 98 70 - 99 mg/dL    GFR Estimate 77 >60 mL/min/1.73m2   **TSH with free T4 reflex FUTURE anytime     Status: Normal   Result Value Ref Range    TSH 1.14 0.40 - 4.00 mU/L   Vitamin D Deficiency     Status: Normal   Result Value Ref Range    Vitamin D, Total (25-Hydroxy) 35 20 - 75 ug/L    Narrative    Season, race, dietary intake, and treatment affect the concentration of 25-hydroxy-Vitamin D. Values may decrease during winter months and increase during summer months. Values 20-29 ug/L may indicate Vitamin D insufficiency and values <20 ug/L may indicate Vitamin D deficiency.    Vitamin D determination is routinely performed by an immunoassay specific for 25 hydroxyvitamin D3.  If an individual is on vitamin D2(ergocalciferol) supplementation, please specify 25 OH vitamin D2 and D3 level determination by LCMSMS test [id number].

## 2021-07-13 NOTE — PATIENT INSTRUCTIONS
Patient Education     Prevention Guidelines, Women Ages 50 to 64  Screening tests and vaccines are an important part of managing your health. A screening test is done to find possible disorders or diseases in people who don't have any symptoms. The goal is to find a disease early so lifestyle changes can be made and you can be watched more closely to reduce the risk of disease, or to detect it early enough to treat it most effectively. Screening tests are not considered diagnostic, but are used to determine if more testing is needed. Health counseling is essential, too. Below are guidelines for these, for women ages 50 to 64. Keep in mind that screening advice varies among expert groups. Talk with your healthcare provider about which tests are best for you and to make sure you re up to date on what you need.   Screening  Who needs it  How often    Type 2 diabetes or prediabetes  All women beginning at age 45 and women without symptoms at any age who are overweight or obese and have 1 or more additional risk factors for diabetes.  At  least every 3 years    Type 2 diabetes or prediabetes  All women diagnosed with gestational diabetes  Lifelong testing at least every 3 years    Type 2 diabetes All women with prediabetes  Every year   Unhealthy alcohol use  All women in this age group  At routine exams   Blood pressure All women in this age group  Yearly checkup if your blood pressure is normal   Normal blood pressure is less than 120/80 mm Hg   If your blood pressure reading is higher than normal, follow the advice of your healthcare provider    Breast cancer All women at average risk in this age group  Yearly mammogram should be done until age 54. At age 55, you can switch to every other year or choose to continue yearly.   All women should know how their breasts normally look and feel and know the possible benefits and risks of breast cancer screening with mammograms.    Cervical cancer All women in this age  group, except women who have had a complete hysterectomy  Pap test every 3 years or Pap test with human papillomavirus (HPV) test every 5 years    Chlamydia Women who are sexually active and at increased risk for infection  At yearly routine exams    Colorectal cancer All women at average risk in this age group  Multiple tests are available and are used at different times. Possible tests include:     Flexible sigmoidoscopy every 5 years, or    Colonoscopy every 10 years, or    CT colonography (virtual colonoscopy) every 5 years, or    Yearly fecal occult blood test, or    Yearly fecal immunochemical test every year, or    Stool DNA test, every 3 years  If you choose a test other than a colonoscopy and have an abnormal test result, you will need to follow up with a colonoscopy. Screening advice varies among expert groups. Talk with your healthcare provider about which tests are best for you.   Some people should be screened using a different schedule because of their personal or family health history. Talk with your healthcare provider about your health history.    Depression All women in this age group  At routine exams   Gonorrhea Sexually active women at increased risk for infection  At yearly routine exams    Hepatitis C Anyone at increased risk; 1 time for those born between 1945 and 1965  At routine exams   High cholesterol or triglycerides  All women in this age group who are at risk for coronary artery disease  At least every 5 years; talk with your healthcare provider about your risk    HIV All women At least once during your lifetime; yearly if at high risk    Lung cancer Women between the ages of 55 to 74 who are in fairly good health and are at higher risk for lung cancer         Currently smoke or have  quit within past 15 years         30-pack-year smoking history  , Eligibility criteria and age limit (possibly up to age 80) may vary across major organizations  Yearly lung cancer screening with a  low-dose CT scan (LDCT) Talk with your healthcare provider for more information.    Obesity All women in this age group  At yearly routine exams    Osteoporosis Women who are postmenopausal  Talk with your healthcare provider    Syphilis Women at increased risk for infection  At routine exams; talk with your healthcare provider    Tuberculosis Women at increased risk for infection  Talk with your healthcare provider    Vision All women in this age group  Talk with your healthcare provider    Vaccine Who needs it How often   Chickenpox (varicella)  All women in this age group who have no record of this infection or vaccine  2 doses; the second dose should be given at least 4 weeks after the first dose    Hepatitis A Women at increased risk for infection  2 or 3 doses (depending on the vaccine) given at least 6 months apart; talk with your healthcare provider    Hepatitis B Women at increased risk for infection  2 or 3 doses (depending on the vaccine) ; second dose should be given 1 month after the first dose; if a third dose, it should be given at least 2 months after the second dose and at least 4 months after the first dose; talk with your healthcare provider    Haemophilus influenzae Type B (HIB)  Women at increased risk for infection  1 or 3 doses; talk with your healthcare provider    Influenza (flu) All women in this age group  Once a year   Measles, mumps, rubella (MMR)  Women in this age group born in 1957 or later who have no record of these infections or vaccines  1 or 2doses   Meningococcal Women at increased risk for infection  1 or more doses; talk with your healthcare provider    Pneumococcal conjugate vaccine (PCV13) and pneumococcal polysaccharide vaccine (PPSV23)  Women at increased risk for infection  PCV13: 1 dose ages 19 to 64 (protects against 13 types of pneumococcal bacteria)   PPSV23: 1 or 2 doses through age 64(protects against 23 types of pneumococcal bacteria)   Talk with your healthcare  provider   Tetanus/diphtheria/pertussis (Td/Tdap) booster All women in this age group  Td every 10 years, or a 1-time dose of Tdap instead of a Td booster after age 18, then Td every 10 years    Recombinant zoster vaccine (RZV)  All women ages 50 and older  If 2 doses; the 2nd dose is given 2 to 6 months after the first. This is given even if you've had shingles before or had a previous zoster live vaccine.    Counseling Who needs it How often   BRCA gene mutation testing for breast and ovarian cancer susceptibility  Women with increased risk for having gene mutation  When your risk is known; talk with your healthcare provider    Breast cancer and chemoprevention  Women at high risk for breast cancer  When your risk is known; talk with your healthcare provider    Diet and exercise Women who are overweight or obese  When diagnosed, and then at routine exams    Sexually transmitted infection prevention  Women at increased risk for infection  At routine exams; talk with your healthcare provider    Use of daily aspirin  Women ages 50 and up who are at high risk for cardiovascular health problems and not at increased risk for bleeding as identified by their healthcare provider  When your risk is known; talk with your healthcare provider    Use of tobacco and the health effects it can cause  All women in this age group  Every exam   [employer] last reviewed this educational content on 6/1/2020 2000-2021 [employer]. All rights reserved. This information is not intended as a substitute for professional medical care. Always follow your healthcare professional's instructions.           Patient Education     Established High Blood Pressure    High blood pressure (hypertension) is a long-term (chronic) disease. Often healthcare providers don t know what causes it. But it can be caused by certain health conditions and medicines.  If you have high blood pressure, you may not have any symptoms. If you do have  symptoms, they may include:    Headache    Dizziness    Changes in your vision    Chest pain    Shortness of breath  But even without symptoms, high blood pressure that s not treated raises your risk for heart attack, heart failure, kidney disease, and stroke. High blood pressure is a serious health risk and shouldn t be ignored.  Blood pressure measurements are given as 2 numbers. Systolic blood pressure is the upper number. This is the pressure when the heart contracts. Diastolic blood pressure is the lower number. This is the pressure when the heart relaxes between beats. You will see your blood pressure readings written together. For example, a person with a systolic pressure of 118 and a diastolic pressure of 78 will have 118/78 written in the medical record.  Blood pressure is classified as normal, raised (elevated) or stage 1 or stage 2 high blood pressure:    Normal blood pressure. Systolic of less than 120 and diastolic of less than 80 (120/80).    Elevated blood pressure. Systolic of 120 to 129 and diastolic less than 80.    Stage 1 high blood pressure. Systolic is 130 to 139 or diastolic between 80 to 89.    Stage 2 high blood pressure. Systolic is 140 or higher or the diastolic is 90 or higher.  Home care  If you have high blood pressure, follow these home care guidelines to help lower your blood pressure. If you are taking medicines for high blood pressure, these methods may reduce or end your need for medicines in the future.    Start a weight-loss program if you are overweight.    Cut back on how much salt you get in your diet. Here s how to do this:  ? Don t eat foods that have a lot of salt. These include olives, pickles, smoked meats, and salted potato chips.  ? Don t add salt to your food at the table.  ? Use only small amounts of salt when cooking.    Start an exercise program. Talk with your healthcare provider about the type of exercise program that would be best for you. It doesn't have to be  hard. Even brisk walking for 20 minutes 3 times a week is a good form of exercise.    Don t take medicines that stimulate the heart. This includes many over-the-counter cold and sinus decongestant pills and sprays, as well as diet pills. Check the warnings about high blood pressure on the label. Before buying any over-the-counter medicines or supplements, always ask the pharmacist about the product's possible interaction with your high blood pressure and your high blood pressure medicines.    Stimulants such as amphetamine or cocaine could be deadly for someone with high blood pressure. Never take these.    Limit how much caffeine you get in your diet. Switch to caffeine-free products.    Stop smoking. If you are a long-time smoker, this can be hard. Talk with your healthcare provider about medicines and nicotine replacement options to help you. Also join a stop-smoking program . This makes it more likely that you will quit for good.    Learn how to handle stress. This is an important part of any program to lower blood pressure. Learn about relaxation methods such as meditation, yoga, or biofeedback.    If your provider prescribed medicines, take them exactly as directed. Missing doses may cause your blood pressure get out of control.    If you miss a dose, check with your healthcare provider or pharmacist about what to do.    Think about buying an automatic blood pressure machine to check your blood pressure at home. Ask your provider for a recommendation. You can get one of these at most pharmacies.  Using a home blood pressure monitor  The American Heart Association advises the following guidelines for home blood pressure monitoring:    Don't smoke or drink coffee for 30 minutes before taking your blood pressure.    Go to the bathroom before the test.    Relax for 5 minutes before taking the measurement.    Sit with your back supported (don't sit on a couch or soft chair). Keep your feet on the floor uncrossed.  Place your arm on a solid flat surface (such as a table) with the upper part of the arm at heart level. Place the middle of the cuff directly above the bend of the elbow. Check the monitor's instruction manual for an illustration.    Take multiple readings. When you measure, take 2 to 3 readings one minute apart and record all of the results.    Take your blood pressure at the same time every day, or as your healthcare provider advises.    Record the date, time, and blood pressure reading.    Take the record with you to your next healthcare appointment. If your blood pressure monitor has a built-in memory, just take the monitor with you to your next appointment.    Call your provider if you have several high readings. Don't be frightened by one high blood pressure reading. But if you get a few high readings, check in with your healthcare provider.  Follow-up care  You will need to see your healthcare provider regularly. This is to check your blood pressure and to make changes to your medicines. Make a follow-up appointment as directed. Bring the record of your home blood pressure readings to the appointment.  Call 911  Call 911 if you have any of these:    Blood pressure of 180/120 or higher    Chest pain or shortness of breath    Weakness of an arm or leg or one side of the face    Problems speaking or seeing     When to get medical advice  Call your healthcare provider right away if any of these occur:    Severe headache    Throbbing or rushing sound in the ears    Nosebleed    Sudden severe pain in your belly (abdomen)    Extreme drowsiness, confusion, or fainting    Dizziness or spinning feeling (vertigo)  [employer] last reviewed this educational content on 7/1/2019 2000-2021 [employer]. All rights reserved. This information is not intended as a substitute for professional medical care. Always follow your healthcare professional's instructions.

## 2021-07-14 LAB — DEPRECATED CALCIDIOL+CALCIFEROL SERPL-MC: 35 UG/L (ref 20–75)

## 2021-07-16 DIAGNOSIS — E03.9 HYPOTHYROIDISM, UNSPECIFIED TYPE: ICD-10-CM

## 2021-07-16 RX ORDER — LEVOTHYROXINE SODIUM 75 UG/1
TABLET ORAL
Qty: 90 TABLET | Refills: 3 | Status: SHIPPED | OUTPATIENT
Start: 2021-07-16 | End: 2021-08-17

## 2021-07-16 NOTE — TELEPHONE ENCOUNTER
"      Routing refill request to provider for review/approval because:  Patient had a visit with PCP on 7/13/2021.   Requested Prescriptions   Pending Prescriptions Disp Refills     levothyroxine (SYNTHROID/LEVOTHROID) 75 MCG tablet [Pharmacy Med Name: LEVOTHYROXINE SODIUM 75MCG TABS] 30 tablet 0     Sig: TAKE ONE TABLET BY MOUTH EVERY DAY ; SEPERATE IRON OR CALCIUM CONTAINING PRODUCTS BY ATLEAST 4 HOURS FROM THIS MEDICATION       Thyroid Protocol Failed - 7/16/2021  4:06 AM        Failed - No positive pregnancy test in past 12 months     If patient is pregnant or has had a positive pregnancy test, please check TSH.          Passed - Patient is 12 years or older        Passed - Recent (12 mo) or future (30 days) visit within the authorizing provider's specialty     Patient has had an office visit with the authorizing provider or a provider within the authorizing providers department within the previous 12 mos or has a future within next 30 days. See \"Patient Info\" tab in inbasket, or \"Choose Columns\" in Meds & Orders section of the refill encounter.              Passed - Medication is active on med list        Passed - Normal TSH on file in past 12 months     Recent Labs   Lab Test 07/13/21  0940   TSH 1.14              Passed - No active pregnancy on record     If patient is pregnant or has had a positive pregnancy test, please check TSH.             Cyndy Farmer RN  [facility]            "

## 2021-07-27 ENCOUNTER — ANCILLARY PROCEDURE (OUTPATIENT)
Dept: GENERAL RADIOLOGY | Facility: CLINIC | Age: 56
End: 2021-07-27
Attending: NURSE PRACTITIONER
Payer: COMMERCIAL

## 2021-07-27 ENCOUNTER — OFFICE VISIT (OUTPATIENT)
Dept: FAMILY MEDICINE | Facility: CLINIC | Age: 56
End: 2021-07-27
Payer: COMMERCIAL

## 2021-07-27 VITALS
HEIGHT: 64 IN | SYSTOLIC BLOOD PRESSURE: 133 MMHG | BODY MASS INDEX: 35.85 KG/M2 | WEIGHT: 210 LBS | DIASTOLIC BLOOD PRESSURE: 86 MMHG | HEART RATE: 64 BPM | OXYGEN SATURATION: 98 %

## 2021-07-27 DIAGNOSIS — G89.29 CHRONIC RIGHT HIP PAIN: ICD-10-CM

## 2021-07-27 DIAGNOSIS — M25.551 CHRONIC RIGHT HIP PAIN: ICD-10-CM

## 2021-07-27 DIAGNOSIS — M79.672 CHRONIC HEEL PAIN, LEFT: Primary | ICD-10-CM

## 2021-07-27 DIAGNOSIS — G89.29 CHRONIC HEEL PAIN, LEFT: Primary | ICD-10-CM

## 2021-07-27 DIAGNOSIS — G89.29 CHRONIC HEEL PAIN, LEFT: ICD-10-CM

## 2021-07-27 DIAGNOSIS — M79.672 CHRONIC HEEL PAIN, LEFT: ICD-10-CM

## 2021-07-27 PROCEDURE — 99214 OFFICE O/P EST MOD 30 MIN: CPT | Performed by: NURSE PRACTITIONER

## 2021-07-27 PROCEDURE — 73630 X-RAY EXAM OF FOOT: CPT | Mod: LT | Performed by: RADIOLOGY

## 2021-07-27 PROCEDURE — 73502 X-RAY EXAM HIP UNI 2-3 VIEWS: CPT | Mod: RT | Performed by: RADIOLOGY

## 2021-07-27 ASSESSMENT — MIFFLIN-ST. JEOR: SCORE: 1517.55

## 2021-07-27 NOTE — PATIENT INSTRUCTIONS
Xray today of right hip and left foot.     Left heel pain- will await xray report, but likely referral to podiatry.     Right hip pain- given this is more lateral, possible referred pain from low back.  We will see what xray shows, but it would be reasonable to see if it improves with planned PT for your low back.      Continue Tylenol and/or Aleve as needed.  Ice as needed.  Keep active.

## 2021-07-27 NOTE — PROGRESS NOTES
Assessment & Plan     Chronic heel pain, left  Possibly related to bone spur noted on xray in 2018  Denies pain to plantar surface.   Check xray today, pending.   Possible referral to podiatry  - XR Foot Left G/E 3 Views; Future    Chronic right hip pain  Possibly referred from low back- she actually will be starting PT for her low back later this week.   Check hip xray.   Further plan pending results but discussed would likely wait to see if she has improvement from PT prior to sending her to ortho.    She will continue Tylenol and Aleve as needed.   - XR Hip Right 2-3 Views; Future         Return in about 4 weeks (around 8/24/2021) for If failure to improve, or sooner if worsening.    Cynthia Molina, CNP  [facility]    Kb Hernandez is a 56 year old who presents for the following health issues     HPI       Right hip pain and left heel pain.     Right hip has been an issue for quite a while, but now more consistent.  Getting worse.   Hip pain varies, waxes and wanes.  Sometimes doesn't bother her at all, other times it is very painful and has missed worked because of it.  Tylenol is somewhat helpful.  Pain is in lateral hip, but can have pain in the groin/pelvis. Pain is worse with getting up out of chair and going up stairs. No clicking, popping, does not feel unstable.  Has a history of meralgia paresthetica, has been well controlled with gabapentin, no numbness/tingling.  She does have some chronic low back pain and starts PT for her back later this week.     Left heel pain started about a month ago. Pain is to posterior heel, no pain at plantar surface. Feels stiff, like she is losing flexibility.  Pain is mostly with walking or pressure against the heel.   No trauma.   Denies any redness, swelling or warmth.           Review of Systems   Constitutional, HEENT, cardiovascular, pulmonary, gi and gu systems are negative, except as otherwise noted.      Objective    /86    "Pulse 64   Ht 1.626 m (5' 4\")   Wt 95.3 kg (210 lb)   LMP 08/04/2015 (Approximate)   SpO2 98%   BMI 36.05 kg/m    Body mass index is 36.05 kg/m .  Physical Exam   GENERAL: healthy, alert and no distress  NECK: no adenopathy, no asymmetry, masses, or scars and thyroid normal to palpation  RESP: lungs clear to auscultation - no rales, rhonchi or wheezes  CV: regular rate and rhythm, normal S1 S2, no S3 or S4, no murmur, click or rub, no peripheral edema and peripheral pulses strong  MS: no gross musculoskeletal defects noted, no edema  SKIN: no suspicious lesions or rashes  NEURO: Normal strength and tone, mentation intact and speech normal            "

## 2021-07-28 DIAGNOSIS — M79.672 CHRONIC HEEL PAIN, LEFT: Primary | ICD-10-CM

## 2021-07-28 DIAGNOSIS — G89.29 CHRONIC HEEL PAIN, LEFT: Primary | ICD-10-CM

## 2021-07-29 ENCOUNTER — THERAPY VISIT (OUTPATIENT)
Dept: PHYSICAL THERAPY | Facility: CLINIC | Age: 56
End: 2021-07-29
Attending: NURSE PRACTITIONER
Payer: COMMERCIAL

## 2021-07-29 DIAGNOSIS — M54.50 RIGHT-SIDED LOW BACK PAIN WITHOUT SCIATICA, UNSPECIFIED CHRONICITY: ICD-10-CM

## 2021-07-29 PROCEDURE — 97110 THERAPEUTIC EXERCISES: CPT | Mod: GP | Performed by: PHYSICAL THERAPIST

## 2021-07-29 PROCEDURE — 97161 PT EVAL LOW COMPLEX 20 MIN: CPT | Mod: GP | Performed by: PHYSICAL THERAPIST

## 2021-07-29 PROCEDURE — 97112 NEUROMUSCULAR REEDUCATION: CPT | Mod: GP | Performed by: PHYSICAL THERAPIST

## 2021-07-29 NOTE — PROGRESS NOTES
Physical Therapy Initial Evaluation  Subjective:    Patient Health History  Mary Sims being seen for Lower back pain.     Problem began: 7/13/2021 (MD visit).   Problem occurred: 3 months ago insidious onset central LBP potentially after being more sedentary after surgery in February.  Progressively getting worse.   Pain is reported as 10/10 (1-10/10) on pain scale.  General health as reported by patient is fair.  Pertinent medical history includes: anemia, cancer, high blood pressure, migraines/headaches, overweight and thyroid problems (osteopenia).     Medical allergies: none.   Surgeries include:  Orthopedic surgery, cancer surgery and other. Other surgery history details: Rotator cuff, foot, 2/9/21 endometrial cancer.    Current medications:  High blood pressure medication, pain medication and thyroid medication.    Current occupation  at [employer].   Primary job tasks include:  Prolonged standing.                  Therapist Generated HPI Evaluation         Type of problem:  Lumbar.    This is a chronic condition.  Condition occurred with:  Repetition/overuse (Prolonged sitting following surgery in February to being on feet most of day at work.).  Where condition occurred: at home and at work.  Patient reports pain:  Lower lumbar spine, central lumbar spine and upper lumbar spine.  Pain is described as shooting and sharp (hard ache) and is intermittent.  Pain radiates to:  Thigh right (R Lateral Hip Proximal Thigh). Pain is worse in the P.M. (worse after workshift).  Since onset symptoms are gradually worsening.  Associated symptoms:  Loss of motion/stiffness. Symptoms are exacerbated by sitting and standing (standing 1 hour at work, by end of 8 hour work shift up to 10/10 pain, rolling/shifting in bed, getting up from sitting after 2 hour car trip very stiff)  and relieved by analgesics and rest (heat from car seat).  Special tests included:  X-ray (hip xray normal, chronic L heel pain (xray  midfoot degeneration, plantar calcaneal spur).    Restrictions due to condition include:  Working in normal job without restrictions.  Barriers include:  None as reported by patient.                        Objective:  Standing Alignment:    Cervical/Thoracic:  Thoracic kyphosis increased                Gait:    Gait Type:  Normal                    Lumbar/SI Evaluation  ROM:    AROM Lumbar:   Flexion:          Mod loss   Ext:                    Min mod loss   Side Bend:        Left:     Right:   Rotation:           Left:     Right:   Side Glide:        Left:  No loss    Right:  No loss          Lumbar Myotomes:    T12-L3 (Hip Flex):  Left: 5    Right: 5  L2-4 (Quads):  Left:  5    Right:  5  L4 (Ankle DF):  Left:  5    Right:  5  L5 (Great Toe Ext): Left: 4+    Right: 4+         Lumbar Dermtomes:  not assessed                Neural Tension/Mobility:      Left side:Slump  negative.   Right side:   Slump positive.                                                         Mahesh Lumbar Evaluation    Posture:  Sitting: fair  Standing: fair  Lordosis: Accentuated  Lateral Shift: no  Correction of Posture: better  Other Observations: Pain with over-correct position, lessens with reps.      Test Movements:  FIS: During: no effect  After: no effect  Pretest Movements: 0/10   Repeat FIS: During: no effect  After: no effect    EIS: During: no effect  After: no effect    Repeat EIS: During: no effect  After: no effect  Mechanical Response: no effect    EIL: During: no effect  After: no effect    Repeat EIL: During: no effect  After: no effect  Mechanical Response: IncROM                                                 ROS    Assessment/Plan:    Patient is a 56 year old female with lumbar complaints.    Patient has the following significant findings with corresponding treatment plan.                Diagnosis 1:  R /central LBP without sciatica    Pain -  manual therapy, self management, education and home program  Decreased  ROM/flexibility - manual therapy, therapeutic exercise, therapeutic activity and home program  Decreased strength - therapeutic exercise, therapeutic activities and home program  Decreased function - therapeutic activities and home program  Impaired posture - neuro re-education, therapeutic activities and home program    Therapy Evaluation Codes:   Cumulative Therapy Evaluation is: Low complexity.    Previous and current functional limitations:  (See Goal Flow Sheet for this information)    Short term and Long term goals: (See Goal Flow Sheet for this information)     Communication ability:  Patient appears to be able to clearly communicate and understand verbal and written communication and follow directions correctly.  Treatment Explanation - The following has been discussed with the patient:   RX ordered/plan of care  Anticipated outcomes  Possible risks and side effects  This patient would benefit from PT intervention to resume normal activities.   Rehab potential is good.    Frequency:  1 X week, once daily  Duration:  for 6 weeks  Discharge Plan:  Achieve all LTG.  Independent in home treatment program.  Reach maximal therapeutic benefit.    Please refer to the daily flowsheet for treatment today, total treatment time and time spent performing 1:1 timed codes.

## 2021-07-29 NOTE — LETTER
[facility]  [address]  [facility] [address]  [phone]    2021    Re: Mary Sims   :   1965  MRN:  [id number]   REFERRING PHYSICIAN:   Janie ORTEGA [facility]  Date of Initial Evaluation:  21  Visits:  Rxs Used: 1  Reason for Referral:  Right-sided low back pain without sciatica, unspecified chronicity    EVALUATION SUMMARY  Physical Therapy Initial Evaluation  Subjective:    Patient Health History  Mary Sims being seen for Lower back pain.   Problem began: 2021 (MD visit).   Problem occurred: 3 months ago insidious onset central LBP potentially after being more sedentary after surgery in February.  Progressively getting worse.   Pain is reported as 10/10 (1-10/10) on pain scale.  General health as reported by patient is fair.  Pertinent medical history includes: anemia, cancer, high blood pressure, migraines/headaches, overweight and thyroid problems (osteopenia).     Medical allergies: none.   Surgeries include:  Orthopedic surgery, cancer surgery and other. Other surgery history details: Rotator cuff, foot, 19 endometrial cancer.    Current medications:  High blood pressure medication, pain medication and thyroid medication.    Current occupation  at [email or web address].   Primary job tasks include:  Prolonged standing.                Therapist Generated HPI Evaluation  Type of problem:  Lumbar.  This is a chronic condition.  Condition occurred with:  Repetition/overuse (Prolonged sitting following surgery in February to being on feet most of day at work.).  Where condition occurred: at home and at work.  Patient reports pain:  Lower lumbar spine, central lumbar spine and upper lumbar spine.  Pain is described as shooting and sharp (hard ache) and is intermittent.  Pain radiates to:  Thigh right (R Lateral Hip Proximal Thigh). Pain is worse in the  P.M. (worse after workshift).  Since onset symptoms are gradually worsening.  Associated symptoms:  Loss of motion/stiffness. Symptoms are exacerbated by sitting and standing (standing 1 hour at work, by end of 8 hour work shift up to 10/10 pain, rolling/shifting in bed, getting up from sitting after 2 hour car trip very stiff)  and relieved by analgesics and rest (heat from car seat).  Re: Mary Sims   :   1965      Special tests included:  X-ray (hip xray normal, chronic L heel pain (xray midfoot degeneration, plantar calcaneal spur).  Restrictions due to condition include:  Working in normal job without restrictions.  Barriers include:  None as reported by patient.    Objective:  Standing Alignment:    Cervical/Thoracic:  Thoracic kyphosis increased  Gait:    Gait Type:  Normal          Lumbar/SI Evaluation  ROM:    AROM Lumbar:   Flexion:          Mod loss   Ext:                    Min mod loss   Side Glide:        Left:  No loss    Right:  No loss  Lumbar Myotomes:    T12-L3 (Hip Flex):  Left: 5    Right: 5  L2-4 (Quads):  Left:  5    Right:  5  L4 (Ankle DF):  Left:  5    Right:  5  L5 (Great Toe Ext): Left: 4+    Right: 4+   Lumbar Dermtomes:  not assessed  Neural Tension/Mobility:    Left side:Slump  negative.   Right side:   Slump positive.    Mahesh Lumbar Evaluation  Posture:  Sitting: fair  Standing: fair  Lordosis: Accentuated  Lateral Shift: no  Correction of Posture: better  Other Observations: Pain with over-correct position, lessens with reps.    Test Movements:  FIS: During: no effect  After: no effect  Pretest Movements: 0/10   Repeat FIS: During: no effect  After: no effect    EIS: During: no effect  After: no effect    Repeat EIS: During: no effect  After: no effect  Mechanical Response: no effect  EIL: During: no effect  After: no effect    Repeat EIL: During: no effect  After: no effect  Mechanical Response: IncROM    Assessment/Plan:    Patient is a 56 year old female with lumbar  complaints.    Patient has the following significant findings with corresponding treatment plan.                Diagnosis 1:  R /central LBP without sciatica    Pain -  manual therapy, self management, education and home program  Decreased ROM/flexibility - manual therapy, therapeutic exercise, therapeutic activity and home program  Re: Mary Sims   :   1965    Decreased strength - therapeutic exercise, therapeutic activities and home program  Decreased function - therapeutic activities and home program  Impaired posture - neuro re-education, therapeutic activities and home program    Therapy Evaluation Codes:   Cumulative Therapy Evaluation is: Low complexity.    Previous and current functional limitations:  (See Goal Flow Sheet for this information)    Short term and Long term goals: (See Goal Flow Sheet for this information)     Communication ability:  Patient appears to be able to clearly communicate and understand verbal and written communication and follow directions correctly.  Treatment Explanation - The following has been discussed with the patient:     RX ordered/plan of care  Anticipated outcomes  Possible risks and side effects  This patient would benefit from PT intervention to resume normal activities.   Rehab potential is good.    Frequency:  1 X week, once daily  Duration:  for 6 weeks  Discharge Plan:  Achieve all LTG.  Independent in home treatment program.  Reach maximal therapeutic benefit.        Thank you for your referral.    INQUIRIES  Therapist: Stephanie Hardwick PT  [facility]  [address] [address]  Phone: [phone]  Fax: [phone]

## 2021-08-05 ENCOUNTER — THERAPY VISIT (OUTPATIENT)
Dept: PHYSICAL THERAPY | Facility: CLINIC | Age: 56
End: 2021-08-05
Payer: COMMERCIAL

## 2021-08-05 DIAGNOSIS — M54.50 RIGHT-SIDED LOW BACK PAIN WITHOUT SCIATICA, UNSPECIFIED CHRONICITY: ICD-10-CM

## 2021-08-05 PROCEDURE — 97110 THERAPEUTIC EXERCISES: CPT | Mod: GP | Performed by: PHYSICAL THERAPIST

## 2021-08-05 PROCEDURE — 97530 THERAPEUTIC ACTIVITIES: CPT | Mod: GP | Performed by: PHYSICAL THERAPIST

## 2021-08-05 PROCEDURE — 97112 NEUROMUSCULAR REEDUCATION: CPT | Mod: GP | Performed by: PHYSICAL THERAPIST

## 2021-08-10 ENCOUNTER — OFFICE VISIT (OUTPATIENT)
Dept: PODIATRY | Facility: CLINIC | Age: 56
End: 2021-08-10
Attending: NURSE PRACTITIONER
Payer: COMMERCIAL

## 2021-08-10 VITALS
WEIGHT: 210 LBS | DIASTOLIC BLOOD PRESSURE: 60 MMHG | BODY MASS INDEX: 36.05 KG/M2 | SYSTOLIC BLOOD PRESSURE: 118 MMHG | HEART RATE: 65 BPM

## 2021-08-10 DIAGNOSIS — M76.62 ACHILLES TENDINITIS OF LEFT LOWER EXTREMITY: Primary | ICD-10-CM

## 2021-08-10 PROCEDURE — 99203 OFFICE O/P NEW LOW 30 MIN: CPT | Performed by: PODIATRIST

## 2021-08-10 NOTE — LETTER
8/10/2021         RE: Mary Sims  [address]  [facility] [address]        Dear Colleague,    Thank you for referring your patient, Mary Sims, to the [facility]. Please see a copy of my visit note below.      Pt is seen today in consult from Cynthia Molina with the c/c of painful left foot.  This has been symptomatic intermittently for years and recently became worse over the last 2 months..  Pt denies any hx of trauma.  Aggravated by activity and relieved by rest.  Describes as burning pain.  Is slowly getting worse.  Going up stairs is painful.  Points to posterior heel.  History of contralateral foot surgery in past and has no pain here now.    ROS:  A 10-point review of systems was performed and is positive for that noted in the HPI and as seen above.  All other areas are negative.        No Known Allergies    Current Outpatient Medications   Medication Sig Dispense Refill     gabapentin (NEURONTIN) 300 MG capsule TAKE ONE CAPSULE BY MOUTH THREE TIMES A  capsule 3     levothyroxine (SYNTHROID/LEVOTHROID) 75 MCG tablet TAKE ONE TABLET BY MOUTH EVERY DAY ; SEPERATE IRON OR CALCIUM CONTAINING PRODUCTS BY ATLEAST 4 HOURS FROM THIS MEDICATION 90 tablet 3     metoprolol succinate ER (TOPROL-XL) 25 MG 24 hr tablet Take 1 tablet (25 mg) by mouth daily 90 tablet 3     vitamin D2 (ERGOCALCIFEROL) 96416 units (1250 mcg) capsule Take 1 capsule (50,000 Units) by mouth once a week (Patient taking differently: Take 50,000 Units by mouth once a week ) 12 capsule 0       Patient Active Problem List   Diagnosis     CARDIOVASCULAR SCREENING; LDL GOAL LESS THAN 160     Hypothyroidism     Hypertension goal BP (blood pressure) < 140/90     WONG (iron deficiency anemia)     Obesity     Nontraumatic tear of supraspinatus tendon, left     Insomnia, unspecified type     De Quervain's disease (tenosynovitis)     Right-sided low back pain without sciatica, unspecified chronicity     Arthritis of  foot, right     Morbid obesity (H)     Encounter for routine adult health examination without abnormal findings     Osteopenia of multiple sites     Endometrial cancer (H)       Past Medical History:   Diagnosis Date     Arthritis of foot, right 2019     Endometrial cancer (H) 2021     Graves disease      Hypertension      Hypothyroidism      MEDICAL HISTORY OF -     S/P RADIOACTIVE IODINE     Morbid obesity (H) 2020     Osteopenia of multiple sites 2020       Past Surgical History:   Procedure Laterality Date     ARTHRODESIS FOOT Right 2020    Procedure: MIDFOOT BONE SPUR RESECTION WITH JOINT FUSIONS RIGHT LOWER EXTREMITY;  Surgeon: Choco Frances DPM;  Location: [facility]     ARTHROSCOPY SHLDR ROTATOR CUFF REPAIR, SUBACROMIAL DECOMP, DIST CLAVICLE RESECTION, BICEP TENODESIS Left 2017    Procedure: ARTHROSCOPY SHOULDER ROTATOR CUFF REPAIR, SUBACROMIAL DECOMPRESSION, DISTAL CLAVICLE RESECTION, OPEN BICEP TENODESIS REPAIR;  Surgeon: Dorina Rodriguez MD;  [facility]: [facility]     C  DELIVERY ONLY       COLONOSCOPY  2013    Procedure: COLONOSCOPY;  colonoscopy, screening;  Surgeon: Dalton Lala MD;  Location: [facility] OR     CYSTOSCOPY  2020     CYSTOSCOPY N/A 2021    Procedure: Cystoscopy;  Surgeon: Zacarias Brandon MD;  Location: [facility] OR     DAVINCI HYSTERECTOMY TOTAL, BILATERAL SALPINGO-OOPHORECTOMY, COMBINED Bilateral 2021    Procedure: HYSTERECTOMY, TOTAL, ROBOT-ASSISTED, LAPAROSCOPIC, WITH SALPINGO-OOPHORECTOMY, PELVIC WASHINGS, SENTINEL LYMPHNODE SAMPLING;  Surgeon: Zacarias Brandon MD;  Location: [facility] OR     SURGICAL HISTORY OF -       RT ANKLE Fx AND REPAIR (PIN,PLATE,SCREWS)     THYROIDECTOMY       TUBAL LIGATION         Family History   Problem Relation Age of Onset     Hypertension Mother      Diabetes Mother      Hypertension Father      Alcohol/Drug Father         alcohol     Crohn's Disease Son      Hypertension Other      Cancer No  family hx of      Cerebrovascular Disease No family hx of      Thyroid Disease No family hx of      Glaucoma No family hx of      Macular Degeneration No family hx of        Social History     Tobacco Use     Smoking status: Never Smoker     Smokeless tobacco: Never Used   Substance Use Topics     Alcohol use: Yes     Comment: SELDOM          Exam:    Vitals: /60   Pulse 65   Wt 95.3 kg (210 lb)   LMP 08/04/2015 (Approximate)   BMI 36.05 kg/m    BMI: Body mass index is 36.05 kg/m .  Height: Data Unavailable    Constitutional/ general:  Pt is in no apparent distress, appears well-nourished.  Cooperative with history and physical exam.     Psych:  The patient answered questions appropriately.  Normal affect.  Seems to have reasonable expectations, in terms of treatment.     Eyes:  Visual scanning/ tracking without deficit.     Ears:  Response to auditory stimuli is normal.  negative hearing aid devices.  Auricles in proper alignment.     Lymphatic:  Popliteal lymph nodes not enlarged.     Lungs:  Non labored breathing, non labored speech. No cough.  No audible wheezing. Even, quiet breathing.       Vascular:  positive pedal pulses bilaterally for both the DP and PT arteries.  CFT < 3 sec. positive ankle edema.  positive pedal hair growth.    Neuro:  Alert and oriented x 3. Coordinated gait.  Light touch sensation is intact to the L4, L5, S1 distributions. No obvious deficits.  No evidence of neurological-based weakness, spasticity, or contracture in the lower extremities.      Derm: Normal texture and turgor.  No erythema, ecchymosis, or cyanosis.      Musculoskeletal:    Patient is ambulatory without an assistive device or brace.   Normal arch foot with weightbearing bilateral.  No forefoot or rear foot deformities noted.  MS 5/5 all compartments.  Normal ROM all fore foot and rearfoot joints with no pain or crepitus.  No equinus.  Pain on posterior and posterior medial left Achilles insertion.  No  posterior lateral foot pain.  No pain over bursa.     Radiographic Exam:  Xrays unremarkable    A:  Insertional Achilles Tendonitis left foot     Plan:  X-rays from past personally reviewed..  Discussed etiology and treatment options in detail w/ the pt.  Dispense heel lift.  Patient to wear good shoes at all times at all times both inside and outside and I made suggestions..   Avoid activities that aggravated this and we discussed what will bother this.  Ice twice a day for 20 minutes.  She will do gentle stretching.  Discussed over-the-counter arch supports may be helpful.  She has had orthotics made in the past.  She states the left was uncomfortable.  She will return to the orthotist to get this adjusted so it is comfortable and discussed how this could be helpful as well.  Discussed other treatment options such as physical therapy.  She will call me if she would like to pursue this.  Thank you for allowing me participate in the care of this patient.        Guero Ibarra DPM, FACFAS             Again, thank you for allowing me to participate in the care of your patient.        Sincerely,        Guero Ibarra DPM

## 2021-08-10 NOTE — PROGRESS NOTES
Pt is seen today in consult from Cynthia Molina with the c/c of painful left foot.  This has been symptomatic intermittently for years and recently became worse over the last 2 months..  Pt denies any hx of trauma.  Aggravated by activity and relieved by rest.  Describes as burning pain.  Is slowly getting worse.  Going up stairs is painful.  Points to posterior heel.  History of contralateral foot surgery in past and has no pain here now.    ROS:  A 10-point review of systems was performed and is positive for that noted in the HPI and as seen above.  All other areas are negative.        No Known Allergies    Current Outpatient Medications   Medication Sig Dispense Refill     gabapentin (NEURONTIN) 300 MG capsule TAKE ONE CAPSULE BY MOUTH THREE TIMES A  capsule 3     levothyroxine (SYNTHROID/LEVOTHROID) 75 MCG tablet TAKE ONE TABLET BY MOUTH EVERY DAY ; SEPERATE IRON OR CALCIUM CONTAINING PRODUCTS BY ATLEAST 4 HOURS FROM THIS MEDICATION 90 tablet 3     metoprolol succinate ER (TOPROL-XL) 25 MG 24 hr tablet Take 1 tablet (25 mg) by mouth daily 90 tablet 3     vitamin D2 (ERGOCALCIFEROL) 89897 units (1250 mcg) capsule Take 1 capsule (50,000 Units) by mouth once a week (Patient taking differently: Take 50,000 Units by mouth once a week ) 12 capsule 0       Patient Active Problem List   Diagnosis     CARDIOVASCULAR SCREENING; LDL GOAL LESS THAN 160     Hypothyroidism     Hypertension goal BP (blood pressure) < 140/90     WONG (iron deficiency anemia)     Obesity     Nontraumatic tear of supraspinatus tendon, left     Insomnia, unspecified type     De Quervain's disease (tenosynovitis)     Right-sided low back pain without sciatica, unspecified chronicity     Arthritis of foot, right     Morbid obesity (H)     Encounter for routine adult health examination without abnormal findings     Osteopenia of multiple sites     Endometrial cancer (H)       Past Medical History:   Diagnosis Date     Arthritis of foot,  right 2019     Endometrial cancer (H) 2021     Graves disease      Hypertension      Hypothyroidism      MEDICAL HISTORY OF -     S/P RADIOACTIVE IODINE     Morbid obesity (H) 2020     Osteopenia of multiple sites 2020       Past Surgical History:   Procedure Laterality Date     ARTHRODESIS FOOT Right 2020    Procedure: MIDFOOT BONE SPUR RESECTION WITH JOINT FUSIONS RIGHT LOWER EXTREMITY;  Surgeon: Choco Frances DPM;  Location: [facility]     ARTHROSCOPY SHLDR ROTATOR CUFF REPAIR, SUBACROMIAL DECOMP, DIST CLAVICLE RESECTION, BICEP TENODESIS Left 2017    Procedure: ARTHROSCOPY SHOULDER ROTATOR CUFF REPAIR, SUBACROMIAL DECOMPRESSION, DISTAL CLAVICLE RESECTION, OPEN BICEP TENODESIS REPAIR;  Surgeon: Dorina Rodriguez MD;  [facility]     C  DELIVERY ONLY       COLONOSCOPY  2013    Procedure: COLONOSCOPY;  colonoscopy, screening;  Surgeon: Dalton Lala MD;  Location: [facility]     CYSTOSCOPY  2020     CYSTOSCOPY N/A 2021    Procedure: Cystoscopy;  Surgeon: Zacarias Brandon MD;  [facility]     DAVINCI HYSTERECTOMY TOTAL, BILATERAL SALPINGO-OOPHORECTOMY, COMBINED Bilateral 2021    Procedure: HYSTERECTOMY, TOTAL, ROBOT-ASSISTED, LAPAROSCOPIC, WITH SALPINGO-OOPHORECTOMY, PELVIC WASHINGS, SENTINEL LYMPHNODE SAMPLING;  Surgeon: Zacarias Brandon MD;  Location: [facility]     SURGICAL HISTORY OF -       RT ANKLE Fx AND REPAIR (PIN,PLATE,SCREWS)     THYROIDECTOMY       TUBAL LIGATION         Family History   Problem Relation Age of Onset     Hypertension Mother      Diabetes Mother      Hypertension Father      Alcohol/Drug Father         alcohol     Crohn's Disease Son      Hypertension Other      Cancer No family hx of      Cerebrovascular Disease No family hx of      Thyroid Disease No family hx of      Glaucoma No family hx of      Macular Degeneration No family hx of        Social History     Tobacco Use     Smoking status: Never Smoker      Smokeless tobacco: Never Used   Substance Use Topics     Alcohol use: Yes     Comment: SELDOM          Exam:    Vitals: /60   Pulse 65   Wt 95.3 kg (210 lb)   LMP 08/04/2015 (Approximate)   BMI 36.05 kg/m    BMI: Body mass index is 36.05 kg/m .  Height: Data Unavailable    Constitutional/ general:  Pt is in no apparent distress, appears well-nourished.  Cooperative with history and physical exam.     Psych:  The patient answered questions appropriately.  Normal affect.  Seems to have reasonable expectations, in terms of treatment.     Eyes:  Visual scanning/ tracking without deficit.     Ears:  Response to auditory stimuli is normal.  negative hearing aid devices.  Auricles in proper alignment.     Lymphatic:  Popliteal lymph nodes not enlarged.     Lungs:  Non labored breathing, non labored speech. No cough.  No audible wheezing. Even, quiet breathing.       Vascular:  positive pedal pulses bilaterally for both the DP and PT arteries.  CFT < 3 sec. positive ankle edema.  positive pedal hair growth.    Neuro:  Alert and oriented x 3. Coordinated gait.  Light touch sensation is intact to the L4, L5, S1 distributions. No obvious deficits.  No evidence of neurological-based weakness, spasticity, or contracture in the lower extremities.      Derm: Normal texture and turgor.  No erythema, ecchymosis, or cyanosis.      Musculoskeletal:    Patient is ambulatory without an assistive device or brace.   Normal arch foot with weightbearing bilateral.  No forefoot or rear foot deformities noted.  MS 5/5 all compartments.  Normal ROM all fore foot and rearfoot joints with no pain or crepitus.  No equinus.  Pain on posterior and posterior medial left Achilles insertion.  No posterior lateral foot pain.  No pain over bursa.     Radiographic Exam:  Xrays unremarkable    A:  Insertional Achilles Tendonitis left foot     Plan:  X-rays from past personally reviewed..  Discussed etiology and treatment options in detail w/  the pt.  Dispense heel lift.  Patient to wear good shoes at all times at all times both inside and outside and I made suggestions..   Avoid activities that aggravated this and we discussed what will bother this.  Ice twice a day for 20 minutes.  She will do gentle stretching.  Discussed over-the-counter arch supports may be helpful.  She has had orthotics made in the past.  She states the left was uncomfortable.  She will return to the orthotist to get this adjusted so it is comfortable and discussed how this could be helpful as well.  Discussed other treatment options such as physical therapy.  She will call me if she would like to pursue this.  Thank you for allowing me participate in the care of this patient.        Guero Ibarra, EVELYN, FACFAS

## 2021-08-10 NOTE — PATIENT INSTRUCTIONS
We wish you continued good healing. If you have any questions or concerns, please do not hesitate to contact us at [phone]    [employer] (secure e-mail communication and access to your chart) to send a message or to make an appointment.    Please remember to call and schedule a follow up appointment if one was recommended at your earliest convenience.     +++OF MARCH 2020+++ LOCATION AND HOURS HAVE CHANGED    PLEASE CALL CLINICS TO VERIFY DAYS AND TIMES  PODIATRY CLINIC HOURS  TELEPHONE NUMBER    Dr. Guero MARIAPJOHN [facility]        Clinics:  [facility]   Tuesday 1PM-6PM  [place]  Wednesday 745AM-330PM  [place]  Thursday/Friday 745AM-230PM  [facility]END[facility] APPOINTMENTS  [phone]    [place] APPOINTMENTS  [phone]          If you need a medication refill, please contact us you may need lab work and/or a follow up visit prior to your refill (i.e. Antifungal medications).    If MRI needed please call Imaging at [phone] or [phone]    HOW DO I GET MY KNEE SCOOTER? Knee scooters can be picked up at ANY Medical Supply stores with your knee scooter Prescription.  OR    Bring your signed prescription to an [facility] showroom.

## 2021-08-12 ENCOUNTER — THERAPY VISIT (OUTPATIENT)
Dept: PHYSICAL THERAPY | Facility: CLINIC | Age: 56
End: 2021-08-12
Payer: COMMERCIAL

## 2021-08-12 DIAGNOSIS — M54.50 RIGHT-SIDED LOW BACK PAIN WITHOUT SCIATICA, UNSPECIFIED CHRONICITY: ICD-10-CM

## 2021-08-12 PROCEDURE — 97112 NEUROMUSCULAR REEDUCATION: CPT | Mod: GP | Performed by: PHYSICAL THERAPIST

## 2021-08-12 PROCEDURE — 97530 THERAPEUTIC ACTIVITIES: CPT | Mod: GP | Performed by: PHYSICAL THERAPIST

## 2021-08-12 PROCEDURE — 97110 THERAPEUTIC EXERCISES: CPT | Mod: GP | Performed by: PHYSICAL THERAPIST

## 2021-08-12 NOTE — PROGRESS NOTES
Physical Therapy Initial Evaluation  Subjective:  HPI  Oswestry Score: 4 %                 Objective:  System    Physical Exam    General     ROS    Assessment/Plan:    DISCHARGE REPORT    Progress reporting period is from 7/29/21 to 8/12/21, 3 visits.       SUBJECTIVE  Subjective changes noted by patient:  Low back is really not too bad.  If starts feeling strain starts moving, changing positions and is more aware of posture and body mechanics.  Is catching self if bending wrong.  No longer having daily pain.  Has only done EIS, not EIL.  Patient feeling like can progress independently with home exercise program.    Current Pain level: 0/10.     Initial Pain level: 10/10.   Changes in function:  Yes (See Goal flowsheet attached for changes in current functional level)  Adverse reaction to treatment or activity: None    OBJECTIVE  Changes noted in objective findings:  Yes:  LROM flexion min loss, ext min loss, sideglide no loss B.    Lumbar EIS NE/NE no pain, Lumbar EIL NE/NE no pain.    Good response to posture, body mechanics and extension exercises.    (-) B slump.    (-) hip cullen, FADIR and hip scour.  Educate patient if gets lateral hip pain most likely stemming from low back, now hip (hip xray normal as well).    Educate perform EIL to further progress and abolish symptoms.    Oswestry Low back Disability Index 18% to 4%.     ASSESSMENT/PLAN  Updated problem list and treatment plan: Diagnosis 1:  R LBP without sciatica    Pain -  home program  Decreased ROM/flexibility - home program  Decreased strength - home program  Decreased function - home program  Impaired posture - home program  STG/LTGs have been met or progress has been made towards goals:  Yes (See Goal flow sheet completed today.)  Assessment of Progress: The patient's condition is improving very well and takes responsibility for her pain and modifying posture/body mechanics.  Suspect symptoms would improve further and even faster if performed EIL  at home.  Self Management Plans:  Patient is independent in a home treatment program.  Patient is independent in self management of symptoms.  I have re-evaluated this patient and find that the nature, scope, duration and intensity of the therapy is appropriate for the medical condition of the patient.  Mary continues to require the following intervention to meet STG and LTG's:  PT intervention is no longer required to meet STG/LTG.    Recommendations:  This patient is ready to be discharged from therapy and continue their home treatment program.    Please refer to the daily flowsheet for treatment today, total treatment time and time spent performing 1:1 timed codes.

## 2021-08-14 DIAGNOSIS — G57.11 MERALGIA PARESTHETICA, RIGHT LOWER LIMB: ICD-10-CM

## 2021-08-14 DIAGNOSIS — I10 HYPERTENSION GOAL BP (BLOOD PRESSURE) < 140/90: ICD-10-CM

## 2021-08-16 NOTE — TELEPHONE ENCOUNTER
Routing refill request to provider for review/approval because:  Drug not on the [facility] refill protocol   gabapentin    Glory Hansen BSN, RN

## 2021-08-17 DIAGNOSIS — E03.9 HYPOTHYROIDISM, UNSPECIFIED TYPE: ICD-10-CM

## 2021-08-17 RX ORDER — LEVOTHYROXINE SODIUM 75 UG/1
TABLET ORAL
Qty: 90 TABLET | Refills: 2 | Status: SHIPPED | OUTPATIENT
Start: 2021-08-17 | End: 2022-04-27

## 2021-08-17 NOTE — TELEPHONE ENCOUNTER
[facility] uses [facility] and wanting prescription sent to them for the levothyroxine.  Prescription sent.  Laura Pereira RN

## 2021-08-17 NOTE — TELEPHONE ENCOUNTER
levothyroxine (SYNTHROID/LEVOTHROID) 75 MCG tablet 90 tablet 3 7/16/2021     [facility] Received a fax  Stating RX was given  For the med below. It was filled at a retail pharmacy on 7/17/2021 for 30 days/0 refills & Patient would like to receive a 90 day supply by mail. Please review, and if valid to fill. Please transfer Rx form [facility] [phone].

## 2021-08-18 RX ORDER — METOPROLOL SUCCINATE 25 MG/1
25 TABLET, EXTENDED RELEASE ORAL DAILY
Qty: 90 TABLET | Refills: 3 | Status: SHIPPED | OUTPATIENT
Start: 2021-08-18 | End: 2022-06-28

## 2021-08-18 RX ORDER — GABAPENTIN 300 MG/1
CAPSULE ORAL
Qty: 180 CAPSULE | Refills: 3 | Status: SHIPPED | OUTPATIENT
Start: 2021-08-18 | End: 2022-06-28

## 2021-09-26 ENCOUNTER — HEALTH MAINTENANCE LETTER (OUTPATIENT)
Age: 56
End: 2021-09-26

## 2021-09-27 NOTE — PROGRESS NOTES
Gynecologic Oncology Follow Up Note    Date: 2021    RE: Mary Sims  : 1965  PAULA: 2021    CC: Stage IA grade 1 endometrioid endometrial adenocarcinoma     HPI:  Mary Sims is a 56 year old woman with a history of stage IA grade 1 endometrioid endometrial adenocarcinoma.  She is s/p TLH-BSO 21 which served as her treatment.  She is here today for a surveillance visit.      Oncology History:     Initially, se was seen for vaginal spotting.     2020: Pelvic US  IMPRESSION: Thickened endometrium up to 24 mm in transvaginal  measurement for a postmenopausal patient comment given the reported  history of postmenopausal bleeding, this does raise concern for  neoplasia/hyperplasia and tissue sampling is recommended. Small amount  of free fluid in the posterior cul-de-sac and left adnexa.  Nonvisualization of left ovary. The visualized right ovary appears  Normal.     21: EMB  FINAL DIAGNOSIS:   Uterus, endometrium: Biopsy:   - At least complex atypical hyperplasia with foci bordering on FIGO grade   1 endometrioid carcinoma, see   comment      21: Robotic-assisted total laparoscopic hysterectomy, bilateral salpingo-oopherectomy, sentinel lymph node mapping and sampling, cystoscopy  FINAL DIAGNOSIS:   A. UTERUS, CERVIX, BILATERAL TUBES AND OVARIES, HYSTERECTOMY AND BILATERAL    SALPINGO-OOPHORECTOMY:   - Endometrioid endometrial adenocarcinoma, FIGO grade 1, MMR proficient   - Unremarkable ovaries with follicular cysts   - Cervix with reactive changes and nabothian cysts   - Unremarkable fallopian tubes   B. SENTINEL LYMPH NODE, RIGHT EXTERNAL ILIAC, LYMPHADENECTOMY:   - One reactive lymph node, negative for malignancy (0/1)   C. SENTINEL LYMPH NODE, LEFT PELVIC, LYMPHADENECTOMY:   - One reactive lymph node, negative for malignancy (0/1)                Today she comes to clinic feeling well and is without concern.  She denies any vaginal bleeding, no changes in her bowel or bladder  habits, no nausea/emesis, no lower extremity edema, and no difficulties eating or sleeping. She denies any abdominal discomfort/bloating, no fevers or chills, and no chest pain or shortness of breath. She is current with her annual physical, colon cancer screening, mammogram, dexa scan, and she is fully vaccinated against COVID.               Health Maintenance  Colonoscopy: 8/27/2013  Mammogram: 3/3/21  Annual physical: 7/13/21  Dexa: 9/14/2020  COVID vaccine: 3/21/21, 4/10/21      Review of Systems:    Systemic           no weight changes; no fever; no chills; no night sweats; no appetite changes  Skin           no rashes, or lesions  Eye           no irritation; no changes in vision  Trent-Laryngeal           no dysphagia; no hoarseness   Pulmonary    no cough; no shortness of breath  Cardiovascular    no chest pain; no palpitations  Gastrointestinal    no diarrhea; no constipation; no abdominal pain; no changes in bowel habits; no blood in stool  Genitourinary   no urinary frequency; no urinary urgency; no dysuria; no pain; no abnormal vaginal discharge; no abnormal vaginal bleeding  Breast   no breast discharge; no breast changes; no breast pain  Musculoskeletal    no myalgias; no arthralgias; no back pain  Psychiatric           no depressed mood; no anxiety    Hematologic   no tender lymph nodes; no noticeable swellings or lumps   Endocrine    no hot flashes; no heat/cold intolerance         Neurological   no tremor; no numbness and tingling; no headaches; no difficulty sleeping      Past Medical History:    Past Medical History:   Diagnosis Date     Arthritis of foot, right 12/13/2019     Endometrial cancer (H) 01/13/2021     Graves disease      Hypertension      Hypothyroidism      MEDICAL HISTORY OF -     S/P RADIOACTIVE IODINE     Morbid obesity (H) 07/08/2020     Osteopenia of multiple sites 09/14/2020         Past Surgical History:    Past Surgical History:   Procedure Laterality Date     ARTHRODESIS FOOT  Right 2020    Procedure: MIDFOOT BONE SPUR RESECTION WITH JOINT FUSIONS RIGHT LOWER EXTREMITY;  Surgeon: Choco Frances DPM;  Location: [facility]     ARTHROSCOPY SHLDR ROTATOR CUFF REPAIR, SUBACROMIAL DECOMP, DIST CLAVICLE RESECTION, BICEP TENODESIS Left 2017    Procedure: ARTHROSCOPY SHOULDER ROTATOR CUFF REPAIR, SUBACROMIAL DECOMPRESSION, DISTAL CLAVICLE RESECTION, OPEN BICEP TENODESIS REPAIR;  Surgeon: Dorina Rodriguez MD;  Location: [facility]     C  DELIVERY ONLY       COLONOSCOPY  2013    Procedure: COLONOSCOPY;  colonoscopy, screening;  Surgeon: Dalton Lala MD;  Location: [facility]     CYSTOSCOPY       CYSTOSCOPY N/A 2021    Procedure: Cystoscopy;  Surgeon: Zacarias Brandon MD;  Location: [facility]     DAVINCI HYSTERECTOMY TOTAL, BILATERAL SALPINGO-OOPHORECTOMY, COMBINED Bilateral 2021    Procedure: HYSTERECTOMY, TOTAL, ROBOT-ASSISTED, LAPAROSCOPIC, WITH SALPINGO-OOPHORECTOMY, PELVIC WASHINGS, SENTINEL LYMPHNODE SAMPLING;  Surgeon: Zacarias Brandon MD;  Location: [facility]     SURGICAL HISTORY OF -       RT ANKLE Fx AND REPAIR (PIN,PLATE,SCREWS)     THYROIDECTOMY       TUBAL LIGATION           Health Maintenance Due   Topic Date Due     PREVENTIVE CARE VISIT  2021     INFLUENZA VACCINE (1) 2021       Current Medications:     Current Outpatient Medications   Medication Sig Dispense Refill     gabapentin (NEURONTIN) 300 MG capsule TAKE ONE CAPSULE BY MOUTH THREE TIMES A  capsule 3     levothyroxine (SYNTHROID/LEVOTHROID) 75 MCG tablet TAKE ONE TABLET BY MOUTH EVERY DAY ; SEPERATE IRON OR CALCIUM CONTAINING PRODUCTS BY ATLEAST 4 HOURS FROM THIS MEDICATION 90 tablet 2     metoprolol succinate ER (TOPROL-XL) 25 MG 24 hr tablet Take 1 tablet (25 mg) by mouth daily 90 tablet 3     vitamin D2 (ERGOCALCIFEROL) 72650 units (1250 mcg) capsule Take 1 capsule (50,000 Units) by mouth once a week (Patient taking differently: Take 50,000 Units by mouth  once a week ) 12 capsule 0         Allergies:      No Known Allergies     Social History:     Social History     Tobacco Use     Smoking status: Never Smoker     Smokeless tobacco: Never Used   Substance Use Topics     Alcohol use: Yes     Comment: SELDOM        History   Drug Use No         Family History:     The patient's family history is notable for:    Family History   Problem Relation Age of Onset     Hypertension Mother      Diabetes Mother      Hypertension Father      Alcohol/Drug Father         alcohol     Crohn's Disease Son      Hypertension Other      Cancer No family hx of      Cerebrovascular Disease No family hx of      Thyroid Disease No family hx of      Glaucoma No family hx of      Macular Degeneration No family hx of          Physical Exam:     LMP 08/04/2015 (Approximate)   There is no height or weight on file to calculate BMI.    General Appearance: healthy and alert, no distress     HEENT: no palpable nodules or masses        Cardiovascular: regular rate and rhythm, no gallops, rubs or murmurs     Respiratory: lungs clear, no rales, rhonchi or wheezes    Musculoskeletal: extremities non tender and without edema    Skin: no lesions or rashes     Neurological: normal gait, no gross defects     Psychiatric: appropriate mood and affect                               Hematological: normal cervical, supraclavicular and inguinal lymph nodes     Gastrointestinal:       abdomen soft, non-tender, non-distended, no organomegaly or masses    Genitourinary: External genitalia and urethral meatus appears normal.  Vagina is smooth without nodularity or masses.  Cervix is surgically absent.  Bimanual exam reveal no masses, nodularity or fullness.  Recto-vaginal exam confirms these findings.      Assessment:    Mary Sims is a 56 year old woman with a history of stage IA grade 1 endometrioid endometrial adenocarcinoma.  She is s/p TLH-BSO 2/9/21 which served as her treatment.  She is here today for a  surveillance visit.     10 minutes spent on the date of the encounter doing chart review, history and exam, documentation, and further activities as noted above.      Plan:     1.)        Endometrial adenocarcinoma:  JACKIE on exam.  RTC in 3 months for her next surveillance visit.  Plan for surveillance visits every 3 months for the first 2 years post treatment (2/2023), followed by every 6 months for an additional 3 years thereafter (2/2026), then annually.  Reviewed signs and symptoms for when she should contact the clinic or seek additional care.  Patient to contact the clinic with any questions or concerns in the interim.        Genetic risk factors were assessed and her MMR proteins were intact.       Labs and/or tests ordered include:  None.                2.)        Health maintenance:  Issues addressed today include following up with PCP for annual health maintenance and non-gynecologic issues.          Jack Jarrett DNP, PABLO, FNP-C  Nurse Practitioner  Division of Gynecologic Oncology  Pager: [phone]     CC  Patient Care Team:  Janie Riley APRN CNP as PCP - General (Nurse Practitioner - Family)  Stan Weber MD as Referring Physician (Internal Medicine)  Guero Matute MD as Assigned OBGYN Provider  Wilber Rice OD as Assigned Surgical Provider  Zacarias Brandon MD as Assigned Cancer Care Provider  Janie Riley APRN CNP as Assigned PCP  Guero Ibarra DPM as Assigned Musculoskeletal Provider  JACK JARRETT

## 2021-09-29 ENCOUNTER — ONCOLOGY VISIT (OUTPATIENT)
Dept: ONCOLOGY | Facility: CLINIC | Age: 56
End: 2021-09-29
Attending: NURSE PRACTITIONER
Payer: COMMERCIAL

## 2021-09-29 VITALS
WEIGHT: 212 LBS | OXYGEN SATURATION: 100 % | HEART RATE: 69 BPM | SYSTOLIC BLOOD PRESSURE: 127 MMHG | DIASTOLIC BLOOD PRESSURE: 84 MMHG | BODY MASS INDEX: 36.19 KG/M2 | HEIGHT: 64 IN

## 2021-09-29 DIAGNOSIS — Z08 ENCOUNTER FOR FOLLOW-UP SURVEILLANCE OF ENDOMETRIAL CANCER: ICD-10-CM

## 2021-09-29 DIAGNOSIS — Z85.42 ENCOUNTER FOR FOLLOW-UP SURVEILLANCE OF ENDOMETRIAL CANCER: ICD-10-CM

## 2021-09-29 DIAGNOSIS — C54.1 ENDOMETRIAL CANCER (H): Primary | ICD-10-CM

## 2021-09-29 PROCEDURE — 99212 OFFICE O/P EST SF 10 MIN: CPT | Performed by: NURSE PRACTITIONER

## 2021-09-29 ASSESSMENT — MIFFLIN-ST. JEOR: SCORE: 1526.63

## 2021-09-29 ASSESSMENT — PAIN SCALES - GENERAL: PAINLEVEL: NO PAIN (0)

## 2021-09-29 NOTE — LETTER
2021         RE: Mary Sims  [address]  [facility] [address]        Dear Colleague,    Thank you for referring your patient, Mary Sims, to the [facility]. Please see a copy of my visit note below.    Gynecologic Oncology Follow Up Note    Date: 2021    RE: Mary Sims  : 1965  PAULA: 2021    CC: Stage IA grade 1 endometrioid endometrial adenocarcinoma     HPI:  Mary Sims is a 56 year old woman with a history of stage IA grade 1 endometrioid endometrial adenocarcinoma.  She is s/p TLH-BSO 21 which served as her treatment.  She is here today for a surveillance visit.      Oncology History:     Initially, se was seen for vaginal spotting.     2020: Pelvic US  IMPRESSION: Thickened endometrium up to 24 mm in transvaginal  measurement for a postmenopausal patient comment given the reported  history of postmenopausal bleeding, this does raise concern for  neoplasia/hyperplasia and tissue sampling is recommended. Small amount  of free fluid in the posterior cul-de-sac and left adnexa.  Nonvisualization of left ovary. The visualized right ovary appears  Normal.     21: EMB  FINAL DIAGNOSIS:   Uterus, endometrium: Biopsy:   - At least complex atypical hyperplasia with foci bordering on FIGO grade   1 endometrioid carcinoma, see   comment      21: Robotic-assisted total laparoscopic hysterectomy, bilateral salpingo-oopherectomy, sentinel lymph node mapping and sampling, cystoscopy  FINAL DIAGNOSIS:   A. UTERUS, CERVIX, BILATERAL TUBES AND OVARIES, HYSTERECTOMY AND BILATERAL    SALPINGO-OOPHORECTOMY:   - Endometrioid endometrial adenocarcinoma, FIGO grade 1, MMR proficient   - Unremarkable ovaries with follicular cysts   - Cervix with reactive changes and nabothian cysts   - Unremarkable fallopian tubes   B. SENTINEL LYMPH NODE, RIGHT EXTERNAL ILIAC, LYMPHADENECTOMY:   - One reactive lymph node, negative for malignancy (0/1)   C.  SENTINEL LYMPH NODE, LEFT PELVIC, LYMPHADENECTOMY:   - One reactive lymph node, negative for malignancy (0/1)                Today she comes to clinic feeling well and is without concern.  She denies any vaginal bleeding, no changes in her bowel or bladder habits, no nausea/emesis, no lower extremity edema, and no difficulties eating or sleeping. She denies any abdominal discomfort/bloating, no fevers or chills, and no chest pain or shortness of breath. She is current with her annual physical, colon cancer screening, mammogram, dexa scan, and she is fully vaccinated against COVID.               Health Maintenance  Colonoscopy: 8/27/2013  Mammogram: 3/3/21  Annual physical: 7/13/21  Dexa: 9/14/2020  COVID vaccine: 3/21/21, 4/10/21      Review of Systems:    Systemic           no weight changes; no fever; no chills; no night sweats; no appetite changes  Skin           no rashes, or lesions  Eye           no irritation; no changes in vision  Trent-Laryngeal           no dysphagia; no hoarseness   Pulmonary    no cough; no shortness of breath  Cardiovascular    no chest pain; no palpitations  Gastrointestinal    no diarrhea; no constipation; no abdominal pain; no changes in bowel habits; no blood in stool  Genitourinary   no urinary frequency; no urinary urgency; no dysuria; no pain; no abnormal vaginal discharge; no abnormal vaginal bleeding  Breast   no breast discharge; no breast changes; no breast pain  Musculoskeletal    no myalgias; no arthralgias; no back pain  Psychiatric           no depressed mood; no anxiety    Hematologic   no tender lymph nodes; no noticeable swellings or lumps   Endocrine    no hot flashes; no heat/cold intolerance         Neurological   no tremor; no numbness and tingling; no headaches; no difficulty sleeping      Past Medical History:    Past Medical History:   Diagnosis Date     Arthritis of foot, right 12/13/2019     Endometrial cancer (H) 01/13/2021     Graves disease      Hypertension       Hypothyroidism      MEDICAL HISTORY OF -     S/P RADIOACTIVE IODINE     Morbid obesity (H) 2020     Osteopenia of multiple sites 2020         Past Surgical History:    Past Surgical History:   Procedure Laterality Date     ARTHRODESIS FOOT Right 2020    Procedure: MIDFOOT BONE SPUR RESECTION WITH JOINT FUSIONS RIGHT LOWER EXTREMITY;  Surgeon: Choco Frances DPM;  Location: [facility]     ARTHROSCOPY SHLDR ROTATOR CUFF REPAIR, SUBACROMIAL DECOMP, DIST CLAVICLE RESECTION, BICEP TENODESIS Left 2017    Procedure: ARTHROSCOPY SHOULDER ROTATOR CUFF REPAIR, SUBACROMIAL DECOMPRESSION, DISTAL CLAVICLE RESECTION, OPEN BICEP TENODESIS REPAIR;  Surgeon: Dorina Rodriguez MD;  Location: [facility]     C  DELIVERY ONLY       COLONOSCOPY  2013    Procedure: COLONOSCOPY;  colonoscopy, screening;  Surgeon: Dalton Lala MD;  Location: [facility]     CYSTOSCOPY  2020     CYSTOSCOPY N/A 2021    Procedure: Cystoscopy;  Surgeon: Zacarias Brandon MD;  Location: [facility]     DAVINCI HYSTERECTOMY TOTAL, BILATERAL SALPINGO-OOPHORECTOMY, COMBINED Bilateral 2021    Procedure: HYSTERECTOMY, TOTAL, ROBOT-ASSISTED, LAPAROSCOPIC, WITH SALPINGO-OOPHORECTOMY, PELVIC WASHINGS, SENTINEL LYMPHNODE SAMPLING;  Surgeon: Zacarias Brandon MD;  Location:  OR     SURGICAL HISTORY OF -       RT ANKLE Fx AND REPAIR (PIN,PLATE,SCREWS)     THYROIDECTOMY       TUBAL LIGATION           Health Maintenance Due   Topic Date Due     PREVENTIVE CARE VISIT  2021     INFLUENZA VACCINE (1) 2021       Current Medications:     Current Outpatient Medications   Medication Sig Dispense Refill     gabapentin (NEURONTIN) 300 MG capsule TAKE ONE CAPSULE BY MOUTH THREE TIMES A  capsule 3     levothyroxine (SYNTHROID/LEVOTHROID) 75 MCG tablet TAKE ONE TABLET BY MOUTH EVERY DAY ; SEPERATE IRON OR CALCIUM CONTAINING PRODUCTS BY ATLEAST 4 HOURS FROM THIS MEDICATION 90 tablet 2     metoprolol succinate  ER (TOPROL-XL) 25 MG 24 hr tablet Take 1 tablet (25 mg) by mouth daily 90 tablet 3     vitamin D2 (ERGOCALCIFEROL) 04234 units (1250 mcg) capsule Take 1 capsule (50,000 Units) by mouth once a week (Patient taking differently: Take 50,000 Units by mouth once a week ) 12 capsule 0         Allergies:      No Known Allergies     Social History:     Social History     Tobacco Use     Smoking status: Never Smoker     Smokeless tobacco: Never Used   Substance Use Topics     Alcohol use: Yes     Comment: SELDOM        History   Drug Use No         Family History:     The patient's family history is notable for:    Family History   Problem Relation Age of Onset     Hypertension Mother      Diabetes Mother      Hypertension Father      Alcohol/Drug Father         alcohol     Crohn's Disease Son      Hypertension Other      Cancer No family hx of      Cerebrovascular Disease No family hx of      Thyroid Disease No family hx of      Glaucoma No family hx of      Macular Degeneration No family hx of          Physical Exam:     LMP 08/04/2015 (Approximate)   There is no height or weight on file to calculate BMI.    General Appearance: healthy and alert, no distress     HEENT: no palpable nodules or masses        Cardiovascular: regular rate and rhythm, no gallops, rubs or murmurs     Respiratory: lungs clear, no rales, rhonchi or wheezes    Musculoskeletal: extremities non tender and without edema    Skin: no lesions or rashes     Neurological: normal gait, no gross defects     Psychiatric: appropriate mood and affect                               Hematological: normal cervical, supraclavicular and inguinal lymph nodes     Gastrointestinal:       abdomen soft, non-tender, non-distended, no organomegaly or masses    Genitourinary: External genitalia and urethral meatus appears normal.  Vagina is smooth without nodularity or masses.  Cervix is surgically absent.  Bimanual exam reveal no masses, nodularity or fullness.   Recto-vaginal exam confirms these findings.      Assessment:    Mary Sims is a 56 year old woman with a history of stage IA grade 1 endometrioid endometrial adenocarcinoma.  She is s/p TLH-BSO 2/9/21 which served as her treatment.  She is here today for a surveillance visit.     10 minutes spent on the date of the encounter doing chart review, history and exam, documentation, and further activities as noted above.      Plan:     1.)        Endometrial adenocarcinoma:  JACKIE on exam.  RTC in 3 months for her next surveillance visit.  Plan for surveillance visits every 3 months for the first 2 years post treatment (2/2023), followed by every 6 months for an additional 3 years thereafter (2/2026), then annually.  Reviewed signs and symptoms for when she should contact the clinic or seek additional care.  Patient to contact the clinic with any questions or concerns in the interim.        Genetic risk factors were assessed and her MMR proteins were intact.       Labs and/or tests ordered include:  None.                2.)        Health maintenance:  Issues addressed today include following up with PCP for annual health maintenance and non-gynecologic issues.          Jack Jarrett DNP, APRN, FNP-C  Nurse Practitioner  Division of Gynecologic Oncology  Pager: [phone]     CC  Patient Care Team:  Janie Riley APRN CNP as PCP - General (Nurse Practitioner - Family)  Stan Weber MD as Referring Physician (Internal Medicine)  Guero Matute MD as Assigned OBGYN Provider  Wilber Rice OD as Assigned Surgical Provider  Zacarias Brandon MD as Assigned Cancer Care Provider  Janie Riley APRN CNP as Assigned PCP  Guero Ibarra DPM as Assigned Musculoskeletal Provider  JACK JARRETT        Again, thank you for allowing me to participate in the care of your patient.        Sincerely,        PABLO Miller CNP

## 2021-09-29 NOTE — NURSING NOTE
"Oncology Rooming Note    September 29, 2021 3:24 PM   Mary Sims is a 56 year old female who presents for:    Chief Complaint   Patient presents with     Oncology Clinic Visit     follow up     Initial Vitals: /84 (BP Location: Right arm, Patient Position: Chair, Cuff Size: Adult Regular)   Pulse 69   Ht 1.626 m (5' 4\")   Wt 96.2 kg (212 lb)   LMP 08/04/2015 (Approximate)   SpO2 100%   BMI 36.39 kg/m   Estimated body mass index is 36.39 kg/m  as calculated from the following:    Height as of this encounter: 1.626 m (5' 4\").    Weight as of this encounter: 96.2 kg (212 lb). Body surface area is 2.08 meters squared.  No Pain (0) Comment: Data Unavailable   Patient's last menstrual period was 08/04/2015 (approximate).  Allergies reviewed: Yes  Medications reviewed: Yes    Medications: Medication refills not needed today.  Pharmacy name entered into [employer]:    [facility], [place], [place] - [place], [place] - [address] [facility] [facility]  "[employer], [place]"-FAX ONLY-[facility]  [facility] - [facility], [place] - [address] [facility] [address] [facility]    Clinical concerns: NO Radha was notified.      Minna Minor CMA              "

## 2021-12-24 NOTE — PROGRESS NOTES
Gynecologic Oncology Follow Up Note    Date: 2021    RE: Mary Sims  : 1965  PAULA: 2021    CC: Stage IA grade 1 endometrioid endometrial adenocarcinoma     HPI:  Mary Sims is a 56 year old woman with a history of stage IA grade 1 endometrioid endometrial adenocarcinoma.  She is s/p TLH-BSO 21 which served as her treatment.  She is here today for a surveillance visit.      Oncology History:     Initially, se was seen for vaginal spotting.     2020: Pelvic US  IMPRESSION: Thickened endometrium up to 24 mm in transvaginal  measurement for a postmenopausal patient comment given the reported  history of postmenopausal bleeding, this does raise concern for  neoplasia/hyperplasia and tissue sampling is recommended. Small amount  of free fluid in the posterior cul-de-sac and left adnexa.  Nonvisualization of left ovary. The visualized right ovary appears  Normal.     21: EMB  FINAL DIAGNOSIS:   Uterus, endometrium: Biopsy:   - At least complex atypical hyperplasia with foci bordering on FIGO grade   1 endometrioid carcinoma, see   comment      21: Robotic-assisted total laparoscopic hysterectomy, bilateral salpingo-oopherectomy, sentinel lymph node mapping and sampling, cystoscopy  FINAL DIAGNOSIS:   A. UTERUS, CERVIX, BILATERAL TUBES AND OVARIES, HYSTERECTOMY AND BILATERAL    SALPINGO-OOPHORECTOMY:   - Endometrioid endometrial adenocarcinoma, FIGO grade 1, MMR proficient   - Unremarkable ovaries with follicular cysts   - Cervix with reactive changes and nabothian cysts   - Unremarkable fallopian tubes   B. SENTINEL LYMPH NODE, RIGHT EXTERNAL ILIAC, LYMPHADENECTOMY:   - One reactive lymph node, negative for malignancy (0/1)   C. SENTINEL LYMPH NODE, LEFT PELVIC, LYMPHADENECTOMY:   - One reactive lymph node, negative for malignancy (0/1)          Reports new pelvic pain after sitting for extended amounts of time. This started a few months ago. She has also noticed pelvic pain  unrelated to sitting as well. Pain has occurred about three times over the last couple months. Pain is center to left of pelvic floor can worsen with more weight on left leg. Pain is sharp and lasts for 5-10 minutes and goes away. Denies bloating and no early satiety. She does report urinary urgency that has been ongoing since vaginal birth deliveries. She denies any vaginal bleeding, no changes in her bowel or bladder habits, no nausea/emesis, no lower extremity edema, and no difficulties eating or sleeping. She denies any abdominal bloating, no fevers or chills, and no chest pain or shortness of breath.             Health Maintenance  Colonoscopy: 8/27/2013  Mammogram: 3/3/21  Annual physical: 7/13/21  Dexa: 9/14/2020  COVID vaccine: 3/21/21, 4/10/21      Review of Systems:    Systemic           no weight changes; no fever; no chills; no night sweats; no appetite changes  Skin           no rashes, or lesions  Eye           no irritation; no changes in vision  Trent-Laryngeal           no dysphagia; no hoarseness   Pulmonary    no cough; no shortness of breath  Cardiovascular    no chest pain; no palpitations  Gastrointestinal    no diarrhea; no constipation; + abdominal pain; no changes in bowel habits; no blood in stool  Genitourinary   no urinary frequency; no urinary urgency; no dysuria; no pain; no abnormal vaginal discharge; no abnormal vaginal bleeding  Breast   no breast discharge; no breast changes; no breast pain  Musculoskeletal    no myalgias; no arthralgias; no back pain  Psychiatric           no depressed mood; no anxiety    Hematologic   no tender lymph nodes; no noticeable swellings or lumps   Endocrine    no hot flashes; no heat/cold intolerance         Neurological   no tremor; no numbness and tingling; no headaches; no difficulty sleeping      Past Medical History:    Past Medical History:   Diagnosis Date     Arthritis of foot, right 12/13/2019     Endometrial cancer (H) 01/13/2021     Graves  disease      Hypertension      Hypothyroidism      MEDICAL HISTORY OF -     S/P RADIOACTIVE IODINE     Morbid obesity (H) 2020     Osteopenia of multiple sites 2020         Past Surgical History:    Past Surgical History:   Procedure Laterality Date     ARTHRODESIS FOOT Right 2020    Procedure: MIDFOOT BONE SPUR RESECTION WITH JOINT FUSIONS RIGHT LOWER EXTREMITY;  Surgeon: Choco Frances DPM;  Location: [facility]     ARTHROSCOPY SHLDR ROTATOR CUFF REPAIR, SUBACROMIAL DECOMP, DIST CLAVICLE RESECTION, BICEP TENODESIS Left 2017    Procedure: ARTHROSCOPY SHOULDER ROTATOR CUFF REPAIR, SUBACROMIAL DECOMPRESSION, DISTAL CLAVICLE RESECTION, OPEN BICEP TENODESIS REPAIR;  Surgeon: Dorina Rodriguez MD;  [facility]     COLONOSCOPY  2013    Procedure: COLONOSCOPY;  colonoscopy, screening;  Surgeon: Dalton Lala MD;  Location: [facility]     CYSTOSCOPY  2020     CYSTOSCOPY N/A 2021    Procedure: Cystoscopy;  Surgeon: Zacarias Brandon MD;  [facility]: [facility]     DAVINCI HYSTERECTOMY TOTAL, BILATERAL SALPINGO-OOPHORECTOMY, COMBINED Bilateral 2021    Procedure: HYSTERECTOMY, TOTAL, ROBOT-ASSISTED, LAPAROSCOPIC, WITH SALPINGO-OOPHORECTOMY, PELVIC WASHINGS, SENTINEL LYMPHNODE SAMPLING;  Surgeon: Zacarias Brandon MD;  Location:  OR     SURGICAL HISTORY OF -       RT ANKLE Fx AND REPAIR (PIN,PLATE,SCREWS)     THYROIDECTOMY       TUBAL LIGATION       ZZC  DELIVERY ONLY           Health Maintenance Due   Topic Date Due     PREVENTIVE CARE VISIT  2021     INFLUENZA VACCINE (1) 2021     COVID-19 Vaccine (3 - Booster for Pfizer series) 10/10/2021     EYE EXAM  2022       Current Medications:     Current Outpatient Medications   Medication Sig Dispense Refill     gabapentin (NEURONTIN) 300 MG capsule TAKE ONE CAPSULE BY MOUTH THREE TIMES A  capsule 3     levothyroxine (SYNTHROID/LEVOTHROID) 75 MCG tablet TAKE ONE TABLET BY MOUTH EVERY DAY ;  "SEPERATE IRON OR CALCIUM CONTAINING PRODUCTS BY ATLEAST 4 HOURS FROM THIS MEDICATION 90 tablet 2     metoprolol succinate ER (TOPROL-XL) 25 MG 24 hr tablet Take 1 tablet (25 mg) by mouth daily 90 tablet 3     vitamin D2 (ERGOCALCIFEROL) 03778 units (1250 mcg) capsule Take 1 capsule (50,000 Units) by mouth once a week (Patient taking differently: Take 50,000 Units by mouth once a week ) 12 capsule 0         Allergies:      No Known Allergies     Social History:     Social History     Tobacco Use     Smoking status: Never Smoker     Smokeless tobacco: Never Used   Substance Use Topics     Alcohol use: Yes     Comment: SELDOM        History   Drug Use No         Family History:     The patient's family history is notable for:    Family History   Problem Relation Age of Onset     Hypertension Mother      Diabetes Mother      Hypertension Father      Alcohol/Drug Father         alcohol     Crohn's Disease Son      Hypertension Other      Cancer No family hx of      Cerebrovascular Disease No family hx of      Thyroid Disease No family hx of      Glaucoma No family hx of      Macular Degeneration No family hx of          Physical Exam:     /82 (BP Location: Left arm, Patient Position: Chair, Cuff Size: Adult Large)   Pulse 87   Ht 1.626 m (5' 4\")   Wt 95.3 kg (210 lb)   LMP 08/04/2015 (Approximate)   SpO2 98%   BMI 36.05 kg/m    Body mass index is 36.05 kg/m .    General Appearance: healthy and alert, no distress     HEENT: no palpable nodules or masses        Cardiovascular: regular rate and rhythm, no gallops, rubs or murmurs     Respiratory: lungs clear, no rales, rhonchi or wheezes    Musculoskeletal: extremities non tender and without edema    Skin: no lesions or rashes     Neurological: normal gait, no gross defects     Psychiatric: appropriate mood and affect                               Hematological: normal cervical, supraclavicular and inguinal lymph nodes     Gastrointestinal:       abdomen soft, " non-tender, non-distended, no organomegaly or masses    Genitourinary: External genitalia and urethral meatus appears normal.  Vagina is smooth without nodularity or masses. Relaxation of vaginal walls apparent. Cervix is surgically absent.  Bimanual exam reveal no masses, nodularity or fullness.  Recto-vaginal exam confirms these findings.      Assessment:    Mary Sims is a 56 year old woman with a history of stage IA grade 1 endometrioid endometrial adenocarcinoma.  She is s/p TLH-BSO 2/9/21 which served as her treatment.  She is here today for a surveillance visit.     30 minutes spent on the date of the encounter doing chart review, history and exam, documentation, and further activities as noted above.      Plan:     1.)        Endometrial adenocarcinoma:  JACKIE on exam. Given ongoing pelvic pain > 3 months, will CT pt and also send pt to pelvic floor PT for chronic urinary urgency and pelvic relaxation on exam. RTC in 3 months for her next surveillance visit.  Plan for surveillance visits every 3 months for the first 2 years post treatment (2/2023), followed by every 6 months for an additional 3 years thereafter (2/2026), then annually.  Reviewed signs and symptoms for when she should contact the clinic or seek additional care.  Patient to contact the clinic with any questions or concerns in the interim.   -survivorship form discussed and provided to pt today.        Genetic risk factors were assessed and her MMR proteins were intact.       Labs and/or tests ordered include: CT A/P                 2.)        Health maintenance:  Issues addressed today include following up with PCP for annual health maintenance and non-gynecologic issues.            PABLO Mayer, NP-BC  Women's Health Nurse Practitioner  Division of Gynecologic Oncology  [facility]        CC  Patient Care Team:  Janie Riley APRN CNP as PCP - General (Nurse Practitioner - Family)  Stan Weber  MD Alfonso as Referring Physician (Internal Medicine)  Guero Matute MD as Assigned OBGYN Provider  Wilber Rice OD as Assigned Surgical Provider  Janie Riley APRN CNP as Assigned PCP  Guero Ibarra DPM as Assigned Musculoskeletal Provider  Jack Jarrett APRN CNP as Assigned Cancer Care Provider  JACK JARRETT

## 2021-12-29 ENCOUNTER — ONCOLOGY VISIT (OUTPATIENT)
Dept: ONCOLOGY | Facility: CLINIC | Age: 56
End: 2021-12-29
Attending: NURSE PRACTITIONER
Payer: COMMERCIAL

## 2021-12-29 VITALS
SYSTOLIC BLOOD PRESSURE: 116 MMHG | WEIGHT: 210 LBS | OXYGEN SATURATION: 98 % | HEIGHT: 64 IN | BODY MASS INDEX: 35.85 KG/M2 | HEART RATE: 87 BPM | DIASTOLIC BLOOD PRESSURE: 82 MMHG

## 2021-12-29 DIAGNOSIS — N81.89 PELVIC FLOOR WEAKNESS: ICD-10-CM

## 2021-12-29 DIAGNOSIS — C54.1 ENDOMETRIAL CANCER (H): Primary | ICD-10-CM

## 2021-12-29 DIAGNOSIS — R10.2 PELVIC PAIN IN FEMALE: ICD-10-CM

## 2021-12-29 PROCEDURE — 99214 OFFICE O/P EST MOD 30 MIN: CPT | Performed by: OBSTETRICS & GYNECOLOGY

## 2021-12-29 ASSESSMENT — MIFFLIN-ST. JEOR: SCORE: 1517.55

## 2021-12-29 ASSESSMENT — PAIN SCALES - GENERAL: PAINLEVEL: NO PAIN (0)

## 2021-12-29 NOTE — NURSING NOTE
"Oncology Rooming Note    December 29, 2021 2:38 PM   Mary Sims is a 56 year old female who presents for:    Chief Complaint   Patient presents with     Oncology Clinic Visit     follow up     Initial Vitals: /82 (BP Location: Left arm, Patient Position: Chair, Cuff Size: Adult Large)   Pulse 87   Ht 1.626 m (5' 4\")   Wt 88.9 kg (196 lb)   LMP 08/04/2015 (Approximate)   SpO2 98%   BMI 33.64 kg/m   Estimated body mass index is 33.64 kg/m  as calculated from the following:    Height as of this encounter: 1.626 m (5' 4\").    Weight as of this encounter: 88.9 kg (196 lb). Body surface area is 2 meters squared.  No Pain (0) Comment: Data Unavailable   Patient's last menstrual period was 08/04/2015 (approximate).  Allergies reviewed: Yes  Medications reviewed: Yes    Medications: Medication refills not needed today.  Pharmacy name entered into [employer]:    [facility], [place], [place] - [place], [place] - [address] [place]  Powered by [employer] ONLY- MEMBER CHOICE [place]  [facility] - [place], [place] - [address]    Clinical concerns: pelvic floor pain from center to left groin x about 2 months     Grisel was notified.      Minna Minor CMA              "

## 2021-12-29 NOTE — LETTER
2021         RE: Mary Sims  [address]  [place] [address]        Dear Colleague,    Thank you for referring your patient, Mary Sims, to the [facility]. Please see a copy of my visit note below.    Gynecologic Oncology Follow Up Note    Date: 2021    RE: Mary Sims  : 1965  PAULA: 2021    CC: Stage IA grade 1 endometrioid endometrial adenocarcinoma     HPI:  Mary Sims is a 56 year old woman with a history of stage IA grade 1 endometrioid endometrial adenocarcinoma.  She is s/p TLH-BSO 21 which served as her treatment.  She is here today for a surveillance visit.      Oncology History:     Initially, se was seen for vaginal spotting.     2020: Pelvic US  IMPRESSION: Thickened endometrium up to 24 mm in transvaginal  measurement for a postmenopausal patient comment given the reported  history of postmenopausal bleeding, this does raise concern for  neoplasia/hyperplasia and tissue sampling is recommended. Small amount  of free fluid in the posterior cul-de-sac and left adnexa.  Nonvisualization of left ovary. The visualized right ovary appears  Normal.     21: EMB  FINAL DIAGNOSIS:   Uterus, endometrium: Biopsy:   - At least complex atypical hyperplasia with foci bordering on FIGO grade   1 endometrioid carcinoma, see   comment      21: Robotic-assisted total laparoscopic hysterectomy, bilateral salpingo-oopherectomy, sentinel lymph node mapping and sampling, cystoscopy  FINAL DIAGNOSIS:   A. UTERUS, CERVIX, BILATERAL TUBES AND OVARIES, HYSTERECTOMY AND BILATERAL    SALPINGO-OOPHORECTOMY:   - Endometrioid endometrial adenocarcinoma, FIGO grade 1, MMR proficient   - Unremarkable ovaries with follicular cysts   - Cervix with reactive changes and nabothian cysts   - Unremarkable fallopian tubes   B. SENTINEL LYMPH NODE, RIGHT EXTERNAL ILIAC, LYMPHADENECTOMY:   - One reactive lymph node, negative for malignancy (0/1)   C.  SENTINEL LYMPH NODE, LEFT PELVIC, LYMPHADENECTOMY:   - One reactive lymph node, negative for malignancy (0/1)          Reports new pelvic pain after sitting for extended amounts of time. This started a few months ago. She has also noticed pelvic pain unrelated to sitting as well. Pain has occurred about three times over the last couple months. Pain is center to left of pelvic floor can worsen with more weight on left leg. Pain is sharp and lasts for 5-10 minutes and goes away. Denies bloating and no early satiety. She does report urinary urgency that has been ongoing since vaginal birth deliveries. She denies any vaginal bleeding, no changes in her bowel or bladder habits, no nausea/emesis, no lower extremity edema, and no difficulties eating or sleeping. She denies any abdominal bloating, no fevers or chills, and no chest pain or shortness of breath.             Health Maintenance  Colonoscopy: 8/27/2013  Mammogram: 3/3/21  Annual physical: 7/13/21  Dexa: 9/14/2020  COVID vaccine: 3/21/21, 4/10/21      Review of Systems:    Systemic           no weight changes; no fever; no chills; no night sweats; no appetite changes  Skin           no rashes, or lesions  Eye           no irritation; no changes in vision  Trent-Laryngeal           no dysphagia; no hoarseness   Pulmonary    no cough; no shortness of breath  Cardiovascular    no chest pain; no palpitations  Gastrointestinal    no diarrhea; no constipation; + abdominal pain; no changes in bowel habits; no blood in stool  Genitourinary   no urinary frequency; no urinary urgency; no dysuria; no pain; no abnormal vaginal discharge; no abnormal vaginal bleeding  Breast   no breast discharge; no breast changes; no breast pain  Musculoskeletal    no myalgias; no arthralgias; no back pain  Psychiatric           no depressed mood; no anxiety    Hematologic   no tender lymph nodes; no noticeable swellings or lumps   Endocrine    no hot flashes; no heat/cold intolerance          Neurological   no tremor; no numbness and tingling; no headaches; no difficulty sleeping      Past Medical History:    Past Medical History:   Diagnosis Date     Arthritis of foot, right 2019     Endometrial cancer (H) 2021     Graves disease      Hypertension      Hypothyroidism      MEDICAL HISTORY OF -     S/P RADIOACTIVE IODINE     Morbid obesity (H) 2020     Osteopenia of multiple sites 2020         Past Surgical History:    Past Surgical History:   Procedure Laterality Date     ARTHRODESIS FOOT Right 2020    Procedure: MIDFOOT BONE SPUR RESECTION WITH JOINT FUSIONS RIGHT LOWER EXTREMITY;  Surgeon: Choco Frances DPM;  Location: [facility] OR     ARTHROSCOPY SHLDR ROTATOR CUFF REPAIR, SUBACROMIAL DECOMP, DIST CLAVICLE RESECTION, BICEP TENODESIS Left 2017    Procedure: ARTHROSCOPY SHOULDER ROTATOR CUFF REPAIR, SUBACROMIAL DECOMPRESSION, DISTAL CLAVICLE RESECTION, OPEN BICEP TENODESIS REPAIR;  Surgeon: Dorina Rodriguez MD;  [facility]     COLONOSCOPY  2013    Procedure: COLONOSCOPY;  colonoscopy, screening;  Surgeon: Dalton Lala MD;  Location: [facility]     CYSTOSCOPY  2020     CYSTOSCOPY N/A 2021    Procedure: Cystoscopy;  Surgeon: aZcarias Brandon MD;  Location: [facility]     DAVINCI HYSTERECTOMY TOTAL, BILATERAL SALPINGO-OOPHORECTOMY, COMBINED Bilateral 2021    Procedure: HYSTERECTOMY, TOTAL, ROBOT-ASSISTED, LAPAROSCOPIC, WITH SALPINGO-OOPHORECTOMY, PELVIC WASHINGS, SENTINEL LYMPHNODE SAMPLING;  Surgeon: Zacarias Brandon MD;  [facility]: [facility]     SURGICAL HISTORY OF -       RT ANKLE Fx AND REPAIR (PIN,PLATE,SCREWS)     THYROIDECTOMY       TUBAL LIGATION       ZZC  DELIVERY ONLY           Health Maintenance Due   Topic Date Due     PREVENTIVE CARE VISIT  2021     INFLUENZA VACCINE (1) 2021     COVID-19 Vaccine (3 - Booster for Pfizer series) 10/10/2021     EYE EXAM  2022       Current Medications:     Current  "Outpatient Medications   Medication Sig Dispense Refill     gabapentin (NEURONTIN) 300 MG capsule TAKE ONE CAPSULE BY MOUTH THREE TIMES A  capsule 3     levothyroxine (SYNTHROID/LEVOTHROID) 75 MCG tablet TAKE ONE TABLET BY MOUTH EVERY DAY ; SEPERATE IRON OR CALCIUM CONTAINING PRODUCTS BY ATLEAST 4 HOURS FROM THIS MEDICATION 90 tablet 2     metoprolol succinate ER (TOPROL-XL) 25 MG 24 hr tablet Take 1 tablet (25 mg) by mouth daily 90 tablet 3     vitamin D2 (ERGOCALCIFEROL) 56135 units (1250 mcg) capsule Take 1 capsule (50,000 Units) by mouth once a week (Patient taking differently: Take 50,000 Units by mouth once a week ) 12 capsule 0         Allergies:      No Known Allergies     Social History:     Social History     Tobacco Use     Smoking status: Never Smoker     Smokeless tobacco: Never Used   Substance Use Topics     Alcohol use: Yes     Comment: SELDOM        History   Drug Use No         Family History:     The patient's family history is notable for:    Family History   Problem Relation Age of Onset     Hypertension Mother      Diabetes Mother      Hypertension Father      Alcohol/Drug Father         alcohol     Crohn's Disease Son      Hypertension Other      Cancer No family hx of      Cerebrovascular Disease No family hx of      Thyroid Disease No family hx of      Glaucoma No family hx of      Macular Degeneration No family hx of          Physical Exam:     /82 (BP Location: Left arm, Patient Position: Chair, Cuff Size: Adult Large)   Pulse 87   Ht 1.626 m (5' 4\")   Wt 95.3 kg (210 lb)   LMP 08/04/2015 (Approximate)   SpO2 98%   BMI 36.05 kg/m    Body mass index is 36.05 kg/m .    General Appearance: healthy and alert, no distress     HEENT: no palpable nodules or masses        Cardiovascular: regular rate and rhythm, no gallops, rubs or murmurs     Respiratory: lungs clear, no rales, rhonchi or wheezes    Musculoskeletal: extremities non tender and without edema    Skin: no lesions " or rashes     Neurological: normal gait, no gross defects     Psychiatric: appropriate mood and affect                               Hematological: normal cervical, supraclavicular and inguinal lymph nodes     Gastrointestinal:       abdomen soft, non-tender, non-distended, no organomegaly or masses    Genitourinary: External genitalia and urethral meatus appears normal.  Vagina is smooth without nodularity or masses. Relaxation of vaginal walls apparent. Cervix is surgically absent.  Bimanual exam reveal no masses, nodularity or fullness.  Recto-vaginal exam confirms these findings.      Assessment:    Mary Sims is a 56 year old woman with a history of stage IA grade 1 endometrioid endometrial adenocarcinoma.  She is s/p TLH-BSO 2/9/21 which served as her treatment.  She is here today for a surveillance visit.     30 minutes spent on the date of the encounter doing chart review, history and exam, documentation, and further activities as noted above.      Plan:     1.)        Endometrial adenocarcinoma:  JACKIE on exam. Given ongoing pelvic pain > 3 months, will CT pt and also send pt to pelvic floor PT for chronic urinary urgency and pelvic relaxation on exam. RTC in 3 months for her next surveillance visit.  Plan for surveillance visits every 3 months for the first 2 years post treatment (2/2023), followed by every 6 months for an additional 3 years thereafter (2/2026), then annually.  Reviewed signs and symptoms for when she should contact the clinic or seek additional care.  Patient to contact the clinic with any questions or concerns in the interim.   -survivorship form discussed and provided to pt today.        Genetic risk factors were assessed and her MMR proteins were intact.       Labs and/or tests ordered include: CT A/P                 2.)        Health maintenance:  Issues addressed today include following up with PCP for annual health maintenance and non-gynecologic issues.            Grisel Salazar,  PABLO NP-BC  Women's Health Nurse Practitioner  Division of Gynecologic Oncology  [facility]        CC  Patient Care Team:  Janie Riley APRN CNP as PCP - General (Nurse Practitioner - Family)  Stan Weber MD as Referring Physician (Internal Medicine)  Guero Matute MD as Assigned OBGYN Provider  Wilber Rice OD as Assigned Surgical Provider  Janie Riley APRN CNP as Assigned PCP  Guero Ibarra DPM as Assigned Musculoskeletal Provider  Jack Jarrett APRN CNP as Assigned Cancer Care Provider  JACK JARRETT        Again, thank you for allowing me to participate in the care of your patient.        Sincerely,        PABLO Khalil CNP

## 2021-12-31 ENCOUNTER — TELEPHONE (OUTPATIENT)
Dept: ONCOLOGY | Facility: CLINIC | Age: 56
End: 2021-12-31
Payer: COMMERCIAL

## 2021-12-31 NOTE — TELEPHONE ENCOUNTER
Grisel Salazar and team:    Your request for order: CT Abdomen Pelvis W is pending for a peer to peer consult with the insurance.  To complete the peer to peer consult please call [facility]:    Phone # [phone]  Patient ID # [id number]    Pt is scheduled for CT Scan 1/5/22 @ [facility].     Please reply when complete or advise.    Thank you for your attention on this issue,  Shelly Pitts  Financial Counselor   [phone]

## 2022-01-11 ENCOUNTER — ANCILLARY PROCEDURE (OUTPATIENT)
Dept: CT IMAGING | Facility: CLINIC | Age: 57
End: 2022-01-11
Attending: OBSTETRICS & GYNECOLOGY
Payer: COMMERCIAL

## 2022-01-11 DIAGNOSIS — R10.2 PELVIC PAIN IN FEMALE: ICD-10-CM

## 2022-01-11 DIAGNOSIS — C54.1 ENDOMETRIAL CANCER (H): ICD-10-CM

## 2022-01-11 PROCEDURE — 74177 CT ABD & PELVIS W/CONTRAST: CPT | Performed by: RADIOLOGY

## 2022-01-11 RX ORDER — IOPAMIDOL 755 MG/ML
128 INJECTION, SOLUTION INTRAVASCULAR ONCE
Status: COMPLETED | OUTPATIENT
Start: 2022-01-11 | End: 2022-01-11

## 2022-01-11 RX ADMIN — IOPAMIDOL 128 ML: 755 INJECTION, SOLUTION INTRAVASCULAR at 15:15

## 2022-04-12 NOTE — PROGRESS NOTES
Gynecologic Oncology Return Visit Note    Date: 2022    RE: Mary Sims  : 1965  PAULA: 2022    CC: Stage IA grade 1 endometrioid endometrial adenocarcinoma     HPI:  Mary Sims is a 56 year old woman with a history of stage IA grade 1 endometrioid endometrial adenocarcinoma.  She is s/p TLH-BSO 21 which served as her treatment.  She is here today for a surveillance visit.      Oncology History:     Initially, she was seen for vaginal spotting.     2020: Pelvic US  IMPRESSION: Thickened endometrium up to 24 mm in transvaginal  measurement for a postmenopausal patient comment given the reported  history of postmenopausal bleeding, this does raise concern for  neoplasia/hyperplasia and tissue sampling is recommended. Small amount  of free fluid in the posterior cul-de-sac and left adnexa.  Nonvisualization of left ovary. The visualized right ovary appears  Normal.     21: EMB  FINAL DIAGNOSIS:   Uterus, endometrium: Biopsy:   - At least complex atypical hyperplasia with foci bordering on FIGO grade   1 endometrioid carcinoma, see   comment      21: Robotic-assisted total laparoscopic hysterectomy, bilateral salpingo-oopherectomy, sentinel lymph node mapping and sampling, cystoscopy  FINAL DIAGNOSIS:   A. UTERUS, CERVIX, BILATERAL TUBES AND OVARIES, HYSTERECTOMY AND BILATERAL    SALPINGO-OOPHORECTOMY:   - Endometrioid endometrial adenocarcinoma, FIGO grade 1, MMR proficient   - Unremarkable ovaries with follicular cysts   - Cervix with reactive changes and nabothian cysts   - Unremarkable fallopian tubes   B. SENTINEL LYMPH NODE, RIGHT EXTERNAL ILIAC, LYMPHADENECTOMY:   - One reactive lymph node, negative for malignancy (0/1)   C. SENTINEL LYMPH NODE, LEFT PELVIC, LYMPHADENECTOMY  - One reactive lymph node, negative for malignancy (0/1)    22: CT CAP  IMPRESSION:  1.  No evidence of recurrent or metastatic disease in the abdomen or  pelvis.   2.  Multiple hepatic cysts are  stable to slightly enlarged.  3.  Finding suggestive of mild colitis. Mild pelvic floor descent with  large rectal stool burden.  4.  Small hiatal hernia.                Today she comes to clinic feeling well and is without concern.  Her prior pelvic pain and pressure has resolved.  She thinks it was related to lifting as she had recently moved.  She denies any vaginal bleeding, no changes in her bowel or bladder habits, no nausea/emesis, no lower extremity edema, and no difficulties eating or sleeping. She denies any abdominal discomfort/bloating, no fevers or chills, and no chest pain or shortness of breath. She is current with her annual physical, colon cancer screening, and she is fully vaccinated against COVID.  She is due for her mammogram.                      Health Maintenance  Colonoscopy: 8/27/2013  Mammogram: 3/3/21   Annual physical: 7/13/21  Dexa: 9/14/2020  COVID vaccine: 3/21/21, 4/10/21, 1/12/22 [employer]      Review of Systems:    Systemic           no weight changes; no fever; no chills; no night sweats; no appetite changes  Skin           no rashes, or lesions  Eye           no irritation; no changes in vision  Trent-Laryngeal           no dysphagia; no hoarseness   Pulmonary    no cough; no shortness of breath  Cardiovascular    no chest pain; no palpitations  Gastrointestinal    no diarrhea; no constipation; no abdominal pain; no changes in bowel habits; no blood in stool  Genitourinary   no urinary frequency; no urinary urgency; no dysuria; no pain; no abnormal vaginal discharge; no abnormal vaginal bleeding  Breast   no breast discharge; no breast changes; no breast pain  Musculoskeletal    no myalgias; no arthralgias; no back pain  Psychiatric           no depressed mood; no anxiety    Hematologic   no tender lymph nodes; no noticeable swellings or lumps   Endocrine    no hot flashes; no heat/cold intolerance         Neurological   no tremor; no numbness and tingling; no headaches; no difficulty  sleeping      Past Medical History:    Past Medical History:   Diagnosis Date     Arthritis of foot, right 2019     Endometrial cancer (H) 2021     Graves disease      Hypertension      Hypothyroidism      MEDICAL HISTORY OF -     S/P RADIOACTIVE IODINE     Morbid obesity (H) 2020     Osteopenia of multiple sites 2020         Past Surgical History:    Past Surgical History:   Procedure Laterality Date     ARTHRODESIS FOOT Right 2020    Procedure: MIDFOOT BONE SPUR RESECTION WITH JOINT FUSIONS RIGHT LOWER EXTREMITY;  Surgeon: Choco Frances DPM;  Location: [facility]     ARTHROSCOPY SHLDR ROTATOR CUFF REPAIR, SUBACROMIAL DECOMP, DIST CLAVICLE RESECTION, BICEP TENODESIS Left 2017    Procedure: ARTHROSCOPY SHOULDER ROTATOR CUFF REPAIR, SUBACROMIAL DECOMPRESSION, DISTAL CLAVICLE RESECTION, OPEN BICEP TENODESIS REPAIR;  Surgeon: Dorina Rodriguez MD;  [facility]     COLONOSCOPY  2013    Procedure: COLONOSCOPY;  colonoscopy, screening;  Surgeon: Dalton Lala MD;  Location: [facility]     CYSTOSCOPY  2020     CYSTOSCOPY N/A 2021    Procedure: Cystoscopy;  Surgeon: Zacarias Brandon MD;  [facility]: [facility]     DAVINCI HYSTERECTOMY TOTAL, BILATERAL SALPINGO-OOPHORECTOMY, COMBINED Bilateral 2021    Procedure: HYSTERECTOMY, TOTAL, ROBOT-ASSISTED, LAPAROSCOPIC, WITH SALPINGO-OOPHORECTOMY, PELVIC WASHINGS, SENTINEL LYMPHNODE SAMPLING;  Surgeon: Zacarias Brandon MD;  Location[facility]     SURGICAL HISTORY OF -       RT ANKLE Fx AND REPAIR (PIN,PLATE,SCREWS)     THYROIDECTOMY       TUBAL LIGATION       ZZC  DELIVERY ONLY           Health Maintenance Due   Topic Date Due     PREVENTIVE CARE VISIT  2021     INFLUENZA VACCINE (1) 2021     EYE EXAM  2022     MAMMO SCREENING  2022       Current Medications:     Current Outpatient Medications   Medication Sig Dispense Refill     gabapentin (NEURONTIN) 300 MG capsule TAKE ONE CAPSULE  "BY MOUTH THREE TIMES A  capsule 3     levothyroxine (SYNTHROID/LEVOTHROID) 75 MCG tablet TAKE ONE TABLET BY MOUTH EVERY DAY ; SEPERATE IRON OR CALCIUM CONTAINING PRODUCTS BY ATLEAST 4 HOURS FROM THIS MEDICATION 90 tablet 2     metoprolol succinate ER (TOPROL-XL) 25 MG 24 hr tablet Take 1 tablet (25 mg) by mouth daily 90 tablet 3     vitamin D2 (ERGOCALCIFEROL) 48180 units (1250 mcg) capsule Take 1 capsule (50,000 Units) by mouth once a week (Patient taking differently: Take 50,000 Units by mouth once a week) 12 capsule 0         Allergies:      No Known Allergies     Social History:     Social History     Tobacco Use     Smoking status: Never Smoker     Smokeless tobacco: Never Used   Substance Use Topics     Alcohol use: Yes     Comment: SELDOM        History   Drug Use No         Family History:     The patient's family history is notable for:    Family History   Problem Relation Age of Onset     Hypertension Mother      Diabetes Mother      Hypertension Father      Alcohol/Drug Father         alcohol     Crohn's Disease Son      Hypertension Other      Cancer No family hx of      Cerebrovascular Disease No family hx of      Thyroid Disease No family hx of      Glaucoma No family hx of      Macular Degeneration No family hx of          Physical Exam:     /89 (BP Location: Right arm, Patient Position: Chair, Cuff Size: Adult Large)   Pulse 68   Ht 1.626 m (5' 4\")   Wt 96.6 kg (213 lb)   LMP 08/04/2015 (Approximate)   SpO2 99%   BMI 36.56 kg/m    Body mass index is 36.56 kg/m .    General Appearance: healthy and alert, no distress     HEENT: no palpable nodules or masses        Cardiovascular: regular rate and rhythm, no gallops, rubs or murmurs     Respiratory: lungs clear, no rales, rhonchi or wheezes    Musculoskeletal: extremities non tender and without edema    Skin: no lesions or rashes     Neurological: normal gait, no gross defects     Psychiatric: appropriate mood and affect               "                 Hematological: normal cervical, supraclavicular and inguinal lymph nodes     Gastrointestinal:       abdomen soft, non-tender, non-distended, no organomegaly or masses    Genitourinary: External genitalia and urethral meatus appears normal.  Vagina is smooth without nodularity or masses.  Cervix is surgically absent.  Bimanual exam reveal no masses, nodularity or fullness.  Recto-vaginal exam confirms these findings.      Assessment:    Mary Sims is a 56 year old woman with a history of stage IA grade 1 endometrioid endometrial adenocarcinoma.  She is s/p TLH-BSO 2/9/21 which served as her treatment.  She is here today for a surveillance visit.      20 minutes spent on the date of the encounter doing chart review, history and exam, documentation, and further activities as noted above.      Plan:     1.)        Endometrial adenocarcinoma:  JACKIE on exam.  RTC in 3 months for her next surveillance visit.  Plan for surveillance visits every 3 months for the first 2 years post treatment (2/2023), followed by every 6 months for an additional 3 years thereafter (2/2026), then annually.  Reviewed signs and symptoms for when she should contact the clinic or seek additional care.  Patient to contact the clinic with any questions or concerns in the interim.        Genetic risk factors were assessed and her MMR proteins were intact.       Labs and/or tests ordered include:  None.                2.)        Health maintenance:  Issues addressed today include following up with PCP for annual health maintenance and non-gynecologic issues.  Encouraged to schedule her mammogram.      Radha Sanders, LIDYA, APRN, FNP-C  Nurse Practitioner  Division of Gynecologic Oncology  Pager: [phone]     CC  Patient Care Team:  Janie Riley APRN CNP as PCP - General (Nurse Practitioner - Family)  Gus, Stan Hamilton MD as Referring Physician (Internal Medicine)  Guero Matute MD as Assigned OBGYN  Provider  Wilber Rice OD as Assigned Surgical Provider  Janie Riley APRN CNP as Assigned PCP  Guero Ibarra DPM as Assigned Musculoskeletal Provider  Radha Sanders APRN CNP as Assigned Cancer Care Provider  BEBETO STILES

## 2022-04-20 ENCOUNTER — ONCOLOGY VISIT (OUTPATIENT)
Dept: ONCOLOGY | Facility: CLINIC | Age: 57
End: 2022-04-20
Attending: OBSTETRICS & GYNECOLOGY
Payer: COMMERCIAL

## 2022-04-20 VITALS
HEIGHT: 64 IN | SYSTOLIC BLOOD PRESSURE: 133 MMHG | OXYGEN SATURATION: 99 % | DIASTOLIC BLOOD PRESSURE: 89 MMHG | WEIGHT: 213 LBS | BODY MASS INDEX: 36.37 KG/M2 | HEART RATE: 68 BPM

## 2022-04-20 DIAGNOSIS — Z08 ENCOUNTER FOR FOLLOW-UP SURVEILLANCE OF ENDOMETRIAL CANCER: ICD-10-CM

## 2022-04-20 DIAGNOSIS — Z85.42 ENCOUNTER FOR FOLLOW-UP SURVEILLANCE OF ENDOMETRIAL CANCER: ICD-10-CM

## 2022-04-20 DIAGNOSIS — C54.1 ENDOMETRIAL CANCER (H): Primary | ICD-10-CM

## 2022-04-20 PROCEDURE — 99213 OFFICE O/P EST LOW 20 MIN: CPT | Performed by: NURSE PRACTITIONER

## 2022-04-20 ASSESSMENT — PAIN SCALES - GENERAL: PAINLEVEL: NO PAIN (0)

## 2022-04-20 NOTE — LETTER
2022         RE: Mary Sims   [place] [address]        Dear Colleague,    Thank you for referring your patient, Mary Sims, to the [facility]. Please see a copy of my visit note below.    Gynecologic Oncology Return Visit Note    Date: 2022    RE: Mary Sims  : 1965  PAULA: 2022    CC: Stage IA grade 1 endometrioid endometrial adenocarcinoma     HPI:  Mary Sims is a 56 year old woman with a history of stage IA grade 1 endometrioid endometrial adenocarcinoma.  She is s/p TLH-BSO 21 which served as her treatment.  She is here today for a surveillance visit.      Oncology History:     Initially, she was seen for vaginal spotting.     2020: Pelvic US  IMPRESSION: Thickened endometrium up to 24 mm in transvaginal  measurement for a postmenopausal patient comment given the reported  history of postmenopausal bleeding, this does raise concern for  neoplasia/hyperplasia and tissue sampling is recommended. Small amount  of free fluid in the posterior cul-de-sac and left adnexa.  Nonvisualization of left ovary. The visualized right ovary appears  Normal.     21: EMB  FINAL DIAGNOSIS:   Uterus, endometrium: Biopsy:   - At least complex atypical hyperplasia with foci bordering on FIGO grade   1 endometrioid carcinoma, see   comment      21: Robotic-assisted total laparoscopic hysterectomy, bilateral salpingo-oopherectomy, sentinel lymph node mapping and sampling, cystoscopy  FINAL DIAGNOSIS:   A. UTERUS, CERVIX, BILATERAL TUBES AND OVARIES, HYSTERECTOMY AND BILATERAL    SALPINGO-OOPHORECTOMY:   - Endometrioid endometrial adenocarcinoma, FIGO grade 1, MMR proficient   - Unremarkable ovaries with follicular cysts   - Cervix with reactive changes and nabothian cysts   - Unremarkable fallopian tubes   B. SENTINEL LYMPH NODE, RIGHT EXTERNAL ILIAC, LYMPHADENECTOMY:   - One reactive lymph node, negative for malignancy  (0/1)   C. SENTINEL LYMPH NODE, LEFT PELVIC, LYMPHADENECTOMY  - One reactive lymph node, negative for malignancy (0/1)    1/11/22: CT CAP  IMPRESSION:  1.  No evidence of recurrent or metastatic disease in the abdomen or  pelvis.   2.  Multiple hepatic cysts are stable to slightly enlarged.  3.  Finding suggestive of mild colitis. Mild pelvic floor descent with  large rectal stool burden.  4.  Small hiatal hernia.                Today she comes to clinic feeling well and is without concern.  Her prior pelvic pain and pressure has resolved.  She thinks it was related to lifting as she had recently moved.  She denies any vaginal bleeding, no changes in her bowel or bladder habits, no nausea/emesis, no lower extremity edema, and no difficulties eating or sleeping. She denies any abdominal discomfort/bloating, no fevers or chills, and no chest pain or shortness of breath. She is current with her annual physical, colon cancer screening, and she is fully vaccinated against COVID.  She is due for her mammogram.                      Health Maintenance  Colonoscopy: 8/27/2013  Mammogram: 3/3/21   Annual physical: 7/13/21  Dexa: 9/14/2020  COVID vaccine: 3/21/21, 4/10/21, 1/12/22 [employer]      Review of Systems:    Systemic           no weight changes; no fever; no chills; no night sweats; no appetite changes  Skin           no rashes, or lesions  Eye           no irritation; no changes in vision  Trent-Laryngeal           no dysphagia; no hoarseness   Pulmonary    no cough; no shortness of breath  Cardiovascular    no chest pain; no palpitations  Gastrointestinal    no diarrhea; no constipation; no abdominal pain; no changes in bowel habits; no blood in stool  Genitourinary   no urinary frequency; no urinary urgency; no dysuria; no pain; no abnormal vaginal discharge; no abnormal vaginal bleeding  Breast   no breast discharge; no breast changes; no breast pain  Musculoskeletal    no myalgias; no arthralgias; no back  pain  Psychiatric           no depressed mood; no anxiety    Hematologic   no tender lymph nodes; no noticeable swellings or lumps   Endocrine    no hot flashes; no heat/cold intolerance         Neurological   no tremor; no numbness and tingling; no headaches; no difficulty sleeping      Past Medical History:    Past Medical History:   Diagnosis Date     Arthritis of foot, right 2019     Endometrial cancer (H) 2021     Graves disease      Hypertension      Hypothyroidism      MEDICAL HISTORY OF -     S/P RADIOACTIVE IODINE     Morbid obesity (H) 2020     Osteopenia of multiple sites 2020         Past Surgical History:    Past Surgical History:   Procedure Laterality Date     ARTHRODESIS FOOT Right 2020    Procedure: MIDFOOT BONE SPUR RESECTION WITH JOINT FUSIONS RIGHT LOWER EXTREMITY;  Surgeon: Choco Frances DPM;  Location: [facility]     ARTHROSCOPY SHLDR ROTATOR CUFF REPAIR, SUBACROMIAL DECOMP, DIST CLAVICLE RESECTION, BICEP TENODESIS Left 2017    Procedure: ARTHROSCOPY SHOULDER ROTATOR CUFF REPAIR, SUBACROMIAL DECOMPRESSION, DISTAL CLAVICLE RESECTION, OPEN BICEP TENODESIS REPAIR;  Surgeon: Dorina Rodriguez MD;  [facility]     COLONOSCOPY  2013    Procedure: COLONOSCOPY;  colonoscopy, screening;  Surgeon: Dalton Lala MD;  Location: [facility]     CYSTOSCOPY  2020     CYSTOSCOPY N/A 2021    Procedure: Cystoscopy;  Surgeon: Zacarias Brandon MD;  [facility]     DAVINCI HYSTERECTOMY TOTAL, BILATERAL SALPINGO-OOPHORECTOMY, COMBINED Bilateral 2021    Procedure: HYSTERECTOMY, TOTAL, ROBOT-ASSISTED, LAPAROSCOPIC, WITH SALPINGO-OOPHORECTOMY, PELVIC WASHINGS, SENTINEL LYMPHNODE SAMPLING;  Surgeon: Zacarias Brandon MD;  Location:  OR     SURGICAL HISTORY OF -       RT ANKLE Fx AND REPAIR (PIN,PLATE,SCREWS)     THYROIDECTOMY       TUBAL LIGATION       ZZC  DELIVERY ONLY           Health Maintenance Due   Topic Date Due      "PREVENTIVE CARE VISIT  07/08/2021     INFLUENZA VACCINE (1) 09/01/2021     EYE EXAM  01/05/2022     MAMMO SCREENING  03/03/2022       Current Medications:     Current Outpatient Medications   Medication Sig Dispense Refill     gabapentin (NEURONTIN) 300 MG capsule TAKE ONE CAPSULE BY MOUTH THREE TIMES A  capsule 3     levothyroxine (SYNTHROID/LEVOTHROID) 75 MCG tablet TAKE ONE TABLET BY MOUTH EVERY DAY ; SEPERATE IRON OR CALCIUM CONTAINING PRODUCTS BY ATLEAST 4 HOURS FROM THIS MEDICATION 90 tablet 2     metoprolol succinate ER (TOPROL-XL) 25 MG 24 hr tablet Take 1 tablet (25 mg) by mouth daily 90 tablet 3     vitamin D2 (ERGOCALCIFEROL) 68976 units (1250 mcg) capsule Take 1 capsule (50,000 Units) by mouth once a week (Patient taking differently: Take 50,000 Units by mouth once a week) 12 capsule 0         Allergies:      No Known Allergies     Social History:     Social History     Tobacco Use     Smoking status: Never Smoker     Smokeless tobacco: Never Used   Substance Use Topics     Alcohol use: Yes     Comment: SELDOM        History   Drug Use No         Family History:     The patient's family history is notable for:    Family History   Problem Relation Age of Onset     Hypertension Mother      Diabetes Mother      Hypertension Father      Alcohol/Drug Father         alcohol     Crohn's Disease Son      Hypertension Other      Cancer No family hx of      Cerebrovascular Disease No family hx of      Thyroid Disease No family hx of      Glaucoma No family hx of      Macular Degeneration No family hx of          Physical Exam:     /89 (BP Location: Right arm, Patient Position: Chair, Cuff Size: Adult Large)   Pulse 68   Ht 1.626 m (5' 4\")   Wt 96.6 kg (213 lb)   LMP 08/04/2015 (Approximate)   SpO2 99%   BMI 36.56 kg/m    Body mass index is 36.56 kg/m .    General Appearance: healthy and alert, no distress     HEENT: no palpable nodules or masses        Cardiovascular: regular rate and rhythm, no " gallops, rubs or murmurs     Respiratory: lungs clear, no rales, rhonchi or wheezes    Musculoskeletal: extremities non tender and without edema    Skin: no lesions or rashes     Neurological: normal gait, no gross defects     Psychiatric: appropriate mood and affect                               Hematological: normal cervical, supraclavicular and inguinal lymph nodes     Gastrointestinal:       abdomen soft, non-tender, non-distended, no organomegaly or masses    Genitourinary: External genitalia and urethral meatus appears normal.  Vagina is smooth without nodularity or masses.  Cervix is surgically absent.  Bimanual exam reveal no masses, nodularity or fullness.  Recto-vaginal exam confirms these findings.      Assessment:    Mary Sims is a 56 year old woman with a history of stage IA grade 1 endometrioid endometrial adenocarcinoma.  She is s/p TLH-BSO 2/9/21 which served as her treatment.  She is here today for a surveillance visit.      20 minutes spent on the date of the encounter doing chart review, history and exam, documentation, and further activities as noted above.      Plan:     1.)        Endometrial adenocarcinoma:  JACKIE on exam.  RTC in 3 months for her next surveillance visit.  Plan for surveillance visits every 3 months for the first 2 years post treatment (2/2023), followed by every 6 months for an additional 3 years thereafter (2/2026), then annually.  Reviewed signs and symptoms for when she should contact the clinic or seek additional care.  Patient to contact the clinic with any questions or concerns in the interim.        Genetic risk factors were assessed and her MMR proteins were intact.       Labs and/or tests ordered include:  None.                2.)        Health maintenance:  Issues addressed today include following up with PCP for annual health maintenance and non-gynecologic issues.  Encouraged to schedule her mammogram.      Radha Sanders, DNP, APRN, FNP-C  Nurse  Practitioner  Division of Gynecologic Oncology  Pager: [phone]     CC  Patient Care Team:  Janie Riley APRN CNP as PCP - General (Nurse Practitioner - Family)  Stan Weber MD as Referring Physician (Internal Medicine)  Guero Matute MD as Assigned OBGYN Provider  Wilber Rice OD as Assigned Surgical Provider  Janie Riley APRN CNP as Assigned PCP  Guero Ibarra DPM as Assigned Musculoskeletal Provider  Radha Sanders APRN CNP as Assigned Cancer Care Provider  BEBETO STILES        Again, thank you for allowing me to participate in the care of your patient.        Sincerely,        PABLO Miller CNP

## 2022-04-20 NOTE — NURSING NOTE
"Oncology Rooming Note    April 20, 2022 8:01 AM   Mary Sims is a 56 year old female who presents for:    Chief Complaint   Patient presents with     Oncology Clinic Visit     Follow up     Initial Vitals: /89 (BP Location: Right arm, Patient Position: Chair, Cuff Size: Adult Large)   Pulse 68   Ht 1.626 m (5' 4\")   Wt 96.6 kg (213 lb)   LMP 08/04/2015 (Approximate)   SpO2 99%   BMI 36.56 kg/m   Estimated body mass index is 36.56 kg/m  as calculated from the following:    Height as of this encounter: 1.626 m (5' 4\").    Weight as of this encounter: 96.6 kg (213 lb). Body surface area is 2.09 meters squared.  No Pain (0) Comment: Data Unavailable   Patient's last menstrual period was 08/04/2015 (approximate).  Allergies reviewed: Yes  Medications reviewed: Yes    Medications: Medication refills not needed today.  Pharmacy name entered into [employer]: [facility] - [facility], [place] - [address] [place] [address]    Clinical concerns: NO Radha was notified.      Minna Minor CMA              "

## 2022-04-21 ENCOUNTER — OFFICE VISIT (OUTPATIENT)
Dept: FAMILY MEDICINE | Facility: CLINIC | Age: 57
End: 2022-04-21
Payer: COMMERCIAL

## 2022-04-21 VITALS
TEMPERATURE: 98.2 F | DIASTOLIC BLOOD PRESSURE: 85 MMHG | WEIGHT: 211.4 LBS | BODY MASS INDEX: 36.29 KG/M2 | OXYGEN SATURATION: 99 % | HEART RATE: 95 BPM | SYSTOLIC BLOOD PRESSURE: 122 MMHG

## 2022-04-21 DIAGNOSIS — M79.641 PAIN OF RIGHT HAND: Primary | ICD-10-CM

## 2022-04-21 DIAGNOSIS — Z12.31 VISIT FOR SCREENING MAMMOGRAM: ICD-10-CM

## 2022-04-21 DIAGNOSIS — E66.01 MORBID OBESITY (H): ICD-10-CM

## 2022-04-21 PROCEDURE — 99213 OFFICE O/P EST LOW 20 MIN: CPT | Performed by: PHYSICIAN ASSISTANT

## 2022-04-21 ASSESSMENT — PAIN SCALES - GENERAL: PAINLEVEL: NO PAIN (0)

## 2022-04-21 NOTE — PROGRESS NOTES
Assessment & Plan  CTS vs tendonopathy, will trial as below and refer  Problem List Items Addressed This Visit     Morbid obesity (H)      Other Visit Diagnoses     Pain of right hand    -  Primary    Relevant Orders    Orthopedic  Referral    Wrist/Arm/Hand Supplies Order    Visit for screening mammogram        Relevant Orders    MA SCREENING DIGITAL BILAT - Future  (s+30)          6 minutes spent on the date of the encounter doing chart review, history and exam, documentation and further activities per the note  {     Return in about 2 weeks (around 5/5/2022) for Specialist Follow-up.    MP [place]  [facility]    Subjective     HPI     Reason for visit:  Pain in my hand, as well as loss of strength  Symptom onset:  3-7 days ago  Symptoms include:  Not easy to close my hand into a fist. Numbness extending from the first knuckle of my ring finger down through the palm of my hand. Loss of strength in the hand.  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)    Patient's obesity is stable as is her BP which it affects    Review of Systems   [facility] hand as above        Objective    /85 (BP Location: Left arm, Patient Position: Sitting, Cuff Size: Adult Large)   Pulse 95   Temp 98.2  F (36.8  C) (Tympanic)   Wt 95.9 kg (211 lb 6.4 oz)   LMP 08/04/2015 (Approximate)   SpO2 99%   BMI 36.29 kg/m      Physical Exam   Rt hand- MAITE, negative tinnel/phalen, strength esentially intact              DME (Durable Medical Equipment) Orders and Documentation  Orders Placed This Encounter   Procedures     Wrist/Arm/Hand Supplies Order      The patient was assessed and it was determined the patient is in need of the following listed DME Supplies/Equipment. Please complete supporting documentation below to demonstrate medical necessity.      Wrist/Arm/Hand Bracing Supplies  Documentation  Patient requires the use of the ordered bracing device due to following medical need/condition: hand pan , weakness presumed CTS

## 2022-04-22 NOTE — TELEPHONE ENCOUNTER
DIAGNOSIS: Pain of right hand   APPOINTMENT DATE: 04/25/2022   NOTES STATUS DETAILS   OFFICE NOTE from referring provider Internal 04/21/2022 Bob GRESHAM [facility]    OFFICE NOTE from other specialist N/A    DISCHARGE SUMMARY from hospital N/A    DISCHARGE REPORT from the ER N/A    OPERATIVE REPORT N/A    EMG report N/A    MEDICATION LIST N/A    MRI N/A    DEXA (osteoporosis/bone health) N/A    CT SCAN N/A    XRAYS (IMAGES & REPORTS) N/A

## 2022-04-25 ENCOUNTER — PRE VISIT (OUTPATIENT)
Dept: ORTHOPEDICS | Facility: CLINIC | Age: 57
End: 2022-04-25
Payer: COMMERCIAL

## 2022-04-25 ENCOUNTER — OFFICE VISIT (OUTPATIENT)
Dept: ORTHOPEDICS | Facility: CLINIC | Age: 57
End: 2022-04-25
Payer: COMMERCIAL

## 2022-04-25 DIAGNOSIS — M79.641 PAIN OF RIGHT HAND: ICD-10-CM

## 2022-04-25 DIAGNOSIS — M79.644 FINGER PAIN, RIGHT: Primary | ICD-10-CM

## 2022-04-25 PROCEDURE — 99204 OFFICE O/P NEW MOD 45 MIN: CPT | Performed by: FAMILY MEDICINE

## 2022-04-25 NOTE — PATIENT INSTRUCTIONS
Thanks for coming today.  Ortho/[facility] Medicine Clinic  [address] [place] [address]    To schedule future appointments in Ortho Clinic, you may call [phone].    To schedule ordered imaging by your Provider: Call [facility] at [phone]    [email or web address] available online at:   [email or web address]    Please call if any further questions or concerns [phone] and ask for the Orthopedic Department. Clinic hours 8 am to 5 pm.    Return to clinic if symptoms worsen.

## 2022-04-25 NOTE — PROGRESS NOTES
CHIEF COMPLAINT:  Consult (Right ring finger pain and numbness and tingling )       HISTORY OF PRESENT ILLNESS  Ms. Sims is a pleasant 56 year old female who presents to clinic today with right hand pain.  Mary dislocated her right ring finger at the PIP joint approximately 30 years ago.  This was relocated in the emergency department and treated nonoperatively.  Over the years she developed a bony callus and pain in this region.  This has become more prominent over the past year or so as it is difficult for her to perform tasks at work and at home.  She is now having pain that extends down her palm to her wrist, in the distribution of her ring finger.  This is her dominant hand.        Additional history: as documented    MEDICAL HISTORY  Patient Active Problem List   Diagnosis     CARDIOVASCULAR SCREENING; LDL GOAL LESS THAN 160     Hypothyroidism     Hypertension goal BP (blood pressure) < 140/90     WONG (iron deficiency anemia)     Obesity     Nontraumatic tear of supraspinatus tendon, left     Insomnia, unspecified type     De Quervain's disease (tenosynovitis)     Arthritis of foot, right     Morbid obesity (H)     Encounter for routine adult health examination without abnormal findings     Osteopenia of multiple sites     Endometrial cancer (H)       Current Outpatient Medications   Medication Sig Dispense Refill     gabapentin (NEURONTIN) 300 MG capsule TAKE ONE CAPSULE BY MOUTH THREE TIMES A  capsule 3     levothyroxine (SYNTHROID/LEVOTHROID) 75 MCG tablet TAKE ONE TABLET BY MOUTH EVERY DAY ; SEPERATE IRON OR CALCIUM CONTAINING PRODUCTS BY ATLEAST 4 HOURS FROM THIS MEDICATION 90 tablet 2     metoprolol succinate ER (TOPROL-XL) 25 MG 24 hr tablet Take 1 tablet (25 mg) by mouth daily 90 tablet 3     vitamin D2 (ERGOCALCIFEROL) 48381 units (1250 mcg) capsule Take 1 capsule (50,000 Units) by mouth once a week (Patient taking differently: Take 50,000 Units by mouth once a week) 12 capsule 0       No  Known Allergies    Family History   Problem Relation Age of Onset     Hypertension Mother      Diabetes Mother      Hypertension Father      Alcohol/Drug Father         alcohol     Crohn's Disease Son      Hypertension Other      Cancer No family hx of      Cerebrovascular Disease No family hx of      Thyroid Disease No family hx of      Glaucoma No family hx of      Macular Degeneration No family hx of        Additional medical/Social/Surgical histories reviewed in [facility] and updated as appropriate.        PHYSICAL EXAM    General  - normal appearance, in no obvious distress  HEENT  - conjunctivae not injected, moist mucous membranes  CV  - normal radial pulse  Pulm  - normal respiratory pattern, non-labored  Musculoskeletal - right hand, ring finger  - inspection: ulnar variance at PIP joint  - palpation: bony prominence at PIP joint  - ROM:  MCP 90 deg flexion   0 deg extension    deg flexion   0 deg extension   DIP 80 deg flexion   0 deg extension  - strength: 5/5  strength   - special tests:  (-) varus  (-) valgus  Neuro  - no numbness, no motor deficit, grossly normal coordination, normal muscle tone  Skin  - no ecchymosis, erythema, warmth, or induration, no obvious rash  Psych  - interactive, appropriate, normal mood and affect               ASSESSMENT & PLAN  Ms. Sims is a 56 year old female who presents to clinic today with right hand and finger pain.    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    I do suspect that she likely suffered a small fracture or dislocation, possibly a collateral ligament injury at the time.  I do think that Mary is going to have some pain in this finger moving forward secondary to the osteoarthritis, although the bulk of her pain at the moment does seem to be attributable to a flexor tendinitis.  I am referring her to hand therapy, if her  symptoms do not improve whatsoever over the coming weeks and likely order an MRI of her hand.    It was a pleasure seeing Mary today.    Murray Brewster DO, [facility]  Primary Care Sports Medicine      This note was constructed using Dragon dictation software, please excuse any minor errors in spelling, grammar, or syntax.

## 2022-04-25 NOTE — LETTER
4/25/2022         RE: Mary Sims  [address]  [place] [address]        Dear Colleague,    Thank you for referring your patient, Mary Sims, to the [facility] [place]. Please see a copy of my visit note below.    CHIEF COMPLAINT:  Consult (Right ring finger pain and numbness and tingling )       HISTORY OF PRESENT ILLNESS  Ms. Sims is a pleasant 56 year old female who presents to clinic today with right hand pain.  Mary dislocated her right ring finger at the PIP joint approximately 30 years ago.  This was relocated in the emergency department and treated nonoperatively.  Over the years she developed a bony callus and pain in this region.  This has become more prominent over the past year or so as it is difficult for her to perform tasks at work and at home.  She is now having pain that extends down her palm to her wrist, in the distribution of her ring finger.  This is her dominant hand.        Additional history: as documented    MEDICAL HISTORY  Patient Active Problem List   Diagnosis     CARDIOVASCULAR SCREENING; LDL GOAL LESS THAN 160     Hypothyroidism     Hypertension goal BP (blood pressure) < 140/90     WONG (iron deficiency anemia)     Obesity     Nontraumatic tear of supraspinatus tendon, left     Insomnia, unspecified type     De Quervain's disease (tenosynovitis)     Arthritis of foot, right     Morbid obesity (H)     Encounter for routine adult health examination without abnormal findings     Osteopenia of multiple sites     Endometrial cancer (H)       Current Outpatient Medications   Medication Sig Dispense Refill     gabapentin (NEURONTIN) 300 MG capsule TAKE ONE CAPSULE BY MOUTH THREE TIMES A  capsule 3     levothyroxine (SYNTHROID/LEVOTHROID) 75 MCG tablet TAKE ONE TABLET BY MOUTH EVERY DAY ; SEPERATE IRON OR CALCIUM CONTAINING PRODUCTS BY ATLEAST 4 HOURS FROM THIS MEDICATION 90 tablet 2     metoprolol succinate ER (TOPROL-XL) 25  MG 24 hr tablet Take 1 tablet (25 mg) by mouth daily 90 tablet 3     vitamin D2 (ERGOCALCIFEROL) 13775 units (1250 mcg) capsule Take 1 capsule (50,000 Units) by mouth once a week (Patient taking differently: Take 50,000 Units by mouth once a week) 12 capsule 0       No Known Allergies    Family History   Problem Relation Age of Onset     Hypertension Mother      Diabetes Mother      Hypertension Father      Alcohol/Drug Father         alcohol     Crohn's Disease Son      Hypertension Other      Cancer No family hx of      Cerebrovascular Disease No family hx of      Thyroid Disease No family hx of      Glaucoma No family hx of      Macular Degeneration No family hx of        Additional medical/Social/Surgical histories reviewed in [facility] and updated as appropriate.        PHYSICAL EXAM    General  - normal appearance, in no obvious distress  HEENT  - conjunctivae not injected, moist mucous membranes  CV  - normal radial pulse  Pulm  - normal respiratory pattern, non-labored  Musculoskeletal - right hand, ring finger  - inspection: ulnar variance at PIP joint  - palpation: bony prominence at PIP joint  - ROM:  MCP 90 deg flexion   0 deg extension    deg flexion   0 deg extension   DIP 80 deg flexion   0 deg extension  - strength: 5/5  strength   - special tests:  (-) varus  (-) valgus  Neuro  - no numbness, no motor deficit, grossly normal coordination, normal muscle tone  Skin  - no ecchymosis, erythema, warmth, or induration, no obvious rash  Psych  - interactive, appropriate, normal mood and affect               ASSESSMENT & PLAN  Ms. Sims is a 56 year old female who presents to clinic today with right hand and finger pain.    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    I do suspect that she likely suffered a small fracture or dislocation, possibly a collateral ligament injury at  the time.  I do think that Mary is going to have some pain in this finger moving forward secondary to the osteoarthritis, although the bulk of her pain at the moment does seem to be attributable to a flexor tendinitis.  I am referring her to hand therapy, if her symptoms do not improve whatsoever over the coming weeks and likely order an MRI of her hand.    It was a pleasure seeing Mary today.    Murray Brewster DO, [facility]  Primary Care Sports Medicine      This note was constructed using Dragon dictation software, please excuse any minor errors in spelling, grammar, or syntax.        Again, thank you for allowing me to participate in the care of your patient.        Sincerely,        Murray Brewster DO

## 2022-05-08 ENCOUNTER — HEALTH MAINTENANCE LETTER (OUTPATIENT)
Age: 57
End: 2022-05-08

## 2022-05-10 ENCOUNTER — THERAPY VISIT (OUTPATIENT)
Dept: OCCUPATIONAL THERAPY | Facility: CLINIC | Age: 57
End: 2022-05-10
Attending: FAMILY MEDICINE
Payer: COMMERCIAL

## 2022-05-10 DIAGNOSIS — M79.644 FINGER PAIN, RIGHT: ICD-10-CM

## 2022-05-10 DIAGNOSIS — M79.641 PAIN OF RIGHT HAND: ICD-10-CM

## 2022-05-10 PROCEDURE — 97165 OT EVAL LOW COMPLEX 30 MIN: CPT | Mod: GO

## 2022-05-10 PROCEDURE — 97535 SELF CARE MNGMENT TRAINING: CPT | Mod: GO

## 2022-05-10 PROCEDURE — 97110 THERAPEUTIC EXERCISES: CPT | Mod: GO

## 2022-05-10 NOTE — PROGRESS NOTES
Hand Therapy Initial Evaluation    Current Date:  5/10/2022  Referring Provider: Murray Brewster DO MD Order Date: 4/25/2022    Diagnosis: Finger pain, right; Pain of right hand   DOI: 2 months ago    Subjective:  Mary Sims is a 56 year old female.    Answers for HPI/ROS submitted by the patient on 5/6/2022  Reason for Visit:: Occasional pain and muscle weakness in my right hand, starting in the ring finger and extending down thru the palm.  Number scale: 0/10  General health as reported by patient: fair  Please check all that apply to your current or past medical history: anemia, history of fractures, high blood pressure, migraines/headaches, overweight, thyroid problems  Medical allergies: none  Surgeries: orthopedic surgery, cancer surgery  Medications you are currently taking: high blood pressure medication, thyroid medication  Occupation:: Manager  What are your primary job tasks: prolonged standing, repetitive tasks    Occupational Profile Information:  Right hand dominant  Prior functional level:  independent-all household chores  Patient reports symptoms of pain, stiffness/loss of motion and numbness  Special tests:  x-ray.    Previous treatment: flexing fingers  Barriers include:none  Mobility: No difficulty  Transportation: drives  Currently working in normal job without restrictions  Leisure activities/hobbies: playing with granddaughter - 16 months, computer games    Functional Outcome Measure:   Upper Extremity Functional Index Score:  SCORE:   Column Totals: /80: (P) 72   (A lower score indicates greater disability.)    Objective:  Pain Level (Scale 0-10)   5/10/2022   At Rest 0/10   With Use 4/10     Pain Description  Date 5/10/2022   Location ring finger - from tip of finger to guyon's canal   Pain Quality Numb and Shooting   Frequency intermittent     Pain is worst  daytime   Exacerbated by  supination/pronation with weight, mousing   Relieved by Stretch, rest   Progression Unchanged     Edema  (Circumference measured in cm)   5/10/2022 5/10/2022   RF L R   P1 6.0 6.2   PIP 6.2 6.3   P2 5.0 5.0     Sensation   Decreased Ulnar Nerve distribution per pt report    Neural Tension Testing  UNT: Ulnar Neurodynamic Test (based on DS Marie's ULNT)   5/10/2022   0-5 Scale 4/5   Position:   0/5: Arm across abdomen in coronal plane  1/5: ER to neutral ABD shoulder to 45 degrees  2/5: ER shoulder to 90 degrees  3/5: Block shoulder and ABD shoulder to 110 degrees  4/5: Fully pronate forearm, extend wrist and ring and small fingers  5/5: Flex elbow and bring hand to side of face  Notes:    (+) indicates beyond grade level but less than assisted to next level    (-) indicates over assisted to level    S1  onset/change of patient's symptoms    S2 definite stop point based on patient's discomfort level    Special Tests  Pain Report: - none  + mild    ++ moderate    +++ severe    5/10/2022   Tinels at cubital tunnel -   Tinel's at guyon's canal -   Elbow Flexion Test -   Ulnar nerve subluxation at elbow -   30 secs compression on guyon's canal + @ 24 secs     ROM  Ring Finger 5/10/2022 5/10/2022   AROM (PROM) L R   MCP 0/90 0/88   PIP 0/99 0/92   DIP 0/67 -7/49   BUCHANAN 256      222     Strength   (Measured in pounds)  Pain Report: - none  + mild    ++ moderate    +++ severe    5/10/2022 5/10/2022   Trials L R   1  2  3 40 19 (1/10 pain)   Average 40 19     Assessment:  Patient presents with symptoms consistent with diagnosis of right hand and ring finger pain, with conservative intervention.     Patient's limitations or Problem List includes:  Pain, Decreased ROM/motion, Weakness, Sensory disturbance, Decreased  and Tightness in musculature of the right ring finger which interferes with the patient's ability to perform Work Tasks and Household Chores as compared to previous level of function.    Rehab Potential:  Good - Return to full activity, some limitations    Patient will benefit from skilled Occupational Therapy  to increase ROM, flexibility, motion, overall strength,  strength and sensation and decrease pain to return to previous activity level and resume normal daily tasks and to reach their rehab potential.    Barriers to Learning:  No barrier    Communication Issues:  Patient appears to be able to clearly communicate and understand verbal and written communication and follow directions correctly.    Chart Review: Chart Review and Simple history review with patient    Identified Performance Deficits: home establishment and management, meal preparation and cleanup, work and leisure activities    Assessment of Occupational Performance:  3-5 Performance Deficits    Clinical Decision Making (Complexity): Low complexity    Treatment Explanation:  The following has been discussed with the patient:  RX ordered/plan of care  Anticipated outcomes  Possible risks and side effects    Plan:  Frequency:  1 X week, once daily  Duration:  for 6 weeks    Treatment Plan:    Modalities:    US and Paraffin   Therapeutic Exercise:    AROM, AAROM, PROM, Tendon Gliding, Blocking, Isotonics, Isometrics and Stabilization  Therapeutic Activities:   Functional activities   Neuromuscular re-ed:   Nerve Gliding and Kinesiotaping  Manual Techniques:   Joint mobilization, Friction massage, Myofascial release and Manual edema mobilization  Orthotic Fabrication:    Static  Self Care:    Self Care Tasks, Ergonomic Considerations and Work Tasks    Discharge Plan:  Achieve all LTG.  Independent in home treatment program.  Reach maximal therapeutic benefit.    Home Exercise Program  Arthritis Tips/Joint Protection  Computer Ergonomics  Warmth  Moist heat for 5 minutes before exercises  Ball Massage  5 minutes before exercises  Sides of fingers, intrinsic muscles in palm of hand, and flexor muscles in forearm  Finger Active Range of Motion Tendon Glides Fist Series  Reps 10  Sessions per day 2-3  Finger Active Range of Motion Extensor Tendon  Gliding  Reps 10  Sessions per day 2-3  Finger Active Range of Motion FDS Flexor Tendon Gliding  Reps 10  Sessions per day 2-3  Finger Active Range of Motion DIP Joint Blocking  Reps 10  Sessions per day 2-3  Finger Active Range of Motion PIP Joint Blocking  Reps 10  Sessions per day 2-3  Towel Gathering and Spreading  Reps 1 minute  Sessions per day 1-2  Nerve Flossing Highly Irritable Ulnar Nerve - Guyons Canal  Reps 10 (hold for 5 seconds)  Sessions per day 1-2    Next Visit  Review HEP   MFR - flexors, intrinsics, sides of finger  Nerve gliding - ulnar  A/AA/PROM

## 2022-05-17 ENCOUNTER — THERAPY VISIT (OUTPATIENT)
Dept: OCCUPATIONAL THERAPY | Facility: CLINIC | Age: 57
End: 2022-05-17
Payer: COMMERCIAL

## 2022-05-17 DIAGNOSIS — M79.641 PAIN OF RIGHT HAND: Primary | ICD-10-CM

## 2022-05-17 DIAGNOSIS — M79.644 FINGER PAIN, RIGHT: ICD-10-CM

## 2022-05-17 PROCEDURE — 97110 THERAPEUTIC EXERCISES: CPT | Mod: GO

## 2022-05-17 PROCEDURE — 97140 MANUAL THERAPY 1/> REGIONS: CPT | Mod: GO

## 2022-05-17 PROCEDURE — 97112 NEUROMUSCULAR REEDUCATION: CPT | Mod: GO

## 2022-05-17 NOTE — PROGRESS NOTES
SOAP note objective information for 5/17/2022.    ROM  Ring Finger 5/10/2022 5/10/2022 5/17/2022   AROM (PROM) L R R   MCP 0/90 0/88 0/87   PIP 0/99 0/92 0/100   DIP 0/67 -7/49 -6/72   BUCHANAN 256      222      253   Please refer to the daily flowsheet for treatment today, total treatment time and time spent performing 1:1 timed codes.

## 2022-05-19 ENCOUNTER — ANCILLARY PROCEDURE (OUTPATIENT)
Dept: MAMMOGRAPHY | Facility: CLINIC | Age: 57
End: 2022-05-19
Attending: PHYSICIAN ASSISTANT
Payer: COMMERCIAL

## 2022-05-19 DIAGNOSIS — Z12.31 VISIT FOR SCREENING MAMMOGRAM: ICD-10-CM

## 2022-05-19 PROCEDURE — 77063 BREAST TOMOSYNTHESIS BI: CPT | Mod: GC | Performed by: RADIOLOGY

## 2022-05-19 PROCEDURE — 77067 SCR MAMMO BI INCL CAD: CPT | Mod: GC | Performed by: RADIOLOGY

## 2022-06-02 ENCOUNTER — IMMUNIZATION (OUTPATIENT)
Dept: NURSING | Facility: CLINIC | Age: 57
End: 2022-06-02
Payer: COMMERCIAL

## 2022-06-02 PROCEDURE — 0054A COVID-19,PF,PFIZER (12+ YRS): CPT

## 2022-06-02 PROCEDURE — 91305 COVID-19,PF,PFIZER (12+ YRS): CPT

## 2022-06-14 ENCOUNTER — THERAPY VISIT (OUTPATIENT)
Dept: OCCUPATIONAL THERAPY | Facility: CLINIC | Age: 57
End: 2022-06-14
Payer: COMMERCIAL

## 2022-06-14 DIAGNOSIS — M79.641 PAIN OF RIGHT HAND: Primary | ICD-10-CM

## 2022-06-14 DIAGNOSIS — M79.644 FINGER PAIN, RIGHT: ICD-10-CM

## 2022-06-14 PROCEDURE — 97112 NEUROMUSCULAR REEDUCATION: CPT | Mod: GO

## 2022-06-14 PROCEDURE — 97110 THERAPEUTIC EXERCISES: CPT | Mod: GO

## 2022-06-14 PROCEDURE — 97140 MANUAL THERAPY 1/> REGIONS: CPT | Mod: GO

## 2022-06-22 ASSESSMENT — ENCOUNTER SYMPTOMS
EYE PAIN: 0
PARESTHESIAS: 0
MYALGIAS: 0
HEARTBURN: 0
DIZZINESS: 0
CONSTIPATION: 0
BREAST MASS: 0
ABDOMINAL PAIN: 0
HEMATURIA: 0
WEAKNESS: 0
COUGH: 0
DIARRHEA: 0
HEMATOCHEZIA: 0
NERVOUS/ANXIOUS: 0
FREQUENCY: 0
CHILLS: 0
ARTHRALGIAS: 0
DYSURIA: 0
NAUSEA: 0
SORE THROAT: 0
PALPITATIONS: 0
JOINT SWELLING: 0
HEADACHES: 0
FEVER: 0
SHORTNESS OF BREATH: 0

## 2022-06-28 ENCOUNTER — OFFICE VISIT (OUTPATIENT)
Dept: FAMILY MEDICINE | Facility: CLINIC | Age: 57
End: 2022-06-28
Payer: COMMERCIAL

## 2022-06-28 VITALS
BODY MASS INDEX: 35.44 KG/M2 | HEIGHT: 64 IN | HEART RATE: 81 BPM | SYSTOLIC BLOOD PRESSURE: 114 MMHG | WEIGHT: 207.6 LBS | OXYGEN SATURATION: 98 % | TEMPERATURE: 98.1 F | DIASTOLIC BLOOD PRESSURE: 80 MMHG | RESPIRATION RATE: 17 BRPM

## 2022-06-28 DIAGNOSIS — E66.01 MORBID OBESITY (H): ICD-10-CM

## 2022-06-28 DIAGNOSIS — I10 HYPERTENSION GOAL BP (BLOOD PRESSURE) < 140/90: ICD-10-CM

## 2022-06-28 DIAGNOSIS — C54.1 ENDOMETRIAL CANCER (H): ICD-10-CM

## 2022-06-28 DIAGNOSIS — Z01.00 VISIT FOR EYE AND VISION EXAM: ICD-10-CM

## 2022-06-28 DIAGNOSIS — E03.9 HYPOTHYROIDISM, UNSPECIFIED TYPE: ICD-10-CM

## 2022-06-28 DIAGNOSIS — Z00.00 ROUTINE GENERAL MEDICAL EXAMINATION AT A HEALTH CARE FACILITY: Primary | ICD-10-CM

## 2022-06-28 DIAGNOSIS — G57.11 MERALGIA PARESTHETICA, RIGHT LOWER LIMB: ICD-10-CM

## 2022-06-28 DIAGNOSIS — Z13.21 ENCOUNTER FOR VITAMIN DEFICIENCY SCREENING: ICD-10-CM

## 2022-06-28 LAB
ANION GAP SERPL CALCULATED.3IONS-SCNC: 1 MMOL/L (ref 3–14)
BUN SERPL-MCNC: 16 MG/DL (ref 7–30)
CALCIUM SERPL-MCNC: 9.1 MG/DL (ref 8.5–10.1)
CHLORIDE BLD-SCNC: 107 MMOL/L (ref 94–109)
CO2 SERPL-SCNC: 33 MMOL/L (ref 20–32)
CREAT SERPL-MCNC: 0.8 MG/DL (ref 0.52–1.04)
CREAT UR-MCNC: 208 MG/DL
GFR SERPL CREATININE-BSD FRML MDRD: 84 ML/MIN/1.73M2
GLUCOSE BLD-MCNC: 110 MG/DL (ref 70–99)
MICROALBUMIN UR-MCNC: 14 MG/L
MICROALBUMIN/CREAT UR: 6.73 MG/G CR (ref 0–25)
POTASSIUM BLD-SCNC: 4.9 MMOL/L (ref 3.4–5.3)
SODIUM SERPL-SCNC: 141 MMOL/L (ref 133–144)
TSH SERPL DL<=0.005 MIU/L-ACNC: 0.63 MU/L (ref 0.4–4)

## 2022-06-28 PROCEDURE — 84443 ASSAY THYROID STIM HORMONE: CPT | Performed by: NURSE PRACTITIONER

## 2022-06-28 PROCEDURE — 99214 OFFICE O/P EST MOD 30 MIN: CPT | Mod: 25 | Performed by: NURSE PRACTITIONER

## 2022-06-28 PROCEDURE — 82043 UR ALBUMIN QUANTITATIVE: CPT | Performed by: NURSE PRACTITIONER

## 2022-06-28 PROCEDURE — 82306 VITAMIN D 25 HYDROXY: CPT | Performed by: NURSE PRACTITIONER

## 2022-06-28 PROCEDURE — 36415 COLL VENOUS BLD VENIPUNCTURE: CPT | Performed by: NURSE PRACTITIONER

## 2022-06-28 PROCEDURE — 99396 PREV VISIT EST AGE 40-64: CPT | Performed by: NURSE PRACTITIONER

## 2022-06-28 PROCEDURE — 80048 BASIC METABOLIC PNL TOTAL CA: CPT | Performed by: NURSE PRACTITIONER

## 2022-06-28 RX ORDER — GABAPENTIN 300 MG/1
CAPSULE ORAL
Qty: 180 CAPSULE | Refills: 3 | Status: SHIPPED | OUTPATIENT
Start: 2022-06-28 | End: 2023-06-16

## 2022-06-28 ASSESSMENT — ENCOUNTER SYMPTOMS
DIZZINESS: 0
ARTHRALGIAS: 0
CONSTIPATION: 0
BREAST MASS: 0
SORE THROAT: 0
CHILLS: 0
WEAKNESS: 0
HEARTBURN: 0
FREQUENCY: 0
MYALGIAS: 0
PARESTHESIAS: 0
SHORTNESS OF BREATH: 0
HEADACHES: 0
COUGH: 0
DYSURIA: 0
NERVOUS/ANXIOUS: 0
EYE PAIN: 0
HEMATURIA: 0
PALPITATIONS: 0
DIARRHEA: 0
NAUSEA: 0
HEMATOCHEZIA: 0
FEVER: 0
JOINT SWELLING: 0
ABDOMINAL PAIN: 0

## 2022-06-28 ASSESSMENT — PAIN SCALES - GENERAL: PAINLEVEL: NO PAIN (0)

## 2022-06-28 NOTE — PROGRESS NOTES
SUBJECTIVE:   CC: Mary Sims is an 57 year old woman who presents for preventive health visit.       Patient has been advised of split billing requirements and indicates understanding: Yes  Healthy Habits:     Getting at least 3 servings of Calcium per day:  NO    Bi-annual eye exam:  NO    Dental care twice a year:  NO    Sleep apnea or symptoms of sleep apnea:  None    Diet:  Regular (no restrictions)    Frequency of exercise:  None    Taking medications regularly:  Yes    Medication side effects:  None    PHQ-2 Total Score: 0    Additional concerns today:  No      Hypertension Follow-up      Do you check your blood pressure regularly outside of the clinic? Yes     Are you following a low salt diet? Yes    Are your blood pressures ever more than 140 on the top number (systolic) OR more   than 90 on the bottom number (diastolic), for example 140/90? No    Hypothyroidism Follow-up      Since last visit, patient describes the following symptoms: Weight stable, no hair loss, no skin changes, no constipation, no loose stools      Today's PHQ-2 Score:   PHQ-2 ( 1999 Pfizer) 6/22/2022   Q1: Little interest or pleasure in doing things 0   Q2: Feeling down, depressed or hopeless 0   PHQ-2 Score 0   PHQ-2 Total Score (12-17 Years)- Positive if 3 or more points; Administer PHQ-A if positive -   Q1: Little interest or pleasure in doing things Not at all   Q2: Feeling down, depressed or hopeless Not at all   PHQ-2 Score 0       Abuse: Current or Past (Physical, Sexual or Emotional) - No  Do you feel safe in your environment? Yes        Social History     Tobacco Use     Smoking status: Never Smoker     Smokeless tobacco: Never Used   Substance Use Topics     Alcohol use: Not Currently     Comment: SELDOM        Alcohol Use 6/22/2022   Prescreen: >3 drinks/day or >7 drinks/week? No   Prescreen: >3 drinks/day or >7 drinks/week? -       Reviewed orders with patient.  Reviewed health maintenance and updated orders  accordingly - Yes  Labs reviewed in EPIC  BP Readings from Last 3 Encounters:   22 114/80   22 122/85   22 133/89    Wt Readings from Last 3 Encounters:   22 94.2 kg (207 lb 9.6 oz)   22 95.9 kg (211 lb 6.4 oz)   22 96.6 kg (213 lb)                  Patient Active Problem List   Diagnosis     CARDIOVASCULAR SCREENING; LDL GOAL LESS THAN 160     Hypothyroidism     Hypertension goal BP (blood pressure) < 140/90     WONG (iron deficiency anemia)     Obesity     Nontraumatic tear of supraspinatus tendon, left     Insomnia, unspecified type     De Quervain's disease (tenosynovitis)     Arthritis of foot, right     Morbid obesity (H)     Encounter for routine adult health examination without abnormal findings     Osteopenia of multiple sites     Endometrial cancer (H)     Pain of right hand     Finger pain, right     Past Surgical History:   Procedure Laterality Date     ABDOMEN SURGERY  1991         ARTHRODESIS FOOT Right 2020    Procedure: MIDFOOT BONE SPUR RESECTION WITH JOINT FUSIONS RIGHT LOWER EXTREMITY;  Surgeon: Choco Frances DPM;  Location:  OR     ARTHROSCOPY SHLDR ROTATOR CUFF REPAIR, SUBACROMIAL DECOMP, DIST CLAVICLE RESECTION, BICEP TENODESIS Left 2017    Procedure: ARTHROSCOPY SHOULDER ROTATOR CUFF REPAIR, SUBACROMIAL DECOMPRESSION, DISTAL CLAVICLE RESECTION, OPEN BICEP TENODESIS REPAIR;  Surgeon: Dorina Rodriguez MD;  Location: [facility]     BIOPSY  2021     COLONOSCOPY  2013    Procedure: COLONOSCOPY;  colonoscopy, screening;  Surgeon: Dalton Lala MD;  Location: [facility]     CYSTOSCOPY  2020     CYSTOSCOPY N/A 2021    Procedure: Cystoscopy;  Surgeon: Zacarias Brandon MD;  Location: [facility] OR     DAVINCI HYSTERECTOMY TOTAL, BILATERAL SALPINGO-OOPHORECTOMY, COMBINED Bilateral 2021    Procedure: HYSTERECTOMY, TOTAL, ROBOT-ASSISTED, LAPAROSCOPIC, WITH SALPINGO-OOPHORECTOMY, PELVIC WASHINGS, SENTINEL  LYMPHNODE SAMPLING;  Surgeon: Zacarias Brandon MD;  Location: [facility] OR     SOFT TISSUE SURGERY  2017    Shoulder     SURGICAL HISTORY OF -   1996    RT ANKLE Fx AND REPAIR (PIN,PLATE,SCREWS)     THYROIDECTOMY       TUBAL LIGATION       ZZC  DELIVERY ONLY         Social History     Tobacco Use     Smoking status: Never Smoker     Smokeless tobacco: Never Used   Substance Use Topics     Alcohol use: Not Currently     Comment: SELDOM      Family History   Problem Relation Age of Onset     Hypertension Mother      Diabetes Mother      Thyroid Disease Mother      Hypertension Father      Alcohol/Drug Father         alcohol     Substance Abuse Father      Crohn's Disease Son      Hypertension Other      Cancer No family hx of      Cerebrovascular Disease No family hx of      Glaucoma No family hx of      Macular Degeneration No family hx of            Breast Cancer Screening:    FHS-7:   Breast CA Risk Assessment (FHS-7) 2022   Did any of your first-degree relatives have breast or ovarian cancer? No   Did any of your relatives have bilateral breast cancer? No   Did any man in your family have breast cancer? No   Did any woman in your family have breast and ovarian cancer? No   Did any woman in your family have breast cancer before age 50 y? No   Do you have 2 or more relatives with breast and/or ovarian cancer? No   Do you have 2 or more relatives with breast and/or bowel cancer? No       Mammogram Screening: Recommended mammography every 1-2 years with patient discussion and risk factor consideration  Pertinent mammograms are reviewed under the imaging tab.    History of abnormal Pap smear: YES - updated in Problem List and Health Maintenance accordingly  PAP / HPV Latest Ref Rng & Units 2020 5/15/2017 3/11/2013   PAP (Historical) - NIL NIL ASC-US(A)   HPV16 NEG:Negative Negative Negative -   HPV18 NEG:Negative Negative Negative -   HRHPV NEG:Negative Negative Negative -     Reviewed and updated as  needed this visit by clinical staff   Tobacco  Allergies  Meds   Med Hx  Surg Hx  Fam Hx  Soc Hx          Reviewed and updated as needed this visit by Provider                   Past Medical History:   Diagnosis Date     Arthritis of foot, right 2019     Endometrial cancer (H) 2021     Graves disease      History of blood transfusion      Hypertension      Hypothyroidism      MEDICAL HISTORY OF -     S/P RADIOACTIVE IODINE     Morbid obesity (H) 2020     Osteopenia of multiple sites 2020      Past Surgical History:   Procedure Laterality Date     ABDOMEN SURGERY  1991         ARTHRODESIS FOOT Right 2020    Procedure: MIDFOOT BONE SPUR RESECTION WITH JOINT FUSIONS RIGHT LOWER EXTREMITY;  Surgeon: Choco Frances DPM;  Location: [facility]     ARTHROSCOPY SHLDR ROTATOR CUFF REPAIR, SUBACROMIAL DECOMP, DIST CLAVICLE RESECTION, BICEP TENODESIS Left 2017    Procedure: ARTHROSCOPY SHOULDER ROTATOR CUFF REPAIR, SUBACROMIAL DECOMPRESSION, DISTAL CLAVICLE RESECTION, OPEN BICEP TENODESIS REPAIR;  Surgeon: Dorina Rodriguez MD;  [facility]     BIOPSY  2021     COLONOSCOPY  2013    Procedure: COLONOSCOPY;  colonoscopy, screening;  Surgeon: Dalton Lala MD;  Location: [facility]     CYSTOSCOPY       CYSTOSCOPY N/A 2021    Procedure: Cystoscopy;  Surgeon: Zacarias Brandon MD;  [facility]     DAVINCI HYSTERECTOMY TOTAL, BILATERAL SALPINGO-OOPHORECTOMY, COMBINED Bilateral 2021    Procedure: HYSTERECTOMY, TOTAL, ROBOT-ASSISTED, LAPAROSCOPIC, WITH SALPINGO-OOPHORECTOMY, PELVIC WASHINGS, SENTINEL LYMPHNODE SAMPLING;  Surgeon: Zacarias Brandon MD;  Location: [facility]     SOFT TISSUE SURGERY  2017    Shoulder     SURGICAL HISTORY OF -       RT ANKLE Fx AND REPAIR (PIN,PLATE,SCREWS)     THYROIDECTOMY       TUBAL LIGATION       ZZC  DELIVERY ONLY         Review of Systems   Constitutional: Negative for chills  "and fever.   HENT: Negative for congestion, ear pain, hearing loss and sore throat.    Eyes: Negative for pain and visual disturbance.   Respiratory: Negative for cough and shortness of breath.    Cardiovascular: Negative for chest pain, palpitations and peripheral edema.   Gastrointestinal: Negative for abdominal pain, constipation, diarrhea, heartburn, hematochezia and nausea.   Breasts:  Negative for tenderness, breast mass and discharge.   Genitourinary: Negative for dysuria, frequency, genital sores, hematuria, pelvic pain, urgency, vaginal bleeding and vaginal discharge.   Musculoskeletal: Negative for arthralgias, joint swelling and myalgias.   Skin: Negative for rash.   Neurological: Negative for dizziness, weakness, headaches and paresthesias.   Psychiatric/Behavioral: Negative for mood changes. The patient is not nervous/anxious.         OBJECTIVE:   /80 (BP Location: Left arm, Patient Position: Sitting, Cuff Size: Adult Regular)   Pulse 81   Temp 98.1  F (36.7  C) (Tympanic)   Resp 17   Ht 1.626 m (5' 4\")   Wt 94.2 kg (207 lb 9.6 oz)   LMP 08/04/2015 (Approximate)   SpO2 98%   BMI 35.63 kg/m    Physical Exam  GENERAL: healthy, alert and no distress  EYES: Eyes grossly normal to inspection, PERRL and conjunctivae and sclerae normal  HENT: ear canals and TM's normal, nose and mouth without ulcers or lesions  NECK: no adenopathy, no asymmetry, masses, or scars and thyroid normal to palpation  RESP: lungs clear to auscultation - no rales, rhonchi or wheezes  CV: regular rate and rhythm, normal S1 S2, no S3 or S4, no murmur, click or rub, no peripheral edema and peripheral pulses strong  ABDOMEN: soft, nontender, no hepatosplenomegaly, no masses and bowel sounds normal  MS: no gross musculoskeletal defects noted, no edema  SKIN: no suspicious lesions or rashes  NEURO: Normal strength and tone, mentation intact and speech normal  PSYCH: mentation appears normal, affect normal/bright  LYMPH: no " "cervical, supraclavicular, axillary, or inguinal adenopathy    Diagnostic Test Results:  Labs reviewed in Epic  No results found for this or any previous visit (from the past 24 hour(s)).    ASSESSMENT/PLAN:   (Z00.00) Routine general medical examination at a health care facility  (primary encounter diagnosis)  Comment:   Plan: REVIEW OF HEALTH MAINTENANCE PROTOCOL ORDERS            (C54.1) Endometrial cancer (H)  Comment:   Plan: Followed by GYN Onc    (E66.01) Morbid obesity (H)  Comment:   Plan: Benefits of weight loss reviewed in detail, encouraged her to cut back on the carbohydrates in the diet, consume more fruits and vegetables, drink plenty of water, avoid fruit juices, sodas, get 150 min moderate exercise/week.  Recheck weight in 6 months.      (E03.9) Hypothyroidism, unspecified type  Comment: Stable on current 75 mcg synthroid isabel  Plan: TSH WITH FREE T4 REFLEX            (I10) Hypertension goal BP (blood pressure) < 140/90  Comment: diet Controlled  Plan: BASIC METABOLIC PANEL, Albumin Random Urine         Quantitative with Creat Ratio            (G57.11) Meralgia paresthetica, right lower limb  Comment: Refilled Neurontin  Plan: gabapentin (NEURONTIN) 300 MG capsule            (Z01.00) Visit for eye and vision exam  Comment:   Plan: OPTOMETRY REFERRAL            (Z13.21) Encounter for vitamin deficiency screening  Comment:   Plan: Vitamin D Deficiency              Patient has been advised of split billing requirements and indicates understanding: Yes    COUNSELING:  Reviewed preventive health counseling, as reflected in patient instructions    Estimated body mass index is 35.63 kg/m  as calculated from the following:    Height as of this encounter: 1.626 m (5' 4\").    Weight as of this encounter: 94.2 kg (207 lb 9.6 oz).    Weight management plan: Discussed healthy diet and exercise guidelines    She reports that she has never smoked. She has never used smokeless tobacco.      Counseling " Resources:  ATP IV Guidelines  Pooled Cohorts Equation Calculator  Breast Cancer Risk Calculator  BRCA-Related Cancer Risk Assessment: FHS-7 Tool  FRAX Risk Assessment  ICSI Preventive Guidelines  Dietary Guidelines for Americans, 2010  USDA's MyPlate  ASA Prophylaxis  Lung CA Screening    PABLO Glez [facility]

## 2022-06-29 DIAGNOSIS — E03.9 HYPOTHYROIDISM, UNSPECIFIED TYPE: ICD-10-CM

## 2022-06-29 LAB — DEPRECATED CALCIDIOL+CALCIFEROL SERPL-MC: 46 UG/L (ref 20–75)

## 2022-06-29 RX ORDER — LEVOTHYROXINE SODIUM 75 UG/1
TABLET ORAL
Qty: 90 TABLET | Refills: 3 | Status: SHIPPED | OUTPATIENT
Start: 2022-06-29 | End: 2023-06-28

## 2022-07-14 ASSESSMENT — ENCOUNTER SYMPTOMS
SMELL DISTURBANCE: 0
TASTE DISTURBANCE: 0
SNORES LOUDLY: 0
SPUTUM PRODUCTION: 0
WHEEZING: 0
SORE THROAT: 0
HEMOPTYSIS: 0
NECK MASS: 0
COUGH: 1
POSTURAL DYSPNEA: 0
COUGH DISTURBING SLEEP: 0
HOARSE VOICE: 1
SINUS PAIN: 0
TROUBLE SWALLOWING: 0
DYSPNEA ON EXERTION: 0
SINUS CONGESTION: 0
SHORTNESS OF BREATH: 0

## 2022-07-15 NOTE — PROGRESS NOTES
Gynecologic Oncology Return Visit Note    Date: 2022    RE: Mary Sims  : 1965  PAULA: 2022    CC: Stage IA grade 1 endometrioid endometrial adenocarcinoma     HPI:  Mary Sims is a 56 year old woman with a history of stage IA grade 1 endometrioid endometrial adenocarcinoma.  She is s/p TLH-BSO 21 which served as her treatment.  She is here today for a surveillance visit.      Oncology History:     Initially, she was seen for vaginal spotting.     2020: Pelvic US  IMPRESSION: Thickened endometrium up to 24 mm in transvaginal  measurement for a postmenopausal patient comment given the reported  history of postmenopausal bleeding, this does raise concern for  neoplasia/hyperplasia and tissue sampling is recommended. Small amount  of free fluid in the posterior cul-de-sac and left adnexa.  Nonvisualization of left ovary. The visualized right ovary appears  Normal.     21: EMB  FINAL DIAGNOSIS:   Uterus, endometrium: Biopsy:   - At least complex atypical hyperplasia with foci bordering on FIGO grade   1 endometrioid carcinoma, see   comment      21: Robotic-assisted total laparoscopic hysterectomy, bilateral salpingo-oopherectomy, sentinel lymph node mapping and sampling, cystoscopy  FINAL DIAGNOSIS:   A. UTERUS, CERVIX, BILATERAL TUBES AND OVARIES, HYSTERECTOMY AND BILATERAL    SALPINGO-OOPHORECTOMY:   - Endometrioid endometrial adenocarcinoma, FIGO grade 1, MMR proficient   - Unremarkable ovaries with follicular cysts   - Cervix with reactive changes and nabothian cysts   - Unremarkable fallopian tubes   B. SENTINEL LYMPH NODE, RIGHT EXTERNAL ILIAC, LYMPHADENECTOMY:   - One reactive lymph node, negative for malignancy (0/1)   C. SENTINEL LYMPH NODE, LEFT PELVIC, LYMPHADENECTOMY  - One reactive lymph node, negative for malignancy (0/1)     22: CT CAP  IMPRESSION:  1.  No evidence of recurrent or metastatic disease in the abdomen or  pelvis.   2.  Multiple hepatic cysts are  stable to slightly enlarged.  3.  Finding suggestive of mild colitis. Mild pelvic floor descent with  large rectal stool burden.  4.  Small hiatal hernia.                Today she reports feeling well overall and is without concern.  She denies any vaginal bleeding, no changes in her bowel or bladder habits, no nausea/emesis, no lower extremity edema, and no difficulties eating or sleeping. She denies any abdominal discomfort/bloating, no fevers or chills, and no chest pain or shortness of breath.  She is current with her annual physical, colon cancer screening, mammogram, and she is vaccinated against COVID.               Health Maintenance  Colonoscopy: 8/27/2013  Mammogram: 5/19/22   Annual physical: 6/28/22  Dexa: 9/14/2020  COVID vaccine: 3/21/21, 4/10/21, 1/12/22 Pfizer      Review of Systems     Constitutional:  Negative for fever, chills, weight loss, weight gain, fatigue, decreased appetite, night sweats, recent stressors, height gain, height loss, post-operative complications, incisional pain, hallucinations, increased energy, hyperactivity and confused.   HENT:  Positive for hoarse voice. Negative for ear pain, hearing loss, tinnitus, nosebleeds, trouble swallowing, mouth sores, sore throat, ear discharge, tooth pain, gum tenderness, taste disturbance, smell disturbance, hearing aid, bleeding gums, dry mouth, sinus pain, sinus congestion and neck mass.    Eyes:  Negative for double vision, pain, redness, eye pain, decreased vision, eye watering, eye bulging, eye dryness, flashing lights, spots, floaters, strabismus, tunnel vision, jaundice and eye irritation.   Respiratory:   Positive for cough. Negative for hemoptysis, sputum production, shortness of breath, wheezing, sleep disturbances due to breathing, snores loudly, dyspnea on exertion, cough disturbing sleep and postural dyspnea.    Cardiovascular:  Negative for chest pain, dyspnea on exertion, palpitations, orthopnea, claudication, leg swelling,  fingers/toes turn blue, hypertension, hypotension, syncope, history of heart murmur, chest pain on exertion, chest pain at rest, pacemaker, few scattered varicosities, leg pain, sleep disturbances due to breathing, tachycardia, light-headedness, exercise intolerance and edema.   Gastrointestinal:  Negative for heartburn, nausea, vomiting, abdominal pain, diarrhea, constipation, blood in stool, melena, rectal pain, bloating, hemorrhoids, bowel incontinence, jaundice, rectal bleeding, coffee ground emesis and change in stool.   Genitourinary:  Negative for bladder incontinence, dysuria, urgency, hematuria, flank pain, vaginal discharge, difficulty urinating, genital sores, dyspareunia, decreased libido, nocturia, voiding less frequently, arousal difficulty, abnormal vaginal bleeding, excessive menstruation, menstrual changes, hot flashes, vaginal dryness and postmenopausal bleeding.   Musculoskeletal:  Negative for myalgias, back pain, joint swelling, arthralgias, stiffness, muscle cramps, neck pain, bone pain, muscle weakness and fracture.   Skin:  Negative for nail changes, itching, poor wound healing, rash, hair changes, skin changes, acne, warts, poor wound healing, scarring, flaky skin, Raynaud's phenomenon, sensitivity to sunlight and skin thickening.   Neurological:  Negative for dizziness, tingling, tremors, speech change, seizures, loss of consciousness, weakness, light-headedness, numbness, headaches, disturbances in coordination, extremity numbness, memory loss, difficulty walking and paralysis.   Endo/Heme:  Negative for anemia, swollen glands and bruises/bleeds easily.   Psychiatric/Behavioral:  Negative for depression, hallucinations, memory loss, decreased concentration, mood swings and panic attacks.    Breast:  Negative for breast discharge, breast mass, breast pain and nipple retraction.   Endocrine:  Negative for altered temperature regulation, polyphagia, polydipsia, unwanted hair growth and change  in facial hair.        Past Medical History:    Past Medical History:   Diagnosis Date     Arthritis of foot, right 2019     Endometrial cancer (H) 2021     Graves disease      History of blood transfusion      Hypertension      Hypothyroidism      MEDICAL HISTORY OF -     S/P RADIOACTIVE IODINE     Morbid obesity (H) 2020     Osteopenia of multiple sites 2020         Past Surgical History:    Past Surgical History:   Procedure Laterality Date     ABDOMEN SURGERY  1991         ARTHRODESIS FOOT Right 2020    Procedure: MIDFOOT BONE SPUR RESECTION WITH JOINT FUSIONS RIGHT LOWER EXTREMITY;  Surgeon: Choco Frances DPM;  Location: [facility]     ARTHROSCOPY SHLDR ROTATOR CUFF REPAIR, SUBACROMIAL DECOMP, DIST CLAVICLE RESECTION, BICEP TENODESIS Left 2017    Procedure: ARTHROSCOPY SHOULDER ROTATOR CUFF REPAIR, SUBACROMIAL DECOMPRESSION, DISTAL CLAVICLE RESECTION, OPEN BICEP TENODESIS REPAIR;  Surgeon: Dorina Rodriguez MD;  Location: [facility]     BIOPSY  2021     COLONOSCOPY  2013    Procedure: COLONOSCOPY;  colonoscopy, screening;  Surgeon: Dalton Lala MD;  Location: [facility]     CYSTOSCOPY       CYSTOSCOPY N/A 2021    Procedure: Cystoscopy;  Surgeon: Zacarias Brandon MD;  Location: [facility]     DAVINCI HYSTERECTOMY TOTAL, BILATERAL SALPINGO-OOPHORECTOMY, COMBINED Bilateral 2021    Procedure: HYSTERECTOMY, TOTAL, ROBOT-ASSISTED, LAPAROSCOPIC, WITH SALPINGO-OOPHORECTOMY, PELVIC WASHINGS, SENTINEL LYMPHNODE SAMPLING;  Surgeon: Zacarias Brandon MD;  Location: [facility]     SOFT TISSUE SURGERY  2017    Shoulder     SURGICAL HISTORY OF -       RT ANKLE Fx AND REPAIR (PIN,PLATE,SCREWS)     THYROIDECTOMY       TUBAL LIGATION       ZZC  DELIVERY ONLY           Health Maintenance Due   Topic Date Due     EYE EXAM  2022       Current Medications:     Current Outpatient Medications   Medication Sig Dispense Refill  "    gabapentin (NEURONTIN) 300 MG capsule TAKE ONE CAPSULE BY MOUTH THREE TIMES A  capsule 3     levothyroxine (SYNTHROID/LEVOTHROID) 75 MCG tablet TAKE 1 TABLET DAILY. SEPARATE IRON OR CALCIUM CONTAINING PRODUCTS BY AT LEAST 4 HOURS FROM THIS MEDICATION 90 tablet 3     vitamin D2 (ERGOCALCIFEROL) 35542 units (1250 mcg) capsule Take 1 capsule (50,000 Units) by mouth once a week (Patient taking differently: Take 50,000 Units by mouth once a week) 12 capsule 0         Allergies:      No Known Allergies     Social History:     Social History     Tobacco Use     Smoking status: Never Smoker     Smokeless tobacco: Never Used   Substance Use Topics     Alcohol use: Not Currently     Comment: SELDOM        History   Drug Use Unknown         Family History:     The patient's family history is notable for:    Family History   Problem Relation Age of Onset     Hypertension Mother      Diabetes Mother      Thyroid Disease Mother      Hypertension Father      Alcohol/Drug Father         alcohol     Substance Abuse Father      Crohn's Disease Son      Hypertension Other      Cancer No family hx of      Cerebrovascular Disease No family hx of      Glaucoma No family hx of      Macular Degeneration No family hx of          Physical Exam:     /87 (BP Location: Left arm, Patient Position: Chair, Cuff Size: Adult Large)   Pulse 87   Ht 1.626 m (5' 4\")   Wt 94.8 kg (209 lb)   LMP 08/04/2015 (Approximate)   SpO2 97%   BMI 35.87 kg/m    Body mass index is 35.87 kg/m .    General Appearance: healthy and alert, no distress     HEENT: no palpable nodules or masses        Cardiovascular: regular rate and rhythm, no gallops, rubs or murmurs     Respiratory: lungs clear, no rales, rhonchi or wheezes    Musculoskeletal: extremities non tender and without edema    Skin: no lesions or rashes     Neurological: normal gait, no gross defects     Psychiatric: appropriate mood and affect                             "   Hematological: normal cervical, supraclavicular and inguinal lymph nodes     Gastrointestinal:       abdomen soft, non-tender, non-distended, no organomegaly or masses    Genitourinary: External genitalia and urethral meatus appears normal.  Vagina is smooth without nodularity or masses.  Cervix is surgically absent.  Bimanual exam reveal no masses, nodularity or fullness.  Recto-vaginal exam confirms these findings.      Assessment:    Mary Sims is a 57 year old woman with a history of stage IA grade 1 endometrioid endometrial adenocarcinoma.  She is s/p TLH-BSO 2/9/21 which served as her treatment.  She is here today for a surveillance visit.      10 minutes spent on the date of the encounter doing chart review, history and exam, documentation, and further activities as noted above.      Plan:     1.)        Endometrial cancer:  JACKIE on exam.  RTC in 3 months for her next surveillance visit.  Plan for surveillance visits every 3 months for the first 2 years post treatment (2/2023), followed by every 6 months for an additional 3 years thereafter (2/2026), then annually.  Reviewed signs and symptoms for when she should contact the clinic or seek additional care.  Patient to contact the clinic with any questions or concerns in the interim.        Genetic risk factors were assessed and her MMR proteins were intact.       Labs and/or tests ordered include:  None.                2.)        Health maintenance:  Issues addressed today include following up with PCP for annual health maintenance and non-gynecologic issues.      Radha Sanders, LIDYA, APRN, FNP-C  Nurse Practitioner  Division of Gynecologic Oncology  Pager: [phone]     CC  Patient Care Team:  Janie Riley APRN CNP as PCP - General (Nurse Practitioner - Family)  Stan Weber MD as Referring Physician (Internal Medicine)  Guero Matute MD as Assigned OBGYN Provider  Wilber Rice OD as Assigned Surgical Provider  Janie Riley  PABLO MERINO CNP as Assigned PCP  Guero Ibarra DPM as Assigned Musculoskeletal Provider  Radha Sanders APRN CNP as Assigned Cancer Care Provider  RADHA SANDERS

## 2022-07-20 ENCOUNTER — ONCOLOGY VISIT (OUTPATIENT)
Dept: ONCOLOGY | Facility: CLINIC | Age: 57
End: 2022-07-20
Attending: NURSE PRACTITIONER
Payer: COMMERCIAL

## 2022-07-20 VITALS
SYSTOLIC BLOOD PRESSURE: 121 MMHG | OXYGEN SATURATION: 97 % | BODY MASS INDEX: 35.68 KG/M2 | HEIGHT: 64 IN | DIASTOLIC BLOOD PRESSURE: 87 MMHG | HEART RATE: 87 BPM | WEIGHT: 209 LBS

## 2022-07-20 DIAGNOSIS — C54.1 ENDOMETRIAL CANCER (H): Primary | ICD-10-CM

## 2022-07-20 DIAGNOSIS — Z08 ENCOUNTER FOR FOLLOW-UP SURVEILLANCE OF ENDOMETRIAL CANCER: ICD-10-CM

## 2022-07-20 DIAGNOSIS — Z85.42 ENCOUNTER FOR FOLLOW-UP SURVEILLANCE OF ENDOMETRIAL CANCER: ICD-10-CM

## 2022-07-20 PROCEDURE — 99212 OFFICE O/P EST SF 10 MIN: CPT | Performed by: NURSE PRACTITIONER

## 2022-07-20 ASSESSMENT — ENCOUNTER SYMPTOMS
EYE PAIN: 0
TASTE DISTURBANCE: 0
HALLUCINATIONS: 0
NECK PAIN: 0
DECREASED LIBIDO: 0
ALTERED TEMPERATURE REGULATION: 0
DECREASED APPETITE: 0
SMELL DISTURBANCE: 0
NECK MASS: 0
STIFFNESS: 0
HYPOTENSION: 0
MYALGIAS: 0
EXERCISE INTOLERANCE: 0
DIFFICULTY URINATING: 0
LIGHT-HEADEDNESS: 0
HEMATURIA: 0
SPUTUM PRODUCTION: 0
DOUBLE VISION: 0
NAUSEA: 0
NERVOUS/ANXIOUS: 0
SKIN CHANGES: 0
SYNCOPE: 0
EYE IRRITATION: 0
PANIC: 0
JAUNDICE: 0
COUGH DISTURBING SLEEP: 0
TACHYCARDIA: 0
NAIL CHANGES: 0
LEG PAIN: 0
BACK PAIN: 0
SWOLLEN GLANDS: 0
DYSPNEA ON EXERTION: 0
NUMBNESS: 0
DYSURIA: 0
WEIGHT LOSS: 0
BOWEL INCONTINENCE: 0
DIZZINESS: 0
TROUBLE SWALLOWING: 0
ORTHOPNEA: 0
ARTHRALGIAS: 0
BLOOD IN STOOL: 0
EYE REDNESS: 0
POLYPHAGIA: 0
FLANK PAIN: 0
SINUS CONGESTION: 0
CHILLS: 0
TINGLING: 0
HEMOPTYSIS: 0
NIGHT SWEATS: 0
LEG SWELLING: 0
PARALYSIS: 0
PALPITATIONS: 0
DECREASED CONCENTRATION: 0
POOR WOUND HEALING: 0
POLYDIPSIA: 0
HOARSE VOICE: 1
JOINT SWELLING: 0
CLAUDICATION: 0
SPEECH CHANGE: 0
BREAST PAIN: 0
EYE WATERING: 0
HOT FLASHES: 0
MUSCLE WEAKNESS: 0
VOMITING: 0
FEVER: 0
DEPRESSION: 0
BREAST MASS: 0
MUSCLE CRAMPS: 0
BLOATING: 0
RECTAL PAIN: 0
ABDOMINAL PAIN: 0
SHORTNESS OF BREATH: 0
INSOMNIA: 0
LOSS OF CONSCIOUSNESS: 0
DISTURBANCES IN COORDINATION: 0
BRUISES/BLEEDS EASILY: 0
HEARTBURN: 0
SORE THROAT: 0
TREMORS: 0
POSTURAL DYSPNEA: 0
COUGH: 1
INCREASED ENERGY: 0
HEADACHES: 0
SINUS PAIN: 0
HYPERTENSION: 0
SLEEP DISTURBANCES DUE TO BREATHING: 0
RECTAL BLEEDING: 0
CONSTIPATION: 0
WEAKNESS: 0
MEMORY LOSS: 0
WEIGHT GAIN: 0
SNORES LOUDLY: 0
DIARRHEA: 0
EXTREMITY NUMBNESS: 0
SEIZURES: 0
WHEEZING: 0
FATIGUE: 0

## 2022-07-20 ASSESSMENT — PAIN SCALES - GENERAL: PAINLEVEL: NO PAIN (0)

## 2022-07-20 NOTE — LETTER
2022         RE: Mary Sims   [place] [address]        Dear Colleague,    Thank you for referring your patient, Mary Sims, to the [facility]. Please see a copy of my visit note below.    Gynecologic Oncology Return Visit Note    Date: 2022    RE: Mary Sims  : 1965  PAULA: 2022    CC: Stage IA grade 1 endometrioid endometrial adenocarcinoma     HPI:  Mary Sims is a 56 year old woman with a history of stage IA grade 1 endometrioid endometrial adenocarcinoma.  She is s/p TLH-BSO 21 which served as her treatment.  She is here today for a surveillance visit.      Oncology History:     Initially, she was seen for vaginal spotting.     2020: Pelvic US  IMPRESSION: Thickened endometrium up to 24 mm in transvaginal  measurement for a postmenopausal patient comment given the reported  history of postmenopausal bleeding, this does raise concern for  neoplasia/hyperplasia and tissue sampling is recommended. Small amount  of free fluid in the posterior cul-de-sac and left adnexa.  Nonvisualization of left ovary. The visualized right ovary appears  Normal.     21: EMB  FINAL DIAGNOSIS:   Uterus, endometrium: Biopsy:   - At least complex atypical hyperplasia with foci bordering on FIGO grade   1 endometrioid carcinoma, see   comment      21: Robotic-assisted total laparoscopic hysterectomy, bilateral salpingo-oopherectomy, sentinel lymph node mapping and sampling, cystoscopy  FINAL DIAGNOSIS:   A. UTERUS, CERVIX, BILATERAL TUBES AND OVARIES, HYSTERECTOMY AND BILATERAL    SALPINGO-OOPHORECTOMY:   - Endometrioid endometrial adenocarcinoma, FIGO grade 1, MMR proficient   - Unremarkable ovaries with follicular cysts   - Cervix with reactive changes and nabothian cysts   - Unremarkable fallopian tubes   B. SENTINEL LYMPH NODE, RIGHT EXTERNAL ILIAC, LYMPHADENECTOMY:   - One reactive lymph node, negative for malignancy  (0/1)   C. SENTINEL LYMPH NODE, LEFT PELVIC, LYMPHADENECTOMY  - One reactive lymph node, negative for malignancy (0/1)     1/11/22: CT CAP  IMPRESSION:  1.  No evidence of recurrent or metastatic disease in the abdomen or  pelvis.   2.  Multiple hepatic cysts are stable to slightly enlarged.  3.  Finding suggestive of mild colitis. Mild pelvic floor descent with  large rectal stool burden.  4.  Small hiatal hernia.                Today she reports feeling well overall and is without concern.  She denies any vaginal bleeding, no changes in her bowel or bladder habits, no nausea/emesis, no lower extremity edema, and no difficulties eating or sleeping. She denies any abdominal discomfort/bloating, no fevers or chills, and no chest pain or shortness of breath.  She is current with her annual physical, colon cancer screening, mammogram, and she is vaccinated against COVID.               Health Maintenance  Colonoscopy: 8/27/2013  Mammogram: 5/19/22   Annual physical: 6/28/22  Dexa: 9/14/2020  COVID vaccine: 3/21/21, 4/10/21, 1/12/22 [facility]      Review of Systems     Constitutional:  Negative for fever, chills, weight loss, weight gain, fatigue, decreased appetite, night sweats, recent stressors, height gain, height loss, post-operative complications, incisional pain, hallucinations, increased energy, hyperactivity and confused.   HENT:  Positive for hoarse voice. Negative for ear pain, hearing loss, tinnitus, nosebleeds, trouble swallowing, mouth sores, sore throat, ear discharge, tooth pain, gum tenderness, taste disturbance, smell disturbance, hearing aid, bleeding gums, dry mouth, sinus pain, sinus congestion and neck mass.    Eyes:  Negative for double vision, pain, redness, eye pain, decreased vision, eye watering, eye bulging, eye dryness, flashing lights, spots, floaters, strabismus, tunnel vision, jaundice and eye irritation.   Respiratory:   Positive for cough. Negative for hemoptysis, sputum production,  shortness of breath, wheezing, sleep disturbances due to breathing, snores loudly, dyspnea on exertion, cough disturbing sleep and postural dyspnea.    Cardiovascular:  Negative for chest pain, dyspnea on exertion, palpitations, orthopnea, claudication, leg swelling, fingers/toes turn blue, hypertension, hypotension, syncope, history of heart murmur, chest pain on exertion, chest pain at rest, pacemaker, few scattered varicosities, leg pain, sleep disturbances due to breathing, tachycardia, light-headedness, exercise intolerance and edema.   Gastrointestinal:  Negative for heartburn, nausea, vomiting, abdominal pain, diarrhea, constipation, blood in stool, melena, rectal pain, bloating, hemorrhoids, bowel incontinence, jaundice, rectal bleeding, coffee ground emesis and change in stool.   Genitourinary:  Negative for bladder incontinence, dysuria, urgency, hematuria, flank pain, vaginal discharge, difficulty urinating, genital sores, dyspareunia, decreased libido, nocturia, voiding less frequently, arousal difficulty, abnormal vaginal bleeding, excessive menstruation, menstrual changes, hot flashes, vaginal dryness and postmenopausal bleeding.   Musculoskeletal:  Negative for myalgias, back pain, joint swelling, arthralgias, stiffness, muscle cramps, neck pain, bone pain, muscle weakness and fracture.   Skin:  Negative for nail changes, itching, poor wound healing, rash, hair changes, skin changes, acne, warts, poor wound healing, scarring, flaky skin, Raynaud's phenomenon, sensitivity to sunlight and skin thickening.   Neurological:  Negative for dizziness, tingling, tremors, speech change, seizures, loss of consciousness, weakness, light-headedness, numbness, headaches, disturbances in coordination, extremity numbness, memory loss, difficulty walking and paralysis.   Endo/Heme:  Negative for anemia, swollen glands and bruises/bleeds easily.   Psychiatric/Behavioral:  Negative for depression, hallucinations, memory  loss, decreased concentration, mood swings and panic attacks.    Breast:  Negative for breast discharge, breast mass, breast pain and nipple retraction.   Endocrine:  Negative for altered temperature regulation, polyphagia, polydipsia, unwanted hair growth and change in facial hair.        Past Medical History:    Past Medical History:   Diagnosis Date     Arthritis of foot, right 2019     Endometrial cancer (H) 2021     Graves disease      History of blood transfusion      Hypertension      Hypothyroidism      MEDICAL HISTORY OF -     S/P RADIOACTIVE IODINE     Morbid obesity (H) 2020     Osteopenia of multiple sites 2020         Past Surgical History:    Past Surgical History:   Procedure Laterality Date     ABDOMEN SURGERY  1991         ARTHRODESIS FOOT Right 2020    Procedure: MIDFOOT BONE SPUR RESECTION WITH JOINT FUSIONS RIGHT LOWER EXTREMITY;  Surgeon: Choco Frances DPM;  Location:  OR     ARTHROSCOPY SHLDR ROTATOR CUFF REPAIR, SUBACROMIAL DECOMP, DIST CLAVICLE RESECTION, BICEP TENODESIS Left 2017    Procedure: ARTHROSCOPY SHOULDER ROTATOR CUFF REPAIR, SUBACROMIAL DECOMPRESSION, DISTAL CLAVICLE RESECTION, OPEN BICEP TENODESIS REPAIR;  Surgeon: Dorina Rodriguez MD;  Location: [facility]     BIOPSY  2021     COLONOSCOPY  2013    Procedure: COLONOSCOPY;  colonoscopy, screening;  Surgeon: Dalton Lala MD;  Location: [facility] OR     CYSTOSCOPY       CYSTOSCOPY N/A 2021    Procedure: Cystoscopy;  Surgeon: Zacarias Brandon MD;  Location:  OR     DAVINCI HYSTERECTOMY TOTAL, BILATERAL SALPINGO-OOPHORECTOMY, COMBINED Bilateral 2021    Procedure: HYSTERECTOMY, TOTAL, ROBOT-ASSISTED, LAPAROSCOPIC, WITH SALPINGO-OOPHORECTOMY, PELVIC WASHINGS, SENTINEL LYMPHNODE SAMPLING;  Surgeon: Zacarias Brandon MD;  Location: [facility] OR     SOFT TISSUE SURGERY  2017    Shoulder     SURGICAL HISTORY OF -       RT ANKLE Fx AND  "REPAIR (PIN,PLATE,SCREWS)     THYROIDECTOMY       TUBAL LIGATION       ZZC  DELIVERY ONLY           Health Maintenance Due   Topic Date Due     EYE EXAM  2022       Current Medications:     Current Outpatient Medications   Medication Sig Dispense Refill     gabapentin (NEURONTIN) 300 MG capsule TAKE ONE CAPSULE BY MOUTH THREE TIMES A  capsule 3     levothyroxine (SYNTHROID/LEVOTHROID) 75 MCG tablet TAKE 1 TABLET DAILY. SEPARATE IRON OR CALCIUM CONTAINING PRODUCTS BY AT LEAST 4 HOURS FROM THIS MEDICATION 90 tablet 3     vitamin D2 (ERGOCALCIFEROL) 60212 units (1250 mcg) capsule Take 1 capsule (50,000 Units) by mouth once a week (Patient taking differently: Take 50,000 Units by mouth once a week) 12 capsule 0         Allergies:      No Known Allergies     Social History:     Social History     Tobacco Use     Smoking status: Never Smoker     Smokeless tobacco: Never Used   Substance Use Topics     Alcohol use: Not Currently     Comment: SELDOM        History   Drug Use Unknown         Family History:     The patient's family history is notable for:    Family History   Problem Relation Age of Onset     Hypertension Mother      Diabetes Mother      Thyroid Disease Mother      Hypertension Father      Alcohol/Drug Father         alcohol     Substance Abuse Father      Crohn's Disease Son      Hypertension Other      Cancer No family hx of      Cerebrovascular Disease No family hx of      Glaucoma No family hx of      Macular Degeneration No family hx of          Physical Exam:     /87 (BP Location: Left arm, Patient Position: Chair, Cuff Size: Adult Large)   Pulse 87   Ht 1.626 m (5' 4\")   Wt 94.8 kg (209 lb)   LMP 2015 (Approximate)   SpO2 97%   BMI 35.87 kg/m    Body mass index is 35.87 kg/m .    General Appearance: healthy and alert, no distress     HEENT: no palpable nodules or masses        Cardiovascular: regular rate and rhythm, no gallops, rubs or " murmurs     Respiratory: lungs clear, no rales, rhonchi or wheezes    Musculoskeletal: extremities non tender and without edema    Skin: no lesions or rashes     Neurological: normal gait, no gross defects     Psychiatric: appropriate mood and affect                               Hematological: normal cervical, supraclavicular and inguinal lymph nodes     Gastrointestinal:       abdomen soft, non-tender, non-distended, no organomegaly or masses    Genitourinary: External genitalia and urethral meatus appears normal.  Vagina is smooth without nodularity or masses.  Cervix is surgically absent.  Bimanual exam reveal no masses, nodularity or fullness.  Recto-vaginal exam confirms these findings.      Assessment:    Mary Sims is a 57 year old woman with a history of stage IA grade 1 endometrioid endometrial adenocarcinoma.  She is s/p TLH-BSO 2/9/21 which served as her treatment.  She is here today for a surveillance visit.      10 minutes spent on the date of the encounter doing chart review, history and exam, documentation, and further activities as noted above.      Plan:     1.)        Endometrial cancer:  JACKIE on exam.  RTC in 3 months for her next surveillance visit.  Plan for surveillance visits every 3 months for the first 2 years post treatment (2/2023), followed by every 6 months for an additional 3 years thereafter (2/2026), then annually.  Reviewed signs and symptoms for when she should contact the clinic or seek additional care.  Patient to contact the clinic with any questions or concerns in the interim.        Genetic risk factors were assessed and her MMR proteins were intact.       Labs and/or tests ordered include:  None.                2.)        Health maintenance:  Issues addressed today include following up with PCP for annual health maintenance and non-gynecologic issues.      Radha Sanders, DNP, APRN, FNP-C  Nurse Practitioner  Division of Gynecologic Oncology  Pager: [phone]      CC  Patient Care Team:  Janie Riley APRN CNP as PCP - General (Nurse Practitioner - Family)  Stan Weber MD as Referring Physician (Internal Medicine)  Guero Matute MD as Assigned OBGYN Provider  Wilber Rice OD as Assigned Surgical Provider  Janie Riley APRN CNP as Assigned PCP  Guero Ibarra DPM as Assigned Musculoskeletal Provider  Jack Jarrett APRN CNP as Assigned Cancer Care Provider  JACK JARRETT        Again, thank you for allowing me to participate in the care of your patient.        Sincerely,        PABLO Miller CNP

## 2022-07-20 NOTE — NURSING NOTE
"Oncology Rooming Note    July 20, 2022 3:27 PM   Mary Sims is a 57 year old female who presents for:    Chief Complaint   Patient presents with     Oncology Clinic Visit     Follow up     Initial Vitals: /87 (BP Location: Left arm, Patient Position: Chair, Cuff Size: Adult Large)   Pulse 87   Ht 1.626 m (5' 4\")   Wt 94.8 kg (209 lb)   LMP 08/04/2015 (Approximate)   SpO2 97%   BMI 35.87 kg/m   Estimated body mass index is 35.87 kg/m  as calculated from the following:    Height as of this encounter: 1.626 m (5' 4\").    Weight as of this encounter: 94.8 kg (209 lb). Body surface area is 2.07 meters squared.  No Pain (0) Comment: Data Unavailable   Patient's last menstrual period was 08/04/2015 (approximate).  Allergies reviewed: Yes  Medications reviewed: Yes    Medications: Medication refills not needed today.  Pharmacy name entered into [employer]: [facility] - [facility], [place] - [address] [place] [address]    Clinical concerns: NO Radha was notified.      Minna Minor CMA              "

## 2022-09-16 ENCOUNTER — IMMUNIZATION (OUTPATIENT)
Dept: NURSING | Facility: CLINIC | Age: 57
End: 2022-09-16
Payer: COMMERCIAL

## 2022-09-16 PROCEDURE — 90471 IMMUNIZATION ADMIN: CPT

## 2022-09-16 PROCEDURE — 91312 COVID-19,PF,PFIZER BOOSTER BIVALENT: CPT

## 2022-09-16 PROCEDURE — 90682 RIV4 VACC RECOMBINANT DNA IM: CPT

## 2022-09-16 PROCEDURE — 0124A COVID-19,PF,PFIZER BOOSTER BIVALENT: CPT

## 2022-10-18 NOTE — PROGRESS NOTES
Gynecologic Oncology Return Visit Note    Date: 10/19/2022    RE: Mary Sims  : 1965  PAULA: 10/19/2022          Mary Sims is a 57 year old woman with a history of stage IA grade 1 endometrioid endometrial adenocarcinoma.  She is s/p TLH-BSO 21 which served as her treatment.  She is here today for a surveillance visit.      Oncology History:     Initially, she was seen for vaginal spotting.     2020: Pelvic US  IMPRESSION: Thickened endometrium up to 24 mm in transvaginal  measurement for a postmenopausal patient comment given the reported  history of postmenopausal bleeding, this does raise concern for  neoplasia/hyperplasia and tissue sampling is recommended. Small amount  of free fluid in the posterior cul-de-sac and left adnexa.  Nonvisualization of left ovary. The visualized right ovary appears  Normal.     21: EMB  FINAL DIAGNOSIS:   Uterus, endometrium: Biopsy:   - At least complex atypical hyperplasia with foci bordering on FIGO grade   1 endometrioid carcinoma, see   comment      21: Robotic-assisted total laparoscopic hysterectomy, bilateral salpingo-oopherectomy, sentinel lymph node mapping and sampling, cystoscopy  FINAL DIAGNOSIS:   A. UTERUS, CERVIX, BILATERAL TUBES AND OVARIES, HYSTERECTOMY AND BILATERAL    SALPINGO-OOPHORECTOMY:   - Endometrioid endometrial adenocarcinoma, FIGO grade 1, MMR proficient   - Unremarkable ovaries with follicular cysts   - Cervix with reactive changes and nabothian cysts   - Unremarkable fallopian tubes   B. SENTINEL LYMPH NODE, RIGHT EXTERNAL ILIAC, LYMPHADENECTOMY:   - One reactive lymph node, negative for malignancy (0/1)   C. SENTINEL LYMPH NODE, LEFT PELVIC, LYMPHADENECTOMY  - One reactive lymph node, negative for malignancy (0/1)     22: CT CAP  IMPRESSION:  1.  No evidence of recurrent or metastatic disease in the abdomen or  pelvis.   2.  Multiple hepatic cysts are stable to slightly enlarged.  3.  Finding suggestive of mild  colitis. Mild pelvic floor descent with  large rectal stool burden.  4.  Small hiatal hernia.              Today she reports feeling well overall and is without concern.  She denies any vaginal bleeding, no changes in her bowel or bladder habits, no nausea/emesis, no lower extremity edema, and no difficulties eating or sleeping. She denies any abdominal discomfort/bloating, no fevers or chills, and no chest pain or shortness of breath.                Health Maintenance  Colonoscopy: 2013, due 2023  Mammogram: 22   Annual physical: 22  Dexa: 2020, due for this   COVID vaccine: 3/21/21, 4/10/21, 22 Pfizer      ROS: 10 point ROS neg other than the symptoms noted above in the HPI.        Past Medical History:    Past Medical History:   Diagnosis Date     Arthritis of foot, right 2019     Endometrial cancer (H) 2021     Graves disease      History of blood transfusion      Hypertension      Hypothyroidism      MEDICAL HISTORY OF -     S/P RADIOACTIVE IODINE     Morbid obesity (H) 2020     Osteopenia of multiple sites 2020         Past Surgical History:    Past Surgical History:   Procedure Laterality Date     ABDOMEN SURGERY  1991         ARTHRODESIS FOOT Right 2020    Procedure: MIDFOOT BONE SPUR RESECTION WITH JOINT FUSIONS RIGHT LOWER EXTREMITY;  Surgeon: Choco Frances DPM;  Location:  OR     ARTHROSCOPY SHLDR ROTATOR CUFF REPAIR, SUBACROMIAL DECOMP, DIST CLAVICLE RESECTION, BICEP TENODESIS Left 2017    Procedure: ARTHROSCOPY SHOULDER ROTATOR CUFF REPAIR, SUBACROMIAL DECOMPRESSION, DISTAL CLAVICLE RESECTION, OPEN BICEP TENODESIS REPAIR;  Surgeon: Dorina Rodriguez MD;  Location: [facility] OR     BIOPSY  2021     COLONOSCOPY  2013    Procedure: COLONOSCOPY;  colonoscopy, screening;  Surgeon: Dalton Lala MD;  Location: [facility] OR     CYSTOSCOPY       CYSTOSCOPY N/A 2021    Procedure: Cystoscopy;   Surgeon: Zacarias Brandon MD;  Location:  OR     DAVINCI HYSTERECTOMY TOTAL, BILATERAL SALPINGO-OOPHORECTOMY, COMBINED Bilateral 2021    Procedure: HYSTERECTOMY, TOTAL, ROBOT-ASSISTED, LAPAROSCOPIC, WITH SALPINGO-OOPHORECTOMY, PELVIC WASHINGS, SENTINEL LYMPHNODE SAMPLING;  Surgeon: Zacarias Brandon MD;  Location: [facility] OR     SOFT TISSUE SURGERY  2017    Shoulder     SURGICAL HISTORY OF -       RT ANKLE Fx AND REPAIR (PIN,PLATE,SCREWS)     THYROIDECTOMY       TUBAL LIGATION       ZZC  DELIVERY ONLY           Health Maintenance Due   Topic Date Due     HEPATITIS B IMMUNIZATION (1 of 3 - 3-dose series) Never done     EYE EXAM  2022     DEXA  2022       Current Medications:     Current Outpatient Medications   Medication Sig Dispense Refill     gabapentin (NEURONTIN) 300 MG capsule TAKE ONE CAPSULE BY MOUTH THREE TIMES A  capsule 3     levothyroxine (SYNTHROID/LEVOTHROID) 75 MCG tablet TAKE 1 TABLET DAILY. SEPARATE IRON OR CALCIUM CONTAINING PRODUCTS BY AT LEAST 4 HOURS FROM THIS MEDICATION 90 tablet 3     vitamin D2 (ERGOCALCIFEROL) 43491 units (1250 mcg) capsule Take 1 capsule (50,000 Units) by mouth once a week (Patient taking differently: Take 50,000 Units by mouth once a week) 12 capsule 0         Allergies:      No Known Allergies     Social History:     Social History     Tobacco Use     Smoking status: Never     Smokeless tobacco: Never   Substance Use Topics     Alcohol use: Not Currently     Comment: SELDOM        History   Drug Use Unknown         Family History:     The patient's family history is notable for:    Family History   Problem Relation Age of Onset     Hypertension Mother      Diabetes Mother      Thyroid Disease Mother      Hypertension Father      Alcohol/Drug Father         alcohol     Substance Abuse Father      Crohn's Disease Son      Hypertension Other      Cancer No family hx of      Cerebrovascular Disease No family hx of      Glaucoma No  "family hx of      Macular Degeneration No family hx of          Physical Exam:     /86 (BP Location: Right arm)   Pulse 90   Temp 97.9  F (36.6  C) (Oral)   Resp 16   Ht 1.626 m (5' 4.02\")   Wt 95.5 kg (210 lb 9.6 oz)   LMP 08/04/2015 (Approximate)   SpO2 97%   BMI 36.13 kg/m    Body mass index is 36.13 kg/m .    General Appearance: healthy and alert, no distress     HEENT: no palpable nodules or masses        Cardiovascular: regular rate and rhythm, no gallops, rubs or murmurs     Respiratory: lungs clear, no rales, rhonchi or wheezes    Musculoskeletal: extremities non tender and without edema    Skin: no lesions or rashes     Neurological: normal gait, no gross defects     Psychiatric: appropriate mood and affect                               Hematological: normal cervical, supraclavicular and inguinal lymph nodes     Gastrointestinal:       abdomen soft, non-tender, non-distended, no organomegaly or masses    Genitourinary: External genitalia and urethral meatus appears normal.  Vagina is smooth without nodularity or masses.  Cervix is surgically absent.  Bimanual exam reveal no masses, nodularity or fullness.  Recto-vaginal exam confirms these findings.      Assessment:    Mary Sims is a 57 year old woman with a history of stage IA grade 1 endometrioid endometrial adenocarcinoma.  She is s/p TLH-BSO 2/9/21 which served as her treatment.  She is here today for a surveillance visit.      20 minutes spent on the date of the encounter doing chart review, history and exam, documentation, and further activities as noted above.      Plan:     1.)        Endometrial cancer:  JACKIE on exam.  RTC in 6 months for her next surveillance visit.  Plan for surveillance visits every 6 months for the first two years 4/2023, then annually.  Reviewed signs and symptoms for when she should contact the clinic or seek additional care.  Patient to contact the clinic with any questions or concerns in the " interim.     2.)      Genetic risk factors were assessed and her MMR proteins were intact.    3.)        Labs and/or tests ordered include:  None.                4.)        Health maintenance:  Issues addressed today include following up with PCP for annual health maintenance and non-gynecologic issues. Encouraged DEXA.               PABLO Mayer, [facility]-BC  Women's Health Nurse Practitioner  Division of Gynecologic Oncology  [facility]    CC  Patient Care Team:  Janie Riley APRN CNP as PCP - General (Nurse Practitioner - Family)  Stan Weber MD as Referring Physician (Internal Medicine)  Janie Riley APRN CNP as Assigned PCP  Guero Ibarra DPM as Assigned Musculoskeletal Provider  Jack Jarrett APRN CNP as Assigned Cancer Care Provider  JACK JARRETT

## 2022-10-19 ENCOUNTER — ONCOLOGY VISIT (OUTPATIENT)
Dept: ONCOLOGY | Facility: CLINIC | Age: 57
End: 2022-10-19
Attending: NURSE PRACTITIONER
Payer: COMMERCIAL

## 2022-10-19 VITALS
BODY MASS INDEX: 35.96 KG/M2 | RESPIRATION RATE: 16 BRPM | HEIGHT: 64 IN | HEART RATE: 90 BPM | SYSTOLIC BLOOD PRESSURE: 125 MMHG | WEIGHT: 210.6 LBS | DIASTOLIC BLOOD PRESSURE: 86 MMHG | OXYGEN SATURATION: 97 % | TEMPERATURE: 97.9 F

## 2022-10-19 DIAGNOSIS — Z08 ENCOUNTER FOR FOLLOW-UP SURVEILLANCE OF ENDOMETRIAL CANCER: ICD-10-CM

## 2022-10-19 DIAGNOSIS — Z85.42 ENCOUNTER FOR FOLLOW-UP SURVEILLANCE OF ENDOMETRIAL CANCER: ICD-10-CM

## 2022-10-19 DIAGNOSIS — C54.1 ENDOMETRIAL CANCER (H): Primary | ICD-10-CM

## 2022-10-19 PROCEDURE — 99213 OFFICE O/P EST LOW 20 MIN: CPT | Performed by: OBSTETRICS & GYNECOLOGY

## 2022-10-19 ASSESSMENT — PAIN SCALES - GENERAL: PAINLEVEL: NO PAIN (0)

## 2022-10-19 ASSESSMENT — ENCOUNTER SYMPTOMS
NERVOUS/ANXIOUS: 0
INSOMNIA: 0

## 2022-10-19 NOTE — LETTER
10/19/2022         RE: Mary Sims   [place] [address]        Dear Colleague,    Thank you for referring your patient, Mary Sims, to the [facility]. Please see a copy of my visit note below.    Gynecologic Oncology Return Visit Note    Date: 10/19/2022    RE: Mary Sims  : 1965  PAULA: 10/19/2022          Mary Sims is a 57 year old woman with a history of stage IA grade 1 endometrioid endometrial adenocarcinoma.  She is s/p TLH-BSO 21 which served as her treatment.  She is here today for a surveillance visit.      Oncology History:     Initially, she was seen for vaginal spotting.     2020: Pelvic US  IMPRESSION: Thickened endometrium up to 24 mm in transvaginal  measurement for a postmenopausal patient comment given the reported  history of postmenopausal bleeding, this does raise concern for  neoplasia/hyperplasia and tissue sampling is recommended. Small amount  of free fluid in the posterior cul-de-sac and left adnexa.  Nonvisualization of left ovary. The visualized right ovary appears  Normal.     21: EMB  FINAL DIAGNOSIS:   Uterus, endometrium: Biopsy:   - At least complex atypical hyperplasia with foci bordering on FIGO grade   1 endometrioid carcinoma, see   comment      21: Robotic-assisted total laparoscopic hysterectomy, bilateral salpingo-oopherectomy, sentinel lymph node mapping and sampling, cystoscopy  FINAL DIAGNOSIS:   A. UTERUS, CERVIX, BILATERAL TUBES AND OVARIES, HYSTERECTOMY AND BILATERAL    SALPINGO-OOPHORECTOMY:   - Endometrioid endometrial adenocarcinoma, FIGO grade 1, MMR proficient   - Unremarkable ovaries with follicular cysts   - Cervix with reactive changes and nabothian cysts   - Unremarkable fallopian tubes   B. SENTINEL LYMPH NODE, RIGHT EXTERNAL ILIAC, LYMPHADENECTOMY:   - One reactive lymph node, negative for malignancy (0/1)   C. SENTINEL LYMPH NODE, LEFT PELVIC, LYMPHADENECTOMY  -  One reactive lymph node, negative for malignancy (0/1)     22: CT CAP  IMPRESSION:  1.  No evidence of recurrent or metastatic disease in the abdomen or  pelvis.   2.  Multiple hepatic cysts are stable to slightly enlarged.  3.  Finding suggestive of mild colitis. Mild pelvic floor descent with  large rectal stool burden.  4.  Small hiatal hernia.              Today she reports feeling well overall and is without concern.  She denies any vaginal bleeding, no changes in her bowel or bladder habits, no nausea/emesis, no lower extremity edema, and no difficulties eating or sleeping. She denies any abdominal discomfort/bloating, no fevers or chills, and no chest pain or shortness of breath.                Health Maintenance  Colonoscopy: 2013, due 2023  Mammogram: 22   Annual physical: 22  Dexa: 2020, due for this   COVID vaccine: 3/21/21, 4/10/21, 22 Pfizer      ROS: 10 point ROS neg other than the symptoms noted above in the HPI.        Past Medical History:    Past Medical History:   Diagnosis Date     Arthritis of foot, right 2019     Endometrial cancer (H) 2021     Graves disease      History of blood transfusion      Hypertension      Hypothyroidism      MEDICAL HISTORY OF -     S/P RADIOACTIVE IODINE     Morbid obesity (H) 2020     Osteopenia of multiple sites 2020         Past Surgical History:    Past Surgical History:   Procedure Laterality Date     ABDOMEN SURGERY  1991         ARTHRODESIS FOOT Right 2020    Procedure: MIDFOOT BONE SPUR RESECTION WITH JOINT FUSIONS RIGHT LOWER EXTREMITY;  Surgeon: Choco Frances DPM;  Location: [facility] OR     ARTHROSCOPY SHLDR ROTATOR CUFF REPAIR, SUBACROMIAL DECOMP, DIST CLAVICLE RESECTION, BICEP TENODESIS Left 2017    Procedure: ARTHROSCOPY SHOULDER ROTATOR CUFF REPAIR, SUBACROMIAL DECOMPRESSION, DISTAL CLAVICLE RESECTION, OPEN BICEP TENODESIS REPAIR;  Surgeon: Dorina Rodriguez MD;   Location: [facility]     BIOPSY  2021     COLONOSCOPY  2013    Procedure: COLONOSCOPY;  colonoscopy, screening;  Surgeon: Dalton Lala MD;  Location: [facility] OR     CYSTOSCOPY       CYSTOSCOPY N/A 2021    Procedure: Cystoscopy;  Surgeon: Zacarias Brandon MD;  Location: [facility] OR     DAVINCI HYSTERECTOMY TOTAL, BILATERAL SALPINGO-OOPHORECTOMY, COMBINED Bilateral 2021    Procedure: HYSTERECTOMY, TOTAL, ROBOT-ASSISTED, LAPAROSCOPIC, WITH SALPINGO-OOPHORECTOMY, PELVIC WASHINGS, SENTINEL LYMPHNODE SAMPLING;  Surgeon: Zacarias Brandon MD;  Location: [facility] OR     SOFT TISSUE SURGERY  2017    Shoulder     SURGICAL HISTORY OF -       RT ANKLE Fx AND REPAIR (PIN,PLATE,SCREWS)     THYROIDECTOMY       TUBAL LIGATION       ZZC  DELIVERY ONLY           Health Maintenance Due   Topic Date Due     HEPATITIS B IMMUNIZATION (1 of 3 - 3-dose series) Never done     EYE EXAM  2022     DEXA  2022       Current Medications:     Current Outpatient Medications   Medication Sig Dispense Refill     gabapentin (NEURONTIN) 300 MG capsule TAKE ONE CAPSULE BY MOUTH THREE TIMES A  capsule 3     levothyroxine (SYNTHROID/LEVOTHROID) 75 MCG tablet TAKE 1 TABLET DAILY. SEPARATE IRON OR CALCIUM CONTAINING PRODUCTS BY AT LEAST 4 HOURS FROM THIS MEDICATION 90 tablet 3     vitamin D2 (ERGOCALCIFEROL) 62025 units (1250 mcg) capsule Take 1 capsule (50,000 Units) by mouth once a week (Patient taking differently: Take 50,000 Units by mouth once a week) 12 capsule 0         Allergies:      No Known Allergies     Social History:     Social History     Tobacco Use     Smoking status: Never     Smokeless tobacco: Never   Substance Use Topics     Alcohol use: Not Currently     Comment: SELDOM        History   Drug Use Unknown         Family History:     The patient's family history is notable for:    Family History   Problem Relation Age of Onset     Hypertension Mother      Diabetes Mother       "Thyroid Disease Mother      Hypertension Father      Alcohol/Drug Father         alcohol     Substance Abuse Father      Crohn's Disease Son      Hypertension Other      Cancer No family hx of      Cerebrovascular Disease No family hx of      Glaucoma No family hx of      Macular Degeneration No family hx of          Physical Exam:     /86 (BP Location: Right arm)   Pulse 90   Temp 97.9  F (36.6  C) (Oral)   Resp 16   Ht 1.626 m (5' 4.02\")   Wt 95.5 kg (210 lb 9.6 oz)   LMP 08/04/2015 (Approximate)   SpO2 97%   BMI 36.13 kg/m    Body mass index is 36.13 kg/m .    General Appearance: healthy and alert, no distress     HEENT: no palpable nodules or masses        Cardiovascular: regular rate and rhythm, no gallops, rubs or murmurs     Respiratory: lungs clear, no rales, rhonchi or wheezes    Musculoskeletal: extremities non tender and without edema    Skin: no lesions or rashes     Neurological: normal gait, no gross defects     Psychiatric: appropriate mood and affect                               Hematological: normal cervical, supraclavicular and inguinal lymph nodes     Gastrointestinal:       abdomen soft, non-tender, non-distended, no organomegaly or masses    Genitourinary: External genitalia and urethral meatus appears normal.  Vagina is smooth without nodularity or masses.  Cervix is surgically absent.  Bimanual exam reveal no masses, nodularity or fullness.  Recto-vaginal exam confirms these findings.      Assessment:    Mary Sims is a 57 year old woman with a history of stage IA grade 1 endometrioid endometrial adenocarcinoma.  She is s/p TLH-BSO 2/9/21 which served as her treatment.  She is here today for a surveillance visit.      20 minutes spent on the date of the encounter doing chart review, history and exam, documentation, and further activities as noted above.      Plan:     1.)        Endometrial cancer:  JACKIE on exam.  RTC in 6 months for her next surveillance visit.  Plan for " surveillance visits every 6 months for the first two years 4/2023, then annually.  Reviewed signs and symptoms for when she should contact the clinic or seek additional care.  Patient to contact the clinic with any questions or concerns in the interim.     2.)      Genetic risk factors were assessed and her MMR proteins were intact.    3.)        Labs and/or tests ordered include:  None.                4.)        Health maintenance:  Issues addressed today include following up with PCP for annual health maintenance and non-gynecologic issues. Encouraged DEXA.               PABLO Mayer, NP-BC  Women's Health Nurse Practitioner  Division of Gynecologic Oncology  [facility]    CC  Patient Care Team:  Janie Riley APRN CNP as PCP - General (Nurse Practitioner - Family)  Stan Weber MD as Referring Physician (Internal Medicine)  Janie Riley APRN CNP as Assigned PCP  Guero Ibarra DPM as Assigned Musculoskeletal Provider  Jack Jarrett APRN CNP as Assigned Cancer Care Provider  JACK JARRETT        Again, thank you for allowing me to participate in the care of your patient.        Sincerely,        PABLO Khalil CNP

## 2022-10-19 NOTE — NURSING NOTE
"Oncology Rooming Note    October 19, 2022 3:29 PM   Mary Sims is a 57 year old female who presents for:    Chief Complaint   Patient presents with     Oncology Clinic Visit     3 month follow up     Initial Vitals: /86 (BP Location: Right arm)   Pulse 90   Temp 97.9  F (36.6  C) (Oral)   Resp 16   Ht 1.626 m (5' 4.02\")   Wt 95.5 kg (210 lb 9.6 oz)   LMP 08/04/2015 (Approximate)   SpO2 97%   BMI 36.13 kg/m   Estimated body mass index is 36.13 kg/m  as calculated from the following:    Height as of this encounter: 1.626 m (5' 4.02\").    Weight as of this encounter: 95.5 kg (210 lb 9.6 oz). Body surface area is 2.08 meters squared.  No Pain (0) Comment: Data Unavailable   Patient's last menstrual period was 08/04/2015 (approximate).  Allergies reviewed: Yes  Medications reviewed: Yes    Medications: Medication refills not needed today.  Pharmacy name entered into [facility]: [facility] - [facility], [place] - [address] [place] [address]    Clinical concerns: No new concerns         Viviana Wahl LPN            "

## 2022-11-15 ENCOUNTER — OFFICE VISIT (OUTPATIENT)
Dept: OPTOMETRY | Facility: CLINIC | Age: 57
End: 2022-11-15
Payer: COMMERCIAL

## 2022-11-15 DIAGNOSIS — H52.13 MYOPIA OF BOTH EYES WITH ASTIGMATISM: ICD-10-CM

## 2022-11-15 DIAGNOSIS — H52.203 MYOPIA OF BOTH EYES WITH ASTIGMATISM: ICD-10-CM

## 2022-11-15 DIAGNOSIS — Z01.00 EXAMINATION OF EYES AND VISION: Primary | ICD-10-CM

## 2022-11-15 DIAGNOSIS — H52.4 PRESBYOPIA: ICD-10-CM

## 2022-11-15 PROCEDURE — 92310 CONTACT LENS FITTING OU: CPT | Mod: GA | Performed by: OPTOMETRIST

## 2022-11-15 PROCEDURE — 92015 DETERMINE REFRACTIVE STATE: CPT | Performed by: OPTOMETRIST

## 2022-11-15 PROCEDURE — 92014 COMPRE OPH EXAM EST PT 1/>: CPT | Performed by: OPTOMETRIST

## 2022-11-15 ASSESSMENT — VISUAL ACUITY
CORRECTION_TYPE: CONTACTS
OS_CC: 20/40-1
OD_CC+: -2
OS_CC+: -1
OD_CC: 20/20
OD_CC: 20/70
OS_CC: 20/25
METHOD: SNELLEN - LINEAR

## 2022-11-15 ASSESSMENT — REFRACTION_WEARINGRX
OD_CYLINDER: +2.50
OS_CYLINDER: +2.00
OD_AXIS: 083
OD_SPHERE: -11.50
OS_AXIS: 093
OS_SPHERE: -11.00
OS_ADD: +2.50
OD_ADD: +2.50

## 2022-11-15 ASSESSMENT — EXTERNAL EXAM - LEFT EYE: OS_EXAM: NORMAL

## 2022-11-15 ASSESSMENT — TONOMETRY
OS_IOP_MMHG: 16
IOP_METHOD: TONOPEN
OD_IOP_MMHG: 15

## 2022-11-15 ASSESSMENT — SLIT LAMP EXAM - LIDS
COMMENTS: NORMAL
COMMENTS: NORMAL

## 2022-11-15 ASSESSMENT — CONF VISUAL FIELD
OD_INFERIOR_TEMPORAL_RESTRICTION: 0
OD_INFERIOR_NASAL_RESTRICTION: 0
OS_SUPERIOR_NASAL_RESTRICTION: 0
OD_SUPERIOR_NASAL_RESTRICTION: 0
OD_NORMAL: 1
OS_INFERIOR_NASAL_RESTRICTION: 0
OS_NORMAL: 1
OS_SUPERIOR_TEMPORAL_RESTRICTION: 0
OS_INFERIOR_TEMPORAL_RESTRICTION: 0
OD_SUPERIOR_TEMPORAL_RESTRICTION: 0

## 2022-11-15 ASSESSMENT — REFRACTION_CURRENTRX
OS_AXIS: 180
OS_CYLINDER: -1.75
OD_CYLINDER: -2.25
OS_BRAND: ALCON AIR OPTIX FOR ASTIGMATISM BC 8.7, D 14.5
OD_SPHERE: -8.00
OD_BRAND: ALCON AIR OPTIX FOR ASTIGMATISM BC 8.7, D 14.5
OS_SPHERE: -8.00
OD_AXIS: 180

## 2022-11-15 ASSESSMENT — EXTERNAL EXAM - RIGHT EYE: OD_EXAM: NORMAL

## 2022-11-15 ASSESSMENT — REFRACTION_MANIFEST
OS_CYLINDER: +2.00
OD_SPHERE: -11.50
OS_ADD: +2.50
OD_CYLINDER: +2.50
OS_AXIS: 093
OS_SPHERE: -11.25
OD_AXIS: 083
OD_ADD: +2.50

## 2022-11-15 ASSESSMENT — CUP TO DISC RATIO
OS_RATIO: 0.2
OD_RATIO: 0.2

## 2022-11-15 NOTE — PATIENT INSTRUCTIONS
Eyeglass prescription given.    Contact lens prescription given and form signed.    Return in 1 year for a complete eye exam or sooner if needed.    Wilber Rice, MATTHEW     You are at a little higher risk of holes or tears in the retina due to you being nearsighted.  The signs of a retinal detachment are flashes of light or a curtain coming over the vision. If you notice any of these changes please let me know right away.  If I am not available this is an emergency type situation that you would need to be seen. I recommend the [facility] Emergency Room or call [phone].    The affects of the dilating drops last for 4- 6 hours.  You will be more sensitive to light and vision will be blurry up close.  Do not drive if you do not feel comfortable.  Mydriatic sunglasses were given if needed.      Optometry Providers       Clinic Locations                                 Telephone Number   Dr. Rosi Flores   [facility] [phone]     [facility] Hours:                [facility] [place] [facility] Hours:       [facility] Hours:   [address]   [address] [facility]     [address] [place] [address]   [place] [address]    [place] [address]  Phone: [phone]                    Phone: [phone]     Phone: [phone]                      Monday 8:00-6:00                          Monday 8:00-6:00                          Monday 8:00-6:00              Tuesday 8:00-6:00                          Tuesday 8:00-6:00                          Tuesday 8:00-6:00              Wednesday 8:00-6:00                  Wednesday 8:00-6:00                   Wednesday 8:00-6:00      Thursday 8:00-6:00                        Thursday 8:00-6:00                         Thursday 8:00-6:00            Friday 8:00-5:00                              Friday 8:00-5:00                               Friday 8:00-5:00    [facility] Hours:   [address] [place] [address]  [phone]    Monday 9:00-6:00  Tuesday 9:00-6:00  Wednesday 9:00-6:00  Thursday 9:00-6:00  Friday 9:00-5:00  Please log on to [email or web address] to order your contact lenses.  The link is found on the [facility] page.  As always, Thank you for trusting us with your health care needs!

## 2022-11-15 NOTE — PROGRESS NOTES
Chief Complaint   Patient presents with     Annual Eye Exam      Previous contact lens wearer? Yes: air optis astig  Comfort of contact lenses :good  Satisfied with current lenses: Yes    Last Eye Exam: 1-5-2021  Dilated Previously: Yes    What are you currently using to see?  glasses and contacts    Distance Vision Acuity: Satisfied with vision    Near Vision Acuity: Satisfied with vision while reading contacts    Eye Comfort: good  Do you use eye drops? : No  Occupation or Hobbies: [employer]     Medical, surgical and family histories reviewed and updated 11/15/2022.       OBJECTIVE: See Ophthalmology exam    ASSESSMENT:    ICD-10-CM    1. Examination of eyes and vision  Z01.00 EYE EXAM (SIMPLE-NONBILLABLE)      2. Myopia of both eyes with astigmatism  H52.13 REFRACTION    H52.203 CONTACT LENS FITTING,BILAT w/ signed waiver      3. Presbyopia  H52.4 REFRACTION         PLAN:     Patient Instructions   Eyeglass prescription given.    Contact lens prescription given and form signed.    Return in 1 year for a complete eye exam or sooner if needed.    Wilber Rice, OD

## 2022-11-15 NOTE — LETTER
11/15/2022         RE: Mary Sims  [address]  [facility] [address]        Dear Colleague,    Thank you for referring your patient, Mary Sims, to the [facility]. Please see a copy of my visit note below.    Chief Complaint   Patient presents with     Annual Eye Exam      Previous contact lens wearer? Yes: air optis astig  Comfort of contact lenses :good  Satisfied with current lenses: Yes    Last Eye Exam: 1-5-2021  Dilated Previously: Yes    What are you currently using to see?  glasses and contacts    Distance Vision Acuity: Satisfied with vision    Near Vision Acuity: Satisfied with vision while reading contacts    Eye Comfort: good  Do you use eye drops? : No  Occupation or Hobbies: [employer]     Medical, surgical and family histories reviewed and updated 11/15/2022.       OBJECTIVE: See Ophthalmology exam    ASSESSMENT:    ICD-10-CM    1. Examination of eyes and vision  Z01.00 EYE EXAM (SIMPLE-NONBILLABLE)      2. Myopia of both eyes with astigmatism  H52.13 REFRACTION    H52.203 CONTACT LENS FITTING,BILAT w/ signed waiver      3. Presbyopia  H52.4 REFRACTION         PLAN:     Patient Instructions   Eyeglass prescription given.    Contact lens prescription given and form signed.    Return in 1 year for a complete eye exam or sooner if needed.    Wilber Rice, MATTHEW                           Again, thank you for allowing me to participate in the care of your patient.        Sincerely,        Wilber Rice, OD

## 2023-02-06 NOTE — TELEPHONE ENCOUNTER
DIAGNOSIS: (LFT KNEE)  I have a pain just below the knee which has continually gotten worse. I don't recall injuring it. It now hurts all the time   APPOINTMENT DATE: 02/11/2023   NOTES STATUS DETAILS   OFFICE NOTE from referring provider N/A    OFFICE NOTE from other specialist N/A    DISCHARGE SUMMARY from hospital N/A    DISCHARGE REPORT from the ER N/A    OPERATIVE REPORT N/A    EMG report N/A    MEDICATION LIST N/A    MRI N/A    DEXA (osteoporosis/bone health) N/A    CT SCAN N/A    XRAYS (IMAGES & REPORTS) N/A

## 2023-02-11 ENCOUNTER — OFFICE VISIT (OUTPATIENT)
Dept: ORTHOPEDICS | Facility: CLINIC | Age: 58
End: 2023-02-11
Payer: COMMERCIAL

## 2023-02-11 ENCOUNTER — PRE VISIT (OUTPATIENT)
Dept: ORTHOPEDICS | Facility: CLINIC | Age: 58
End: 2023-02-11
Payer: COMMERCIAL

## 2023-02-11 ENCOUNTER — ANCILLARY PROCEDURE (OUTPATIENT)
Dept: GENERAL RADIOLOGY | Facility: CLINIC | Age: 58
End: 2023-02-11
Attending: FAMILY MEDICINE
Payer: COMMERCIAL

## 2023-02-11 DIAGNOSIS — M25.562 LEFT KNEE PAIN: ICD-10-CM

## 2023-02-11 DIAGNOSIS — M25.562 ACUTE PAIN OF LEFT KNEE: Primary | ICD-10-CM

## 2023-02-11 PROCEDURE — 99214 OFFICE O/P EST MOD 30 MIN: CPT | Performed by: FAMILY MEDICINE

## 2023-02-11 PROCEDURE — 73562 X-RAY EXAM OF KNEE 3: CPT | Mod: LT | Performed by: RADIOLOGY

## 2023-02-11 NOTE — PROGRESS NOTES
[facility]  SPORTS MEDICINE CLINIC VISIT     Feb 11, 2023        ASSESSMENT & PLAN    57-year-old with left knee/lower leg pain intermittently for the past 2 months that localizes to the Pes anserine tendon insertion    Reviewed imaging and assessment with patient in detail  We discussed use of topical diclofenac as well as icing for painful flares.  She was provided with some home exercises.  May consider physical therapy if symptoms do not improve.  We also discussed the indication for steroid injection and she will contact us if she would like to pursue this option.  If her symptoms worsen or progress to involve other parts of the knee or lower leg that would not be in keeping with a diagnosis of Pes anserine bursitis we recommend that she contact us for further discussion.    Hitesh Martell MD  [facility] SPORTS MEDICINE CLINIC [place]    -----  No chief complaint on file.      SUBJECTIVE  Mary Sims is a/an 57 year old female who is seen as a self referral for evaluation of  Left knee pain.     The patient is seen by themselves.  The patient is Right handed    Onset: 2 month(s) ago. Reports insidious onset without acute precipitating event.  Location of Pain: left knee anterior tibia, near tubercle  Worsened by: standing, and walking  Better with: rest  Treatments tried: Tylenol  Associated symptoms: no distal numbness or tingling; denies swelling or warmth    Orthopedic/Surgical history: NO  Social History/Occupation: works at "[employer]      REVIEW OF SYSTEMS:    Do you have fever, chills, weight loss? No    Do you have any vision problems? No    Do you have any chest pain or edema? No    Do you have any shortness of breath or wheezing?  No    Do you have stomach problems? No    Do you have any numbness or focal weakness? No    Do you have diabetes? No    Do you have problems with bleeding or clotting? No    Do you have an rashes or other skin lesions? No    OBJECTIVE:  [facility] 08/04/2015  (Approximate)      Patient is alert, No acute distress, pleasant and conversational.    Gait: nonantalgic. Normal heel toe gait.    left knee:   Skin intact. No erythema or ecchymosis.  No effusion or soft tissue swelling.    AROM: Zero to approximately 135  without restriction or reported pain.    Palpation: No medial or lateral facet joint tenderness.  No posterior medial or posterior lateral joint line tenderness   Mild TTP over the Pes anserine area.  No swelling in this region    Special Tests:  Negative bounce test, negative forced flexion and negative Monty's.  No ligamentous laxity or pain with valgus or varus stress.    Full Isometric quad strength, extensor mechanism in place     Neurovascularly intact in the lower extremity      RADIOLOGY:    3 view xrays of left knee performed and reviewed independently demonstrating no acute fracture or dislocation.  Loose body in the posterior knee.  Minimal degenerative change. See EMR for formal radiology report.

## 2023-02-11 NOTE — LETTER
2/11/2023         RE: Mary Sims  [address] [facility] [address]        Dear Colleague,    Thank you for referring your patient, Mayr Sims, to the [facility] SPORTS MEDICINE [facility]. Please see a copy of my visit note below.      [facility]  SPORTS MEDICINE CLINIC VISIT     Feb 11, 2023        ASSESSMENT & PLAN    57-year-old with left knee/lower leg pain intermittently for the past 2 months that localizes to the Pes anserine tendon insertion    Reviewed imaging and assessment with patient in detail  We discussed use of topical diclofenac as well as icing for painful flares.  She was provided with some home exercises.  May consider physical therapy if symptoms do not improve.  We also discussed the indication for steroid injection and she will contact us if she would like to pursue this option.  If her symptoms worsen or progress to involve other parts of the knee or lower leg that would not be in keeping with a diagnosis of Pes anserine bursitis we recommend that she contact us for further discussion.    Hitesh Martell MD  [facility] SPORTS MEDICINE [facility]    -----  No chief complaint on file.      SUBJECTIVE  Mary Sims is a/an 57 year old female who is seen as a self referral for evaluation of  Left knee pain.     The patient is seen by themselves.  The patient is Right handed    Onset: 2 month(s) ago. Reports insidious onset without acute precipitating event.  Location of Pain: left knee anterior tibia, near tubercle  Worsened by: standing, and walking  Better with: rest  Treatments tried: Tylenol  Associated symptoms: no distal numbness or tingling; denies swelling or warmth    Orthopedic/Surgical history: NO  Social History/Occupation: works at [employer]      REVIEW OF SYSTEMS:    Do you have fever, chills, weight loss? No    Do you have any vision problems? No    Do you have any chest pain or edema? No    Do you have any shortness of  breath or wheezing?  No    Do you have stomach problems? No    Do you have any numbness or focal weakness? No    Do you have diabetes? No    Do you have problems with bleeding or clotting? No    Do you have an rashes or other skin lesions? No    OBJECTIVE:  [facility] 08/04/2015 (Approximate)      Patient is alert, No acute distress, pleasant and conversational.    Gait: nonantalgic. Normal heel toe gait.    left knee:   Skin intact. No erythema or ecchymosis.  No effusion or soft tissue swelling.    AROM: Zero to approximately 135  without restriction or reported pain.    Palpation: No medial or lateral facet joint tenderness.  No posterior medial or posterior lateral joint line tenderness   Mild TTP over the Pes anserine area.  No swelling in this region    Special Tests:  Negative bounce test, negative forced flexion and negative Monty's.  No ligamentous laxity or pain with valgus or varus stress.    Full Isometric quad strength, extensor mechanism in place     Neurovascularly intact in the lower extremity      RADIOLOGY:    3 view xrays of left knee performed and reviewed independently demonstrating no acute fracture or dislocation.  Loose body in the posterior knee.  Minimal degenerative change. See EMR for formal radiology report.                   Again, thank you for allowing me to participate in the care of your patient.        Sincerely,        Hitesh Martell MD

## 2023-02-11 NOTE — PATIENT INSTRUCTIONS
-Try using topical diclofenac (Voltaren gel) on the painful area of the knee 3-4 times daily as needed for pain.  -Icing is also helpful for pain and swelling.  -Contact our clinic if your pain worsens and you would like to try a steroid injection  -Please let us know if there is no improvement in the pain with the above interventions or if your pain worsens or involves other areas of the knee or lower leg    Pes Anserine Bursitis (Knee Bursitis)     WHAT IS KNEE BURSITIS?    Bursitis is an irritation or inflammation of a bursa in your knee. A bursa is a fluid-filled sac that acts as a cushion between tendons, bones, and skin. This condition is also called pes anserine bursitis.    The pes anserine bursa is located on the inner side of the knee just below the knee joint. Tendons of three muscles attach to the shin bone (tibia) over this bursa. These muscles act to bend the knee, bring the knees together, and cross the legs.    Knee bursitis is common in swimmers who do the breaststroke and is sometimes called breaststroker's knee.    WHAT IS THE CAUSE?    Knee bursitis can result from:    Overuse, as in breaststroke kicking or kicking a ball repeatedly  Repeated pivoting from a deep knee bend  A direct blow to the area  WHAT ARE THE SYMPTOMS?    Knee bursitis causes pain on the inner side of the knee, just below the joint. You may have pain when you bend or straighten your leg.    HOW IS IT DIAGNOSED?    Your healthcare provider examines your knee for tenderness over the pes anserine bursa.    HOW IS IT TREATED?    To treat this condition:    Put an ice pack, gel pack, or package of frozen vegetables wrapped in a cloth on your knee every 3 to 4 hours for up to 20 minutes at a time until the pain goes away.  Raise your knee on a pillow when you sit or lie down.  Wrap an elastic bandage around your knee to reduce any swelling or to prevent swelling from occurring.    Take an anti-inflammatory medicine, such as  ibuprofen, as directed by your provider. Nonsteroidal anti-inflammatory medicines (NSAIDs) may cause stomach bleeding and other problems. These risks increase with age. Read the label and take as directed. Unless recommended by your healthcare provider, do not take for more than 10 days.  Your provider may give you a shot of a steroid medicine in the bursa.  Follow your provider s instructions for doing exercises to help you recover.  Ask your provider:    How long it will take to recover  What activities you should avoid and when you can return to your normal activities  How to take care of yourself at home  What symptoms or problems you should watch for and what to do if you have them  Make sure you know when you should come back for a checkup.    HOW LONG WILL THE EFFECTS LAST?    Pain from knee bursitis usually goes away within a few weeks. You need to stop doing the activities that cause pain until your knee has healed. If you continue doing activities that cause pain, your symptoms will return and it will take longer to recover.    HOW CAN I HELP PREVENT KNEE BURSITIS?    Knee bursitis is best prevented by a proper warm-up that includes stretching of the hamstring muscles, the inner thigh muscles, and the top thigh muscles. Gradually increasing your activity level, rather than doing everything at once, will also help prevent its development.You can stretch your leg right away by doing the first 3 exercises. You may start doing the other exercises when your leg is less painful.    Hamstring stretch on wall: Lie on your back with your buttocks close to a doorway. Stretch your uninjured leg straight out in front of you on the floor through the doorway. Raise your injured leg and rest it against the wall next to the door frame. Keep your leg as straight as possible. You should feel a stretch in the back of your thigh. Hold this position for 15 to 30 seconds. Repeat 3 times.    Standing calf stretch: Stand facing a  wall with your hands on the wall at about eye level. Keep your injured leg back with your heel on the floor. Keep the other leg forward with the knee bent. Turn your back foot slightly inward (as if you were pigeon-toed). Slowly lean into the wall until you feel a stretch in the back of your calf. Hold the stretch for 15 to 30 seconds. Return to the starting position. Repeat 3 times. Do this exercise several times each day.    Quadriceps stretch: Stand at an arm's length away from the wall with your injured side farthest from the wall. Facing straight ahead, brace yourself by keeping one hand against the wall. With your other hand, grasp the ankle on your injured side and pull your heel toward your buttocks. Don't arch or twist your back. Keep your knees together. Hold this stretch for 15 to 30 seconds.    Hip adductor stretch: Lie on your back. Bend your knees and put your feet flat on the floor. Gently spread your knees apart, stretching the muscles on the inside of your thighs. Hold the stretch for 15 to 30 seconds. Repeat 3 times.    Quad sets: Sit on the floor with your injured leg straight and your other leg bent. Press the back of the knee of your injured leg against the floor by tightening the muscles on the top of your thigh. Hold this position 10 seconds. Relax. Do 2 sets of 15.    Heel slide: Sit on a firm surface with your legs straight in front of you. Slowly slide the heel of the foot on your injured side toward your buttock by pulling your knee toward your chest as you slide the heel. Return to the starting position. Do 2 sets of 15.    Side-lying leg lift: Lie on your uninjured side. Tighten the front thigh muscles on your injured leg and lift that leg 8 to 10 inches (20 to 25 centimeters) away from the other leg. Keep the leg straight and lower it slowly. Do 2 sets of 15.    Straight leg raise: Lie on your back with your legs straight out in front of you. Bend the knee on your uninjured side and  place the foot flat on the floor. Tighten the thigh muscle on your injured side and lift your leg about 8 inches off the floor. Keep your leg straight and your thigh muscle tight. Slowly lower your leg back down to the floor. Do 2 sets of 15.

## 2023-04-11 NOTE — PROGRESS NOTES
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  She is s/p TLH-BSO 21 which served as her treatment.  She is here today for a surveillance visit.      Oncology History:     Initially, she was seen for vaginal spotting.     2020: Pelvic US  IMPRESSION: Thickened endometrium up to 24 mm in transvaginal  measurement for a postmenopausal patient comment given the reported  history of postmenopausal bleeding, this does raise concern for  neoplasia/hyperplasia and tissue sampling is recommended. Small amount  of free fluid in the posterior cul-de-sac and left adnexa.  Nonvisualization of left ovary. The visualized right ovary appears  Normal.     21: EMB  FINAL DIAGNOSIS:   Uterus, endometrium: Biopsy:   - At least complex atypical hyperplasia with foci bordering on FIGO grade   1 endometrioid carcinoma, see   comment      21: Robotic-assisted total laparoscopic hysterectomy, bilateral salpingo-oopherectomy, sentinel lymph node mapping and sampling, cystoscopy  FINAL DIAGNOSIS:   A. UTERUS, CERVIX, BILATERAL TUBES AND OVARIES, HYSTERECTOMY AND BILATERAL    SALPINGO-OOPHORECTOMY:   - Endometrioid endometrial adenocarcinoma, FIGO grade 1, MMR proficient   - Unremarkable ovaries with follicular cysts   - Cervix with reactive changes and nabothian cysts   - Unremarkable fallopian tubes   B. SENTINEL LYMPH NODE, RIGHT EXTERNAL ILIAC, LYMPHADENECTOMY:   - One reactive lymph node, negative for malignancy (0/1)   C. SENTINEL LYMPH NODE, LEFT PELVIC, LYMPHADENECTOMY  - One reactive lymph node, negative for malignancy (0/1)     22: CT CAP  IMPRESSION:  1.  No evidence of recurrent or metastatic disease in the abdomen or  pelvis.   2.  Multiple hepatic cysts are stable to slightly enlarged.  3.  Finding suggestive of mild  colitis. Mild pelvic floor descent with  large rectal stool burden.  4.  Small hiatal hernia.              Today she reports feeling well overall and is without concern.  She denies any vaginal bleeding, no changes in her bowel habits, no nausea/emesis, no lower extremity edema, and no difficulties eating or sleeping. She denies any abdominal bloating, no fevers or chills, and no chest pain or shortness of breath.      The same rare intermittent left lower pelvic discomfort with lifting or twisting. This is sharp and quickly resolves. Pt reports that this does not continue beyond a few seconds. Pt trying to not lift heavier things due to this. Bowels normal. No exercising. Lost weight with changing eating habits. Having deep dyspareunia denies dryness and reports that she is aroused. Having urinary urgency without incontinence. No UTI symptoms.             Health Maintenance  Colonoscopy: 2013, due 2023  Mammogram: 22   Annual physical: 22  Dexa: 2020, due for this   COVID vaccine: 3/21/21, 4/10/21, 22 Pfizer      ROS: 10 point ROS neg other than the symptoms noted above in the HPI.        Past Medical History:    Past Medical History:   Diagnosis Date     Arthritis of foot, right 2019     Endometrial cancer (H) 2021     Graves disease      History of blood transfusion      Hypertension      Hypothyroidism      MEDICAL HISTORY OF -     S/P RADIOACTIVE IODINE     Morbid obesity (H) 2020     Osteopenia of multiple sites 2020         Past Surgical History:    Past Surgical History:   Procedure Laterality Date     ABDOMEN SURGERY  1991         ARTHRODESIS FOOT Right 2020    Procedure: MIDFOOT BONE SPUR RESECTION WITH JOINT FUSIONS RIGHT LOWER EXTREMITY;  Surgeon: Choco Frances DPM;  Location: [facility] OR     ARTHROSCOPY SHLDR ROTATOR CUFF REPAIR, SUBACROMIAL DECOMP, DIST CLAVICLE RESECTION, BICEP TENODESIS Left 2017    Procedure:  ARTHROSCOPY SHOULDER ROTATOR CUFF REPAIR, SUBACROMIAL DECOMPRESSION, DISTAL CLAVICLE RESECTION, OPEN BICEP TENODESIS REPAIR;  Surgeon: Dorina Rodriguez MD;  Location: [facility] OR     BIOPSY  2021     COLONOSCOPY  2013    Procedure: COLONOSCOPY;  colonoscopy, screening;  Surgeon: Dalton Lala MD;  Location: [facility] OR     CYSTOSCOPY       CYSTOSCOPY N/A 2021    Procedure: Cystoscopy;  Surgeon: Zacarias Brandon MD;  Location: [facility] OR     DAVINCI HYSTERECTOMY TOTAL, BILATERAL SALPINGO-OOPHORECTOMY, COMBINED Bilateral 2021    Procedure: HYSTERECTOMY, TOTAL, ROBOT-ASSISTED, LAPAROSCOPIC, WITH SALPINGO-OOPHORECTOMY, PELVIC WASHINGS, SENTINEL LYMPHNODE SAMPLING;  Surgeon: Zacarias Brandon MD;  Location: [facility] OR     SOFT TISSUE SURGERY  2017    Shoulder     SURGICAL HISTORY OF -       RT ANKLE Fx AND REPAIR (PIN,PLATE,SCREWS)     THYROIDECTOMY       TUBAL LIGATION       ZZC  DELIVERY ONLY           Health Maintenance Due   Topic Date Due     HEPATITIS B IMMUNIZATION (1 of 3 - 3-dose series) Never done     DEXA  2022       Current Medications:     Current Outpatient Medications   Medication Sig Dispense Refill     gabapentin (NEURONTIN) 300 MG capsule TAKE ONE CAPSULE BY MOUTH THREE TIMES A  capsule 3     levothyroxine (SYNTHROID/LEVOTHROID) 75 MCG tablet TAKE 1 TABLET DAILY. SEPARATE IRON OR CALCIUM CONTAINING PRODUCTS BY AT LEAST 4 HOURS FROM THIS MEDICATION 90 tablet 3         Allergies:      No Known Allergies     Social History:     Social History     Tobacco Use     Smoking status: Never     Smokeless tobacco: Never   Vaping Use     Vaping status: Never Used   Substance Use Topics     Alcohol use: Not Currently     Comment: SELDOM        History   Drug Use Unknown         Family History:     The patient's family history is notable for:    Family History   Problem Relation Age of Onset     Hypertension Mother      Diabetes Mother      Thyroid Disease  "Mother      Hypertension Father      Alcohol/Drug Father         alcohol     Substance Abuse Father      Crohn's Disease Son      Hypertension Other      Cancer No family hx of      Cerebrovascular Disease No family hx of      Glaucoma No family hx of      Macular Degeneration No family hx of          Physical Exam:     /88 (BP Location: Right arm, Patient Position: Sitting, Cuff Size: Adult Large)   Pulse 81   Temp 98.3  F (36.8  C) (Oral)   Resp 16   Ht 1.608 m (5' 3.31\")   Wt 91.5 kg (201 lb 12.8 oz)   LMP 08/04/2015 (Approximate)   SpO2 96%   BMI 35.40 kg/m    Body mass index is 35.4 kg/m .    General Appearance: healthy and alert, no distress     HEENT: no palpable nodules or masses        Cardiovascular: regular rate and rhythm, no gallops, rubs or murmurs     Respiratory: lungs clear, no rales, rhonchi or wheezes    Musculoskeletal: extremities non tender and without edema    Skin: no lesions or rashes     Neurological: normal gait, no gross defects     Psychiatric: appropriate mood and affect                               Hematological: normal cervical, supraclavicular and inguinal lymph nodes     Gastrointestinal:       abdomen soft, non-tender, non-distended, no organomegaly or masses    Genitourinary: External genitalia and urethral meatus appears normal.  Vagina is smooth without nodularity or masses.  Cervix is surgically absent.  Bimanual exam reveal no masses, nodularity or fullness.  Recto-vaginal exam confirms these findings.       Assessment:    Mary Sims is a 57 year old woman with a history of stage IA grade 1 endometrioid endometrial adenocarcinoma.  She is s/p TLH-BSO 2/9/21 which served as her treatment.  She is here today for a surveillance visit.      20 minutes spent on the date of the encounter doing chart review, history and exam, documentation, and further activities as noted above.      Plan:     1.)        Endometrial cancer:  JACKIE on exam.  RTC in one year or sooner " for symptoms. Will refer to pelvic floor PT now to work on pelvic strengthening post hyst and for deep dyspareunia/urinary urgency. Pain is rare and mild resolves within a few seconds following lifting or twisting core. Would benefit from PT. Encouraged exercise and reviewed lubricants/toys/foreplay for intercourse.  Pt aware to call us if the pain increases in severity or frequency for a possible CT. This pain has been present and rare intermittetny mild since 1/2022 CT. No changes and likely scar tissue related and weakened core pelvic floor. Reviewed signs and symptoms for when she should contact the clinic or seek additional care.  Patient to contact the clinic with any questions or concerns in the interim.     2.)      Genetic risk factors were assessed and her MMR proteins were intact.    3.)        Labs and/or tests ordered include:  None.                4.)        Health maintenance:  Issues addressed today include following up with PCP for annual health maintenance and non-gynecologic issues.               PABLO Mayer, NP-BC  Women's Health Nurse Practitioner  Division of Gynecologic Oncology  [facility]    CC  Patient Care Team:  Janie Riley APRN CNP as PCP - General (Nurse Practitioner - Family)  Stan Weber MD as Referring Physician (Internal Medicine)  Janie Riley APRN CNP as Assigned PCP  Jack Jarrett APRN CNP as Assigned Cancer Care Provider  Wilber Rice OD as Assigned Surgical Provider  Hitesh Martell MD as Assigned Musculoskeletal Provider  JACK JARRETT

## 2023-04-17 ENCOUNTER — ONCOLOGY VISIT (OUTPATIENT)
Dept: ONCOLOGY | Facility: CLINIC | Age: 58
End: 2023-04-17
Attending: OBSTETRICS & GYNECOLOGY
Payer: COMMERCIAL

## 2023-04-17 VITALS
RESPIRATION RATE: 16 BRPM | SYSTOLIC BLOOD PRESSURE: 121 MMHG | DIASTOLIC BLOOD PRESSURE: 88 MMHG | TEMPERATURE: 98.3 F | HEART RATE: 81 BPM | WEIGHT: 201.8 LBS | BODY MASS INDEX: 35.75 KG/M2 | OXYGEN SATURATION: 96 % | HEIGHT: 63 IN

## 2023-04-17 DIAGNOSIS — N94.12 DEEP DYSPAREUNIA: ICD-10-CM

## 2023-04-17 DIAGNOSIS — Z85.42 ENCOUNTER FOR FOLLOW-UP SURVEILLANCE OF ENDOMETRIAL CANCER: ICD-10-CM

## 2023-04-17 DIAGNOSIS — C54.1 ENDOMETRIAL CANCER (H): Primary | ICD-10-CM

## 2023-04-17 DIAGNOSIS — Z08 ENCOUNTER FOR FOLLOW-UP SURVEILLANCE OF ENDOMETRIAL CANCER: ICD-10-CM

## 2023-04-17 DIAGNOSIS — Z90.710 HISTORY OF HYSTERECTOMY: ICD-10-CM

## 2023-04-17 DIAGNOSIS — R39.15 URINARY URGENCY: ICD-10-CM

## 2023-04-17 PROCEDURE — 99213 OFFICE O/P EST LOW 20 MIN: CPT | Performed by: OBSTETRICS & GYNECOLOGY

## 2023-04-17 ASSESSMENT — ENCOUNTER SYMPTOMS
INSOMNIA: 0
NERVOUS/ANXIOUS: 0

## 2023-04-17 ASSESSMENT — PAIN SCALES - GENERAL: PAINLEVEL: NO PAIN (0)

## 2023-04-17 NOTE — PATIENT INSTRUCTIONS
Lubricants: [place] water based [employer]; [employer] Lube silicone [employer]          Health Maintenance  Colonoscopy: 8/27/2013, due 8/2023  Mammogram: 5/19/22   Annual physical: 6/28/22  Dexa: 9/14/2020, due for this   COVID vaccine: 3/21/21, 4/10/21, 1/12/22 Pfizer

## 2023-04-17 NOTE — LETTER
2023         RE: Mary Sims   [place] [address]        Dear Colleague,    Thank you for referring your patient, Mary Sims, to the [facility]. Please see a copy of my visit note below.    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  She is s/p TLH-BSO 21 which served as her treatment.  She is here today for a surveillance visit.      Oncology History:     Initially, she was seen for vaginal spotting.     2020: Pelvic US  IMPRESSION: Thickened endometrium up to 24 mm in transvaginal  measurement for a postmenopausal patient comment given the reported  history of postmenopausal bleeding, this does raise concern for  neoplasia/hyperplasia and tissue sampling is recommended. Small amount  of free fluid in the posterior cul-de-sac and left adnexa.  Nonvisualization of left ovary. The visualized right ovary appears  Normal.     21: EMB  FINAL DIAGNOSIS:   Uterus, endometrium: Biopsy:   - At least complex atypical hyperplasia with foci bordering on FIGO grade   1 endometrioid carcinoma, see   comment      21: Robotic-assisted total laparoscopic hysterectomy, bilateral salpingo-oopherectomy, sentinel lymph node mapping and sampling, cystoscopy  FINAL DIAGNOSIS:   A. UTERUS, CERVIX, BILATERAL TUBES AND OVARIES, HYSTERECTOMY AND BILATERAL    SALPINGO-OOPHORECTOMY:   - Endometrioid endometrial adenocarcinoma, FIGO grade 1, MMR proficient   - Unremarkable ovaries with follicular cysts   - Cervix with reactive changes and nabothian cysts   - Unremarkable fallopian tubes   B. SENTINEL LYMPH NODE, RIGHT EXTERNAL ILIAC, LYMPHADENECTOMY:   - One reactive lymph node, negative for malignancy (0/1)   C. SENTINEL LYMPH NODE, LEFT PELVIC, LYMPHADENECTOMY  - One  reactive lymph node, negative for malignancy (0/1)     22: CT CAP  IMPRESSION:  1.  No evidence of recurrent or metastatic disease in the abdomen or  pelvis.   2.  Multiple hepatic cysts are stable to slightly enlarged.  3.  Finding suggestive of mild colitis. Mild pelvic floor descent with  large rectal stool burden.  4.  Small hiatal hernia.              Today she reports feeling well overall and is without concern.  She denies any vaginal bleeding, no changes in her bowel habits, no nausea/emesis, no lower extremity edema, and no difficulties eating or sleeping. She denies any abdominal bloating, no fevers or chills, and no chest pain or shortness of breath.      The same rare intermittent left lower pelvic discomfort with lifting or twisting. This is sharp and quickly resolves. Pt reports that this does not continue beyond a few seconds. Pt trying to not lift heavier things due to this. Bowels normal. No exercising. Lost weight with changing eating habits. Having deep dyspareunia denies dryness and reports that she is aroused. Having urinary urgency without incontinence. No UTI symptoms.             Health Maintenance  Colonoscopy: 2013, due 2023  Mammogram: 22   Annual physical: 22  Dexa: 2020, due for this   COVID vaccine: 3/21/21, 4/10/21, 22 Pfizer      ROS: 10 point ROS neg other than the symptoms noted above in the HPI.        Past Medical History:    Past Medical History:   Diagnosis Date     Arthritis of foot, right 2019     Endometrial cancer (H) 2021     Graves disease      History of blood transfusion      Hypertension      Hypothyroidism      MEDICAL HISTORY OF -     S/P RADIOACTIVE IODINE     Morbid obesity (H) 2020     Osteopenia of multiple sites 2020         Past Surgical History:    Past Surgical History:   Procedure Laterality Date     ABDOMEN SURGERY  1991         ARTHRODESIS FOOT Right 2020    Procedure: MIDFOOT  BONE SPUR RESECTION WITH JOINT FUSIONS RIGHT LOWER EXTREMITY;  Surgeon: Choco Frances DPM;  Location: [facility] OR     ARTHROSCOPY SHLDR ROTATOR CUFF REPAIR, SUBACROMIAL DECOMP, DIST CLAVICLE RESECTION, BICEP TENODESIS Left 2017    Procedure: ARTHROSCOPY SHOULDER ROTATOR CUFF REPAIR, SUBACROMIAL DECOMPRESSION, DISTAL CLAVICLE RESECTION, OPEN BICEP TENODESIS REPAIR;  Surgeon: Dorina Rodriguez MD;  Location: [facility] OR     BIOPSY  2021     COLONOSCOPY  2013    Procedure: COLONOSCOPY;  colonoscopy, screening;  Surgeon: Dalton Lala MD;  Location: [facility] OR     CYSTOSCOPY  2020     CYSTOSCOPY N/A 2021    Procedure: Cystoscopy;  Surgeon: Zacarias Brandon MD;  Location: [facility] OR     DAVINCI HYSTERECTOMY TOTAL, BILATERAL SALPINGO-OOPHORECTOMY, COMBINED Bilateral 2021    Procedure: HYSTERECTOMY, TOTAL, ROBOT-ASSISTED, LAPAROSCOPIC, WITH SALPINGO-OOPHORECTOMY, PELVIC WASHINGS, SENTINEL LYMPHNODE SAMPLING;  Surgeon: Zacarias Brandon MD;  Location: [facility] OR     SOFT TISSUE SURGERY  2017    Shoulder     SURGICAL HISTORY OF -       RT ANKLE Fx AND REPAIR (PIN,PLATE,SCREWS)     THYROIDECTOMY       TUBAL LIGATION       ZZC  DELIVERY ONLY           Health Maintenance Due   Topic Date Due     HEPATITIS B IMMUNIZATION (1 of 3 - 3-dose series) Never done     DEXA  2022       Current Medications:     Current Outpatient Medications   Medication Sig Dispense Refill     gabapentin (NEURONTIN) 300 MG capsule TAKE ONE CAPSULE BY MOUTH THREE TIMES A  capsule 3     levothyroxine (SYNTHROID/LEVOTHROID) 75 MCG tablet TAKE 1 TABLET DAILY. SEPARATE IRON OR CALCIUM CONTAINING PRODUCTS BY AT LEAST 4 HOURS FROM THIS MEDICATION 90 tablet 3         Allergies:      No Known Allergies     Social History:     Social History     Tobacco Use     Smoking status: Never     Smokeless tobacco: Never   Vaping Use     Vaping status: Never Used   Substance Use Topics     Alcohol use: Not  "Currently     Comment: SELDOM        History   Drug Use Unknown         Family History:     The patient's family history is notable for:    Family History   Problem Relation Age of Onset     Hypertension Mother      Diabetes Mother      Thyroid Disease Mother      Hypertension Father      Alcohol/Drug Father         alcohol     Substance Abuse Father      Crohn's Disease Son      Hypertension Other      Cancer No family hx of      Cerebrovascular Disease No family hx of      Glaucoma No family hx of      Macular Degeneration No family hx of          Physical Exam:     /88 (BP Location: Right arm, Patient Position: Sitting, Cuff Size: Adult Large)   Pulse 81   Temp 98.3  F (36.8  C) (Oral)   Resp 16   Ht 1.608 m (5' 3.31\")   Wt 91.5 kg (201 lb 12.8 oz)   LMP 08/04/2015 (Approximate)   SpO2 96%   BMI 35.40 kg/m    Body mass index is 35.4 kg/m .    General Appearance: healthy and alert, no distress     HEENT: no palpable nodules or masses        Cardiovascular: regular rate and rhythm, no gallops, rubs or murmurs     Respiratory: lungs clear, no rales, rhonchi or wheezes    Musculoskeletal: extremities non tender and without edema    Skin: no lesions or rashes     Neurological: normal gait, no gross defects     Psychiatric: appropriate mood and affect                               Hematological: normal cervical, supraclavicular and inguinal lymph nodes     Gastrointestinal:       abdomen soft, non-tender, non-distended, no organomegaly or masses    Genitourinary: External genitalia and urethral meatus appears normal.  Vagina is smooth without nodularity or masses.  Cervix is surgically absent.  Bimanual exam reveal no masses, nodularity or fullness.  Recto-vaginal exam confirms these findings.       Assessment:    Mary Sims is a 57 year old woman with a history of stage IA grade 1 endometrioid endometrial adenocarcinoma.  She is s/p TLH-BSO 2/9/21 which served as her treatment.  She is here today " for a surveillance visit.      20 minutes spent on the date of the encounter doing chart review, history and exam, documentation, and further activities as noted above.      Plan:     1.)        Endometrial cancer:  JACKIE on exam.  RTC in one year or sooner for symptoms. Will refer to pelvic floor PT now to work on pelvic strengthening post hyst and for deep dyspareunia/urinary urgency. Pain is rare and mild resolves within a few seconds following lifting or twisting core. Would benefit from PT. Encouraged exercise and reviewed lubricants/toys/foreplay for intercourse.  Pt aware to call us if the pain increases in severity or frequency for a possible CT. This pain has been present and rare intermittetny mild since 1/2022 CT. No changes and likely scar tissue related and weakened core pelvic floor. Reviewed signs and symptoms for when she should contact the clinic or seek additional care.  Patient to contact the clinic with any questions or concerns in the interim.     2.)      Genetic risk factors were assessed and her MMR proteins were intact.    3.)        Labs and/or tests ordered include:  None.                4.)        Health maintenance:  Issues addressed today include following up with PCP for annual health maintenance and non-gynecologic issues.               PABLO Mayer, WALDO-BC  Women's Health Nurse Practitioner  Division of Gynecologic Oncology  [facility]    CC  Patient Care Team:  Janie Riley APRN CNP as PCP - General (Nurse Practitioner - Family)  Stan Weber MD as Referring Physician (Internal Medicine)  Janie Riley APRN CNP as Assigned PCP  Jcak Jarrett APRN CNP as Assigned Cancer Care Provider  Wilber Rice OD as Assigned Surgical Provider  Hitesh Martell MD as Assigned Musculoskeletal Provider  JACK JARRETT        Again, thank you for allowing me to participate in the care of your patient.        Sincerely,        Grisel  PABLO Baez CNP

## 2023-04-17 NOTE — NURSING NOTE
"Oncology Rooming Note    April 17, 2023 2:16 PM   Mary Sims is a 57 year old female who presents for:    Chief Complaint   Patient presents with     Oncology Clinic Visit     Endometrial cancer (H)-6 month follow up      Initial Vitals: /88 (BP Location: Right arm, Patient Position: Sitting, Cuff Size: Adult Large)   Pulse 81   Temp 98.3  F (36.8  C) (Oral)   Resp 16   Ht 1.608 m (5' 3.31\")   Wt 91.5 kg (201 lb 12.8 oz)   LMP 08/04/2015 (Approximate)   SpO2 96%   BMI 35.40 kg/m   Estimated body mass index is 35.4 kg/m  as calculated from the following:    Height as of this encounter: 1.608 m (5' 3.31\").    Weight as of this encounter: 91.5 kg (201 lb 12.8 oz). Body surface area is 2.02 meters squared.  No Pain (0) Comment: Data Unavailable   Patient's last menstrual period was 08/04/2015 (approximate).  Allergies reviewed: Yes  Medications reviewed: Yes    Medications: Medication refills not needed today.  Pharmacy name entered into [employer]: [facility] - [facility], [place] - [address] [place] [address]    Clinical concerns: No changes reported.        Paula Burris LPN April 17, 2023 2:17 PM              "

## 2023-05-15 ENCOUNTER — THERAPY VISIT (OUTPATIENT)
Dept: PHYSICAL THERAPY | Facility: CLINIC | Age: 58
End: 2023-05-15
Attending: OBSTETRICS & GYNECOLOGY
Payer: COMMERCIAL

## 2023-05-15 DIAGNOSIS — Z85.42 ENCOUNTER FOR FOLLOW-UP SURVEILLANCE OF ENDOMETRIAL CANCER: ICD-10-CM

## 2023-05-15 DIAGNOSIS — R10.2 PELVIC PAIN IN FEMALE: ICD-10-CM

## 2023-05-15 DIAGNOSIS — C54.1 ENDOMETRIAL CANCER (H): ICD-10-CM

## 2023-05-15 DIAGNOSIS — M99.05 SOMATIC DYSFUNCTION OF PELVIC REGION: ICD-10-CM

## 2023-05-15 DIAGNOSIS — R39.15 URINARY URGENCY: ICD-10-CM

## 2023-05-15 DIAGNOSIS — Z08 ENCOUNTER FOR FOLLOW-UP SURVEILLANCE OF ENDOMETRIAL CANCER: ICD-10-CM

## 2023-05-15 DIAGNOSIS — N94.12 DEEP DYSPAREUNIA: ICD-10-CM

## 2023-05-15 DIAGNOSIS — Z90.710 HISTORY OF HYSTERECTOMY: ICD-10-CM

## 2023-05-15 PROCEDURE — 97161 PT EVAL LOW COMPLEX 20 MIN: CPT | Mod: GP | Performed by: PHYSICAL THERAPIST

## 2023-05-15 PROCEDURE — 97112 NEUROMUSCULAR REEDUCATION: CPT | Mod: GP | Performed by: PHYSICAL THERAPIST

## 2023-05-15 PROCEDURE — 97530 THERAPEUTIC ACTIVITIES: CPT | Mod: GP | Performed by: PHYSICAL THERAPIST

## 2023-05-15 PROCEDURE — 97110 THERAPEUTIC EXERCISES: CPT | Mod: GP | Performed by: PHYSICAL THERAPIST

## 2023-05-15 NOTE — PROGRESS NOTES
Physical Therapy Initial Evaluation  Subjective:    Patient Health History  Mary Sims being seen for Pelvic dysfunction.     Date of Onset: 2/2022 intermittantly.   Problem occurred: unknown   Pain is reported as 0/10 (up to 7/10) on pain scale.  General health as reported by patient is fair.  Pertinent medical history includes: cancer, high blood pressure, history of fractures, menopausal, migraines/headaches, overweight and thyroid problems.   Red flags:  None as reported by patient.  Medical allergies: none.   Surgeries include:  Orthopedic surgery and cancer surgery. Other surgery history details: hysterectomy 4/2021.    Current medications:  Thyroid medication.    Current occupation  at [employer].   Primary job tasks include:  Lifting/carrying, prolonged standing and repetitive tasks.                  Therapist Generated HPI Evaluation  Problem details: Patient reports as her activity increased after her hysterectomy 4/2021 there has been left deep group pain.  This is most present with lifting and pivoting.  Typically it is a sharp pain that decreases shortly though there is times it lingers. There has been some pain with intercourse with penetration; has tried lubrication. There has not been any pain with voiding or having a BM.  Has normal voiding and BM schedule without any leaking issues. Has completed a CT scan which was normal. .         Type of problem:  Pelvic dysfunction.    This is a chronic condition.    Where condition occurred: other.  Patient reports pain:  Vaginal.  Pain is described as sharp and stabbing and is intermittent.  Pain radiates to:  Groin left. Pain is the same all the time.  Since onset symptoms are gradually improving.  Symptoms are exacerbated by other (lifting)  and relieved by rest.  Special tests included:  CT scan.    Restrictions due to condition include:  Working in normal job without restrictions.  Barriers include:  None as reported by patient.               "          Objective:  System                                 Pelvic Dysfunction Evaluation:    Bladder/Pelvic Problems:        Dyspareunia:  Grade 1    Diagnostic Tests:  Diagnostic tests pelvic: CT=normal.                        Flexibility:    Tightness present at:Adductors; Piriformis and Gluteals    Abdominal Wall:  Abdominal wall pelvic: Difficulty with torrie abdominal musculature without verbal and manual ques 3-/5.        Pelvic Clock Exam:    Ischiocavernosis pain:  -  Bulbocavernosis pain:  -  Transverse Perineal:  -  Levator ANI:  +/-        External Assessment:    Skin Condition:  Atrophic      Tissue Symmetry:  Normal  Introitus:  Normal  Muscle Contraction/Perineal Mobility:  Slight lift, no urogential triangle descent and substitution  Internal Assessment:    Sensory Exam:  Normal  Contraction/Grade:  Weak squeeze, 2 second hold (2)    Accessory Muscle use-Gluteals:  X  Accessory Muscle use-Adductors:  X    SEMG Biofeedback:    Equipment:  [id number]    Suraface electrode placement--Perianal:  X  Baseline EMG PM:  7.8uV    Peak pelvic muscle contraction:  2\" hold 12.5uV/9.2uV/W-R +3.26  10\" hold 16.5uV/8.6uV/W-R +7.85                  Hip Evaluation  HIP AROM:    Flexion: Left: 110    Right:  110          Internal Rotation: Left: 30    Right: 30  External Rotation: Left: 40    Right: 40      Hip PROM:                    Pain: All ROM full and pain free          Hip Special Testing:    Left hip positive for the following special tests:  Rashida  Right hip negative for the following special tests:  Rashida                 General     ROS    Assessment/Plan:    Patient is a 57 year old female with pelvic complaints.    Patient has the following significant findings with corresponding treatment plan.                Diagnosis 1:  Pelvic dysfunction, Pelvic pain  Pain -  manual therapy, self management and education  Decreased ROM/flexibility - manual therapy and therapeutic exercise  Decreased strength - " therapeutic exercise and therapeutic activities  Impaired muscle performance - neuro re-education  Decreased function - therapeutic activities      Previous and current functional limitations:  (See Goal Flow Sheet for this information)    Short term and Long term goals: (See Goal Flow Sheet for this information)     Communication ability:  Patient appears to be able to clearly communicate and understand verbal and written communication and follow directions correctly.  Treatment Explanation - The following has been discussed with the patient:   RX ordered/plan of care  Anticipated outcomes  Possible risks and side effects  This patient would benefit from PT intervention to resume normal activities.   Rehab potential is good.    Frequency:  1 X week, once daily  Duration:  for 12 weeks  Discharge Plan:  Achieve all LTG.  Independent in home treatment program.  Reach maximal therapeutic benefit.    Please refer to the daily flowsheet for treatment today, total treatment time and time spent performing 1:1 timed codes.

## 2023-05-16 PROBLEM — R10.2 PELVIC PAIN IN FEMALE: Status: ACTIVE | Noted: 2023-05-16

## 2023-05-16 PROBLEM — M99.05 SOMATIC DYSFUNCTION OF PELVIC REGION: Status: ACTIVE | Noted: 2023-05-16

## 2023-05-22 ENCOUNTER — THERAPY VISIT (OUTPATIENT)
Dept: PHYSICAL THERAPY | Facility: CLINIC | Age: 58
End: 2023-05-22
Attending: OBSTETRICS & GYNECOLOGY
Payer: COMMERCIAL

## 2023-05-22 DIAGNOSIS — R10.2 PELVIC PAIN IN FEMALE: ICD-10-CM

## 2023-05-22 DIAGNOSIS — M99.05 SOMATIC DYSFUNCTION OF PELVIC REGION: Primary | ICD-10-CM

## 2023-05-22 PROCEDURE — 97110 THERAPEUTIC EXERCISES: CPT | Mod: GP | Performed by: PHYSICAL THERAPIST

## 2023-05-22 PROCEDURE — 97112 NEUROMUSCULAR REEDUCATION: CPT | Mod: GP | Performed by: PHYSICAL THERAPIST

## 2023-05-22 PROCEDURE — 97140 MANUAL THERAPY 1/> REGIONS: CPT | Mod: GP | Performed by: PHYSICAL THERAPIST

## 2023-05-23 ENCOUNTER — ANCILLARY PROCEDURE (OUTPATIENT)
Dept: MAMMOGRAPHY | Facility: CLINIC | Age: 58
End: 2023-05-23
Attending: NURSE PRACTITIONER
Payer: COMMERCIAL

## 2023-05-23 ENCOUNTER — PATIENT OUTREACH (OUTPATIENT)
Dept: CARE COORDINATION | Facility: CLINIC | Age: 58
End: 2023-05-23

## 2023-05-23 DIAGNOSIS — Z12.31 VISIT FOR SCREENING MAMMOGRAM: ICD-10-CM

## 2023-05-23 PROCEDURE — 77067 SCR MAMMO BI INCL CAD: CPT | Mod: TC | Performed by: RADIOLOGY

## 2023-05-23 PROCEDURE — 77063 BREAST TOMOSYNTHESIS BI: CPT | Mod: TC | Performed by: RADIOLOGY

## 2023-05-31 ENCOUNTER — THERAPY VISIT (OUTPATIENT)
Dept: PHYSICAL THERAPY | Facility: CLINIC | Age: 58
End: 2023-05-31
Attending: OBSTETRICS & GYNECOLOGY
Payer: COMMERCIAL

## 2023-05-31 DIAGNOSIS — M99.05 SOMATIC DYSFUNCTION OF PELVIC REGION: Primary | ICD-10-CM

## 2023-05-31 DIAGNOSIS — R10.2 PELVIC PAIN IN FEMALE: ICD-10-CM

## 2023-05-31 PROCEDURE — 97112 NEUROMUSCULAR REEDUCATION: CPT | Mod: GP | Performed by: PHYSICAL THERAPIST

## 2023-05-31 PROCEDURE — 97110 THERAPEUTIC EXERCISES: CPT | Mod: GP | Performed by: PHYSICAL THERAPIST

## 2023-05-31 PROCEDURE — 97140 MANUAL THERAPY 1/> REGIONS: CPT | Mod: GP | Performed by: PHYSICAL THERAPIST

## 2023-06-15 DIAGNOSIS — G57.11 MERALGIA PARESTHETICA, RIGHT LOWER LIMB: ICD-10-CM

## 2023-06-16 RX ORDER — GABAPENTIN 300 MG/1
CAPSULE ORAL
Qty: 180 CAPSULE | Refills: 0 | Status: SHIPPED | OUTPATIENT
Start: 2023-06-16 | End: 2024-04-03

## 2023-06-25 ASSESSMENT — ENCOUNTER SYMPTOMS
HEMATOCHEZIA: 0
ABDOMINAL PAIN: 0
MYALGIAS: 0
SORE THROAT: 0
DIZZINESS: 0
CHILLS: 0
WEAKNESS: 0
COUGH: 0
HEMATURIA: 0
HEADACHES: 0
FEVER: 0
PARESTHESIAS: 0
CONSTIPATION: 0
BREAST MASS: 0
ARTHRALGIAS: 0
SHORTNESS OF BREATH: 0
NAUSEA: 0
NERVOUS/ANXIOUS: 0
HEARTBURN: 0
FREQUENCY: 0
PALPITATIONS: 0
DYSURIA: 0
DIARRHEA: 0
JOINT SWELLING: 0
EYE PAIN: 0

## 2023-06-28 ENCOUNTER — OFFICE VISIT (OUTPATIENT)
Dept: FAMILY MEDICINE | Facility: CLINIC | Age: 58
End: 2023-06-28
Payer: COMMERCIAL

## 2023-06-28 VITALS
HEIGHT: 64 IN | BODY MASS INDEX: 33.8 KG/M2 | HEART RATE: 75 BPM | SYSTOLIC BLOOD PRESSURE: 134 MMHG | DIASTOLIC BLOOD PRESSURE: 88 MMHG | OXYGEN SATURATION: 98 % | WEIGHT: 198 LBS | TEMPERATURE: 97.8 F

## 2023-06-28 DIAGNOSIS — E66.01 MORBID OBESITY (H): ICD-10-CM

## 2023-06-28 DIAGNOSIS — M85.89 OSTEOPENIA OF MULTIPLE SITES: ICD-10-CM

## 2023-06-28 DIAGNOSIS — L30.9 ECZEMA, UNSPECIFIED TYPE: ICD-10-CM

## 2023-06-28 DIAGNOSIS — I10 HYPERTENSION GOAL BP (BLOOD PRESSURE) < 140/90: ICD-10-CM

## 2023-06-28 DIAGNOSIS — Z00.00 ROUTINE HISTORY AND PHYSICAL EXAMINATION OF ADULT: Primary | ICD-10-CM

## 2023-06-28 DIAGNOSIS — E03.9 HYPOTHYROIDISM, UNSPECIFIED TYPE: ICD-10-CM

## 2023-06-28 DIAGNOSIS — C54.1 ENDOMETRIAL CANCER (H): ICD-10-CM

## 2023-06-28 PROCEDURE — 36415 COLL VENOUS BLD VENIPUNCTURE: CPT | Performed by: NURSE PRACTITIONER

## 2023-06-28 PROCEDURE — 82043 UR ALBUMIN QUANTITATIVE: CPT | Performed by: NURSE PRACTITIONER

## 2023-06-28 PROCEDURE — 99214 OFFICE O/P EST MOD 30 MIN: CPT | Mod: 25 | Performed by: NURSE PRACTITIONER

## 2023-06-28 PROCEDURE — 99396 PREV VISIT EST AGE 40-64: CPT | Performed by: NURSE PRACTITIONER

## 2023-06-28 PROCEDURE — 82570 ASSAY OF URINE CREATININE: CPT | Performed by: NURSE PRACTITIONER

## 2023-06-28 PROCEDURE — 84443 ASSAY THYROID STIM HORMONE: CPT | Performed by: NURSE PRACTITIONER

## 2023-06-28 PROCEDURE — 80048 BASIC METABOLIC PNL TOTAL CA: CPT | Performed by: NURSE PRACTITIONER

## 2023-06-28 RX ORDER — TRIAMCINOLONE ACETONIDE 1 MG/G
CREAM TOPICAL 2 TIMES DAILY
Qty: 15 G | Refills: 1 | Status: SHIPPED | OUTPATIENT
Start: 2023-06-28

## 2023-06-28 ASSESSMENT — ENCOUNTER SYMPTOMS
ARTHRALGIAS: 0
NAUSEA: 0
BREAST MASS: 0
DYSURIA: 0
CHILLS: 0
HEADACHES: 0
NERVOUS/ANXIOUS: 0
PARESTHESIAS: 0
FEVER: 0
FREQUENCY: 0
HEARTBURN: 0
MYALGIAS: 0
PALPITATIONS: 0
HEMATURIA: 0
ABDOMINAL PAIN: 0
SORE THROAT: 0
COUGH: 0
DIARRHEA: 0
SHORTNESS OF BREATH: 0
DIZZINESS: 0
CONSTIPATION: 0
EYE PAIN: 0
HEMATOCHEZIA: 0
JOINT SWELLING: 0
WEAKNESS: 0

## 2023-06-28 ASSESSMENT — PAIN SCALES - GENERAL: PAINLEVEL: NO PAIN (1)

## 2023-06-28 NOTE — PROGRESS NOTES
SUBJECTIVE:   CC: Mary is an 58 year old who presents for preventive health visit.       6/28/2023     1:42 PM   Additional Questions   Roomed by pretty   Accompanied by self     Healthy Habits:    Getting at least 3 servings of Calcium per day:  NO    Bi-annual eye exam:  Yes    Dental care twice a year:  NO    Sleep apnea or symptoms of sleep apnea:  None    Diet:  Regular (no restrictions)    Frequency of exercise:  None    Taking medications regularly:  Yes    Barriers to taking medications:  None    Medication side effects:  None    PHQ-2 Total Score:    Additional concerns today:  Yes      Hypertension Follow-up      Do you check your blood pressure regularly outside of the clinic? Yes     Are you following a low salt diet? Yes    Are your blood pressures ever more than 140 on the top number (systolic) OR more   than 90 on the bottom number (diastolic), for example 140/90? No    Hypothyroidism Follow-up      Since last visit, patient describes the following symptoms: Weight stable, no hair loss, no skin changes, no constipation, no loose stools          Social History     Tobacco Use     Smoking status: Never     Passive exposure: Never     Smokeless tobacco: Never   Substance Use Topics     Alcohol use: Not Currently     Comment: SELDOM              6/25/2023    12:38 PM   Alcohol Use   Prescreen: >3 drinks/day or >7 drinks/week? No     Reviewed orders with patient.  Reviewed health maintenance and updated orders accordingly - Yes  Labs reviewed in EPIC  BP Readings from Last 3 Encounters:   06/28/23 134/88   04/17/23 121/88   10/19/22 125/86    Wt Readings from Last 3 Encounters:   06/28/23 89.8 kg (198 lb)   04/17/23 91.5 kg (201 lb 12.8 oz)   10/19/22 95.5 kg (210 lb 9.6 oz)                  Patient Active Problem List   Diagnosis     CARDIOVASCULAR SCREENING; LDL GOAL LESS THAN 160     Hypothyroidism     Hypertension goal BP (blood pressure) < 140/90     WONG (iron deficiency anemia)     Obesity      Nontraumatic tear of supraspinatus tendon, left     Insomnia, unspecified type     De Quervain's disease (tenosynovitis)     Arthritis of foot, right     Morbid obesity (H)     Encounter for routine adult health examination without abnormal findings     Osteopenia of multiple sites     Endometrial cancer (H)     Pain of right hand     Finger pain, right     Somatic dysfunction of pelvic region     Pelvic pain in female     Past Surgical History:   Procedure Laterality Date     ABDOMEN SURGERY  1991         ARTHRODESIS FOOT Right 2020    Procedure: MIDFOOT BONE SPUR RESECTION WITH JOINT FUSIONS RIGHT LOWER EXTREMITY;  Surgeon: Choco Frances DPM;  Location: [facility]     ARTHROSCOPY SHLDR ROTATOR CUFF REPAIR, SUBACROMIAL DECOMP, DIST CLAVICLE RESECTION, BICEP TENODESIS Left 2017    Procedure: ARTHROSCOPY SHOULDER ROTATOR CUFF REPAIR, SUBACROMIAL DECOMPRESSION, DISTAL CLAVICLE RESECTION, OPEN BICEP TENODESIS REPAIR;  Surgeon: Dorina Rodriguez MD;  [facility]: [facility]     BIOPSY  2021     COLONOSCOPY  2013    Procedure: COLONOSCOPY;  colonoscopy, screening;  Surgeon: Dalton Lala MD;  Location: [facility]     CYSTOSCOPY  2020     CYSTOSCOPY N/A 2021    Procedure: Cystoscopy;  Surgeon: Zacarias Brandon MD;  Location: [facility]     DAVINCI HYSTERECTOMY TOTAL, BILATERAL SALPINGO-OOPHORECTOMY, COMBINED Bilateral 2021    Procedure: HYSTERECTOMY, TOTAL, ROBOT-ASSISTED, LAPAROSCOPIC, WITH SALPINGO-OOPHORECTOMY, PELVIC WASHINGS, SENTINEL LYMPHNODE SAMPLING;  Surgeon: Zacarias Brandon MD;  Location: [facility]     SOFT TISSUE SURGERY  2017    Shoulder     SURGICAL HISTORY OF -       RT ANKLE Fx AND REPAIR (PIN,PLATE,SCREWS)     THYROIDECTOMY       TUBAL LIGATION       ZZC  DELIVERY ONLY         Social History     Tobacco Use     Smoking status: Never     Passive exposure: Never     Smokeless tobacco: Never   Substance Use Topics     Alcohol use: Not  Currently     Comment: SELDOM      Family History   Problem Relation Age of Onset     Hypertension Mother      Diabetes Mother      Thyroid Disease Mother      Hypertension Father      Alcohol/Drug Father         alcohol     Substance Abuse Father      Crohn's Disease Son      Hypertension Other      Cancer No family hx of      Cerebrovascular Disease No family hx of      Glaucoma No family hx of      Macular Degeneration No family hx of            Breast Cancer Screening:    FHS-7:       5/19/2022     1:55 PM 5/23/2023    12:50 PM   Breast CA Risk Assessment (FHS-7)   Did any of your first-degree relatives have breast or ovarian cancer? No No   Did any of your relatives have bilateral breast cancer? No No   Did any man in your family have breast cancer? No No   Did any woman in your family have breast and ovarian cancer? No No   Did any woman in your family have breast cancer before age 50 y? No No   Do you have 2 or more relatives with breast and/or ovarian cancer? No No   Do you have 2 or more relatives with breast and/or bowel cancer? No No       Mammogram Screening: Recommended mammography every 1-2 years with patient discussion and risk factor consideration  Pertinent mammograms are reviewed under the imaging tab.    History of abnormal Pap smear:       Latest Ref Rng & Units 7/8/2020     2:44 PM 7/8/2020     1:40 PM 5/15/2017     9:28 AM   PAP / HPV   PAP (Historical)   NIL     HPV 16 DNA NEG^Negative Negative   Negative    HPV 18 DNA NEG^Negative Negative   Negative    Other HR HPV NEG^Negative Negative   Negative      Reviewed and updated as needed this visit by clinical staff   Tobacco  Allergies  Meds              Reviewed and updated as needed this visit by Provider                 Past Medical History:   Diagnosis Date     Arthritis of foot, right 12/13/2019     Endometrial cancer (H) 01/13/2021     Graves disease      History of blood transfusion 1991     Hypertension      Hypothyroidism       MEDICAL HISTORY OF -     S/P RADIOACTIVE IODINE     Morbid obesity (H) 2020     Osteopenia of multiple sites 2020      Past Surgical History:   Procedure Laterality Date     ABDOMEN SURGERY  1991         ARTHRODESIS FOOT Right 2020    Procedure: MIDFOOT BONE SPUR RESECTION WITH JOINT FUSIONS RIGHT LOWER EXTREMITY;  Surgeon: Choco Frances DPM;  Location: [facility]     ARTHROSCOPY SHLDR ROTATOR CUFF REPAIR, SUBACROMIAL DECOMP, DIST CLAVICLE RESECTION, BICEP TENODESIS Left 2017    Procedure: ARTHROSCOPY SHOULDER ROTATOR CUFF REPAIR, SUBACROMIAL DECOMPRESSION, DISTAL CLAVICLE RESECTION, OPEN BICEP TENODESIS REPAIR;  Surgeon: Dorina Rodriguez MD;  [facility]     BIOPSY  2021     COLONOSCOPY  2013    Procedure: COLONOSCOPY;  colonoscopy, screening;  Surgeon: Dalton Lala MD;  Location: [facility]     CYSTOSCOPY       CYSTOSCOPY N/A 2021    Procedure: Cystoscopy;  Surgeon: Zacarias Brandon MD;  [facility]     DAVINCI HYSTERECTOMY TOTAL, BILATERAL SALPINGO-OOPHORECTOMY, COMBINED Bilateral 2021    Procedure: HYSTERECTOMY, TOTAL, ROBOT-ASSISTED, LAPAROSCOPIC, WITH SALPINGO-OOPHORECTOMY, PELVIC WASHINGS, SENTINEL LYMPHNODE SAMPLING;  Surgeon: Zacarias Brandon MD;  Location:  OR     SOFT TISSUE SURGERY  2017    Shoulder     SURGICAL HISTORY OF -       RT ANKLE Fx AND REPAIR (PIN,PLATE,SCREWS)     THYROIDECTOMY       TUBAL LIGATION       ZZC  DELIVERY ONLY         Review of Systems   Constitutional: Negative for chills and fever.   HENT: Negative for congestion, ear pain, hearing loss and sore throat.    Eyes: Negative for pain and visual disturbance.   Respiratory: Negative for cough and shortness of breath.    Cardiovascular: Negative for chest pain, palpitations and peripheral edema.   Gastrointestinal: Negative for abdominal pain, constipation, diarrhea, heartburn, hematochezia and nausea.   Breasts:   "Negative for tenderness, breast mass and discharge.   Genitourinary: Negative for dysuria, frequency, genital sores, hematuria, pelvic pain, urgency, vaginal bleeding and vaginal discharge.   Musculoskeletal: Negative for arthralgias, joint swelling and myalgias.   Skin: Negative for rash.   Neurological: Negative for dizziness, weakness, headaches and paresthesias.   Psychiatric/Behavioral: Negative for mood changes. The patient is not nervous/anxious.      CONSTITUTIONAL: NEGATIVE for fever, chills, change in weight  INTEGUMENTARY/SKIN: NEGATIVE for worrisome rashes, moles or lesions  EYES: NEGATIVE for vision changes or irritation  ENT: NEGATIVE for ear, mouth and throat problems  RESP: NEGATIVE for significant cough or SOB  BREAST: NEGATIVE for masses, tenderness or discharge  CV: NEGATIVE for chest pain, palpitations or peripheral edema  GI: NEGATIVE for nausea, abdominal pain, heartburn, or change in bowel habits  : NEGATIVE for unusual urinary or vaginal symptoms. No vaginal bleeding.  MUSCULOSKELETAL: NEGATIVE for significant arthralgias or myalgia  NEURO: NEGATIVE for weakness, dizziness or paresthesias  ENDOCRINE: NEGATIVE for temperature intolerance, skin/hair changes  PSYCHIATRIC: NEGATIVE for changes in mood or affect      OBJECTIVE:   /88 (BP Location: Left arm, Patient Position: Chair, Cuff Size: Adult Regular)   Pulse 75   Temp 97.8  F (36.6  C) (Tympanic)   Ht 1.626 m (5' 4\")   Wt 89.8 kg (198 lb)   LMP 08/04/2015 (Approximate)   SpO2 98%   BMI 33.99 kg/m    Physical Exam  GENERAL APPEARANCE: healthy, alert and no distress  EYES: Eyes grossly normal to inspection, PERRL and conjunctivae and sclerae normal  HENT: ear canals and TM's normal, nose and mouth without ulcers or lesions, oropharynx clear and oral mucous membranes moist  NECK: no adenopathy, no asymmetry, masses, or scars and thyroid normal to palpation  RESP: lungs clear to auscultation - no rales, rhonchi or wheezes  CV: " regular rate and rhythm, normal S1 S2, no S3 or S4, no murmur, click or rub, no peripheral edema and peripheral pulses strong  ABDOMEN: soft, nontender, no hepatosplenomegaly, no masses and bowel sounds normal  MS: no musculoskeletal defects are noted and gait is age appropriate without ataxia  SKIN: no suspicious lesions or rashes  NEURO: Normal strength and tone, sensory exam grossly normal, mentation intact and speech normal  PSYCH: mentation appears normal and affect normal/bright    Diagnostic Test Results:  Labs reviewed in Epic  No results found for this or any previous visit (from the past 24 hour(s)).    ASSESSMENT/PLAN:   (Z00.00) Routine history and physical examination of adult  (primary encounter diagnosis)  Comment:   Plan: REVIEW OF HEALTH MAINTENANCE PROTOCOL ORDERS            (E66.01) Morbid obesity (H)  Comment: Benefits of weight loss reviewed in detail, encouraged her to cut back on the carbohydrates in the diet, consume more fruits and vegetables, drink plenty of water, avoid fruit juices, sodas, get 150 min moderate exercise/week.    Plan: Recheck weight in 6 months.      (C54.1) Endometrial cancer (H)  Comment: s/p CHRISTOPHER 2/9/21, followed by Oncology  Plan:    (I10) Hypertension goal BP (blood pressure) < 140/90  Comment: controlled, continue currrent management  Plan: BASIC METABOLIC PANEL, Albumin Random Urine         Quantitative with Creat Ratio            (E03.9) Hypothyroidism, unspecified type  Comment: checking TSH, adjust synthroid as indicated  Plan: TSH WITH FREE T4 REFLEX            (M85.89) Osteopenia of multiple sites  Comment: getting interval DEXA  Plan: DEXA HIP/PELVIS/SPINE - Future            (L30.9) Eczema, unspecified type  Comment: Refilled Kenalog cream for prn use  Plan: triamcinolone (KENALOG) 0.1 % external cream              Patient has been advised of split billing requirements and indicates understanding: Yes      COUNSELING:  Reviewed preventive health counseling, as  "reflected in patient instructions      BMI:   Estimated body mass index is 33.99 kg/m  as calculated from the following:    Height as of this encounter: 1.626 m (5' 4\").    Weight as of this encounter: 89.8 kg (198 lb).   Weight management plan: Discussed healthy diet and exercise guidelines      She reports that she has never smoked. She has never been exposed to tobacco smoke. She has never used smokeless tobacco.      PABLO Glez CNP  [facility]  "

## 2023-06-29 LAB
ANION GAP SERPL CALCULATED.3IONS-SCNC: 13 MMOL/L (ref 7–15)
BUN SERPL-MCNC: 17.1 MG/DL (ref 8–23)
CALCIUM SERPL-MCNC: 10.2 MG/DL (ref 8.8–10.2)
CHLORIDE SERPL-SCNC: 104 MMOL/L (ref 98–107)
CREAT SERPL-MCNC: 0.83 MG/DL (ref 0.51–0.95)
CREAT UR-MCNC: 101 MG/DL
DEPRECATED HCO3 PLAS-SCNC: 26 MMOL/L (ref 22–29)
GFR SERPL CREATININE-BSD FRML MDRD: 80 ML/MIN/1.73M2
GLUCOSE SERPL-MCNC: 98 MG/DL (ref 70–99)
MICROALBUMIN UR-MCNC: <12 MG/L
MICROALBUMIN/CREAT UR: NORMAL MG/G{CREAT}
POTASSIUM SERPL-SCNC: 4.5 MMOL/L (ref 3.4–5.3)
SODIUM SERPL-SCNC: 143 MMOL/L (ref 136–145)
TSH SERPL DL<=0.005 MIU/L-ACNC: 0.45 UIU/ML (ref 0.3–4.2)

## 2023-07-10 ENCOUNTER — DOCUMENTATION ONLY (OUTPATIENT)
Dept: OTHER | Facility: CLINIC | Age: 58
End: 2023-07-10

## 2023-07-10 ENCOUNTER — ANCILLARY PROCEDURE (OUTPATIENT)
Dept: BONE DENSITY | Facility: CLINIC | Age: 58
End: 2023-07-10
Attending: NURSE PRACTITIONER
Payer: COMMERCIAL

## 2023-07-10 DIAGNOSIS — M85.89 OSTEOPENIA OF MULTIPLE SITES: ICD-10-CM

## 2023-07-10 PROCEDURE — 77080 DXA BONE DENSITY AXIAL: CPT | Performed by: RADIOLOGY

## 2023-08-15 PROBLEM — M99.05 SOMATIC DYSFUNCTION OF PELVIC REGION: Status: RESOLVED | Noted: 2023-05-16 | Resolved: 2023-08-15

## 2023-08-15 PROBLEM — R10.2 PELVIC PAIN IN FEMALE: Status: RESOLVED | Noted: 2023-05-16 | Resolved: 2023-08-15

## 2023-08-15 NOTE — PROGRESS NOTES
05/31/23 0500   Appointment Info   Signing clinician's name / credentials Danitza Hendrickson DPT   Total/Authorized Visits 12   Visits Used 3   Medical Diagnosis PF Dysfunction   PT Goal 1   Goal Identifier Pain with pivoting   Goal Description Decrease pain with pivoting  0/10 PL   Rationale to maximize safety and independence with performance of ADLs and functional tasks   Goal Progress Pain with pivoting 0-2/10 PL   Target Date 08/08/23   Subjective Report   Subjective Report Patient reports she continues see improvement with pain related to pivoting.  Has been able to completed internal stretching with a therwand and this is going well and seems to be helpful.   Objective Measures   Objective Measures Objective Measure 1;Objective Measure 2   Objective Measure 1   Objective Measure BFB   Details 5.7uV   Objective Measure 2   Objective Measure Palpation   Details Internal OI/LA musculature 4-6 oclock increased tone present though improved from last appointment; no tenderness present just tightness   Treatment Interventions (PT)   Interventions Therapeutic Procedure/Exercise;Neuromuscular Re-education;Manual Therapy   Therapeutic Procedure/Exercise   Therapeutic Procedures: strength, endurance, ROM, flexibillity minutes (98938) 15   PTRx Ther Proc 1 Supine Butterfly   PTRx Ther Proc 1 - Details reviewed   PTRx Ther Proc 2 Piriformis Stretch Below 90 Degrees Supine   PTRx Ther Proc 2 - Details reviewed   PTRx Ther Proc 3 Roll Ins   PTRx Ther Proc 3 - Details 5x   PTRx Ther Proc 4 Roll Outs   PTRx Ther Proc 4 - Details 5x RTB   Therapeutic Activity   PTRx Ther Act 1 Diaphragmatic Breathing   PTRx Ther Act 1 - Details No Notes   Neuromuscular Re-education   Neuromuscular re-ed of mvmt, balance, coord, kinesthetic sense, posture, proprioception minutes (34844) 15   PTRx Neuro Re-ed 1 Bridging Pelvic Floor    PTRx Neuro Re-ed 1 - Details reviewed   PTRx Neuro Re-ed 2 Abdominal Brace Transverse Abdominis   PTRx Neuro  Re-ed 2 - Details 10   PTRx Neuro Re-ed 3 Sit to Stand Pelvic Floor    PTRx Neuro Re-ed 3 - Details 5x   Manual Therapy   Manual Therapy: Mobilization, MFR, MLD, friction massage minutes (67934) 10   Manual Therapy 1 Internal manual therapy   Manual Therapy 1 - Details Internal OI/LA muscualture focused on the left side 4-6 oclock   Patient Response/Progress decreawsed tone;  no pain   Plan   Home program Focus on therawand stretcheing 2-3x/week   Total Session Time   Timed Code Treatment Minutes 40   Total Treatment Time (sum of timed and untimed services) 40         DISCHARGE  Reason for Discharge: Patient has failed to schedule further appointments.    Equipment Issued:     Discharge Plan: Patient to continue home program.    Referring Provider:  Grisel Salazar

## 2023-09-20 ENCOUNTER — TELEPHONE (OUTPATIENT)
Dept: FAMILY MEDICINE | Facility: CLINIC | Age: 58
End: 2023-09-20
Payer: COMMERCIAL

## 2023-09-20 NOTE — TELEPHONE ENCOUNTER
Patient Quality Outreach    Patient is due for the following:   Colon Cancer Screening      Topic Date Due    Flu Vaccine (1) 09/01/2023       Next Steps:   Schedule a nurse only visit for colonoscopy    Type of outreach:    Sent [employer] message.      Questions for provider review:    None           Donald Correa

## 2023-09-25 DIAGNOSIS — E03.9 HYPOTHYROIDISM, UNSPECIFIED TYPE: ICD-10-CM

## 2023-09-26 RX ORDER — LEVOTHYROXINE SODIUM 75 UG/1
TABLET ORAL
Qty: 90 TABLET | Refills: 0 | Status: SHIPPED | OUTPATIENT
Start: 2023-09-26 | End: 2023-12-26

## 2023-12-24 DIAGNOSIS — E03.9 HYPOTHYROIDISM, UNSPECIFIED TYPE: ICD-10-CM

## 2023-12-26 ENCOUNTER — TELEPHONE (OUTPATIENT)
Dept: FAMILY MEDICINE | Facility: CLINIC | Age: 58
End: 2023-12-26
Payer: COMMERCIAL

## 2023-12-26 RX ORDER — LEVOTHYROXINE SODIUM 75 UG/1
TABLET ORAL
Qty: 90 TABLET | Refills: 1 | Status: SHIPPED | OUTPATIENT
Start: 2023-12-26 | End: 2024-06-24

## 2023-12-26 NOTE — TELEPHONE ENCOUNTER
Patient Quality Outreach    Patient is due for the following:   Colon Cancer Screening      Topic Date Due    Flu Vaccine (1) 09/01/2023    COVID-19 Vaccine (6 - 2023-24 season) 09/01/2023       Next Steps:   Schedule a nurse only visit for colonoscopy    Type of outreach:    Sent [employer] message.      Questions for provider review:    None           Donald Correa

## 2024-03-11 ENCOUNTER — IMMUNIZATION (OUTPATIENT)
Dept: NURSING | Facility: CLINIC | Age: 59
End: 2024-03-11
Payer: COMMERCIAL

## 2024-03-11 PROCEDURE — 90471 IMMUNIZATION ADMIN: CPT

## 2024-03-11 PROCEDURE — 91320 SARSCV2 VAC 30MCG TRS-SUC IM: CPT

## 2024-03-11 PROCEDURE — 90686 IIV4 VACC NO PRSV 0.5 ML IM: CPT

## 2024-03-11 PROCEDURE — 90480 ADMN SARSCOV2 VAC 1/ONLY CMP: CPT

## 2024-04-02 NOTE — PROGRESS NOTES
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     Oncology history:  Initially, she was seen for vaginal spotting.     2020: Pelvic US  IMPRESSION: Thickened endometrium up to 24 mm in transvaginal  measurement for a postmenopausal patient comment given the reported  history of postmenopausal bleeding, this does raise concern for  neoplasia/hyperplasia and tissue sampling is recommended. Small amount  of free fluid in the posterior cul-de-sac and left adnexa.  Nonvisualization of left ovary. The visualized right ovary appears  Normal.     21: EMB  - At least complex atypical hyperplasia with foci bordering on FIGO grade 1 endometrioid carcinoma     21: Robotic-assisted total laparoscopic hysterectomy, bilateral salpingo-oopherectomy, sentinel lymph node mapping and sampling, cystoscopy  A. UTERUS, CERVIX, BILATERAL TUBES AND OVARIES, HYSTERECTOMY AND BILATERAL    SALPINGO-OOPHORECTOMY:   - Endometrioid endometrial adenocarcinoma, FIGO grade 1, MMR proficient   - Unremarkable ovaries with follicular cysts   - Cervix with reactive changes and nabothian cysts   - Unremarkable fallopian tubes   B. SENTINEL LYMPH NODE, RIGHT EXTERNAL ILIAC, LYMPHADENECTOMY:   - One reactive lymph node, negative for malignancy (0/1)   C. SENTINEL LYMPH NODE, LEFT PELVIC, LYMPHADENECTOMY  - One reactive lymph node, negative for malignancy (0/1)     22: CT CAP IMPRESSION:  1.  No evidence of recurrent or metastatic disease in the abdomen or pelvis.   2.  Multiple hepatic cysts are stable to slightly enlarged.  3.  Finding suggestive of mild colitis. Mild pelvic floor descent with large rectal stool burden.  4.  Small hiatal hernia.          Today she comes to clinic and denies any  concerns for her visit. She denies any vaginal bleeding, no changes in her bowel or bladder habits, no nausea/emesis, no lower extremity edema, and no difficulties eating or sleeping. She denies any abdominal discomfort/bloating, no fevers or chills, and no chest pain or shortness of breath. She is aware her colon cancer screening is due. She is not working now. She has 4 weddings and a cruise coming up.      Annual exam with PCP:  Mammogram:  Colonoscopy:;   DEXA:       Review of Systems  See HPI      Past Medical History:    Past Medical History:   Diagnosis Date    Arthritis of foot, right 2019    Endometrial cancer (H) 2021    Graves disease     History of blood transfusion     Hypertension     Hypothyroidism     MEDICAL HISTORY OF -     S/P RADIOACTIVE IODINE    Morbid obesity (H) 2020    Osteopenia of multiple sites 2020         Past Surgical History:    Past Surgical History:   Procedure Laterality Date    ABDOMEN SURGERY  1991        ARTHRODESIS FOOT Right 2020    Procedure: MIDFOOT BONE SPUR RESECTION WITH JOINT FUSIONS RIGHT LOWER EXTREMITY;  Surgeon: Choco Frances DPM;  Location: [facility]    ARTHROSCOPY SHLDR ROTATOR CUFF REPAIR, SUBACROMIAL DECOMP, DIST CLAVICLE RESECTION, BICEP TENODESIS Left 2017    Procedure: ARTHROSCOPY SHOULDER ROTATOR CUFF REPAIR, SUBACROMIAL DECOMPRESSION, DISTAL CLAVICLE RESECTION, OPEN BICEP TENODESIS REPAIR;  Surgeon: Dorina Rodriguez MD;  Location: [facility]    BIOPSY  2021    COLONOSCOPY  2013    Procedure: COLONOSCOPY;  colonoscopy, screening;  Surgeon: Dalton Lala MD;  Location: [facility]    CYSTOSCOPY      CYSTOSCOPY N/A 2021    Procedure: Cystoscopy;  Surgeon: Zacarias Brandon MD;  [facility]: [facility]    DAVINCI HYSTERECTOMY TOTAL, BILATERAL SALPINGO-OOPHORECTOMY, COMBINED Bilateral 2021    Procedure: HYSTERECTOMY, TOTAL, ROBOT-ASSISTED, LAPAROSCOPIC,  "WITH SALPINGO-OOPHORECTOMY, PELVIC WASHINGS, SENTINEL LYMPHNODE SAMPLING;  Surgeon: Zacarias Brandon MD;  Location: [facility]    SOFT TISSUE SURGERY  2017    Shoulder    SURGICAL HISTORY OF -       RT ANKLE Fx AND REPAIR (PIN,PLATE,SCREWS)    THYROIDECTOMY      TUBAL LIGATION      ZZC  DELIVERY ONLY         Current Medications:     Current Outpatient Medications   Medication Sig Dispense Refill    calcium carbonate-vitamin D (OSCAL) 500-5 MG-MCG tablet Take 1 tablet by mouth 2 times daily (with meals) 180 tablet 3    levothyroxine (SYNTHROID/LEVOTHROID) 75 MCG tablet TAKE 1 TABLET DAILY. SEPARATE IRON OR CALCIUM CONTAINING PRODUCTS BY AT LEAST 4 HOURS FROM THIS MEDICATION. 90 tablet 1    triamcinolone (KENALOG) 0.1 % external cream Apply topically 2 times daily 15 g 1         Allergies:      No Known Allergies     Social History:     Social History     Tobacco Use    Smoking status: Never     Passive exposure: Never    Smokeless tobacco: Never   Substance Use Topics    Alcohol use: Not Currently     Comment: SELDOM        History   Drug Use Unknown         Family History:     The patient's family history is notable for     Family History   Problem Relation Age of Onset    Hypertension Mother     Diabetes Mother     Thyroid Disease Mother     Hypertension Father     Alcohol/Drug Father         alcohol    Substance Abuse Father     Crohn's Disease Son     Hypertension Other     Cancer No family hx of     Cerebrovascular Disease No family hx of     Glaucoma No family hx of     Macular Degeneration No family hx of          Physical Exam:     /84 (BP Location: Right arm)   Pulse 77   Resp 16   Ht 1.626 m (5' 4\")   Wt 96.3 kg (212 lb 3.2 oz)   LMP 2015 (Approximate)   SpO2 98%   BMI 36.42 kg/m    Body mass index is 36.42 kg/m .    General Appearance: healthy and alert, no distress     HEENT: no thyromegaly, no palpable nodules or masses        Cardiovascular: regular rate and rhythm, no " gallops, rubs or murmurs     Respiratory: lungs clear, no rales, rhonchi or wheezes, normal diaphragmatic excursion    Musculoskeletal: extremities non tender and without edema    Skin: no lesions or rashes     Neurological: normal gait, no gross defects     Psychiatric: appropriate mood and affect                               Hematological: normal cervical, supraclavicular and inguinal lymph nodes     Gastrointestinal:       abdomen soft, non-tender, non-distended, no organomegaly or masses    Genitourinary: External genitalia and urethral meatus appears normal.  Vagina is smooth without nodularity or masses.  Cervix surgically absent  Bimanual exam reveal no masses, nodularity or fullness.  Recto-vaginal exam confirms these findings.      Assessment:    Mary Sims is a 58 year old woman with a history of stage IA grade 1 endometrioid endometrial adenocarcinoma   She is here today for a surveillance visit.    20 minutes spent on the date of the encounter doing chart review, history and exam, documentation and further activities as noted above      Plan:     1.)      Continue with annual pelvic/rectal exam. Reviewed recommendations from [facility] not to perform surveillance pap smears in women diagnosed with endometrial cancer as this does not improve detection of local recurrence. Reviewed signs and symptoms for when she should contact the clinic or seek additional care.  Patient to contact the clinic with any questions or concerns in the interim. Answered all of her questions to the best of my ability.    2.) Genetic risk factors were assessed and her MMR proteins were intact. Reviewed what MMR proteins are and that she does not have Watts syndrome.     3.) Labs and/or tests ordered include:  none.     4.) Health maintenance issues addressed today include annual health maintenance and non-gynecologic issues with PCP. Encouraged her to discuss Cologuard with her PCP given no family history of colon cancer and no  polyps at last colonoscopy.     PABLO White, WHNP-BC, ANP-BC, AOCNP  Women's Health Nurse Practitioner  Adult Nurse Practitioner  Division of Gynecologic Oncology        CC  Patient Care Team:  Janie Riley APRN CNP as PCP - General (Nurse Practitioner - Family)  Stan Weber MD as Referring Physician (Internal Medicine)  Janie Riley APRN CNP as Assigned PCP  Wilber Rice OD as Assigned Surgical Provider  Hitesh Martell MD as Assigned Musculoskeletal Provider  Grisel Salazar APRN CNP as Assigned Cancer Care Provider

## 2024-04-03 ENCOUNTER — ONCOLOGY VISIT (OUTPATIENT)
Dept: ONCOLOGY | Facility: CLINIC | Age: 59
End: 2024-04-03
Attending: OBSTETRICS & GYNECOLOGY
Payer: COMMERCIAL

## 2024-04-03 VITALS
HEIGHT: 64 IN | WEIGHT: 212.2 LBS | BODY MASS INDEX: 36.23 KG/M2 | DIASTOLIC BLOOD PRESSURE: 84 MMHG | RESPIRATION RATE: 16 BRPM | HEART RATE: 77 BPM | OXYGEN SATURATION: 98 % | SYSTOLIC BLOOD PRESSURE: 118 MMHG

## 2024-04-03 DIAGNOSIS — C54.1 ENDOMETRIAL CANCER (H): Primary | ICD-10-CM

## 2024-04-03 PROCEDURE — 99214 OFFICE O/P EST MOD 30 MIN: CPT | Performed by: NURSE PRACTITIONER

## 2024-04-03 PROCEDURE — 99213 OFFICE O/P EST LOW 20 MIN: CPT | Performed by: NURSE PRACTITIONER

## 2024-04-03 ASSESSMENT — PAIN SCALES - GENERAL: PAINLEVEL: NO PAIN (0)

## 2024-04-03 NOTE — LETTER
4/3/2024         RE: Mary Sims   [place] [address]        Dear Colleague,    Thank you for referring your patient, Mary Sims, to the [facility]. Please see a copy of my visit note below.                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     Oncology history:  Initially, she was seen for vaginal spotting.     2020: Pelvic US  IMPRESSION: Thickened endometrium up to 24 mm in transvaginal  measurement for a postmenopausal patient comment given the reported  history of postmenopausal bleeding, this does raise concern for  neoplasia/hyperplasia and tissue sampling is recommended. Small amount  of free fluid in the posterior cul-de-sac and left adnexa.  Nonvisualization of left ovary. The visualized right ovary appears  Normal.     21: EMB  - At least complex atypical hyperplasia with foci bordering on FIGO grade 1 endometrioid carcinoma     21: Robotic-assisted total laparoscopic hysterectomy, bilateral salpingo-oopherectomy, sentinel lymph node mapping and sampling, cystoscopy  A. UTERUS, CERVIX, BILATERAL TUBES AND OVARIES, HYSTERECTOMY AND BILATERAL    SALPINGO-OOPHORECTOMY:   - Endometrioid endometrial adenocarcinoma, FIGO grade 1, MMR proficient   - Unremarkable ovaries with follicular cysts   - Cervix with reactive changes and nabothian cysts   - Unremarkable fallopian tubes   B. SENTINEL LYMPH NODE, RIGHT EXTERNAL ILIAC, LYMPHADENECTOMY:   - One reactive lymph node, negative for malignancy (0/1)   C. SENTINEL LYMPH NODE, LEFT PELVIC, LYMPHADENECTOMY  - One reactive lymph node, negative for malignancy (0/1)     22: CT CAP IMPRESSION:  1.  No evidence of recurrent or metastatic disease in the  abdomen or pelvis.   2.  Multiple hepatic cysts are stable to slightly enlarged.  3.  Finding suggestive of mild colitis. Mild pelvic floor descent with large rectal stool burden.  4.  Small hiatal hernia.          Today she comes to clinic and denies any concerns for her visit. She denies any vaginal bleeding, no changes in her bowel or bladder habits, no nausea/emesis, no lower extremity edema, and no difficulties eating or sleeping. She denies any abdominal discomfort/bloating, no fevers or chills, and no chest pain or shortness of breath. She is aware her colon cancer screening is due. She is not working now. She has 4 weddings and a cruise coming up.      Annual exam with PCP:  Mammogram:  Colonoscopy:;   DEXA:       Review of Systems  See HPI      Past Medical History:    Past Medical History:   Diagnosis Date     Arthritis of foot, right 2019     Endometrial cancer (H) 2021     Graves disease      History of blood transfusion      Hypertension      Hypothyroidism      MEDICAL HISTORY OF -     S/P RADIOACTIVE IODINE     Morbid obesity (H) 2020     Osteopenia of multiple sites 2020         Past Surgical History:    Past Surgical History:   Procedure Laterality Date     ABDOMEN SURGERY  1991         ARTHRODESIS FOOT Right 2020    Procedure: MIDFOOT BONE SPUR RESECTION WITH JOINT FUSIONS RIGHT LOWER EXTREMITY;  Surgeon: Choco Frances DPM;  Location:  OR     ARTHROSCOPY SHLDR ROTATOR CUFF REPAIR, SUBACROMIAL DECOMP, DIST CLAVICLE RESECTION, BICEP TENODESIS Left 2017    Procedure: ARTHROSCOPY SHOULDER ROTATOR CUFF REPAIR, SUBACROMIAL DECOMPRESSION, DISTAL CLAVICLE RESECTION, OPEN BICEP TENODESIS REPAIR;  Surgeon: Dorina Rodriguez MD;  [facility]     BIOPSY  2021     COLONOSCOPY  2013    Procedure: COLONOSCOPY;  colonoscopy, screening;  Surgeon: Dalton Lala MD;  Location: [facility] OR     CYSTOSCOPY        CYSTOSCOPY N/A 2021    Procedure: Cystoscopy;  Surgeon: Zacarias Brandon MD;  Location: [facility] OR     DAVINCI HYSTERECTOMY TOTAL, BILATERAL SALPINGO-OOPHORECTOMY, COMBINED Bilateral 2021    Procedure: HYSTERECTOMY, TOTAL, ROBOT-ASSISTED, LAPAROSCOPIC, WITH SALPINGO-OOPHORECTOMY, PELVIC WASHINGS, SENTINEL LYMPHNODE SAMPLING;  Surgeon: Zacarias Brandon MD;  Location: [facility] OR     SOFT TISSUE SURGERY  2017    Shoulder     SURGICAL HISTORY OF -       RT ANKLE Fx AND REPAIR (PIN,PLATE,SCREWS)     THYROIDECTOMY       TUBAL LIGATION       ZZC  DELIVERY ONLY         Current Medications:     Current Outpatient Medications   Medication Sig Dispense Refill     calcium carbonate-vitamin D (OSCAL) 500-5 MG-MCG tablet Take 1 tablet by mouth 2 times daily (with meals) 180 tablet 3     levothyroxine (SYNTHROID/LEVOTHROID) 75 MCG tablet TAKE 1 TABLET DAILY. SEPARATE IRON OR CALCIUM CONTAINING PRODUCTS BY AT LEAST 4 HOURS FROM THIS MEDICATION. 90 tablet 1     triamcinolone (KENALOG) 0.1 % external cream Apply topically 2 times daily 15 g 1         Allergies:      No Known Allergies     Social History:     Social History     Tobacco Use     Smoking status: Never     Passive exposure: Never     Smokeless tobacco: Never   Substance Use Topics     Alcohol use: Not Currently     Comment: SELDOM        History   Drug Use Unknown         Family History:     The patient's family history is notable for     Family History   Problem Relation Age of Onset     Hypertension Mother      Diabetes Mother      Thyroid Disease Mother      Hypertension Father      Alcohol/Drug Father         alcohol     Substance Abuse Father      Crohn's Disease Son      Hypertension Other      Cancer No family hx of      Cerebrovascular Disease No family hx of      Glaucoma No family hx of      Macular Degeneration No family hx of          Physical Exam:     /84 (BP Location: Right arm)   Pulse 77   Resp 16   Ht 1.626 m  "(5' 4\")   Wt 96.3 kg (212 lb 3.2 oz)   LMP 08/04/2015 (Approximate)   SpO2 98%   BMI 36.42 kg/m    Body mass index is 36.42 kg/m .    General Appearance: healthy and alert, no distress     HEENT: no thyromegaly, no palpable nodules or masses        Cardiovascular: regular rate and rhythm, no gallops, rubs or murmurs     Respiratory: lungs clear, no rales, rhonchi or wheezes, normal diaphragmatic excursion    Musculoskeletal: extremities non tender and without edema    Skin: no lesions or rashes     Neurological: normal gait, no gross defects     Psychiatric: appropriate mood and affect                               Hematological: normal cervical, supraclavicular and inguinal lymph nodes     Gastrointestinal:       abdomen soft, non-tender, non-distended, no organomegaly or masses    Genitourinary: External genitalia and urethral meatus appears normal.  Vagina is smooth without nodularity or masses.  Cervix surgically absent  Bimanual exam reveal no masses, nodularity or fullness.  Recto-vaginal exam confirms these findings.      Assessment:    Mary Sims is a 58 year old woman with a history of stage IA grade 1 endometrioid endometrial adenocarcinoma   She is here today for a surveillance visit.    20 minutes spent on the date of the encounter doing chart review, history and exam, documentation and further activities as noted above      Plan:     1.)      Continue with annual pelvic/rectal exam. Reviewed recommendations from [facility] not to perform surveillance pap smears in women diagnosed with endometrial cancer as this does not improve detection of local recurrence. Reviewed signs and symptoms for when she should contact the clinic or seek additional care.  Patient to contact the clinic with any questions or concerns in the interim. Answered all of her questions to the best of my ability.    2.) Genetic risk factors were assessed and her MMR proteins were intact. Reviewed what MMR proteins are and that she does " not have Watts syndrome.     3.) Labs and/or tests ordered include:  none.     4.) Health maintenance issues addressed today include annual health maintenance and non-gynecologic issues with PCP. Encouraged her to discuss Cologuard with her PCP given no family history of colon cancer and no polyps at last colonoscopy.     PABLO White, WHNP-BC, ANP-BC, AOCNP  Women's Health Nurse Practitioner  Adult Nurse Practitioner  Division of Gynecologic Oncology        CC  Patient Care Team:  Janie Riley APRN CNP as PCP - General (Nurse Practitioner - Family)  Stan Weber MD as Referring Physician (Internal Medicine)  Janie Riley APRN CNP as Assigned PCP  Wilber Rice OD as Assigned Surgical Provider  Hitesh Martell MD as Assigned Musculoskeletal Provider  Grisel Salazar APRN CNP as Assigned Cancer Care Provider      Again, thank you for allowing me to participate in the care of your patient.        Sincerely,        PABLO Golden CNP

## 2024-04-03 NOTE — NURSING NOTE
"Oncology Rooming Note    April 3, 2024 10:37 AM   Mary Sims is a 58 year old female who presents for:    Chief Complaint   Patient presents with    Oncology Clinic Visit     Follow up     Initial Vitals: /84 (BP Location: Right arm)   Pulse 77   Resp 16   Ht 1.626 m (5' 4\")   Wt 96.3 kg (212 lb 3.2 oz)   LMP 08/04/2015 (Approximate)   SpO2 98%   BMI 36.42 kg/m   Estimated body mass index is 36.42 kg/m  as calculated from the following:    Height as of this encounter: 1.626 m (5' 4\").    Weight as of this encounter: 96.3 kg (212 lb 3.2 oz). Body surface area is 2.09 meters squared.  No Pain (0) Comment: Data Unavailable   Patient's last menstrual period was 08/04/2015 (approximate).  Allergies reviewed: Yes  Medications reviewed: Yes    Medications: Medication refills not needed today.  Pharmacy name entered into [employer]: [facility] - [facility] [place] [address]    Frailty Screening:   Is the patient here for a new oncology consult visit in cancer care? 2. No      Clinical concerns: No Concerns        Joaquin Contreras MA            "

## 2024-06-18 ENCOUNTER — ANCILLARY PROCEDURE (OUTPATIENT)
Dept: MAMMOGRAPHY | Facility: CLINIC | Age: 59
End: 2024-06-18
Attending: NURSE PRACTITIONER
Payer: COMMERCIAL

## 2024-06-18 DIAGNOSIS — Z12.31 VISIT FOR SCREENING MAMMOGRAM: ICD-10-CM

## 2024-06-18 PROCEDURE — 77067 SCR MAMMO BI INCL CAD: CPT | Mod: TC | Performed by: RADIOLOGY

## 2024-06-18 PROCEDURE — 77063 BREAST TOMOSYNTHESIS BI: CPT | Mod: TC | Performed by: RADIOLOGY

## 2024-06-24 DIAGNOSIS — E03.9 HYPOTHYROIDISM, UNSPECIFIED TYPE: ICD-10-CM

## 2024-06-24 RX ORDER — LEVOTHYROXINE SODIUM 75 UG/1
TABLET ORAL
Qty: 90 TABLET | Refills: 0 | Status: SHIPPED | OUTPATIENT
Start: 2024-06-24 | End: 2024-07-15

## 2024-06-26 SDOH — HEALTH STABILITY: PHYSICAL HEALTH: ON AVERAGE, HOW MANY DAYS PER WEEK DO YOU ENGAGE IN MODERATE TO STRENUOUS EXERCISE (LIKE A BRISK WALK)?: 3 DAYS

## 2024-06-26 SDOH — HEALTH STABILITY: PHYSICAL HEALTH: ON AVERAGE, HOW MANY MINUTES DO YOU ENGAGE IN EXERCISE AT THIS LEVEL?: 30 MIN

## 2024-06-26 ASSESSMENT — SOCIAL DETERMINANTS OF HEALTH (SDOH): HOW OFTEN DO YOU GET TOGETHER WITH FRIENDS OR RELATIVES?: THREE TIMES A WEEK

## 2024-07-01 ENCOUNTER — OFFICE VISIT (OUTPATIENT)
Dept: FAMILY MEDICINE | Facility: CLINIC | Age: 59
End: 2024-07-01
Attending: NURSE PRACTITIONER
Payer: COMMERCIAL

## 2024-07-01 VITALS
DIASTOLIC BLOOD PRESSURE: 84 MMHG | HEIGHT: 63 IN | HEART RATE: 76 BPM | RESPIRATION RATE: 20 BRPM | TEMPERATURE: 98.6 F | BODY MASS INDEX: 37.88 KG/M2 | WEIGHT: 213.8 LBS | SYSTOLIC BLOOD PRESSURE: 136 MMHG | OXYGEN SATURATION: 98 %

## 2024-07-01 DIAGNOSIS — I10 HYPERTENSION GOAL BP (BLOOD PRESSURE) < 140/90: ICD-10-CM

## 2024-07-01 DIAGNOSIS — M85.89 OSTEOPENIA OF MULTIPLE SITES: ICD-10-CM

## 2024-07-01 DIAGNOSIS — E03.9 ACQUIRED HYPOTHYROIDISM: ICD-10-CM

## 2024-07-01 DIAGNOSIS — Z00.00 ROUTINE HISTORY AND PHYSICAL EXAMINATION OF ADULT: Primary | ICD-10-CM

## 2024-07-01 DIAGNOSIS — Z12.11 SCREEN FOR COLON CANCER: ICD-10-CM

## 2024-07-01 DIAGNOSIS — C54.1 ENDOMETRIAL CANCER (H): ICD-10-CM

## 2024-07-01 DIAGNOSIS — E66.01 MORBID OBESITY (H): ICD-10-CM

## 2024-07-01 DIAGNOSIS — D50.9 IRON DEFICIENCY ANEMIA, UNSPECIFIED IRON DEFICIENCY ANEMIA TYPE: ICD-10-CM

## 2024-07-01 LAB — HGB BLD-MCNC: 13.5 G/DL (ref 11.7–15.7)

## 2024-07-01 PROCEDURE — 84443 ASSAY THYROID STIM HORMONE: CPT | Performed by: NURSE PRACTITIONER

## 2024-07-01 PROCEDURE — 85018 HEMOGLOBIN: CPT | Performed by: NURSE PRACTITIONER

## 2024-07-01 PROCEDURE — 82570 ASSAY OF URINE CREATININE: CPT | Performed by: NURSE PRACTITIONER

## 2024-07-01 PROCEDURE — 99214 OFFICE O/P EST MOD 30 MIN: CPT | Mod: 25 | Performed by: NURSE PRACTITIONER

## 2024-07-01 PROCEDURE — 36415 COLL VENOUS BLD VENIPUNCTURE: CPT | Performed by: NURSE PRACTITIONER

## 2024-07-01 PROCEDURE — 90471 IMMUNIZATION ADMIN: CPT | Performed by: NURSE PRACTITIONER

## 2024-07-01 PROCEDURE — 82043 UR ALBUMIN QUANTITATIVE: CPT | Performed by: NURSE PRACTITIONER

## 2024-07-01 PROCEDURE — 90678 RSV VACC PREF BIVALENT IM: CPT | Performed by: NURSE PRACTITIONER

## 2024-07-01 PROCEDURE — 90472 IMMUNIZATION ADMIN EACH ADD: CPT | Performed by: NURSE PRACTITIONER

## 2024-07-01 PROCEDURE — 80048 BASIC METABOLIC PNL TOTAL CA: CPT | Performed by: NURSE PRACTITIONER

## 2024-07-01 PROCEDURE — 99396 PREV VISIT EST AGE 40-64: CPT | Mod: 25 | Performed by: NURSE PRACTITIONER

## 2024-07-01 PROCEDURE — 90715 TDAP VACCINE 7 YRS/> IM: CPT | Performed by: NURSE PRACTITIONER

## 2024-07-01 ASSESSMENT — PAIN SCALES - GENERAL: PAINLEVEL: NO PAIN (0)

## 2024-07-01 NOTE — PROGRESS NOTES
"Preventive Care Visit  [facility]  PABLO Glez CNP, Family Medicine  Jul 1, 2024      Assessment & Plan     Routine history and physical examination of adult  Age appropriate preventive screenings,health maintenance issues discussed.    - Adult Eye  Referral    Endometrial cancer (H)  S/p Dvainci hysterectomy, BSO 2/9/21, followed by Oncology    Morbid obesity (H)  Benefits of weight loss reviewed in detail, encouraged her to cut back on the carbohydrates in the diet, consume more fruits and vegetables, drink plenty of water, avoid fruit juices, sodas, get 150 min moderate exercise/week.  Recheck weight in 6 months.      Hypertension goal BP (blood pressure) < 140/90  Controlled, not on antihypertensive, reviewed DASH diet, benefits of weight loss, regular exercise  - BASIC METABOLIC PANEL  - Albumin Random Urine Quantitative with Creat Ratio    Acquired hypothyroidism  Adjust synthroid as indicated, stable  - TSH WITH FREE T4 REFLEX    Iron deficiency anemia, unspecified iron deficiency anemia type  Checking Hgb.    Screen for colon cancer    - Colonoscopy Screening  Referral    Osteopenia of multiple sites    - calcium carbonate-vitamin D (OSCAL) 500-5 MG-MCG tablet  Dispense: 180 tablet; Refill: 3      Patient has been advised of split billing requirements and indicates understanding: Yes        BMI  Estimated body mass index is 37.41 kg/m  as calculated from the following:    Height as of this encounter: 1.61 m (5' 3.39\").    Weight as of this encounter: 97 kg (213 lb 12.8 oz).   Weight management plan: Discussed healthy diet and exercise guidelines    Counseling  Appropriate preventive services were discussed with this patient, including applicable screening as appropriate for fall prevention, nutrition, physical activity, Tobacco-use cessation, weight loss and cognition.  Checklist reviewing preventive services available has been given to the " patient.  Reviewed patient's diet, addressing concerns and/or questions.   She is at risk for lack of exercise and has been provided with information to increase physical activity for the benefit of her well-being.   The patient was instructed to see the dentist every 6 months.   She is at risk for psychosocial distress and has been provided with information to reduce risk.       Work on weight loss  Regular exercise  See Patient Instructions    Subjective   Mary is a 59 year old, presenting for the following:  Physical        7/1/2024     2:06 PM   Additional Questions   Roomed by stoney   Accompanied by self         7/1/2024     2:06 PM   Patient Reported Additional Medications   Patient reports taking the following new medications none        Health Care Directive  Patient has a Health Care Directive on file  Advance care planning document is on file but is outdated.  Patient encouraged to updated.    HPI      Hyperlipidemia Follow-Up    Are you regularly taking any medication or supplement to lower your cholesterol?   No  Are you having muscle aches or other side effects that you think could be caused by your cholesterol lowering medication?      Hypertension Follow-up    Do you check your blood pressure regularly outside of the clinic? Yes   Are you following a low salt diet? Yes  Are your blood pressures ever more than 140 on the top number (systolic) OR more   than 90 on the bottom number (diastolic), for example 140/90? No    Hypothyroidism Follow-up    Since last visit, patient describes the following symptoms: Weight stable, no hair loss, no skin changes, no constipation, no loose stools        6/26/2024   General Health   How would you rate your overall physical health? Good   Feel stress (tense, anxious, or unable to sleep) Only a little      (!) STRESS CONCERN      6/26/2024   Nutrition   Three or more servings of calcium each day? (!) NO   Diet: Regular (no restrictions)   How many servings of fruit  and vegetables per day? (!) 0-1   How many sweetened beverages each day? 0-1            6/26/2024   Exercise   Days per week of moderate/strenous exercise 3 days   Average minutes spent exercising at this level 30 min            6/26/2024   Social Factors   Frequency of gathering with friends or relatives Three times a week   Worry food won't last until get money to buy more No   Food not last or not have enough money for food? No   Do you have housing? (Housing is defined as stable permanent housing and does not include staying ouside in a car, in a tent, in an abandoned building, in an overnight shelter, or couch-surfing.) Yes   Are you worried about losing your housing? No   Lack of transportation? No   Unable to get utilities (heat,electricity)? No            6/26/2024   Fall Risk   Fallen 2 or more times in the past year? No   Trouble with walking or balance? No             6/26/2024   Dental   Dentist two times every year? (!) NO            6/26/2024   TB Screening   Were you born outside of the [place]? No            Today's PHQ-2 Score:       6/30/2024     2:38 PM   PHQ-2 ( 1999 [employer])   Q1: Little interest or pleasure in doing things 0   Q2: Feeling down, depressed or hopeless 0   PHQ-2 Score 0   Q1: Little interest or pleasure in doing things Not at all   Q2: Feeling down, depressed or hopeless Not at all   PHQ-2 Score 0           6/26/2024   Substance Use   Alcohol more than 3/day or more than 7/wk No   Do you use any other substances recreationally? No        Social History     Tobacco Use    Smoking status: Never     Passive exposure: Never    Smokeless tobacco: Never   Vaping Use    Vaping status: Never Used   Substance Use Topics    Alcohol use: Not Currently     Comment: SELDOM     Drug use: Never           6/18/2024   LAST FHS-7 RESULTS   1st degree relative breast or ovarian cancer No   Any relative bilateral breast cancer No   Any male have breast cancer No   Any ONE woman have BOTH breast AND ovarian  cancer No   Any woman with breast cancer before 50yrs No   2 or more relatives with breast AND/OR ovarian cancer No   2 or more relatives with breast AND/OR bowel cancer No          Mammogram Screening - Mammogram every 1-2 years updated in Health Maintenance based on mutual decision making        2024   STI Screening   New sexual partner(s) since last STI/HIV test? No        History of abnormal Pap smear: Status post hysterectomy with removal of cervix and no history of CIN2 or greater or cervical cancer. Health Maintenance and Surgical History updated.        Latest Ref Rng & Units 2020     2:44 PM 2020     1:40 PM 5/15/2017     9:28 AM   PAP / HPV   PAP (Historical)   NIL     HPV 16 DNA NEG^Negative Negative   Negative    HPV 18 DNA NEG^Negative Negative   Negative    Other HR HPV NEG^Negative Negative   Negative      ASCVD Risk   The 10-year ASCVD risk score (Tatiana GARCIA, et al., 2019) is: 3.6%    Values used to calculate the score:      Age: 59 years      Sex: Female      Is Non- : No      Diabetic: No      Tobacco smoker: No      Systolic Blood Pressure: 136 mmHg      Is BP treated: No      HDL Cholesterol: 52 mg/dL      Total Cholesterol: 161 mg/dL    Fracture Risk Assessment Tool  Link to Frax Calculator  Use the information below to complete the Frax calculator  : 1965  Sex: female  Weight (kg): 97 kg (actual weight)  Height (cm): 161 cm  Previous Fragility Fracture:  No  History of parent with fractured hip:  No  Current Smoking:  No  Patient has been on glucocorticoids for more than 3 months (5mg/day or more): No  Rheumatoid Arthritis on Problem List:  No  Secondary Osteoporosis on Problem List:  No  Consumes 3 or more units of alcohol per day: No  Femoral Neck BMD (g/cm2)         2024   FRAX Calculated Score- 10yr Fracture Probability (%)   Major Osteoporotic- without BMD 5.7 %   Hip Fracture- without BMD 0.4 %             Reviewed and updated as  needed this visit by Provider      Problems               Past Medical History:   Diagnosis Date    Arthritis of foot, right 2019    Endometrial cancer (H) 2021    Graves disease     History of blood transfusion     Hypertension     Hypothyroidism     MEDICAL HISTORY OF -     S/P RADIOACTIVE IODINE    Morbid obesity (H) 2020    Osteopenia of multiple sites 2020     Past Surgical History:   Procedure Laterality Date    ABDOMEN SURGERY  1991        ARTHRODESIS FOOT Right 2020    Procedure: MIDFOOT BONE SPUR RESECTION WITH JOINT FUSIONS RIGHT LOWER EXTREMITY;  Surgeon: Choco Frances DPM;  Location: [facility] OR    ARTHROSCOPY SHLDR ROTATOR CUFF REPAIR, SUBACROMIAL DECOMP, DIST CLAVICLE RESECTION, BICEP TENODESIS Left 2017    Procedure: ARTHROSCOPY SHOULDER ROTATOR CUFF REPAIR, SUBACROMIAL DECOMPRESSION, DISTAL CLAVICLE RESECTION, OPEN BICEP TENODESIS REPAIR;  Surgeon: Dorina Rodriguez MD;  Location: [facility] OR    BIOPSY  2021    COLONOSCOPY  2013    Procedure: COLONOSCOPY;  colonoscopy, screening;  Surgeon: Dalton Lala MD;  Location: [facility] OR    CYSTOSCOPY      CYSTOSCOPY N/A 2021    Procedure: Cystoscopy;  Surgeon: Zacarias Brandon MD;  Location[facility]    DAVINCI HYSTERECTOMY TOTAL, BILATERAL SALPINGO-OOPHORECTOMY, COMBINED Bilateral 2021    Procedure: HYSTERECTOMY, TOTAL, ROBOT-ASSISTED, LAPAROSCOPIC, WITH SALPINGO-OOPHORECTOMY, PELVIC WASHINGS, SENTINEL LYMPHNODE SAMPLING;  Surgeon: Zacarias Brandon MD;  Location: [facility] OR    SOFT TISSUE SURGERY  2017    Shoulder    SURGICAL HISTORY OF -       RT ANKLE Fx AND REPAIR (PIN,PLATE,SCREWS)    THYROIDECTOMY      TUBAL LIGATION      ZZC  DELIVERY ONLY           Review of Systems  Constitutional, HEENT, cardiovascular, pulmonary, gi and gu systems are negative, except as otherwise noted.     Objective    Exam  /84 (BP Location: Left arm, Patient  "Position: Sitting, Cuff Size: Adult Regular)   Pulse 76   Temp 98.6  F (37  C) (Temporal)   Resp 20   Ht 1.61 m (5' 3.39\")   Wt 97 kg (213 lb 12.8 oz)   LMP 08/04/2015 (Approximate)   SpO2 98%   BMI 37.41 kg/m     Estimated body mass index is 37.41 kg/m  as calculated from the following:    Height as of this encounter: 1.61 m (5' 3.39\").    Weight as of this encounter: 97 kg (213 lb 12.8 oz).    Physical Exam  GENERAL: alert and no distress  EYES: Eyes grossly normal to inspection, PERRL and conjunctivae and sclerae normal  HENT: ear canals and TM's normal, nose and mouth without ulcers or lesions  NECK: no adenopathy, no asymmetry, masses, or scars  RESP: lungs clear to auscultation - no rales, rhonchi or wheezes  CV: regular rate and rhythm, normal S1 S2, no S3 or S4, no murmur, click or rub, no peripheral edema  ABDOMEN: soft, nontender, no hepatosplenomegaly, no masses and bowel sounds normal  MS: no gross musculoskeletal defects noted, no edema  SKIN: no suspicious lesions or rashes  NEURO: Normal strength and tone, mentation intact and speech normal  PSYCH: mentation appears normal, affect normal/bright  LYMPH: normal ant/post cervical, supraclavicular nodes  inguinal: no adenopathy        Signed Electronically by: PABLO Glez CNP    "

## 2024-07-02 LAB
ANION GAP SERPL CALCULATED.3IONS-SCNC: 10 MMOL/L (ref 7–15)
BUN SERPL-MCNC: 16.1 MG/DL (ref 8–23)
CALCIUM SERPL-MCNC: 9.8 MG/DL (ref 8.8–10.2)
CHLORIDE SERPL-SCNC: 104 MMOL/L (ref 98–107)
CREAT SERPL-MCNC: 1.05 MG/DL (ref 0.51–0.95)
CREAT UR-MCNC: 147 MG/DL
DEPRECATED HCO3 PLAS-SCNC: 27 MMOL/L (ref 22–29)
EGFRCR SERPLBLD CKD-EPI 2021: 60 ML/MIN/1.73M2
GLUCOSE SERPL-MCNC: 96 MG/DL (ref 70–99)
MICROALBUMIN UR-MCNC: <12 MG/L
MICROALBUMIN/CREAT UR: NORMAL MG/G{CREAT}
POTASSIUM SERPL-SCNC: 4.1 MMOL/L (ref 3.4–5.3)
SODIUM SERPL-SCNC: 141 MMOL/L (ref 135–145)
TSH SERPL DL<=0.005 MIU/L-ACNC: 0.73 UIU/ML (ref 0.3–4.2)

## 2024-07-09 ENCOUNTER — OFFICE VISIT (OUTPATIENT)
Dept: OPTOMETRY | Facility: CLINIC | Age: 59
End: 2024-07-09
Payer: COMMERCIAL

## 2024-07-09 ENCOUNTER — TELEPHONE (OUTPATIENT)
Dept: GASTROENTEROLOGY | Facility: CLINIC | Age: 59
End: 2024-07-09

## 2024-07-09 DIAGNOSIS — Z01.00 EXAMINATION OF EYES AND VISION: Primary | ICD-10-CM

## 2024-07-09 DIAGNOSIS — H52.13 MYOPIA OF BOTH EYES WITH ASTIGMATISM: ICD-10-CM

## 2024-07-09 DIAGNOSIS — H52.203 MYOPIA OF BOTH EYES WITH ASTIGMATISM: ICD-10-CM

## 2024-07-09 DIAGNOSIS — H52.4 PRESBYOPIA: ICD-10-CM

## 2024-07-09 DIAGNOSIS — Z00.00 ROUTINE HISTORY AND PHYSICAL EXAMINATION OF ADULT: ICD-10-CM

## 2024-07-09 PROCEDURE — 92310 CONTACT LENS FITTING OU: CPT | Mod: GA | Performed by: OPTOMETRIST

## 2024-07-09 PROCEDURE — 92015 DETERMINE REFRACTIVE STATE: CPT | Performed by: OPTOMETRIST

## 2024-07-09 PROCEDURE — 92014 COMPRE OPH EXAM EST PT 1/>: CPT | Performed by: OPTOMETRIST

## 2024-07-09 ASSESSMENT — CONF VISUAL FIELD
OS_SUPERIOR_NASAL_RESTRICTION: 0
OS_INFERIOR_NASAL_RESTRICTION: 0
OD_INFERIOR_TEMPORAL_RESTRICTION: 0
OS_SUPERIOR_TEMPORAL_RESTRICTION: 0
OD_SUPERIOR_TEMPORAL_RESTRICTION: 0
OD_NORMAL: 1
OS_NORMAL: 1
OD_INFERIOR_NASAL_RESTRICTION: 0
OS_INFERIOR_TEMPORAL_RESTRICTION: 0
OD_SUPERIOR_NASAL_RESTRICTION: 0

## 2024-07-09 ASSESSMENT — REFRACTION_MANIFEST
OS_SPHERE: -11.25
OS_AXIS: 093
OD_CYLINDER: +2.50
OS_ADD: +2.50
OD_SPHERE: -11.50
OD_ADD: +2.50
OD_AXIS: 083
OS_CYLINDER: +2.00

## 2024-07-09 ASSESSMENT — CUP TO DISC RATIO
OD_RATIO: 0.2
OS_RATIO: 0.2

## 2024-07-09 ASSESSMENT — EXTERNAL EXAM - RIGHT EYE: OD_EXAM: NORMAL

## 2024-07-09 ASSESSMENT — REFRACTION_CURRENTRX
OD_SPHERE: -8.00
OS_SPHERE: -8.00
OD_CYLINDER: -2.25
OS_BRAND: ALCON AIR OPTIX FOR ASTIGMATISM BC 8.7, D 14.5
OS_CYLINDER: -1.75
OD_AXIS: 180
OD_BRAND: ALCON AIR OPTIX FOR ASTIGMATISM BC 8.7, D 14.5
OS_AXIS: 180

## 2024-07-09 ASSESSMENT — TONOMETRY
OD_IOP_MMHG: 14
IOP_METHOD: TONOPEN
OS_IOP_MMHG: 13

## 2024-07-09 ASSESSMENT — REFRACTION_WEARINGRX
OD_SPHERE: -11.50
OD_AXIS: 083
OS_AXIS: 093
OS_SPHERE: -11.00
OD_CYLINDER: +2.50
OD_ADD: +2.50
OS_ADD: +2.50
OS_CYLINDER: +2.00

## 2024-07-09 ASSESSMENT — VISUAL ACUITY
OD_CC: 20/20
OS_CC: 20/30
CORRECTION_TYPE: CONTACTS
OS_CC: 20/20
METHOD: SNELLEN - LINEAR
OD_CC: 20/70

## 2024-07-09 ASSESSMENT — SLIT LAMP EXAM - LIDS
COMMENTS: MEIBOMIAN GLAND DYSFUNCTION
COMMENTS: MEIBOMIAN GLAND DYSFUNCTION

## 2024-07-09 ASSESSMENT — EXTERNAL EXAM - LEFT EYE: OS_EXAM: NORMAL

## 2024-07-09 NOTE — PROGRESS NOTES
Chief Complaint   Patient presents with    Annual Eye Exam     More contacts fit fee $75        Previous contact lens wearer? Yes: air optix  Comfort of contact lenses :good  Satisfied with current lenses: Yes    Last Eye Exam: 11-  Dilated Previously: Yes    What are you currently using to see?  glasses and contacts    Distance Vision Acuity: Satisfied with vision    Near Vision Acuity: Satisfied with vision while reading  with glasses and contacts    Eye Comfort: good,od eye itchy sometimes and out of focus  Do you use eye drops? : No  Occupation or Hobbies: retired - 41 years at [employer]- 1 granddaughter 3 years old- son getting  this weekend      Sarah Loja Optometric Assistant, A.B.OSachinC.     Medical, surgical and family histories reviewed and updated 7/9/2024.       OBJECTIVE: See Ophthalmology exam    ASSESSMENT:    ICD-10-CM    1. Examination of eyes and vision  Z01.00 EYE EXAM (SIMPLE-NONBILLABLE)      2. Myopia of both eyes with astigmatism  H52.13 REFRACTION    H52.203 CONTACT LENS FITTING,BILAT w/ signed waiver      3. Presbyopia  H52.4 REFRACTION         PLAN:     Patient Instructions   Because you are very nearsighted you are at a slightly higher risk of thin areas in the retina, holes/tears.  The signs of a retinal detachment are flashes of light or a curtain covering the vision.  If you should notice any of these changes let me know right away.  If I am not available you should be seen immediately for an eye evaluation. f I am not available this is an emergency type situation that you would need to be seen. I recommend the [facility] Emergency Room or call [phone].     Eyeglass prescription given.    Contact lens prescription given and form signed.    Recommend using [employer] as explained to clean lenses.    Non preserved artificial tears- 1 drop both eyes 2-4 x day.    Heat to the eyes daily for 10-15 minutes nightly with warm washcloth or reusable gel masks  from the pharmacy or  Sammy heat masks can be purchased at [employer].    Return in 1 year for a complete eye exam or sooner if needed.    Wilber Rice, OD

## 2024-07-09 NOTE — TELEPHONE ENCOUNTER
"Endoscopy Scheduling Screen    Have you had a positive Covid test in the last 14 days?  No    What is your communication preference for Instructions and/or Bowel Prep?   MyChart    What insurance is in the chart?  Other:  [employer]     Ordering/Referring Provider:  PADMINI LEWIS     (If ordering provider performs procedure, schedule with ordering provider unless otherwise instructed. )    BMI: Estimated body mass index is 37.41 kg/m  as calculated from the following:    Height as of 7/1/24: 1.61 m (5' 3.39\").    Weight as of 7/1/24: 97 kg (213 lb 12.8 oz).     Sedation Ordered  moderate sedation.   If patient BMI > 50 do not schedule in [facility].    If patient BMI > 45 do not schedule at [facility].    Are you taking methadone or Suboxone?  No    Have you had difficulties, pain, or discomfort during past endoscopy procedures?  No    Are you taking any prescription medications for pain 3 or more times per week?   NO, No RN review required.    Do you have a history of malignant hyperthermia?  No    (Females) Are you currently pregnant?   No     Have you been diagnosed or told you have pulmonary hypertension?   No    Do you have an LVAD?  No    Have you been told you have moderate to severe sleep apnea?  No    Have you been told you have COPD, asthma, or any other lung disease?  No    Do you have any heart conditions?  No     Have you ever had or are you waiting for an organ transplant?  No. Continue scheduling, no site restrictions.    Have you had a stroke or transient ischemic attack (TIA aka \"mini stroke\" in the last 6 months?   No    Have you been diagnosed with or been told you have cirrhosis of the liver?   No    Are you currently on dialysis?   No    Do you need assistance transferring?   No    BMI: Estimated body mass index is 37.41 kg/m  as calculated from the following:    Height as of 7/1/24: 1.61 m (5' 3.39\").    Weight as of 7/1/24: 97 kg (213 lb 12.8 oz).     Is patients BMI > 40 and scheduling location " [facility]?  No    Do you take an injectable medication for weight loss or diabetes (excluding insulin)?  No    Do you take the medication Naltrexone?  No    Do you take blood thinners?  No       Prep   Are you currently on dialysis or do you have chronic kidney disease?  No    Do you have a diagnosis of diabetes?  No    Do you have a diagnosis of cystic fibrosis (CF)?  No    On a regular basis do you go 3 -5 days between bowel movements?  No    BMI > 40?  No    Preferred Pharmacy:    [id number] - [place], [place] - [address] AT [facility] [address] [[id number]]       Final Scheduling Details     Procedure scheduled  Colonoscopy    Surgeon:  Rodolfo      Date of procedure:  08/01/2024     Pre-OP / PAC:   No - Not required for this site.    Location  [facility] - [facility] - Per order.    Sedation   Moderate Sedation - Per order.      Patient Reminders:   You will receive a call from a Nurse to review instructions and health history.  This assessment must be completed prior to your procedure.  Failure to complete the Nurse assessment may result in the procedure being cancelled.      On the day of your procedure, please designate an adult(s) who can drive you home stay with you for the next 24 hours. The medicines used in the exam will make you sleepy. You will not be able to drive.      You cannot take public transportation, ride share services, or non-medical taxi service without a responsible caregiver.  Medical transport services are allowed with the requirement that a responsible caregiver will receive you at your destination.  We require that drivers and caregivers are confirmed prior to your procedure.

## 2024-07-09 NOTE — LETTER
7/9/2024      Mary Sims  [address]  [facility] [address]      Dear Colleague,    Thank you for referring your patient, Mary Sims, to the [facility]. Please see a copy of my visit note below.    Chief Complaint   Patient presents with     Annual Eye Exam     More contacts fit fee $75        Previous contact lens wearer? Yes: air optix  Comfort of contact lenses :good  Satisfied with current lenses: Yes    Last Eye Exam: 11-  Dilated Previously: Yes    What are you currently using to see?  glasses and contacts    Distance Vision Acuity: Satisfied with vision    Near Vision Acuity: Satisfied with vision while reading  with glasses and contacts    Eye Comfort: good,od eye itchy sometimes and out of focus  Do you use eye drops? : No  Occupation or Hobbies: retired - 41 years at [employer]- 1 granddaughter 3 years old- son getting  this weekend      Sarah Loja Optometric Assistant, A.B.O.C.     Medical, surgical and family histories reviewed and updated 7/9/2024.       OBJECTIVE: See Ophthalmology exam    ASSESSMENT:    ICD-10-CM    1. Examination of eyes and vision  Z01.00 EYE EXAM (SIMPLE-NONBILLABLE)      2. Myopia of both eyes with astigmatism  H52.13 REFRACTION    H52.203 CONTACT LENS FITTING,BILAT w/ signed waiver      3. Presbyopia  H52.4 REFRACTION         PLAN:     Patient Instructions   Because you are very nearsighted you are at a slightly higher risk of thin areas in the retina, holes/tears.  The signs of a retinal detachment are flashes of light or a curtain covering the vision.  If you should notice any of these changes let me know right away.  If I am not available you should be seen immediately for an eye evaluation. f I am not available this is an emergency type situation that you would need to be seen. I recommend the [facility] Emergency Room or call [phone].     Eyeglass prescription given.    Contact lens  prescription given and form signed.    Recommend using Clear Care as explained to clean lenses.    Non preserved artificial tears- 1 drop both eyes 2-4 x day.    Heat to the eyes daily for 10-15 minutes nightly with warm washcloth or reusable gel masks from the pharmacy or  [employer] heat masks can be purchased at [employer].    Return in 1 year for a complete eye exam or sooner if needed.    Wilber Rice, OD                         Again, thank you for allowing me to participate in the care of your patient.        Sincerely,        Wilber Rice, OD

## 2024-07-09 NOTE — PATIENT INSTRUCTIONS
Because you are very nearsighted you are at a slightly higher risk of thin areas in the retina, holes/tears.  The signs of a retinal detachment are flashes of light or a curtain covering the vision.  If you should notice any of these changes let me know right away.  If I am not available you should be seen immediately for an eye evaluation. f I am not available this is an emergency type situation that you would need to be seen. I recommend the [facility] Emergency Room or call [phone].     Eyeglass prescription given.    Contact lens prescription given and form signed.    Recommend using Clear Care as explained to clean lenses.    Non preserved artificial tears- 1 drop both eyes 2-4 x day.    Heat to the eyes daily for 10-15 minutes nightly with warm washcloth or reusable gel masks from the pharmacy or  [employer] heat masks can be purchased at [employer].    Return in 1 year for a complete eye exam or sooner if needed.    Wilber Rice, OD    The affects of the dilating drops last for 4- 6 hours.  You will be more sensitive to light and vision will be blurry up close.  Do not drive if you do not feel comfortable.  Mydriatic sunglasses were given if needed.    There is a combination of three treatments which can greatly improve symptoms of dry eyes.     Artificial tears  Heat (eyes closed)  Eyelid and eyelash cleansing (eyes closed)     Use one drop of artificial tears both eyes 4 x daily.  Once in the morning, lunch, dinner and bedtime. Continue to use the drops regardless if your eyes are comfortable or not.  Artificial tears work best as a preventative and not as well after your eyes are starting to bother you.  It may take 4- 6 weeks of using the drops before you notice improvement.  If after that time you are still having problems schedule an appointment for an evaluation and discussion of different treatments such as Restasis or Xiidra.  Dry eyes are a chronic condition and you may have more  symptoms at certain times of the year.    Excess tearing can be due to the right tears not working properly or a blockage in the tear drainage system.  You can try using artificial tears 1 drop both eyes 4 x day.  If the excess tearing is bothersome after 4-6 weeks of treatment then we can send you for further testing.  This would entail a referral to our oculoplastic specialist Dr. Robert Nina at the [facility]-[phone].    Recommended brands are:    Systane Complete  Systane Ultra  Systane Balance  Refresh Advanced Optive  Refresh Relieva  Blink    Recommended brands for contact lens wearers are:    Systane contacts  Refresh contacts  Blink contacts    If you are using drops more than 4 x day or have sensitivities to preservatives I recommend non preserved artificial tears.  These come in 1 use vials.  They can be used every 1-2 hours.  Do not reuse the vials.    Recommended brands are:    Refresh Optive Rajat-3  Systane- preservative free  Refresh-  preservative free  Blink- preservative free    Gels or ointment can be used at night.    Recommended brands are:    Systane Gel  Refresh Gel  Blink Gel  Genteal Gel    Systane night time (ointment)  Refresh Celluvisc  Refresh PM (ointment)    Optase dry eye spray.  Spray to eyelids 3-4 x daily.  This can be purchased on Amazon.      Visine, Clear Eyes or Murine (drops that get the red out) can irritate the eyes and cause a rebound effect where the eyes become more red and you end up using more drops.  Avoid drops containing tetrahydrozoline, naphazoline, phenylephrine, oxymetazoline.      OTC Lumify is a newer product that gives immediate redness relief without the rebound effect.  Use as needed to take the redness out.    Artificial tears may be used with other drops (such as allergy, glaucoma, antibiotics) around the same time.  Be sure to wait 5 minutes in between drops.    Heat to the eyelids can also improve your symptoms of dry eyes.  Sammy heat  masks can be purchased at [employer] to be used nightly for 10-15 minutes.  Other options are gel masks that can be put in the microwave and purchased at most pharmacies.      Tea Tree Oil eyelid cleansers recommended are Ocusoft Oust foam cleanser to cleanse eyelids/lashes at night and in the am. Other options are Blephadex or Cliradex eyelid wipes.  KEEP EYES CLOSED when using these products.  These can be purchased on amazon.com   A good product for make up remover with tea tree oil is WeLoveEyes.  This can be found at www.[employer] or [employer].    Other good eyelid cleansers have hypochlorous which removes excess bacteria and is safe around the eyes. Products are Avenova, Ocusoft Hypochlor or Heyedrate. Spray solution onto cotton pad, close eyes and gently apply to eyelids and eyelashes using side to side motion.  You can also KEEP EYES CLOSED spray and rub into eyelashes.  You do not need to rinse it off. Use morning and evening. These products can be found on Amazon.  You can check with your local pharmacy and see if they can order if for you if they don't have it.    Other brands of eyelid cleansing wipes are:    Ocusoft wipes  Systane wipes    A great eye make up line is [email or web address]        Optometry Providers       Clinic Locations                                 Telephone Number   Dr. Rosi Flores   [facility] [phone]     [place] [facility] Hours:                [place] [facility] Hours:       [place] [facility] Hours:   [address]   [address]     [address] [facility] [place] [address]   [place] [address]    [place] [address]  Phone: [phone]                    Phone: [phone]     Phone: [phone]                      Monday 8:00-6:00                           Monday 8:00-6:00                          Monday 8:00-6:00              Tuesday 8:00-6:00                          Tuesday 8:00-6:00                          Tuesday 8:00-6:00              Wednesday 8:00-6:00                  Wednesday 8:00-6:00                   Wednesday 8:00-6:00      Thursday 8:00-6:00                        Thursday 8:00-6:00                         Thursday 8:00-6:00            Friday 8:00-5:00                              Friday 8:00-5:00                              Friday 8:00-5:00    [facility] Hours:   [address] [address]  [phone]    Monday 9:00-6:00  Tuesday 9:00-6:00  Wednesday 9:00-6:00  Thursday 9:00-6:00  Friday 9:00-5:00  As always, Thank you for trusting us with your health care needs!

## 2024-07-15 DIAGNOSIS — E03.9 HYPOTHYROIDISM, UNSPECIFIED TYPE: ICD-10-CM

## 2024-07-15 RX ORDER — LEVOTHYROXINE SODIUM 75 UG/1
TABLET ORAL
Qty: 90 TABLET | Refills: 3 | Status: SHIPPED | OUTPATIENT
Start: 2024-07-15

## 2024-08-01 ENCOUNTER — HOSPITAL ENCOUNTER (OUTPATIENT)
Facility: AMBULATORY SURGERY CENTER | Age: 59
Discharge: HOME | End: 2024-08-01
Attending: INTERNAL MEDICINE | Admitting: INTERNAL MEDICINE
Payer: COMMERCIAL

## 2024-08-01 VITALS
OXYGEN SATURATION: 97 % | TEMPERATURE: 97.2 F | WEIGHT: 210 LBS | HEART RATE: 91 BPM | RESPIRATION RATE: 16 BRPM | SYSTOLIC BLOOD PRESSURE: 138 MMHG | BODY MASS INDEX: 36.75 KG/M2 | DIASTOLIC BLOOD PRESSURE: 91 MMHG

## 2024-08-01 LAB — COLONOSCOPY: NORMAL

## 2024-08-01 PROCEDURE — G8918 PT W/O PREOP ORDER IV AB PRO: HCPCS

## 2024-08-01 PROCEDURE — G8907 PT DOC NO EVENTS ON DISCHARG: HCPCS

## 2024-08-01 PROCEDURE — 45378 DIAGNOSTIC COLONOSCOPY: CPT

## 2024-08-01 RX ORDER — ONDANSETRON 4 MG/1
4 TABLET, ORALLY DISINTEGRATING ORAL EVERY 6 HOURS PRN
Status: DISCONTINUED | OUTPATIENT
Start: 2024-08-01 | End: 2024-08-02 | Stop reason: HOSPADM

## 2024-08-01 RX ORDER — NALOXONE HYDROCHLORIDE 0.4 MG/ML
0.4 INJECTION, SOLUTION INTRAMUSCULAR; INTRAVENOUS; SUBCUTANEOUS
Status: DISCONTINUED | OUTPATIENT
Start: 2024-08-01 | End: 2024-08-02 | Stop reason: HOSPADM

## 2024-08-01 RX ORDER — FLUMAZENIL 0.1 MG/ML
0.2 INJECTION, SOLUTION INTRAVENOUS
Status: ACTIVE | OUTPATIENT
Start: 2024-08-01 | End: 2024-08-01

## 2024-08-01 RX ORDER — LIDOCAINE 40 MG/G
CREAM TOPICAL
Status: DISCONTINUED | OUTPATIENT
Start: 2024-08-01 | End: 2024-08-02 | Stop reason: HOSPADM

## 2024-08-01 RX ORDER — ONDANSETRON 2 MG/ML
4 INJECTION INTRAMUSCULAR; INTRAVENOUS
Status: DISCONTINUED | OUTPATIENT
Start: 2024-08-01 | End: 2024-08-02 | Stop reason: HOSPADM

## 2024-08-01 RX ORDER — ONDANSETRON 2 MG/ML
4 INJECTION INTRAMUSCULAR; INTRAVENOUS EVERY 6 HOURS PRN
Status: DISCONTINUED | OUTPATIENT
Start: 2024-08-01 | End: 2024-08-02 | Stop reason: HOSPADM

## 2024-08-01 RX ORDER — NALOXONE HYDROCHLORIDE 0.4 MG/ML
0.2 INJECTION, SOLUTION INTRAMUSCULAR; INTRAVENOUS; SUBCUTANEOUS
Status: DISCONTINUED | OUTPATIENT
Start: 2024-08-01 | End: 2024-08-02 | Stop reason: HOSPADM

## 2024-08-01 RX ORDER — PROCHLORPERAZINE MALEATE 10 MG
10 TABLET ORAL EVERY 6 HOURS PRN
Status: DISCONTINUED | OUTPATIENT
Start: 2024-08-01 | End: 2024-08-02 | Stop reason: HOSPADM

## 2024-08-01 RX ORDER — FENTANYL CITRATE 50 UG/ML
INJECTION, SOLUTION INTRAMUSCULAR; INTRAVENOUS PRN
Status: DISCONTINUED | OUTPATIENT
Start: 2024-08-01 | End: 2024-08-01 | Stop reason: HOSPADM

## 2024-08-01 NOTE — H&P
[facility] Anesthesia Pre-op History and Physical    Mary Sims MRN# [id number]   Age: 59 year old YOB: 1965     Date of Exam 8/1/2024         Primary care provider: Janie Riley         Chief Complaint and/or Reason for Procedure:     CRC screening         Active problem list:     Patient Active Problem List    Diagnosis Date Noted    Pain of right hand 05/10/2022     Priority: Medium    Finger pain, right 05/10/2022     Priority: Medium    Endometrial cancer (H) 01/13/2021     Priority: Medium     Added automatically from request for surgery [id number]      Osteopenia of multiple sites 09/14/2020     Priority: Medium    Morbid obesity (H) 07/08/2020     Priority: Medium    Encounter for routine adult health examination without abnormal findings 07/08/2020     Priority: Medium    Arthritis of foot, right 12/13/2019     Priority: Medium     Added automatically from request for surgery [id number]      Insomnia, unspecified type 04/25/2018     Priority: Medium    De Quervain's disease (tenosynovitis) 04/25/2018     Priority: Medium    Nontraumatic tear of supraspinatus tendon, left 02/18/2016     Priority: Medium    Obesity 02/16/2016     Priority: Medium    WONG (iron deficiency anemia) 05/16/2014     Priority: Medium    Hypothyroidism 09/13/2011     Priority: Medium    Hypertension goal BP (blood pressure) < 140/90 09/13/2011     Priority: Medium    CARDIOVASCULAR SCREENING; LDL GOAL LESS THAN 160 10/31/2010     Priority: Medium            Medications (include herbals and vitamins):   Any Plavix use in the last 7 days? No     Current Outpatient Medications   Medication Sig Dispense Refill    calcium carbonate-vitamin D (OSCAL) 500-5 MG-MCG tablet Take 1 tablet by mouth 2 times daily (with meals) 180 tablet 3    levothyroxine (SYNTHROID/LEVOTHROID) 75 MCG tablet TAKE 1 TABLET DAILY. SEPARATE IRON OR CALCIUM CONTAINING PRODUCTS BY AT LEAST 4 HOURS FROM THIS MEDICATION 90 tablet 3    triamcinolone  (KENALOG) 0.1 % external cream Apply topically 2 times daily 15 g 1     Current Facility-Administered Medications   Medication Dose Route Frequency Provider Last Rate Last Admin    lidocaine (LMX4) kit   Topical Q1H PRN Wendi Ho DO        lidocaine 1 % 0.1-1 mL  0.1-1 mL Other Q1H PRN Wendi Ho DO        ondansetron (ZOFRAN) injection 4 mg  4 mg Intravenous Once PRN Wendi Ho DO        sodium chloride (PF) 0.9% PF flush 3 mL  3 mL Intracatheter Q8H Wendi Ho DO        sodium chloride (PF) 0.9% PF flush 3 mL  3 mL Intracatheter q1 min prn Wendi Ho DO                 Allergies:    No Known Allergies  Allergy to Latex? No  Allergy to tape?   No  Intolerances:             Physical Exam:   All vitals have been reviewed  Patient Vitals for the past 8 hrs:   BP Temp Temp src Resp SpO2 Weight   08/01/24 0710 (!) 140/90 97.2  F (36.2  C) Temporal 16 96 % 95.3 kg (210 lb)     No intake/output data recorded.  Lungs:   No increased work of breathing, good air exchange, clear to auscultation bilaterally, no crackles or wheezing     Cardiovascular:   normal S1 and S2             Lab / Radiology Results:            Anesthetic risk and/or ASA classification:     2  Wendi Ho DO

## 2024-09-29 NOTE — PROGRESS NOTES
"Orders signed for \"midfoot bone spur resection with joint fusions right lower extremity\".      General + Popliteal    Trilliant    Choco Frances DPM FACFAS FACFAOM  Podiatric Foot & Ankle Surgeon  [facility]  [phone]      "
DISPLAY PLAN FREE TEXT

## 2024-11-15 ENCOUNTER — OFFICE VISIT (OUTPATIENT)
Dept: FAMILY MEDICINE | Facility: CLINIC | Age: 59
End: 2024-11-15
Payer: COMMERCIAL

## 2024-11-15 VITALS
HEART RATE: 86 BPM | TEMPERATURE: 97.9 F | HEIGHT: 63 IN | WEIGHT: 211 LBS | BODY MASS INDEX: 37.39 KG/M2 | SYSTOLIC BLOOD PRESSURE: 144 MMHG | RESPIRATION RATE: 20 BRPM | OXYGEN SATURATION: 99 % | DIASTOLIC BLOOD PRESSURE: 82 MMHG

## 2024-11-15 DIAGNOSIS — I10 HYPERTENSION GOAL BP (BLOOD PRESSURE) < 140/90: ICD-10-CM

## 2024-11-15 DIAGNOSIS — J40 BRONCHITIS: Primary | ICD-10-CM

## 2024-11-15 RX ORDER — BENZONATATE 200 MG/1
200 CAPSULE ORAL 3 TIMES DAILY PRN
Qty: 30 CAPSULE | Refills: 1 | Status: SHIPPED | OUTPATIENT
Start: 2024-11-15

## 2024-11-15 ASSESSMENT — ENCOUNTER SYMPTOMS: COUGH: 1

## 2024-11-15 NOTE — PATIENT INSTRUCTIONS
At [facility], we strive to deliver an exceptional experience to you, every time we see you. If you receive a survey, please let us know what we are doing well and/or what we could improve upon, as we do value your feedback.  If you have MyChart, you can expect to receive results automatically within 24 hours of their completion.  Your provider will send a note interpreting your results as well.   If you do not have MyChart, you should receive your results in about a week by mail.    Your care team:                            Family Medicine Internal Medicine   MD Stan Santoro, MD Jennifer Rich, MD Lebron Hong, MD Teri Ramirez, PASeanC    Sean Newby, MD Pediatrics   Samina Ames, MD Lana Echeverria, MD Laura Riley, APRN CNP Gretel Renae APRN CNP   MD Bethany Cook, MD Ewa Melgar, CNP     Glenn De Leon, CNP Same-Day Provider (No follow-up visits)   PABLO Amin, DNP Rima Stewart, PABLO Munguia, FNP, BC WILLIAN PittC     Clinic hours: Monday - Thursday 7 am-6 pm; Fridays 7 am-5 pm.   Urgent care: Monday - Friday 10 am- 8 pm; Saturday and Sunday 9 am-5 pm.    Clinic: [phone]       [facility]: Monday - Thursday 8 am - 7 pm; Friday 8 am - 6 pm  [facility]: [phone]

## 2024-11-15 NOTE — PROGRESS NOTES
"  Assessment & Plan     Bronchitis  Most consistent with viral bronchitis.  Lung sounds reassuring today as well as oxygenation and low suspicion for pneumonia.  Did not think x-ray is needed today.  Considering severity of cough and lack of response to over-the-counter medications we discussed benzonatate as well as Robitussin with codeine.  Patient prefers benzonatate which was sent to pharmacy.  If not having good relief or having more nighttime symptoms can send a follow-up message.  Reviewed red flags for reevaluation.  - benzonatate (TESSALON) 200 MG capsule; Take 1 capsule (200 mg) by mouth 3 times daily as needed for cough.    Hypertension goal BP (blood pressure) < 140/90  Elevated today, I do suspect from recent over-the-counter medications however would recommend recheck in 2 weeks, okay at home versus in clinic.                Kb Hernandez is a 59 year old, presenting for the following health issues:  Cough      11/15/2024     9:10 AM   Additional Questions   Roomed by Kartik   Accompanied by Self     Cough    History of Present Illness       Reason for visit:  Heavy Coughing and some congestion for over 2 weeks  Symptom onset:  3-4 weeks ago  Symptom intensity:  Moderate  Symptom progression:  Staying the same  Had these symptoms before:  No   She is taking medications regularly.     Symptoms for started about 3 weeks ago, seems like typical cold but had worsened cough.  She was taking DayQuil and NyQuil but it did not seem to help much, stopped this and has been taking Robitussin.  Feels like this is breaking things up in her chest however cough is not typically productive.  Having severe coughing spells.  Some nasal congestion.  No shortness of breath.  No fevers.                Objective    BP (!) 153/92 (BP Location: Left arm, Patient Position: Chair, Cuff Size: Adult Large)   Pulse 86   Temp 97.9  F (36.6  C) (Temporal)   Resp 20   Ht 1.61 m (5' 3.39\")   Wt 95.7 kg (211 lb)   LMP " 08/04/2015 (Approximate)   SpO2 99%   BMI 36.92 kg/m    Body mass index is 36.92 kg/m .  Physical Exam   GENERAL: alert and no distress  EYES: Eyes grossly normal to inspection, PERRL and conjunctivae and sclerae normal  HENT: normal cephalic/atraumatic, ear canals and TM's normal, nasal mucosa edematous , oropharynx clear, and oral mucous membranes moist  NECK: no adenopathy, no asymmetry, masses, or scars  RESP: Harsh, barky cough.  Lungs clear to auscultation - no rales, rhonchi or wheezes  CV: regular rate and rhythm, normal S1 S2, no S3 or S4, no murmur, click or rub, no peripheral edema             Signed Electronically by: Cyndy Roy PA-C

## 2024-12-16 ENCOUNTER — IMMUNIZATION (OUTPATIENT)
Dept: FAMILY MEDICINE | Facility: CLINIC | Age: 59
End: 2024-12-16
Payer: COMMERCIAL

## 2024-12-16 VITALS — TEMPERATURE: 97.3 F

## 2024-12-16 DIAGNOSIS — Z23 ENCOUNTER FOR IMMUNIZATION: Primary | ICD-10-CM

## 2024-12-16 PROCEDURE — 90673 RIV3 VACCINE NO PRESERV IM: CPT

## 2024-12-16 PROCEDURE — 99207 PR NO CHARGE NURSE ONLY: CPT

## 2024-12-16 PROCEDURE — 91320 SARSCV2 VAC 30MCG TRS-SUC IM: CPT

## 2024-12-16 PROCEDURE — 90471 IMMUNIZATION ADMIN: CPT

## 2024-12-16 PROCEDURE — 90480 ADMN SARSCOV2 VAC 1/ONLY CMP: CPT

## 2024-12-16 NOTE — PROGRESS NOTES
Prior to immunization administration, verified patients identity using patient s name and date of birth. Please see Immunization Activity for additional information.     Is the patient's temperature normal (100.5 or less)? Yes     Patient MEETS CRITERIA. PROCEED with vaccine administration.          12/16/2024   COVID   Have you had myocarditis or pericarditis (inflammation of or around the heart muscle) after getting a COVID-19 vaccine? No   Have you had a serious reaction to a COVID vaccine or something in a COVID vaccine, like polyethylene glycol (PEG) or polysorbate? No   Have you had multisystem inflammatory syndrome from COVID-19 in the past 90 days? No   Have you received a bone marrow transplant within the previous 3 months? No            Patient MEETS CRITERIA. PROCEED with vaccine administration.            12/16/2024   INFLUENZA   Would you like to receive the flu shot or the nasal flu vaccine today? Flu Shot   Have you had a serious reaction to a flu vaccine or something in a flu vaccine? No   Have you had Guillain-Gen[place]o syndrome within 6 weeks of getting a vaccine? No   Have you received a bone marrow transplant within the previous 6 months? No            Patient MEETS CRITERIA. PROCEED with vaccine administration.        Patient instructed to remain in clinic for 15 minutes afterwards, and to report any adverse reactions.      Link to Ancillary Visit Immunization Standing Orders SmartSet     Screening performed by Milena Ash MA on 12/16/2024 at 9:40 AM.

## 2025-03-25 NOTE — PROGRESS NOTES
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 She completed treatment with surgery 21.    She denies vaginal bleeding or pelvic pain. Notes constipation lately, not requiring intervention.     She doesn't necessarily feel a lot of hunger, but denies early satiety. She has been trying to lose weight- most recently tried an all natural patch, but she developed a reaction to the adhesive.    She denies unintentional weight loss, new adenopathy or masses, trouble breathing, unexplained and persistent cough, bloating, early satiety, abdominal pain, pelvic pain, bladder changes, vaginal bleeding, dyspareunia, new swelling in the legs, or or any other new concerns not listed above.    HEALTH MAINTENANCE:  Breast cancer screening: Mammogram 24; BIRADS 1  Colorectal cancer screening: colonoscopy 24; results normal- repeat in 10 years  Cervical cancer screening: no longer indicated      ONCOLOGY HISTORY:  Initially, she was seen for vaginal spotting.     2020: Pelvic US  IMPRESSION: Thickened endometrium up to 24 mm in transvaginal  measurement for a postmenopausal patient comment given the reported  history of postmenopausal bleeding, this does raise concern for  neoplasia/hyperplasia and tissue sampling is recommended. Small amount  of free fluid in the posterior cul-de-sac and left adnexa.  Nonvisualization of left ovary. The visualized right ovary appears  Normal.     21: EMB  - At least complex atypical hyperplasia with foci bordering on FIGO grade 1 endometrioid carcinoma     21: Robotic-assisted total laparoscopic hysterectomy, bilateral salpingo-oopherectomy, sentinel lymph node mapping and sampling,  "cystoscopy  A. UTERUS, CERVIX, BILATERAL TUBES AND OVARIES, HYSTERECTOMY AND BILATERAL    SALPINGO-OOPHORECTOMY:   - Endometrioid endometrial adenocarcinoma, FIGO grade 1, MMR proficient   - Unremarkable ovaries with follicular cysts   - Cervix with reactive changes and nabothian cysts   - Unremarkable fallopian tubes   B. SENTINEL LYMPH NODE, RIGHT EXTERNAL ILIAC, LYMPHADENECTOMY:   - One reactive lymph node, negative for malignancy (0/1)   C. SENTINEL LYMPH NODE, LEFT PELVIC, LYMPHADENECTOMY  - One reactive lymph node, negative for malignancy (0/1)     1/11/22: CT CAP IMPRESSION:  1.  No evidence of recurrent or metastatic disease in the abdomen or pelvis.   2.  Multiple hepatic cysts are stable to slightly enlarged.  3.  Finding suggestive of mild colitis. Mild pelvic floor descent with large rectal stool burden.  4.  Small hiatal hernia.          OBJECTIVE:    PHYSICAL EXAM:  /83 (BP Location: Right arm)   Pulse 75   Ht 1.61 m (5' 3.39\")   Wt 96.3 kg (212 lb 3.2 oz)   LMP 08/04/2015 (Approximate)   SpO2 99%   BMI 37.13 kg/m       CONSTITUTIONAL: Alert non-toxic appearing female in no acute distress  RESPIRATORY: Respiratory effort unlabored  CV/PV: Bilateral lower extremities without edema  GASTROINTESTINAL: Abdomen soft, non-distended, and non-tender to palpation without masses or organomegaly  GENITOURINARY: External genitalia and urethral meatus pink without lesions, masses, or excoriation. Vagina pink and smooth without masses or lesions. Cervix surgically absent. Vaginal cuff without masses or lesions. Bimanual exam reveals no masses or fullness. Rectovaginal exam confirms these findings- moderate amt hard stool in rectum.  LYMPHATIC: Cervical, supraclavicular, and inguinal lymph nodes without adenopathy  NEUROLOGIC: Grossly intact, normal gait  PSYCHIATRIC: Interactive, affect euthymic, makes appropriate eye contact, thought process linear        ASSESSMENT/PLAN:    History of stage IA grade 1 " endometrioid endometrial adenocarcinoma (pMMR):   No clinical evidence of disease on today's exam.  Continue surveillance with gynecologic oncology team annually x5 years (2/2026)  Pelvic exam (speculum and bimanual exam) at each visit. No tumor markers or routine labs unless clinically indicated. No further imaging unless clinically indicated. Pap/HPV testing no longer indicated.  After five year disease free interval, may transition to annual pelvic exam with general practitioner or benign gynecologist  Return to clinic in 1 year(s) with Suzie De La Rosa CNP for surveillance.   Reviewed signs and symptoms of recurrent disease and when to seek further care.    Treatment/disease related effects:  Denies    Constipation:  Reviewed hydration, dietary fiber, exercise    Genetics:   Genetic counseling referral not indicated at this time- mismatch repair proteins intact, making Watts syndrome unlikely.    Health maintenance:   Follow up with PCP for routine cancer screenings, non-gynecologic concerns, and co-morbid conditions  No contraindications to weight loss medications with her endometrial cancer history    Patient verbalized understanding of and agreement with plan- see AVS for patient instructions    PABLO Erazo CNP  Division of Gynecologic Oncology

## 2025-03-25 NOTE — PATIENT INSTRUCTIONS
Your visit today was with Suzie De La Rosa CNP for surveillance.    Plan:  No evidence of disease on today's exam  Return to clinic in 1 year(s) with Suzie De La Rosa CNP for surveillance  Surveillance visits  annually  Okay from endometrial cancer history perspective for weight loss medications, including GLP1 [place] (semaglutide, terzepatide, etc)    CONSTIPATION  Make sure to hydrate well- your body needs enough water to help with bowel movements. Taking in adequate amounts of dietary fiber (20-25g if this is tolerable for you) can help. Staying physically active and walking regularly can also help.  If you have not had a bowel movement on a regular schedule for you and you are feeling uncomfortable, consider taking senna 1 tab- if you don't have a bowel movement in 6-12 hours, you can repeat this later that day. You can gradually increase (1-2 tablets twice daily as needed) this until you have bowel movements on a regular schedule that are comfortable to pass. Adding in Miralax can help as well- you can titrate the amount you use in order to have soft bowel movements.   Prune juice or pear juice can help with constipation, as can many fruits (particularly ones that start with P!- pears, peaches, plums, prunes- also grapes and blueberries)      SIGNS AND SYMPTOMS OF RECURRENT DISEASE    Symptoms of cancer coming back can vary from person to person and it's important to know your own baseline and health factors that may cause symptoms. The following list includes symptoms that would warrant further investigation with your oncology team.    Vaginal bleeding or spotting  Persistent pelvic, abdominal, or bone pain that is persistent and does not improve over time  New persistent dry cough or shortness of breath at rest without a known cause  Unintentional weight loss  New swelling in the legs  New changes in bowel/bladder patterns  New, persistent bloating/fullness  New, persistent onset of drenching night  sweats  New, persistent lumps or bumps in the groin or neck  New severe headaches, tunnel vision, double vision, or seizures  Sudden and persistent fatigue without a known cause, particularly if associated with any of the above symptoms    If you have symptoms that are new, persistent, or concerning to you and have been ongoing for two weeks or longer, please contact your oncology care team.    [facility] triage: [phone]    If you have difficulty reaching triage during off hours, you can call [phone] and ask to speak to the gynecologic oncology resident on call      For urgent questions or concerns, please call rather than send a medical message.

## 2025-03-26 ENCOUNTER — ONCOLOGY VISIT (OUTPATIENT)
Dept: ONCOLOGY | Facility: CLINIC | Age: 60
End: 2025-03-26
Attending: NURSE PRACTITIONER
Payer: COMMERCIAL

## 2025-03-26 VITALS
SYSTOLIC BLOOD PRESSURE: 126 MMHG | BODY MASS INDEX: 37.6 KG/M2 | HEIGHT: 63 IN | DIASTOLIC BLOOD PRESSURE: 83 MMHG | WEIGHT: 212.2 LBS | OXYGEN SATURATION: 99 % | HEART RATE: 75 BPM

## 2025-03-26 DIAGNOSIS — Z85.42 ENCOUNTER FOR FOLLOW-UP SURVEILLANCE OF ENDOMETRIAL CANCER: Primary | ICD-10-CM

## 2025-03-26 DIAGNOSIS — Z08 ENCOUNTER FOR FOLLOW-UP SURVEILLANCE OF ENDOMETRIAL CANCER: Primary | ICD-10-CM

## 2025-03-26 PROCEDURE — 99214 OFFICE O/P EST MOD 30 MIN: CPT | Performed by: NURSE PRACTITIONER

## 2025-03-26 ASSESSMENT — PAIN SCALES - GENERAL: PAINLEVEL_OUTOF10: NO PAIN (0)

## 2025-03-26 NOTE — LETTER
3/26/2025      Mary Sims   [place] [address]      Dear Colleague,    Thank you for referring your patient, Mary Sims, to the [facility]. Please see a copy of my visit note below.    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 She completed treatment with surgery 21.    She denies vaginal bleeding or pelvic pain. Notes constipation lately, not requiring intervention.     She doesn't necessarily feel a lot of hunger, but denies early satiety. She has been trying to lose weight- most recently tried an all natural patch, but she developed a reaction to the adhesive.    She denies unintentional weight loss, new adenopathy or masses, trouble breathing, unexplained and persistent cough, bloating, early satiety, abdominal pain, pelvic pain, bladder changes, vaginal bleeding, dyspareunia, new swelling in the legs, or or any other new concerns not listed above.    HEALTH MAINTENANCE:  Breast cancer screening: Mammogram 24; BIRADS 1  Colorectal cancer screening: colonoscopy 24; results normal- repeat in 10 years  Cervical cancer screening: no longer indicated      ONCOLOGY HISTORY:  Initially, she was seen for vaginal spotting.     2020: Pelvic US  IMPRESSION: Thickened endometrium up to 24 mm in transvaginal  measurement for a postmenopausal patient comment given the reported  history of postmenopausal bleeding, this does raise concern for  neoplasia/hyperplasia and tissue sampling is recommended. Small amount  of free fluid in the posterior cul-de-sac and left adnexa.  Nonvisualization of left ovary. The visualized right ovary appears  Normal.    "  1/5/21: EMB  - At least complex atypical hyperplasia with foci bordering on FIGO grade 1 endometrioid carcinoma     2/9/21: Robotic-assisted total laparoscopic hysterectomy, bilateral salpingo-oopherectomy, sentinel lymph node mapping and sampling, cystoscopy  A. UTERUS, CERVIX, BILATERAL TUBES AND OVARIES, HYSTERECTOMY AND BILATERAL    SALPINGO-OOPHORECTOMY:   - Endometrioid endometrial adenocarcinoma, FIGO grade 1, MMR proficient   - Unremarkable ovaries with follicular cysts   - Cervix with reactive changes and nabothian cysts   - Unremarkable fallopian tubes   B. SENTINEL LYMPH NODE, RIGHT EXTERNAL ILIAC, LYMPHADENECTOMY:   - One reactive lymph node, negative for malignancy (0/1)   C. SENTINEL LYMPH NODE, LEFT PELVIC, LYMPHADENECTOMY  - One reactive lymph node, negative for malignancy (0/1)     1/11/22: CT CAP IMPRESSION:  1.  No evidence of recurrent or metastatic disease in the abdomen or pelvis.   2.  Multiple hepatic cysts are stable to slightly enlarged.  3.  Finding suggestive of mild colitis. Mild pelvic floor descent with large rectal stool burden.  4.  Small hiatal hernia.          OBJECTIVE:    PHYSICAL EXAM:  /83 (BP Location: Right arm)   Pulse 75   Ht 1.61 m (5' 3.39\")   Wt 96.3 kg (212 lb 3.2 oz)   LMP 08/04/2015 (Approximate)   SpO2 99%   BMI 37.13 kg/m       CONSTITUTIONAL: Alert non-toxic appearing female in no acute distress  RESPIRATORY: Respiratory effort unlabored  CV/PV: Bilateral lower extremities without edema  GASTROINTESTINAL: Abdomen soft, non-distended, and non-tender to palpation without masses or organomegaly  GENITOURINARY: External genitalia and urethral meatus pink without lesions, masses, or excoriation. Vagina pink and smooth without masses or lesions. Cervix surgically absent. Vaginal cuff without masses or lesions. Bimanual exam reveals no masses or fullness. Rectovaginal exam confirms these findings- moderate amt hard stool in rectum.  LYMPHATIC: Cervical, " supraclavicular, and inguinal lymph nodes without adenopathy  NEUROLOGIC: Grossly intact, normal gait  PSYCHIATRIC: Interactive, affect euthymic, makes appropriate eye contact, thought process linear        ASSESSMENT/PLAN:    History of stage IA grade 1 endometrioid endometrial adenocarcinoma (pMMR):   No clinical evidence of disease on today's exam.  Continue surveillance with gynecologic oncology team annually x5 years (2/2026)  Pelvic exam (speculum and bimanual exam) at each visit. No tumor markers or routine labs unless clinically indicated. No further imaging unless clinically indicated. Pap/HPV testing no longer indicated.  After five year disease free interval, may transition to annual pelvic exam with general practitioner or benign gynecologist  Return to clinic in 1 year(s) with Suzie De La Rosa CNP for surveillance.   Reviewed signs and symptoms of recurrent disease and when to seek further care.    Treatment/disease related effects:  Denies    Constipation:  Reviewed hydration, dietary fiber, exercise    Genetics:   Genetic counseling referral not indicated at this time- mismatch repair proteins intact, making Watts syndrome unlikely.    Health maintenance:   Follow up with PCP for routine cancer screenings, non-gynecologic concerns, and co-morbid conditions  No contraindications to weight loss medications with her endometrial cancer history    Patient verbalized understanding of and agreement with plan- see AVS for patient instructions    PABLO Erazo, CNP  Division of Gynecologic Oncology        Again, thank you for allowing me to participate in the care of your patient.        Sincerely,        PABLO Erazo CNP    Electronically signed

## 2025-03-26 NOTE — NURSING NOTE
"Oncology Rooming Note    March 26, 2025 8:42 AM   Mary Sims is a 59 year old female who presents for:    Chief Complaint   Patient presents with    Oncology Clinic Visit     Initial Vitals: /83 (BP Location: Right arm)   Pulse 75   Ht 1.61 m (5' 3.39\")   Wt 96.3 kg (212 lb 3.2 oz)   LMP 08/04/2015 (Approximate)   SpO2 99%   BMI 37.13 kg/m   Estimated body mass index is 37.13 kg/m  as calculated from the following:    Height as of this encounter: 1.61 m (5' 3.39\").    Weight as of this encounter: 96.3 kg (212 lb 3.2 oz). Body surface area is 2.08 meters squared.  No Pain (0) Comment: Data Unavailable   Patient's last menstrual period was 08/04/2015 (approximate).  Allergies reviewed: Yes  Medications reviewed: Yes    Medications: Medication refills not needed today.  Pharmacy name entered into EPIC:    [facility] - [facility], [place] - [address] [place] [address] [id number] - [place], [place] - [address] AT [facility] [address]    Frailty Screening:   Is the patient here for a new oncology consult visit in cancer care? 2. No    PHQ9:  Did this patient require a PHQ9?: No      Clinical concerns: No Concerns        Joaquin Contreras MA            "

## 2025-06-15 NOTE — LETTER
"    7/25/2019         RE: Mary Sims  [address]  [facility] [address]        Dear Colleague,    Thank you for referring your patient, Mary Sims, to the [facility]. Please see a copy of my visit note below.    CHIEF CONCERN: Status post left arthroscopic rotator cuff repair, subacromial decompression, open biceps tenodesis  DATE OF SURGERY: 4/11/17    HISTORY OF PRESENT ILLNESS: Ms. Sims is a 51 year old female who is now over 2 years status post the above procedure. She comes in today after having some new pain with over this past weekend.  She states that it felt like a bruise over the upper lateral aspect of the shoulder.  She does not describe a specific injury or new activity.  She describes this as \"discomfort, not pain.\"      PHYSICAL EXAM:  Adult female in no distress  Respirations even and unlabored  Focused Musculoskeletal Exam: Skin intact. No erythema. Sensation intact all dermatomes into the hand to light touch. EPL, FPL, and Intrinsics intact. Right shoulder active motion is FE to 165, ER at side to 70, and IR to T12.  Negative Neer and Lopez. No pain on palpation over the AC joint. No pain on palpation over the long head of the biceps. No pain with Speed's or O'Briens. Strength 4+/5 in FE and ER without pain    IMAGING:   Left shoulder xrays demonstrate the subacomial space is well maintained. Normal glenohumeral articulation without pain    ASSESSMENT:  1. Two years status post the above procedure    PLAN:  Discussed that exam and xrays today are reassuring. Patient is going to resume some of her home shoulder exercises/rehab. If she does not see improvement in the next 2-4 weeks she will call the office and I would move toward an MRI at that point.    Again, thank you for allowing me to participate in the care of your patient.        Sincerely,        Dorina Rodriguez MD    "
No

## 2025-06-26 SDOH — HEALTH STABILITY: PHYSICAL HEALTH: ON AVERAGE, HOW MANY MINUTES DO YOU ENGAGE IN EXERCISE AT THIS LEVEL?: 20 MIN

## 2025-06-26 SDOH — HEALTH STABILITY: PHYSICAL HEALTH: ON AVERAGE, HOW MANY DAYS PER WEEK DO YOU ENGAGE IN MODERATE TO STRENUOUS EXERCISE (LIKE A BRISK WALK)?: 7 DAYS

## 2025-06-26 ASSESSMENT — SOCIAL DETERMINANTS OF HEALTH (SDOH): HOW OFTEN DO YOU GET TOGETHER WITH FRIENDS OR RELATIVES?: MORE THAN THREE TIMES A WEEK

## 2025-07-01 ENCOUNTER — OFFICE VISIT (OUTPATIENT)
Dept: FAMILY MEDICINE | Facility: CLINIC | Age: 60
End: 2025-07-01
Payer: COMMERCIAL

## 2025-07-01 VITALS
HEART RATE: 75 BPM | OXYGEN SATURATION: 98 % | DIASTOLIC BLOOD PRESSURE: 89 MMHG | HEIGHT: 63 IN | RESPIRATION RATE: 16 BRPM | BODY MASS INDEX: 36.39 KG/M2 | TEMPERATURE: 98.7 F | SYSTOLIC BLOOD PRESSURE: 129 MMHG | WEIGHT: 205.4 LBS

## 2025-07-01 DIAGNOSIS — I10 HYPERTENSION GOAL BP (BLOOD PRESSURE) < 140/90: ICD-10-CM

## 2025-07-01 DIAGNOSIS — M85.89 OSTEOPENIA OF MULTIPLE SITES: ICD-10-CM

## 2025-07-01 DIAGNOSIS — R06.00 PND (PAROXYSMAL NOCTURNAL DYSPNEA): ICD-10-CM

## 2025-07-01 DIAGNOSIS — E66.09 CLASS 2 OBESITY DUE TO EXCESS CALORIES WITHOUT SERIOUS COMORBIDITY WITH BODY MASS INDEX (BMI) OF 35.0 TO 35.9 IN ADULT: ICD-10-CM

## 2025-07-01 DIAGNOSIS — E66.812 CLASS 2 OBESITY DUE TO EXCESS CALORIES WITHOUT SERIOUS COMORBIDITY WITH BODY MASS INDEX (BMI) OF 35.0 TO 35.9 IN ADULT: ICD-10-CM

## 2025-07-01 DIAGNOSIS — E03.8 OTHER SPECIFIED HYPOTHYROIDISM: ICD-10-CM

## 2025-07-01 DIAGNOSIS — Z00.00 ROUTINE GENERAL MEDICAL EXAMINATION AT A HEALTH CARE FACILITY: Primary | ICD-10-CM

## 2025-07-01 DIAGNOSIS — Z13.6 SCREENING FOR CARDIOVASCULAR CONDITION: ICD-10-CM

## 2025-07-01 LAB
ERYTHROCYTE [DISTWIDTH] IN BLOOD BY AUTOMATED COUNT: 13.5 % (ref 10–15)
HCT VFR BLD AUTO: 38.2 % (ref 35–47)
HGB BLD-MCNC: 12.6 G/DL (ref 11.7–15.7)
MCH RBC QN AUTO: 28 PG (ref 26.5–33)
MCHC RBC AUTO-ENTMCNC: 33 G/DL (ref 31.5–36.5)
MCV RBC AUTO: 85 FL (ref 78–100)
PLATELET # BLD AUTO: 232 10E3/UL (ref 150–450)
RBC # BLD AUTO: 4.5 10E6/UL (ref 3.8–5.2)
WBC # BLD AUTO: 7.1 10E3/UL (ref 4–11)

## 2025-07-01 PROCEDURE — 85027 COMPLETE CBC AUTOMATED: CPT

## 2025-07-01 PROCEDURE — 3079F DIAST BP 80-89 MM HG: CPT

## 2025-07-01 PROCEDURE — 82570 ASSAY OF URINE CREATININE: CPT

## 2025-07-01 PROCEDURE — 82043 UR ALBUMIN QUANTITATIVE: CPT

## 2025-07-01 PROCEDURE — 80061 LIPID PANEL: CPT

## 2025-07-01 PROCEDURE — 90471 IMMUNIZATION ADMIN: CPT

## 2025-07-01 PROCEDURE — 3074F SYST BP LT 130 MM HG: CPT

## 2025-07-01 PROCEDURE — 84443 ASSAY THYROID STIM HORMONE: CPT

## 2025-07-01 PROCEDURE — 90677 PCV20 VACCINE IM: CPT

## 2025-07-01 PROCEDURE — 80053 COMPREHEN METABOLIC PANEL: CPT

## 2025-07-01 PROCEDURE — 3049F LDL-C 100-129 MG/DL: CPT

## 2025-07-01 PROCEDURE — 99214 OFFICE O/P EST MOD 30 MIN: CPT | Mod: 25

## 2025-07-01 PROCEDURE — 99396 PREV VISIT EST AGE 40-64: CPT | Mod: 25

## 2025-07-01 PROCEDURE — 36415 COLL VENOUS BLD VENIPUNCTURE: CPT

## 2025-07-01 NOTE — PATIENT INSTRUCTIONS
Screening labs today  Pneumonia  Weight management  - medications discussed today - topiramate, Wellbutrin, phentermine, or injectable.  - Referral to dietician   - follow-up in 3 months or sooner as needed    Try antihistamine, xyzal and/or flonase to see if helps with mucous      At [facility], we strive to deliver an exceptional experience to you, every time we see you. If you receive a survey, please let us know what we are doing well and/or what we could improve upon, as we do value your feedback.  If you have MyChart, you can expect to receive results automatically within 24 hours of their completion.  Your provider will send a note interpreting your results as well.   If you do not have MyChart, you should receive your results in about a week by mail.    Your care team:                            Family Medicine Internal Medicine   MD Stan Santoro MD Shantel Branch-Fleming, MD Lebron Hong, MD Teri Ramriez, PASeanC PABLO Amin, DNP   Sean Newby MD Pediatrics   Samina Ames, MD Lana Echeverria, MD Iqra Figueroa, PAJEREMIAH Renae APRN CNP   MD Bethany Cook, MD Ewa Melgar, CNP      Same-Day Provider (No follow-up visits)    ANTELMO Nuñez PA-C     Clinic hours: Monday - Thursday 7 am-6 pm; Fridays 7 am-5 pm.   Urgent care: Monday - Friday 10 am- 8 pm; Saturday and Sunday 9 am-5 pm.    Clinic: [phone]       [facility]: Monday - Thursday 8 am - 7 pm; Friday 8 am - 6 pm  [facility]: [phone]     [facility] Scheduling: Monday - Friday 7 am - 7 pm; Saturday 7 am - 3:30 pm  (780) [place] : [phone]    Patient Education   Preventive Care Advice   This is general advice given by our system to help you stay healthy. However, your care team may have specific advice just for you. Please talk to  your care team about your preventive care needs.  Nutrition  Eat 5 or more servings of fruits and vegetables each day.  Try wheat bread, brown rice and whole grain pasta (instead of white bread, rice, and pasta).  Get enough calcium and vitamin D. Check the label on foods and aim for 100% of the RDA (recommended daily allowance).  Lifestyle  Exercise at least 150 minutes each week  (30 minutes a day, 5 days a week).  Do muscle strengthening activities 2 days a week. These help control your weight and prevent disease.  No smoking.  Wear sunscreen to prevent skin cancer.  Have a dental exam and cleaning every 6 months.  Yearly exams  See your health care team every year to talk about:  Any changes in your health.  Any medicines your care team has prescribed.  Preventive care, family planning, and ways to prevent chronic diseases.  Shots (vaccines)   HPV shots (up to age 26), if you've never had them before.  Hepatitis B shots (up to age 59), if you've never had them before.  COVID-19 shot: Get this shot when it's due.  Flu shot: Get a flu shot every year.  Tetanus shot: Get a tetanus shot every 10 years.  Pneumococcal, hepatitis A, and RSV shots: Ask your care team if you need these based on your risk.  Shingles shot (for age 50 and up)  General health tests  Diabetes screening:  Starting at age 35, Get screened for diabetes at least every 3 years.  If you are younger than age 35, ask your care team if you should be screened for diabetes.  Cholesterol test: At age 39, start having a cholesterol test every 5 years, or more often if advised.  Bone density scan (DEXA): At age 50, ask your care team if you should have this scan for osteoporosis (brittle bones).  Hepatitis C: Get tested at least once in your life.  STIs (sexually transmitted infections)  Before age 24: Ask your care team if you should be screened for STIs.  After age 24: Get screened for STIs if you're at risk. You are at risk for STIs (including HIV)  if:  You are sexually active with more than one person.  You don't use condoms every time.  You or a partner was diagnosed with a sexually transmitted infection.  If you are at risk for HIV, ask about PrEP medicine to prevent HIV.  Get tested for HIV at least once in your life, whether you are at risk for HIV or not.  Cancer screening tests  Cervical cancer screening: If you have a cervix, begin getting regular cervical cancer screening tests starting at age 21.  Breast cancer scan (mammogram): If you've ever had breasts, begin having regular mammograms starting at age 40. This is a scan to check for breast cancer.  Colon cancer screening: It is important to start screening for colon cancer at age 45.  Have a colonoscopy test every 10 years (or more often if you're at risk) Or, ask your provider about stool tests like a FIT test every year or Cologuard test every 3 years.  To learn more about your testing options, visit:   .  For help making a decision, visit:   https://bit.ly/ea48942.  Prostate cancer screening test: If you have a prostate, ask your care team if a prostate cancer screening test (PSA) at age 55 is right for you.  Lung cancer screening: If you are a current or former smoker ages 50 to 80, ask your care team if ongoing lung cancer screenings are right for you.  For informational purposes only. Not to replace the advice of your health care provider. Copyright   2023 [employer]. All rights reserved. Clinically reviewed by the [facility] Transitions Program. [employer] 839928 - REV 01/24.

## 2025-07-01 NOTE — PROGRESS NOTES
Preventive Care Visit  [facility] YANI Figueroa, Family Medicine  Jul 1, 2025      Assessment & Plan     Routine general medical examination at a health care facility  - Health maintenance and lifestyle reviewed. Updated medical and family history.  - Follow up in one year or sooner as needed.   - Screening labs ordered today. Agreeable to pneumonia vaccine.     Class 2 obesity due to excess calories without serious comorbidity with body mass index (BMI) of 35.0 to 35.9 in adult  Discussed options for treatment to include medication, referral to nutrition, and/or referral to weight management. Discussed medication options to include GLP-1 agonist, Wellbutrin, topiramate, phentermine, or metformin. Patient declines medication at this time. Agreeable to referral to nutrition referral. Patient to follow-up in 3 months for a recheck.   - Adult Nutrition  Referral  - Comprehensive metabolic panel (BMP + Alb, Alk Phos, ALT, AST, Total. Bili, TP)  - Comprehensive metabolic panel (BMP + Alb, Alk Phos, ALT, AST, Total. Bili, TP)    Hypertension goal BP (blood pressure) < 140/90  Well managed without medication. Patient asymptomatic. Will continue to monitor.   - Albumin Random Urine Quantitative with Creat Ratio  - CBC with platelets  - Albumin Random Urine Quantitative with Creat Ratio  - CBC with platelets    PND (paroxysmal nocturnal dyspnea)  Patient with persistent mucous in her throat x the past few months. Recommend she try supportive cares including antihistamine, flonase, saline rinses, and a humidifier. Will recheck in 3 months.     Other specified hypothyroidism  Due for repeat TSH. Patient currently on levothyroxine.   - TSH WITH FREE T4 REFLEX  - TSH WITH FREE T4 REFLEX    Osteopenia of multiple sites  Patient to continue calcium and vitamin D supplementation. Discussed option for repeat DEXA scan this year, will defer to next year.     Screening for cardiovascular  "condition  - Lipid panel reflex to direct LDL Non-fasting  - Lipid panel reflex to direct LDL Non-fasting      BMI  Estimated body mass index is 35.94 kg/m  as calculated from the following:    Height as of this encounter: 1.61 m (5' 3.39\").    Weight as of this encounter: 93.2 kg (205 lb 6.4 oz).   Weight management plan: Discussed healthy diet and exercise guidelines . Referral placed to nutritionist.     Counseling  Appropriate preventive services were addressed with this patient via screening, questionnaire, or discussion as appropriate for fall prevention, nutrition, physical activity, Tobacco-use cessation, social engagement, weight loss and cognition.  Checklist reviewing preventive services available has been given to the patient.  Reviewed patient's diet, addressing concerns and/or questions.         Follow-up    Follow-up Visit   Expected date:  Oct 01, 2025 (Approximate)      Follow Up Appointment Details:     Follow-up with whom?: PCP    Follow-Up for what?: Chronic Disease f/u    Chronic Disease f/u: Weight Management    Weight Management New or Return: New    How?: In Person             Follow-up Visit   Expected date:  Jul 01, 2026 (Approximate)      Follow Up Appointment Details:     Follow-up with whom?: PCP    Follow-Up for what?: Adult Preventive    How?: In Person                 Subjective   Mary is a 60 year old, presenting for the following:  Physical        7/1/2025     1:52 PM   Additional Questions   Roomed by DONI TRAYLOR     Presents to establish care and for physical.    Acute concerns:  # weight management, BMI >35  - patient interested in discussing options to help with weight loss.  - patient tries to exercise regularly. Walks regularly and follows a walk fit belgica. Gets a minimum of 7600 steps a day.  - tries to eat generally healthy, however does stress eat.     # mucous in her throat  - wakes up in the morning with a lot of mucous in the throat. Has to cough to clear her throat. " Typically no symptoms for the rest of the day.  - has been present since having bronchitis last november.   - does use a fan and air purifier     Chronic conditions:  # history of endometrial cancer, currently on surveillance   - History of stage IA grade 1 endometrioid endometrial adenocarcinoma (pMMR) s/p surgical treatment on 2/9/21  - follows with gynecologic oncology every year, last visit 3/26/2025    #history of hypothyroidism  - currently on levothyroxine 75mcg   - due for repeat TSH today    #history of eczema  - uses Triamcinolone cream PRN    # history of hypertension  - not currently on medication. Manages through diet and exercise  - donates blood every ~8 weeks and BP is always normal  - denies any chest pain, shortness of breath, palpitations, headaches, vision changes or other symptom          Preventatives:  Breast cancer screening: Mammogram 6/18/24; BIRADS 1. Mammo scheduled for 7/29/25  Colorectal cancer screening: colonoscopy 8/1/24; results normal- repeat in 10 years  Cervical cancer screening: no longer indicated  DEXA: 7/10/2023, osteopenia. Repeat in 2-3 years. Patient would like to defer to next year.  Eye exam: scheduled 7/10/2025    Advance Care Planning  Document on file is a Health Care Directive or POLST.        6/26/2025   General Health   How would you rate your overall physical health? Good   Feel stress (tense, anxious, or unable to sleep) Only a little   (!) STRESS CONCERN      6/26/2025   Nutrition   Three or more servings of calcium each day? (!) NO   Diet: Regular (no restrictions)   How many servings of fruit and vegetables per day? (!) 2-3   How many sweetened beverages each day? 0-1         6/26/2025   Exercise   Days per week of moderate/strenous exercise 7 days   Average minutes spent exercising at this level 20 min         6/26/2025   Social Factors   Frequency of gathering with friends or relatives More than three times a week   Worry food won't last until get money to  buy more No   Food not last or not have enough money for food? No   Do you have housing? (Housing is defined as stable permanent housing and does not include staying outside in a car, in a tent, in an abandoned building, in an overnight shelter, or couch-surfing.) Yes   Are you worried about losing your housing? No   Lack of transportation? No   Unable to get utilities (heat,electricity)? No         6/26/2025   Fall Risk   Fallen 2 or more times in the past year? No   Trouble with walking or balance? No          6/26/2025   Dental   Dentist two times every year? Yes         Today's PHQ-2 Score:       7/1/2025     1:47 PM   PHQ-2 ( 1999 [employer])   Q1: Little interest or pleasure in doing things 0   Q2: Feeling down, depressed or hopeless 0   PHQ-2 Score 0    Q1: Little interest or pleasure in doing things Not at all   Q2: Feeling down, depressed or hopeless Not at all   PHQ-2 Score 0       Patient-reported           6/26/2025   Substance Use   Alcohol more than 3/day or more than 7/wk No   Do you use any other substances recreationally? No     Social History     Tobacco Use    Smoking status: Never     Passive exposure: Never    Smokeless tobacco: Never   Vaping Use    Vaping status: Never Used   Substance Use Topics    Alcohol use: Not Currently     Comment: SELDOM     Drug use: Never         6/18/2024   LAST FHS-7 RESULTS   1st degree relative breast or ovarian cancer No   Any relative bilateral breast cancer No   Any male have breast cancer No   Any ONE woman have BOTH breast AND ovarian cancer No   Any woman with breast cancer before 50yrs No   2 or more relatives with breast AND/OR ovarian cancer No   2 or more relatives with breast AND/OR bowel cancer No       Mammogram Screening - Mammogram every 1-2 years updated in Health Maintenance based on mutual decision making        6/26/2025   STI Screening   New sexual partner(s) since last STI/HIV test? No     History of abnormal Pap smear: No. No longer indicated.          Latest Ref Rng & Units 2020     2:44 PM 2020     1:40 PM 5/15/2017     9:28 AM   PAP / HPV   PAP (Historical)   NIL     HPV 16 DNA NEG^Negative Negative   Negative    HPV 18 DNA NEG^Negative Negative   Negative    Other HR HPV NEG^Negative Negative   Negative      ASCVD Risk   The 10-year ASCVD risk score (Tatiana GARCIA, et al., 2019) is: 3.6%    Values used to calculate the score:      Age: 60 years      Sex: Female      Is Non- : No      Diabetic: No      Tobacco smoker: No      Systolic Blood Pressure: 129 mmHg      Is BP treated: No      HDL Cholesterol: 58 mg/dL      Total Cholesterol: 173 mg/dL    Reviewed and updated as needed this visit by Provider   Tobacco  Allergies  Meds  Problems  Med Hx  Surg Hx  Fam Hx  Soc   Hx Sexual Activity          Past Medical History:   Diagnosis Date    Arthritis of foot, right 2019    Endometrial cancer (H) 2021    Graves disease     History of blood transfusion     Hypertension     Hypothyroidism     MEDICAL HISTORY OF -     S/P RADIOACTIVE IODINE    Morbid obesity (H) 2020    Osteopenia of multiple sites 2020     Past Surgical History:   Procedure Laterality Date    ABDOMEN SURGERY  1991        ARTHRODESIS FOOT Right 2020    Procedure: MIDFOOT BONE SPUR RESECTION WITH JOINT FUSIONS RIGHT LOWER EXTREMITY;  Surgeon: Choco Frances DPM;  Location: [facility] OR    ARTHROSCOPY SHLDR ROTATOR CUFF REPAIR, SUBACROMIAL DECOMP, DIST CLAVICLE RESECTION, BICEP TENODESIS Left 2017    Procedure: ARTHROSCOPY SHOULDER ROTATOR CUFF REPAIR, SUBACROMIAL DECOMPRESSION, DISTAL CLAVICLE RESECTION, OPEN BICEP TENODESIS REPAIR;  Surgeon: Dorina Rodriguez MD;  Location: [facility] OR    BIOPSY  2021    COLONOSCOPY  2013    Procedure: COLONOSCOPY;  colonoscopy, screening;  Surgeon: Dalton Lala MD;  Location: [facility] OR    COLONOSCOPY WITH CO2 INSUFFLATION N/A 2024     "Procedure: Colonoscopy with CO2 insufflation;  Surgeon: Wendi Ho DO;  Location:  OR    CYSTOSCOPY  2020    CYSTOSCOPY N/A 2021    Procedure: Cystoscopy;  Surgeon: Zacarias Brandon MD;  [facility]: [facility]    DAVINCI HYSTERECTOMY TOTAL, BILATERAL SALPINGO-OOPHORECTOMY, COMBINED Bilateral 2021    Procedure: HYSTERECTOMY, TOTAL, ROBOT-ASSISTED, LAPAROSCOPIC, WITH SALPINGO-OOPHORECTOMY, PELVIC WASHINGS, SENTINEL LYMPHNODE SAMPLING;  Surgeon: Zacarias Brandon MD;  Location:  OR    SOFT TISSUE SURGERY  2017    Shoulder    SURGICAL HISTORY OF -       RT ANKLE Fx AND REPAIR (PIN,PLATE,SCREWS)    THYROIDECTOMY      TUBAL LIGATION      ZZC  DELIVERY ONLY           Review of Systems  Constitutional, HEENT, cardiovascular, pulmonary, gi and gu systems are negative, except as otherwise noted.     Objective    Exam  /89 (BP Location: Left arm, Patient Position: Sitting, Cuff Size: Adult Large)   Pulse 75   Temp 98.7  F (37.1  C) (Temporal)   Resp 16   Ht 1.61 m (5' 3.39\")   Wt 93.2 kg (205 lb 6.4 oz)   LMP 2015 (Approximate)   SpO2 98%   BMI 35.94 kg/m     Estimated body mass index is 35.94 kg/m  as calculated from the following:    Height as of this encounter: 1.61 m (5' 3.39\").    Weight as of this encounter: 93.2 kg (205 lb 6.4 oz).    Physical Exam  GENERAL: alert and no distress  EYES: Eyes grossly normal to inspection, PERRL and conjunctivae and sclerae normal  HENT: ear canals and TM's normal, nose and mouth without ulcers or lesions  NECK: no adenopathy, no asymmetry, masses, or scars  RESP: lungs clear to auscultation - no rales, rhonchi or wheezes  CV: regular rate and rhythm, normal S1 S2, no murmur, click or rub, no peripheral edema  ABDOMEN: soft, nontender, no hepatosplenomegaly, no masses and bowel sounds normal  MS: no gross musculoskeletal defects noted, no edema  SKIN: no suspicious lesions or rashes  NEURO: Normal strength and tone, mentation " intact and speech normal  PSYCH: mentation appears normal, affect normal/bright      Signed Electronically by: Iqra Figueroa

## 2025-07-01 NOTE — NURSING NOTE
Prior to immunization administration, verified patients identity using patient s name and date of birth. Please see Immunization Activity for additional information.     Screening Questionnaire for Adult Immunization    Are you sick today?   No   Do you have allergies to medications, food, a vaccine component or latex?   No   Have you ever had a serious reaction after receiving a vaccination?   No   Do you have a long-term health problem with heart, lung, kidney, or metabolic disease (e.g., diabetes), asthma, a blood disorder, no spleen, complement component deficiency, a cochlear implant, or a spinal fluid leak?  Are you on long-term aspirin therapy?   No   Do you have cancer, leukemia, HIV/AIDS, or any other immune system problem?   No   Do you have a parent, brother, or sister with an immune system problem?   No   In the past 3 months, have you taken medications that affect  your immune system, such as prednisone, other steroids, or anticancer drugs; drugs for the treatment of rheumatoid arthritis, Crohn s disease, or psoriasis; or have you had radiation treatments?   No   Have you had a seizure, or a brain or other nervous system problem?   No   During the past year, have you received a transfusion of blood or blood    products, or been given immune (gamma) globulin or antiviral drug?   No   For women: Are you pregnant or is there a chance you could become       pregnant during the next month?   No   Have you received any vaccinations in the past 4 weeks?   No     Immunization questionnaire answers were all negative.      Patient instructed to remain in clinic for 15 minutes afterwards, and to report any adverse reactions.     Screening performed by Samara Lopez MA.

## 2025-07-02 ENCOUNTER — PATIENT OUTREACH (OUTPATIENT)
Dept: CARE COORDINATION | Facility: CLINIC | Age: 60
End: 2025-07-02
Payer: COMMERCIAL

## 2025-07-02 ENCOUNTER — RESULTS FOLLOW-UP (OUTPATIENT)
Dept: FAMILY MEDICINE | Facility: CLINIC | Age: 60
End: 2025-07-02

## 2025-07-02 LAB
ALBUMIN SERPL BCG-MCNC: 4.4 G/DL (ref 3.5–5.2)
ALP SERPL-CCNC: 71 U/L (ref 40–150)
ALT SERPL W P-5'-P-CCNC: 19 U/L (ref 0–50)
ANION GAP SERPL CALCULATED.3IONS-SCNC: 10 MMOL/L (ref 7–15)
AST SERPL W P-5'-P-CCNC: 23 U/L (ref 0–45)
BILIRUB SERPL-MCNC: 0.7 MG/DL
BUN SERPL-MCNC: 18.2 MG/DL (ref 8–23)
CALCIUM SERPL-MCNC: 9.8 MG/DL (ref 8.8–10.4)
CHLORIDE SERPL-SCNC: 105 MMOL/L (ref 98–107)
CHOLEST SERPL-MCNC: 173 MG/DL
CREAT SERPL-MCNC: 0.79 MG/DL (ref 0.51–0.95)
CREAT UR-MCNC: 164 MG/DL
EGFRCR SERPLBLD CKD-EPI 2021: 84 ML/MIN/1.73M2
FASTING STATUS PATIENT QL REPORTED: NO
FASTING STATUS PATIENT QL REPORTED: NO
GLUCOSE SERPL-MCNC: 84 MG/DL (ref 70–99)
HCO3 SERPL-SCNC: 25 MMOL/L (ref 22–29)
HDLC SERPL-MCNC: 58 MG/DL
LDLC SERPL CALC-MCNC: 100 MG/DL
MICROALBUMIN UR-MCNC: <12 MG/L
MICROALBUMIN/CREAT UR: NORMAL MG/G{CREAT}
NONHDLC SERPL-MCNC: 115 MG/DL
POTASSIUM SERPL-SCNC: 4.5 MMOL/L (ref 3.4–5.3)
PROT SERPL-MCNC: 7.7 G/DL (ref 6.4–8.3)
SODIUM SERPL-SCNC: 140 MMOL/L (ref 135–145)
TRIGL SERPL-MCNC: 75 MG/DL
TSH SERPL DL<=0.005 MIU/L-ACNC: 0.48 UIU/ML (ref 0.3–4.2)

## 2025-07-02 RX ORDER — LEVOTHYROXINE SODIUM 75 UG/1
TABLET ORAL
Qty: 90 TABLET | Refills: 3 | Status: SHIPPED | OUTPATIENT
Start: 2025-07-02

## 2025-07-05 ENCOUNTER — PATIENT OUTREACH (OUTPATIENT)
Dept: CARE COORDINATION | Facility: CLINIC | Age: 60
End: 2025-07-05
Payer: COMMERCIAL

## 2025-07-10 ENCOUNTER — OFFICE VISIT (OUTPATIENT)
Dept: OPTOMETRY | Facility: CLINIC | Age: 60
End: 2025-07-10
Payer: COMMERCIAL

## 2025-07-10 DIAGNOSIS — H52.13 SEVERE MYOPIA OF BOTH EYES: ICD-10-CM

## 2025-07-10 DIAGNOSIS — H04.123 DRY EYE SYNDROME OF BOTH EYES: ICD-10-CM

## 2025-07-10 DIAGNOSIS — H52.223 REGULAR ASTIGMATISM OF BOTH EYES: ICD-10-CM

## 2025-07-10 DIAGNOSIS — H43.393 VITREOUS FLOATERS OF BOTH EYES: Primary | ICD-10-CM

## 2025-07-10 DIAGNOSIS — H52.4 PRESBYOPIA: ICD-10-CM

## 2025-07-10 ASSESSMENT — REFRACTION_CURRENTRX
OD_BRAND: ALCON AIR OPTIX FOR ASTIGMATISM BC 8.7, D 14.5
OD_SPHERE: -8.00
OS_BRAND: ALCON AIR OPTIX FOR ASTIGMATISM BC 8.7, D 14.5
OD_AXIS: 180
OS_AXIS: 180
OS_CYLINDER: -1.75
OD_CYLINDER: -2.25
OS_SPHERE: -8.00

## 2025-07-10 ASSESSMENT — REFRACTION_MANIFEST
OD_ADD: +2.50
OS_CYLINDER: +2.00
OS_AXIS: 093
OS_ADD: +2.50
OD_SPHERE: -11.50
OD_AXIS: 083
OS_SPHERE: -11.50
OD_CYLINDER: +2.50

## 2025-07-10 ASSESSMENT — REFRACTION_WEARINGRX
OS_SPHERE: -11.25
OD_ADD: +2.50
OS_AXIS: 093
OD_AXIS: 083
OS_ADD: +2.50
OD_SPHERE: -11.50
OS_CYLINDER: +2.00
OD_CYLINDER: +2.50

## 2025-07-10 ASSESSMENT — VISUAL ACUITY
CORRECTION_TYPE: CONTACTS
OS_CC+: +1
OD_CC: 20/20
METHOD: SNELLEN - LINEAR
OD_CC: J16
OS_CC: 20/30
OS_CC: J2

## 2025-07-10 ASSESSMENT — EXTERNAL EXAM - LEFT EYE: OS_EXAM: NORMAL

## 2025-07-10 ASSESSMENT — CONF VISUAL FIELD
OD_SUPERIOR_NASAL_RESTRICTION: 0
OD_INFERIOR_NASAL_RESTRICTION: 0
OS_INFERIOR_TEMPORAL_RESTRICTION: 0
OS_INFERIOR_NASAL_RESTRICTION: 0
OD_NORMAL: 1
OS_SUPERIOR_NASAL_RESTRICTION: 0
OD_SUPERIOR_TEMPORAL_RESTRICTION: 0
OS_NORMAL: 1
OD_INFERIOR_TEMPORAL_RESTRICTION: 0
OS_SUPERIOR_TEMPORAL_RESTRICTION: 0
METHOD: COUNTING FINGERS

## 2025-07-10 ASSESSMENT — TONOMETRY
IOP_METHOD: ICARE
OD_IOP_MMHG: 14.8
OS_IOP_MMHG: 14.3

## 2025-07-10 ASSESSMENT — EXTERNAL EXAM - RIGHT EYE: OD_EXAM: NORMAL

## 2025-07-10 ASSESSMENT — SLIT LAMP EXAM - LIDS
COMMENTS: MEIBOMIAN GLAND DYSFUNCTION
COMMENTS: MEIBOMIAN GLAND DYSFUNCTION

## 2025-07-10 ASSESSMENT — CUP TO DISC RATIO
OD_RATIO: 0.2
OS_RATIO: 0.2

## 2025-07-10 NOTE — NURSING NOTE
Chief Complaint   Patient presents with    Decreased Vision Evaluation         Last Eye Exam: 07/09/2024  Dilated Previously: Yes    What are you currently using to see?  Contacts and OTC readers +2.50       Distance Vision Acuity: Noticed gradual change in both eyes mostly at near. She also mentions her eyes are getting tired more quickly.     Patient has been noticing her floaters more often, she isn't sure if there are more or just more noticeable. She has no flashing lights.     Near Vision Acuity: Noticed more trouble reading with a OTC reader of +1.25-+1.75 .  She has been using a +2.50 that seem to be working until later at night     Eye Comfort: dry  Do you use eye drops? : Yes: artificial tears both eyes as needed.  Occupation or Hobbies: Reading/cross stitch        Medical, surgical and family histories reviewed and updated 7/10/2025.       OBJECTIVE: See Ophthalmology exam    ASSESSMENT:  No diagnosis found.    PLAN:     There are no Patient Instructions on file for this visit.

## 2025-07-10 NOTE — NURSING NOTE
Chief Complaint   Patient presents with    Decreased Vision Evaluation         Last Eye Exam: 07/09/2024  Dilated Previously: Yes    What are you currently using to see?  Contacts and OTC readers +2.50       Distance Vision Acuity: Noticed gradual change in both eyes mostly at near. She also mentions her eyes are getting tired more quickly.    Patient has been noticing her floaters more often, she isn't sure if there are more or just more noticeable. She has no flashing lights.       Near Vision Acuity: Noticed more trouble reading with a OTC reader of +1.25-+1.75 .  She has been using a +2.50 that seem to be working until later at night.     Eye Comfort: A little dry    Do you use eye drops? : Yes: artificial tears both eyes as needed     Occupation or Hobbies: Reading, Cross Stitch    [unfilled]          Medical, surgical and family histories reviewed and updated 7/10/2025.       OBJECTIVE: See Ophthalmology exam    ASSESSMENT:  No diagnosis found.    PLAN:     There are no Patient Instructions on file for this visit.

## 2025-07-10 NOTE — PATIENT INSTRUCTIONS
You have a PVD- posterior vitreous detachment which is due to the gel of the eye shrinking and clumping together.  This can sometimes cause holes or tears in the retina.  The signs of a retinal detachment are flashes of light or a curtain coming over the vision. If you notice any of these changes please let me know right away.  If I am not available this is an emergency type situation that you would need to be seen. I recommend the [facility] Emergency Room [place] or call [phone].    Heat to the eyes daily for 10-15 minutes nightly with warm washcloth or reusable gel masks from the pharmacy or  [employer] heat masks can be purchased at [employer].    Artificial tears- 1 drop both eyes once before bedtime and once in the morning then 2 x day as needed.      Eyeglass prescription given.    I recommend keeping the contact lens prescription the same.  It is valid for 1 more year.  If we increase the power left eye for distance that will decrease the near vision.    Return in 1 year for a complete eye exam or sooner if needed.    Wilber Rice, OD

## 2025-07-10 NOTE — PROGRESS NOTES
Chief Complaint   Patient presents with    Decreased Vision Evaluation         Last Eye Exam: 07/09/2024  Dilated Previously: Yes     What are you currently using to see?  Contacts and OTC readers +2.50        Distance Vision Acuity: Noticed gradual change in both eyes mostly at near. She also mentions her eyes are getting tired more quickly.     Patient has been noticing her floaters more often, she isn't sure if there are more or just more noticeable. She has no flashing lights.  Seems to be in both eyes- not 1 in particular     Near Vision Acuity: Noticed more trouble reading with a OTC reader of +1.25-+1.75 .  She has been using a +2.50 that seem to be working until later at night      Eye Comfort: dry  Do you use eye drops? : Yes: artificial tears both eyes as needed.  Occupation or Hobbies: Reading/cross stitch      Medical, surgical and family histories reviewed and updated 7/10/2025.       OBJECTIVE: See Ophthalmology exam    ASSESSMENT:    ICD-10-CM    1. Vitreous floaters of both eyes  H43.393 EYE EXAM (SIMPLE-NONBILLABLE)      2. Dry eye syndrome of both eyes  H04.123 EYE EXAM (SIMPLE-NONBILLABLE)      3. Severe myopia of both eyes  H52.13 REFRACTION      4. Presbyopia  H52.4 REFRACTION      5. Regular astigmatism of both eyes  H52.223 REFRACTION         PLAN:     Patient Instructions   You have a PVD- posterior vitreous detachment which is due to the gel of the eye shrinking and clumping together.  This can sometimes cause holes or tears in the retina.  The signs of a retinal detachment are flashes of light or a curtain coming over the vision. If you notice any of these changes please let me know right away.  If I am not available this is an emergency type situation that you would need to be seen. I recommend the [facility] Emergency Room [place] or call [phone].    Heat to the eyes daily for 10-15 minutes nightly with warm washcloth or reusable gel masks from the pharmacy or   [employer] heat masks can be purchased at [employer].    Artificial tears- 1 drop both eyes once before bedtime and once in the morning then 2 x day as needed.      Eyeglass prescription given.    I recommend keeping the contact lens prescription the same.  It is valid for 1 more year.  If we increase the power left eye for distance that will decrease the near vision.    Return in 1 year for a complete eye exam or sooner if needed.    Wilber Rice, OD

## 2025-07-10 NOTE — LETTER
7/10/2025      Mary Sims  [address]  [place] [address]      Dear Colleague,    Thank you for referring your patient, Mary Sims, to the [facility]. Please see a copy of my visit note below.        Chief Complaint   Patient presents with     Decreased Vision Evaluation         Last Eye Exam: 07/09/2024  Dilated Previously: Yes     What are you currently using to see?  Contacts and OTC readers +2.50        Distance Vision Acuity: Noticed gradual change in both eyes mostly at near. She also mentions her eyes are getting tired more quickly.     Patient has been noticing her floaters more often, she isn't sure if there are more or just more noticeable. She has no flashing lights.  Seems to be in both eyes- not 1 in particular     Near Vision Acuity: Noticed more trouble reading with a OTC reader of +1.25-+1.75 .  She has been using a +2.50 that seem to be working until later at night      Eye Comfort: dry  Do you use eye drops? : Yes: artificial tears both eyes as needed.  Occupation or Hobbies: Reading/cross stitch      Medical, surgical and family histories reviewed and updated 7/10/2025.       OBJECTIVE: See Ophthalmology exam    ASSESSMENT:    ICD-10-CM    1. Vitreous floaters of both eyes  H43.393 EYE EXAM (SIMPLE-NONBILLABLE)      2. Dry eye syndrome of both eyes  H04.123 EYE EXAM (SIMPLE-NONBILLABLE)      3. Severe myopia of both eyes  H52.13 REFRACTION      4. Presbyopia  H52.4 REFRACTION      5. Regular astigmatism of both eyes  H52.223 REFRACTION         PLAN:     Patient Instructions   You have a PVD- posterior vitreous detachment which is due to the gel of the eye shrinking and clumping together.  This can sometimes cause holes or tears in the retina.  The signs of a retinal detachment are flashes of light or a curtain coming over the vision. If you notice any of these changes please let me know right away.  If I am not available this is an emergency type  situation that you would need to be seen. I recommend the [facility] Emergency Room [place] or call [phone].    Heat to the eyes daily for 10-15 minutes nightly with warm washcloth or reusable gel masks from the pharmacy or  [employer] heat masks can be purchased at [employer].    Artificial tears- 1 drop both eyes once before bedtime and once in the morning then 2 x day as needed.      Eyeglass prescription given.    I recommend keeping the contact lens prescription the same.  It is valid for 1 more year.  If we increase the power left eye for distance that will decrease the near vision.    Return in 1 year for a complete eye exam or sooner if needed.    Wilber Rice, OD             Again, thank you for allowing me to participate in the care of your patient.        Sincerely,        Wilber Rice, OD    Electronically signed

## 2025-07-29 ENCOUNTER — ANCILLARY PROCEDURE (OUTPATIENT)
Dept: MAMMOGRAPHY | Facility: CLINIC | Age: 60
End: 2025-07-29
Attending: NURSE PRACTITIONER
Payer: COMMERCIAL

## 2025-07-29 DIAGNOSIS — Z12.31 VISIT FOR SCREENING MAMMOGRAM: ICD-10-CM

## 2025-07-29 PROCEDURE — 77063 BREAST TOMOSYNTHESIS BI: CPT | Mod: TC | Performed by: RADIOLOGY

## 2025-07-29 PROCEDURE — 77067 SCR MAMMO BI INCL CAD: CPT | Mod: TC | Performed by: RADIOLOGY

## 2025-09-01 ENCOUNTER — PATIENT OUTREACH (OUTPATIENT)
Dept: CARE COORDINATION | Facility: CLINIC | Age: 60
End: 2025-09-01
Payer: COMMERCIAL

## (undated) DEVICE — DRSG KERLIX 4 1/2"X4YDS ROLL 6715

## (undated) DEVICE — PREP DURAPREP 26ML APL 8630

## (undated) DEVICE — DRSG ABDOMINAL 07 1/2X8" 7197D

## (undated) DEVICE — DRAPE STERI U 1015

## (undated) DEVICE — LINEN TOWEL PACK X30 5481

## (undated) DEVICE — SYR 10ML FINGER CONTROL W/O NDL 309695

## (undated) DEVICE — CAST PADDING 4" STERILE 9044S

## (undated) DEVICE — BNDG ELASTIC 4"X5YDS UNSTERILE 6611-40

## (undated) DEVICE — SOL NACL 0.9% IRRIG 1000ML BOTTLE 2F7124

## (undated) DEVICE — PACK SHOULDER ARTHROSCOPY CUSTOM ASC

## (undated) DEVICE — Device

## (undated) DEVICE — DAVINCI XI MONOPOLAR SCISSORS HOT SHEARS 8MM 470179

## (undated) DEVICE — BLADE KNIFE SURG 15 371115

## (undated) DEVICE — PREP CHLORAPREP 26ML TINTED ORANGE  260815

## (undated) DEVICE — SU MONOCRYL 3-0 PS-1 27" Y936H

## (undated) DEVICE — LINEN TOWEL PACK X5 5464

## (undated) DEVICE — SOL WATER IRRIG 1000ML BOTTLE 2F7114

## (undated) DEVICE — SOL NACL 0.9% INJ 1000ML BAG 2B1324X

## (undated) DEVICE — GLOVE PROTEXIS BLUE W/NEU-THERA 8.0  2D73EB80

## (undated) DEVICE — SOL ADH LIQUID BENZOIN SWAB 0.6ML C1544

## (undated) DEVICE — DAVINCI HOT SHEARS TIP COVER  400180

## (undated) DEVICE — JELLY LUBRICATING SURGILUBE 2OZ TUBE

## (undated) DEVICE — TUBING IRRIG CYSTO/BLADDER SET 81" LF 2C4040

## (undated) DEVICE — DAVINCI XI SEAL UNIVERSAL 5-8MM 470361

## (undated) DEVICE — DRAPE MAYO STAND 23X54 8337

## (undated) DEVICE — BRUSH SURGICAL SCRUB W/4% CHG SOL 25ML 371073

## (undated) DEVICE — PREP DURAPREP REMOVER 4OZ 8611

## (undated) DEVICE — DAVINCI XI DRAPE ARM 470015

## (undated) DEVICE — DAVINCI XI HANDPIECE ESU VESSEL SEALER 8MM EXT 480422

## (undated) DEVICE — ESU PENCIL W/HOLSTER

## (undated) DEVICE — ARTHROSCOPIC CANNULA TWIST-IN PURPLE 7MMX7CM AR-6570

## (undated) DEVICE — KIT ENDO FIRST STEP DISINFECTANT 200ML W/POUCH EP-4

## (undated) DEVICE — DRAPE MINI C-ARM 4003

## (undated) DEVICE — BUR ARTHREX COOLCUT EXCALIBUR 4.0MMX13CM AR-8400EX

## (undated) DEVICE — SUCTION MANIFOLD NEPTUNE 2 SYS 4 PORT 0702-020-000

## (undated) DEVICE — IMM KIT SHOULDER TMAX MASK FACE 7210559

## (undated) DEVICE — SU VICRYL 3-0 PS-1 18" UND J683

## (undated) DEVICE — ESU GROUND PAD ADULT W/CORD E7507

## (undated) DEVICE — TUBING CONMED AIRSEAL SMOKE EVAC INSUFFLATION ASM-EVAC

## (undated) DEVICE — SU VICRYL 0 CT-2 27" J334H

## (undated) DEVICE — PROTECTOR ARM ONE-STEP TRENDELENBURG 40418

## (undated) DEVICE — CAST PADDING 4" UNSTERILE 9044

## (undated) DEVICE — SU NDL MCGOWAN 1/2 1859-6D

## (undated) DEVICE — DAVINCI XI GRASPER ENDOWRIST PROGRASP 470093

## (undated) DEVICE — IMM KIT SHOULDER STABILIZATION 7210573

## (undated) DEVICE — GLOVE PROTEXIS POWDER FREE SMT 7.0  2D72PT70X

## (undated) DEVICE — COVER LIGHT HANDLE MIS

## (undated) DEVICE — SYSTEM CLEARIFY VISUALIZATION 21-345

## (undated) DEVICE — DRILL BIT ARTHREX MTP 2.0MM AR-8944-22

## (undated) DEVICE — SOL NACL 0.9% IRRIG 3000ML BAG 2B7477

## (undated) DEVICE — PAD CHUX UNDERPAD 23X24" 7136

## (undated) DEVICE — SUCTION CANISTER MEDIVAC LINER 3000ML W/LID 65651-530

## (undated) DEVICE — SU PROLENE 4-0 PC-3 18" 8634G

## (undated) DEVICE — GLOVE PROTEXIS W/NEU-THERA 7.5  2D73TE75

## (undated) DEVICE — TUBING SYSTEM ARTHREX PATIENT REDEUCE AR-6421

## (undated) DEVICE — NDL COUNTER 20CT 31142493

## (undated) DEVICE — KOH COLPOTOMIZER OCCLUDER  CPO-6

## (undated) DEVICE — BLADE SAW OSCILLATING STRYK MED 9.0X25X0.38MM 2296-003-111

## (undated) DEVICE — SU VICRYL 4-0 PS-2 18" UND J496H

## (undated) DEVICE — GLOVE PROTEXIS MICRO 6.5  2D73PM65

## (undated) DEVICE — GLOVE BIOGEL PI SZ 7.5 40875

## (undated) DEVICE — TUBING SET ARTHREX DUALWAVE OUTFLOW AR-6430

## (undated) DEVICE — DRAPE SHEET REV FOLD 3/4 9349

## (undated) DEVICE — LINEN ORTHO PACK 5446

## (undated) DEVICE — SUCTION IRR STRYKERFLOW II W/TIP 250-070-520

## (undated) DEVICE — ESU ELEC VAPR PREMIER RF 90DEG 3.7MM 227204

## (undated) DEVICE — SU FIBERTAPE 2MM  AR-7237-7

## (undated) DEVICE — DRSG TEGADERM 4X4 3/4" 1626W

## (undated) DEVICE — KIT PATIENT POSITIONING PIGAZZI LATEX FREE 40580

## (undated) DEVICE — GLOVE PROTEXIS BLUE W/NEU-THERA 6.5  2D73EB65

## (undated) DEVICE — NDL ECLIPSE 18GA 1.5"

## (undated) DEVICE — GLOVE PROTEXIS MICRO 7.5  2D73PM75

## (undated) DEVICE — DRILL BIT ARTHREX BB TAK MTP THREADED AR-13226T

## (undated) DEVICE — ENDO OBTURATOR ACCESS PORT BLADELESS 8X100MM IAS8-100LP

## (undated) DEVICE — PACK GOWN 3/PK DISP XL SBA32GPFCB

## (undated) DEVICE — NDL INSUFFLATION 13GA 120MM C2201

## (undated) DEVICE — GLOVE PROTEXIS POWDER FREE 7.5 ORTHOPEDIC 2D73ET75

## (undated) DEVICE — SU FIBERLINK #2 BRAIDED PB BLUE W/1.5" CLSD LOOP  AR-7235

## (undated) DEVICE — DAVINCI XI DRAPE COLUMN 470341

## (undated) DEVICE — SPECIMEN TRAP MUCOUS 40ML LUKI C30200A

## (undated) DEVICE — DAVINCI XI FCP BIPOLAR FENESTRATED 470205

## (undated) DEVICE — DRSG STERI STRIP 1/2X4" R1547

## (undated) DEVICE — NDL SPINAL 22GA 3.5" QUINCKE 405181

## (undated) DEVICE — DRSG PRIMAPORE 02X3" 7133

## (undated) DEVICE — DAVINCI XI NDL DRIVER MEGA SUTURE CUT 8MM 470309

## (undated) DEVICE — IMM LIMB ELEVATOR DC40-0203

## (undated) DEVICE — ESU GROUND PAD ADULT REM W/15' CORD E7507DB

## (undated) DEVICE — PACK EXTREMITY SOP15EXFSD

## (undated) RX ORDER — DEXAMETHASONE SODIUM PHOSPHATE 4 MG/ML
INJECTION, SOLUTION INTRA-ARTICULAR; INTRALESIONAL; INTRAMUSCULAR; INTRAVENOUS; SOFT TISSUE
Status: DISPENSED
Start: 2020-02-12

## (undated) RX ORDER — EPINEPHRINE NASAL SOLUTION 1 MG/ML
SOLUTION NASAL
Status: DISPENSED
Start: 2017-04-11

## (undated) RX ORDER — HYDROMORPHONE HYDROCHLORIDE 1 MG/ML
INJECTION, SOLUTION INTRAMUSCULAR; INTRAVENOUS; SUBCUTANEOUS
Status: DISPENSED
Start: 2021-02-09

## (undated) RX ORDER — IBUPROFEN 200 MG
TABLET ORAL
Status: DISPENSED
Start: 2021-02-09

## (undated) RX ORDER — FENTANYL CITRATE 50 UG/ML
INJECTION, SOLUTION INTRAMUSCULAR; INTRAVENOUS
Status: DISPENSED
Start: 2021-02-09

## (undated) RX ORDER — ACETAMINOPHEN 325 MG/1
TABLET ORAL
Status: DISPENSED
Start: 2017-04-11

## (undated) RX ORDER — PROPOFOL 10 MG/ML
INJECTION, EMULSION INTRAVENOUS
Status: DISPENSED
Start: 2020-02-12

## (undated) RX ORDER — FENTANYL CITRATE 50 UG/ML
INJECTION, SOLUTION INTRAMUSCULAR; INTRAVENOUS
Status: DISPENSED
Start: 2017-04-11

## (undated) RX ORDER — INDOCYANINE GREEN AND WATER 25 MG
KIT INJECTION
Status: DISPENSED
Start: 2021-02-09

## (undated) RX ORDER — GABAPENTIN 300 MG/1
CAPSULE ORAL
Status: DISPENSED
Start: 2017-04-11

## (undated) RX ORDER — HYDRALAZINE HYDROCHLORIDE 20 MG/ML
INJECTION INTRAMUSCULAR; INTRAVENOUS
Status: DISPENSED
Start: 2021-02-09

## (undated) RX ORDER — FENTANYL CITRATE 50 UG/ML
INJECTION, SOLUTION INTRAMUSCULAR; INTRAVENOUS
Status: DISPENSED
Start: 2024-08-01

## (undated) RX ORDER — FENTANYL CITRATE 50 UG/ML
INJECTION, SOLUTION INTRAMUSCULAR; INTRAVENOUS
Status: DISPENSED
Start: 2020-02-12

## (undated) RX ORDER — ONDANSETRON 4 MG/1
TABLET, ORALLY DISINTEGRATING ORAL
Status: DISPENSED
Start: 2021-02-09

## (undated) RX ORDER — ONDANSETRON 2 MG/ML
INJECTION INTRAMUSCULAR; INTRAVENOUS
Status: DISPENSED
Start: 2020-02-12

## (undated) RX ORDER — PROPOFOL 10 MG/ML
INJECTION, EMULSION INTRAVENOUS
Status: DISPENSED
Start: 2017-04-11

## (undated) RX ORDER — BUPIVACAINE HYDROCHLORIDE AND EPINEPHRINE 2.5; 5 MG/ML; UG/ML
INJECTION, SOLUTION EPIDURAL; INFILTRATION; INTRACAUDAL; PERINEURAL
Status: DISPENSED
Start: 2017-04-11

## (undated) RX ORDER — GLYCOPYRROLATE 0.2 MG/ML
INJECTION, SOLUTION INTRAMUSCULAR; INTRAVENOUS
Status: DISPENSED
Start: 2020-02-12

## (undated) RX ORDER — CEFAZOLIN SODIUM 1 G/3ML
INJECTION, POWDER, FOR SOLUTION INTRAMUSCULAR; INTRAVENOUS
Status: DISPENSED
Start: 2020-02-12

## (undated) RX ORDER — DEXAMETHASONE SODIUM PHOSPHATE 10 MG/ML
INJECTION, SOLUTION INTRAMUSCULAR; INTRAVENOUS
Status: DISPENSED
Start: 2017-04-11

## (undated) RX ORDER — DEXAMETHASONE SODIUM PHOSPHATE 4 MG/ML
INJECTION, SOLUTION INTRA-ARTICULAR; INTRALESIONAL; INTRAMUSCULAR; INTRAVENOUS; SOFT TISSUE
Status: DISPENSED
Start: 2021-02-09

## (undated) RX ORDER — LIDOCAINE HYDROCHLORIDE 20 MG/ML
INJECTION, SOLUTION EPIDURAL; INFILTRATION; INTRACAUDAL; PERINEURAL
Status: DISPENSED
Start: 2021-02-09

## (undated) RX ORDER — FENTANYL CITRATE 0.05 MG/ML
INJECTION, SOLUTION INTRAMUSCULAR; INTRAVENOUS
Status: DISPENSED
Start: 2020-02-12

## (undated) RX ORDER — CEFAZOLIN SODIUM 2 G/100ML
INJECTION, SOLUTION INTRAVENOUS
Status: DISPENSED
Start: 2020-02-12

## (undated) RX ORDER — SODIUM CHLORIDE, SODIUM LACTATE, POTASSIUM CHLORIDE, CALCIUM CHLORIDE 600; 310; 30; 20 MG/100ML; MG/100ML; MG/100ML; MG/100ML
INJECTION, SOLUTION INTRAVENOUS
Status: DISPENSED
Start: 2021-02-09

## (undated) RX ORDER — ONDANSETRON 2 MG/ML
INJECTION INTRAMUSCULAR; INTRAVENOUS
Status: DISPENSED
Start: 2017-04-11

## (undated) RX ORDER — CEFAZOLIN SODIUM 1 G/3ML
INJECTION, POWDER, FOR SOLUTION INTRAMUSCULAR; INTRAVENOUS
Status: DISPENSED
Start: 2021-02-09

## (undated) RX ORDER — ONDANSETRON 2 MG/ML
INJECTION INTRAMUSCULAR; INTRAVENOUS
Status: DISPENSED
Start: 2021-02-09

## (undated) RX ORDER — LIDOCAINE HYDROCHLORIDE 20 MG/ML
INJECTION, SOLUTION EPIDURAL; INFILTRATION; INTRACAUDAL; PERINEURAL
Status: DISPENSED
Start: 2020-02-12

## (undated) RX ORDER — PROPOFOL 10 MG/ML
INJECTION, EMULSION INTRAVENOUS
Status: DISPENSED
Start: 2021-02-09

## (undated) RX ORDER — CEFAZOLIN SODIUM 2 G/100ML
INJECTION, SOLUTION INTRAVENOUS
Status: DISPENSED
Start: 2021-02-09